# Patient Record
Sex: MALE | Race: BLACK OR AFRICAN AMERICAN | Employment: FULL TIME | URBAN - METROPOLITAN AREA
[De-identification: names, ages, dates, MRNs, and addresses within clinical notes are randomized per-mention and may not be internally consistent; named-entity substitution may affect disease eponyms.]

---

## 2017-10-12 ENCOUNTER — HOSPITAL ENCOUNTER (EMERGENCY)
Age: 27
Discharge: HOME | End: 2017-10-12
Attending: EMERGENCY MEDICINE
Payer: SELF-PAY

## 2017-10-12 VITALS
HEART RATE: 91 BPM | DIASTOLIC BLOOD PRESSURE: 85 MMHG | RESPIRATION RATE: 16 BRPM | OXYGEN SATURATION: 100 % | TEMPERATURE: 98.4 F | SYSTOLIC BLOOD PRESSURE: 137 MMHG | HEIGHT: 69 IN | BODY MASS INDEX: 30.81 KG/M2 | WEIGHT: 208 LBS

## 2017-10-12 DIAGNOSIS — B02.9 HERPES ZOSTER WITHOUT COMPLICATION: Primary | ICD-10-CM

## 2017-10-12 PROCEDURE — 99283 EMERGENCY DEPT VISIT LOW MDM: CPT

## 2017-10-12 PROCEDURE — 74011250637 HC RX REV CODE- 250/637: Performed by: PHYSICIAN ASSISTANT

## 2017-10-12 RX ORDER — ACYCLOVIR 200 MG/1
800 CAPSULE ORAL ONCE
Status: COMPLETED | OUTPATIENT
Start: 2017-10-12 | End: 2017-10-12

## 2017-10-12 RX ORDER — ACYCLOVIR 800 MG/1
800 TABLET ORAL
Qty: 50 TAB | Refills: 0 | Status: SHIPPED | OUTPATIENT
Start: 2017-10-12 | End: 2017-10-22

## 2017-10-12 RX ADMIN — ACYCLOVIR 800 MG: 200 CAPSULE ORAL at 20:32

## 2017-10-12 NOTE — ED TRIAGE NOTES
Amb into ed w/ reports onset blistery rash to R neck and R chest area 2 days ago - w/ irritation noted before rash broke out.  + itchiness/+ pain w/ r sided h/a.

## 2017-10-13 NOTE — ED PROVIDER NOTES
HPI Comments: 8:25 PM    Kassie Tuttle is a 28 y.o. male presenting to the ED C/O gradually worsening rash to the right side of his neck and right chest onset ~2 days ago. Pt describes the rash as painful, blistery, and itchy. Pain rated 5/10. Pt has had chicken pox in the past. Pt suspects he may have shingles. No facial involvement. Pt has no known allergies and no medical problems. Pt denies any other symptoms or complaints. Written by GERARD Vasquez, as dictated by Petros Moreira PA-C     Patient is a 28 y.o. male presenting with rash. The history is provided by the patient. No  was used. Rash    This is a new problem. The current episode started 2 days ago. The problem has been gradually worsening. There has been no fever. The rash is present on the chest and neck. The pain is at a severity of 5/10. Associated symptoms include blisters, itching and pain. History reviewed. No pertinent past medical history. History reviewed. No pertinent surgical history. History reviewed. No pertinent family history. Social History     Social History    Marital status: SINGLE     Spouse name: N/A    Number of children: N/A    Years of education: N/A     Occupational History    Not on file. Social History Main Topics    Smoking status: Current Every Day Smoker     Packs/day: 0.25    Smokeless tobacco: Never Used    Alcohol use Yes      Comment: socially    Drug use: No    Sexual activity: Not on file     Other Topics Concern    Not on file     Social History Narrative         ALLERGIES: Review of patient's allergies indicates no known allergies. Review of Systems   HENT: Negative for facial swelling. Respiratory: Negative for shortness of breath. Musculoskeletal: Positive for neck pain. Skin: Positive for color change, itching and rash (Right side of neck & right chest with pain, blistering, and itchiness). Negative for wound.    Hematological: Negative for adenopathy. Vitals:    10/12/17 1958   BP: 137/85   Pulse: 91   Resp: 16   Temp: 98.4 °F (36.9 °C)   SpO2: 100%   Weight: 94.3 kg (208 lb)   Height: 5' 9\" (1.753 m)            Physical Exam   Constitutional: He is oriented to person, place, and time. He appears well-developed and well-nourished. No distress. Male in NAD. Alert. Appears comfortable. In hallway bed   HENT:   Head: Normocephalic and atraumatic. Head is without right periorbital erythema and without left periorbital erythema. Right Ear: External ear normal.   Left Ear: External ear normal.   Nose: Nose normal.   Mouth/Throat: Uvula is midline, oropharynx is clear and moist and mucous membranes are normal. No oral lesions. No trismus in the jaw. Eyes: Conjunctivae are normal. Right eye exhibits no discharge. Left eye exhibits no discharge. No scleral icterus. Neck: Normal range of motion. Cardiovascular: Normal rate, regular rhythm, normal heart sounds and intact distal pulses. Exam reveals no gallop and no friction rub. No murmur heard. Pulmonary/Chest: Effort normal and breath sounds normal. No accessory muscle usage. No tachypnea. No respiratory distress. He has no decreased breath sounds. He has no wheezes. He has no rhonchi. He has no rales. Abdominal: Bowel sounds are normal.   Musculoskeletal: Normal range of motion. Neurological: He is alert and oriented to person, place, and time. Skin: Skin is warm and dry. Rash noted. Rash is vesicular. He is not diaphoretic. There is erythema. Psychiatric: He has a normal mood and affect. Judgment normal.   Nursing note and vitals reviewed. RESULTS:    No orders to display        Labs Reviewed - No data to display    No results found for this or any previous visit (from the past 12 hour(s)). MDM  Number of Diagnoses or Management Options  Herpes zoster without complication:   Diagnosis management comments: Zoster, tinea, eczema, dermatitis, scabies.  Doubt cellulitis. ED Course     Medications   acyclovir (ZOVIRAX) capsule 800 mg (800 mg Oral Given 10/12/17 2032)      Procedures    PROGRESS NOTE:  8:25 PM  Initial assessment performed. Most c/w zoster. Will tx sxs. No nasociliary involvement. Reasons to RTED discussed with pt. All questions answered. Pt feels comfortable going home at this time. Pt expressed understanding and he agrees with plan. DISCHARGE NOTE:   8:30 PM  Pt has been reexamined. Patient has no new complaints, changes, or physical findings. Care plan outlined and precautions discussed. Results were reviewed with the patient. All medications were reviewed with the patient; will d/c home with Zovirax. All of pt's questions and concerns were addressed. Patient was instructed and agrees to follow up with [facility], as well as to return to the ED upon further deterioration. Patient is ready to go home. CLINICAL IMPRESSION:    1. Herpes zoster without complication        PLAN: DISCHARGE HOME    Follow-up Information     Follow up With Details Comments Contact Info    [facility] CLINIC Schedule an appointment as soon as possible for a visit in 2 days For primary care follow [place] [id number] [facility], [address] [facility] [address] [facility]    [facility] EMERGENCY DEPT  As needed, If symptoms worsen [address] [facility] [address]  [phone]          Discharge Medication List as of 10/12/2017  8:33 PM          ATTESTATION:  This note is prepared by Verline Meckel, acting as Scribe for Letitia Rivera PA-C. Letitia Rivera PA-C: The scribe's documentation has been prepared under my direction and personally reviewed by me in its entirety. I confirm that the note above accurately reflects all work, treatment, procedures, and medical decision making performed by me.

## 2017-10-13 NOTE — DISCHARGE INSTRUCTIONS

## 2017-10-31 ENCOUNTER — HOSPITAL ENCOUNTER (EMERGENCY)
Age: 27
Discharge: HOME | End: 2017-10-31
Attending: EMERGENCY MEDICINE
Payer: SELF-PAY

## 2017-10-31 VITALS
HEART RATE: 73 BPM | BODY MASS INDEX: 30.51 KG/M2 | SYSTOLIC BLOOD PRESSURE: 132 MMHG | DIASTOLIC BLOOD PRESSURE: 93 MMHG | WEIGHT: 206 LBS | OXYGEN SATURATION: 100 % | TEMPERATURE: 98.2 F | HEIGHT: 69 IN | RESPIRATION RATE: 16 BRPM

## 2017-10-31 DIAGNOSIS — B02.8 HERPES ZOSTER WITH OTHER COMPLICATION: Primary | ICD-10-CM

## 2017-10-31 PROCEDURE — 99281 EMR DPT VST MAYX REQ PHY/QHP: CPT

## 2017-10-31 RX ORDER — HYDROCODONE BITARTRATE AND ACETAMINOPHEN 5; 325 MG/1; MG/1
1 TABLET ORAL
Qty: 10 TAB | Refills: 0 | Status: SHIPPED | OUTPATIENT
Start: 2017-10-31 | End: 2018-02-02

## 2017-10-31 NOTE — DISCHARGE INSTRUCTIONS
Take Vicodin for severe pain otherwise Tylenol or Motrin   Return to the ED for severe increase in pain, worsening rash, further rash near the face or the eye or worsening of symptoms  Follow up as directed

## 2017-10-31 NOTE — LETTER
[facility] [place] 
[facility] EMERGENCY DEPT 
[address] [address] 
[phone] Work/School Note Date: 10/31/2017 To Whom It May concern: 
 
Carolynn Javier was seen and treated today in the emergency room by the following provider(s): 
Attending Provider: Vero Mcallister MD 
Nurse Practitioner: Micky Desouza NP. Carolynn Javier may return to work on in 1-2 days;. Sincerely, Micky Desouza NP

## 2017-10-31 NOTE — ED PROVIDER NOTES
[address]  EMERGENCY DEPARTMENT HISTORY AND PHYSICAL EXAM       Date: 10/31/2017   Patient Name: Mary Handley   YOB: 1990  Medical Record Number: [id number]    History of Presenting Illness     Chief Complaint   Patient presents with    Rash        History Provided By:  Patient     Additional History: 12:36 PM  Mary Handley is a 28 y.o. male who presents to the emergency department C/O a gradually improving rash to his right chest and shoulder onset ~3 weeks ago. Pain rated 8/10. Associated Sx include burning and itching to the affected area. Pt reports he was seen here and diagnosed with singles ~2.5 weeks ago but that the pain has continued after antivirals. Pt has no known allergies. Pt denies any other Sx or complaints at this time. Primary Care Provider: None   Specialist:    Past History     Past Medical History:   No past medical history on file. Past Surgical History:   No past surgical history on file. Family History:   No family history on file. Social History:   Social History   Substance Use Topics    Smoking status: Current Every Day Smoker     Packs/day: 0.25    Smokeless tobacco: Never Used    Alcohol use Yes      Comment: socially        Allergies:   No Known Allergies     Review of Systems   Review of Systems   Skin: Positive for rash (Right chest and shoulder with pain, burning, and itching). All other systems reviewed and are negative. Physical Exam  Vitals:    10/31/17 1230   BP: (!) 132/93   Pulse: 73   Resp: 16   Temp: 98.2 °F (36.8 °C)   SpO2: 100%   Weight: 93.4 kg (206 lb)   Height: 5' 9\" (1.753 m)       Physical Exam   Constitutional: He is oriented to person, place, and time. He appears well-developed and well-nourished. HENT:   Head: Normocephalic. Eyes: Conjunctivae are normal.   Neck: Normal range of motion. Pulmonary/Chest: Effort normal.   Musculoskeletal: Normal range of motion.    Neurological: He is alert and oriented to person, place, and time. Skin: Skin is warm and dry. Rash noted. Rash is vesicular. There is erythema. Nursing note and vitals reviewed. Impression: Patient c/o continued neuropathic pain but admits the rash has gotten much better since antivirals. Patient given limited amount of norco for severe pain. He understands reasons to return to the ED and is offering no questions or concerns at this time. Diagnostic Study Results     Labs -    No results found for this or any previous visit (from the past 12 hour(s)). Radiologic Studies -  The following have been ordered and reviewed:  No orders to display       Medical Decision Making   I am the first provider for this patient. I reviewed the vital signs, available nursing notes, past medical history, past surgical history, family history and social history. Vital Signs-Reviewed the patient's vital signs. Patient Vitals for the past 12 hrs:   Temp Pulse Resp BP SpO2   10/31/17 1230 98.2 °F (36.8 °C) 73 16 (!) 132/93 100 %     Pulse Oximetry Analysis - Normal 100% on room air     Procedures:   Procedures    ED Course:  12:36 PM  Initial assessment performed. The patients presenting problems have been discussed, and they are in agreement with the care plan formulated and outlined with them. I have encouraged them to ask questions as they arise throughout their visit. Medications Given in the ED:  Medications - No data to display    Discharge Note:  12:47 PM  Pt has been reexamined. Patient has no new complaints, changes, or physical findings. Care plan outlined and precautions discussed. All medications were reviewed with the patient; will d/c home with [place]. All of pt's questions and concerns were addressed. Patient was instructed and agrees to follow up with [facility], as well as to return to the ED upon further deterioration. Patient is ready to go home. Diagnosis   Clinical Impression:   1.  Herpes zoster with other complication           Follow-up Information     Follow up With Details Comments Contact Info    [facility] CLINIC Schedule an appointment as soon as possible for a visit in 2 days For primary care follow up [address] [facility], [address] [facility] [address]    [facility] EMERGENCY DEPT  As needed, If symptoms worsen [address] [place]  [phone]          Current Discharge Medication List      START taking these medications    Details   HYDROcodone-acetaminophen (NORCO) 5-325 mg per tablet Take 1 Tab by mouth every four (4) hours as needed for Pain. Max Daily Amount: 6 Tabs. Qty: 10 Tab, Refills: 0             _______________________________   Attestations: This note is prepared by Arthur Cam, acting as a Scribe for CANDE Arciniega on 12:30 PM on 10/31/2017. CANDE Arciniega: The scribe's documentation has been prepared under my direction and personally reviewed by me in its entirety.   _______________________________

## 2017-10-31 NOTE — ED TRIAGE NOTES
C/o rash, seen here on 10/12/17 dx'd with shingles, states he con't to have pain, burning, itching, denies having f/u.

## 2018-02-02 ENCOUNTER — HOSPITAL ENCOUNTER (EMERGENCY)
Age: 28
Discharge: HOME | End: 2018-02-02
Attending: EMERGENCY MEDICINE
Payer: SELF-PAY

## 2018-02-02 VITALS
RESPIRATION RATE: 18 BRPM | HEART RATE: 100 BPM | BODY MASS INDEX: 32.58 KG/M2 | TEMPERATURE: 99.6 F | HEIGHT: 68 IN | OXYGEN SATURATION: 99 % | DIASTOLIC BLOOD PRESSURE: 81 MMHG | SYSTOLIC BLOOD PRESSURE: 140 MMHG | WEIGHT: 215 LBS

## 2018-02-02 DIAGNOSIS — B34.9 VIRAL ILLNESS: Primary | ICD-10-CM

## 2018-02-02 PROCEDURE — 87081 CULTURE SCREEN ONLY: CPT | Performed by: EMERGENCY MEDICINE

## 2018-02-02 PROCEDURE — 99282 EMERGENCY DEPT VISIT SF MDM: CPT

## 2018-02-02 RX ORDER — ONDANSETRON 4 MG/1
4 TABLET, ORALLY DISINTEGRATING ORAL
Qty: 12 TAB | Refills: 0 | Status: SHIPPED | OUTPATIENT
Start: 2018-02-02 | End: 2020-01-01

## 2018-02-02 NOTE — DISCHARGE INSTRUCTIONS
Viral Infections: Care Instructions  Your Care Instructions    You don't feel well, but it's not clear what's causing it. You may have a viral infection. Viruses cause many illnesses, such as the common cold, influenza, fever, rashes, and the diarrhea, nausea, and vomiting that are often called \"stomach flu. \" You may wonder if antibiotic medicines could make you feel better. But antibiotics only treat infections caused by bacteria. They don't work on viruses. The good news is that viral infections usually aren't serious. Most will go away in a few days without medical treatment. In the meantime, there are a few things you can do to make yourself more comfortable. Follow-up care is a key part of your treatment and safety. Be sure to make and go to all appointments, and call your doctor if you are having problems. It's also a good idea to know your test results and keep a list of the medicines you take. How can you care for yourself at home? · Get plenty of rest if you feel tired. · Take an over-the-counter pain medicine if needed, such as acetaminophen (Tylenol), ibuprofen (Advil, Motrin), or naproxen (Aleve). Read and follow all instructions on the label. · Be careful when taking over-the-counter cold or flu medicines and Tylenol at the same time. Many of these medicines have acetaminophen, which is Tylenol. Read the labels to make sure that you are not taking more than the recommended dose. Too much acetaminophen (Tylenol) can be harmful. · Drink plenty of fluids, enough so that your urine is light yellow or clear like water. If you have kidney, heart, or liver disease and have to limit fluids, talk with your doctor before you increase the amount of fluids you drink. · Stay home from work, school, and other public places while you have a fever. When should you call for help? Call 911 anytime you think you may need emergency care. For example, call if:  ? · You have severe trouble breathing.    ? · You passed out (lost consciousness). ?Call your doctor now or seek immediate medical care if:  ? · You seem to be getting much sicker. ? · You have a new or higher fever. ? · You have blood in your stools. ? · You have new belly pain, or your pain gets worse. ? · You have a new rash. ? Watch closely for changes in your health, and be sure to contact your doctor if:  ? · You start to get better and then get worse. ? · You do not get better as expected. Where can you learn more? Go to http://garry-tejinder.info/. Enter L190 in the search box to learn more about \"Viral Infections: Care Instructions. \"  Current as of: March 3, 2017  Content Version: 11.4  © 9983-6744 [employer] Care instructions adapted under license by [employer] (which disclaims liability or warranty for this information). If you have questions about a medical condition or this instruction, always ask your healthcare professional. [address] any warranty or liability for your use of this information.

## 2018-02-02 NOTE — ED PROVIDER NOTES
EMERGENCY DEPARTMENT HISTORY AND PHYSICAL EXAM    Date: 2/2/2018  Patient Name: Jovana Pelletier    History of Presenting Illness     Chief Complaint   Patient presents with    Nausea    Vomiting    Diarrhea         History Provided By: Patient    Chief Complaint: URI like sxs  Duration: 1.5 Weeks  Timing:  Progressive  Modifying Factors: Zofran with relief  Associated Symptoms: lightheadedness, dizziness, subjective fever, diaphoresis, headache, sore throat, mild abdominal pain, and generalized body aches    Additional History (Context):   3:49 PM   Jovana Pelletier is a 28 y.o. male who presents to the emergency department C/O URI like sxs starting 1.5 weeks ago. Sxs started with dizziness and lightheadedness and progressed to include subjective fever, diaphoresis, headache, sore throat, hoarse voice, mild abdominal pain, nausea, vomiting, diarrhea, and generalized body aches. Pt has taken Zofran with some relief. Reports recent ill contacts: mother. Pt denies congestion, cough, hematochezia, and any other sxs or complaints. PCP: None        Past History     Past Medical History:  History reviewed. No pertinent past medical history. Past Surgical History:  History reviewed. No pertinent surgical history. Family History:  History reviewed. No pertinent family history. Social History:  Social History   Substance Use Topics    Smoking status: Current Every Day Smoker     Packs/day: 0.25    Smokeless tobacco: Never Used    Alcohol use Yes      Comment: socially       Allergies:  No Known Allergies      Review of Systems   Review of Systems   Constitutional: Positive for diaphoresis and fever (subjective). HENT: Positive for sore throat and voice change (hoarse). Negative for congestion. Respiratory: Negative for cough. Gastrointestinal: Positive for abdominal pain (mild ), diarrhea, nausea and vomiting. Negative for blood in stool.    Musculoskeletal: Positive for myalgias (generalized body aches). All other systems reviewed and are negative. Physical Exam     Vitals:    02/02/18 1515   BP: 140/81   Pulse: 100   Resp: 18   Temp: 99.6 °F (37.6 °C)   SpO2: 99%   Weight: 97.5 kg (215 lb)   Height: 5' 8\" (1.727 m)     Physical Exam   Nursing note and vitals reviewed. Vital signs and nursing notes reviewed. CONSTITUTIONAL: Alert. Well-appearing; well-nourished; in no apparent distress. HEAD: Normocephalic; atraumatic. EYES: PERRL; Conjunctiva clear. ENT: TM's normal. External ear normal. Normal nose; no rhinorrhea. Normal pharynx. Tonsils not enlarged without exudate. Moist mucus membranes. NECK: Supple; FROM without difficulty, non-tender; no cervical lymphadenopathy. CV: Normal S1, S2; no murmurs, rubs, or gallops. No chest wall tenderness. RESPIRATORY: Normal chest excursion with respiration; breath sounds clear and equal bilaterally; no wheezes, rhonchi, or rales. GI: Normal bowel sounds; non-distended; non-tender; no guarding or rigidity; no palpable organomegaly. No CVA tenderness. EXT: Normal ROM in all four extremities; non-tender to palpation. SKIN: Normal for age and race; warm; dry; good turgor; no apparent lesions or exudate. NEURO: A & O x3. PSYCH:  Mood and affect appropriate. Diagnostic Study Results     Labs -   No results found for this or any previous visit (from the past 12 hour(s)). Radiologic Studies -   No orders to display     CT Results  (Last 48 hours)    None        CXR Results  (Last 48 hours)    None          Medications given in the ED-  Medications - No data to display      Medical Decision Making   I am the first provider for this patient. I reviewed the vital signs, available nursing notes, past medical history, past surgical history, family history and social history. Vital Signs-Reviewed the patient's vital signs.     Pulse Oximetry Analysis - 99% on room air     Records Reviewed: Nursing Notes      Procedures:  Procedures    ED Course:   3:49 PM Initial assessment performed. The patients presenting problems have been discussed, and they are in agreement with the care plan formulated and outlined with them. I have encouraged them to ask questions as they arise throughout their visit. Diagnosis and Disposition       DISCHARGE NOTE:  4:07 PM   Sergey Lozada's  results have been reviewed with him. He has been counseled regarding his diagnosis, treatment, and plan. He verbally conveys understanding and agreement of the signs, symptoms, diagnosis, treatment and prognosis and additionally agrees to follow up as discussed. He also agrees with the care-plan and conveys that all of his questions have been answered. I have also provided discharge instructions for him that include: educational information regarding their diagnosis and treatment, and list of reasons why they would want to return to the ED prior to their follow-up appointment, should his condition change. He has been provided with education for proper emergency department utilization. CLINICAL IMPRESSION:    1. Viral illness        PLAN:  1. D/C Home  2. Discharge Medication List as of 2/2/2018  4:08 PM      START taking these medications    Details   ondansetron (ZOFRAN ODT) 4 mg disintegrating tablet Take 1 Tab by mouth every eight (8) hours as needed for Nausea., Normal, Disp-12 Tab, R-0           3. Follow-up Information     Follow up With Details Comments Contact Info    [facility] CLINIC Schedule an appointment as soon as possible for a visit in 2 days For primary care follow up at [facility] [address] [facility], [address] [facility] [address] [facility] [address]    [facility] EMERGENCY DEPT  As needed, If symptoms worsen [address] [address] [phone]        _______________________________    Attestations:   This note is prepared by Marissa Salgado, acting as Scribe for [employer], [employer].    [employer]:  The scribe's documentation has been prepared under my direction and personally reviewed by me in its entirety.   I confirm that the note above accurately reflects all work, treatment, procedures, and medical decision making performed by me.  _______________________________

## 2018-02-02 NOTE — ED TRIAGE NOTES
Patient with complaints of nausea, vomiting and diarrhea x1 week. Patient states he has been able to eat and keep fluids down.

## 2018-02-02 NOTE — ED NOTES
See scanned documents for pt signing that he rec'd discharge instructions. Pt given discharge instructions by AUDI Domínguez. Pt discharged home ambulatory. No distress noted.

## 2018-02-04 LAB
B-HEM STREP THROAT QL CULT: NEGATIVE
B-HEM STREP THROAT QL CULT: NORMAL
BACTERIA SPEC CULT: NORMAL
SERVICE CMNT-IMP: NORMAL

## 2018-03-08 ENCOUNTER — HOSPITAL ENCOUNTER (EMERGENCY)
Age: 28
Discharge: HOME | End: 2018-03-08
Attending: INTERNAL MEDICINE
Payer: SELF-PAY

## 2018-03-08 VITALS
TEMPERATURE: 98 F | RESPIRATION RATE: 16 BRPM | BODY MASS INDEX: 28.79 KG/M2 | HEART RATE: 100 BPM | HEIGHT: 68 IN | WEIGHT: 190 LBS | SYSTOLIC BLOOD PRESSURE: 131 MMHG | DIASTOLIC BLOOD PRESSURE: 92 MMHG | OXYGEN SATURATION: 100 %

## 2018-03-08 DIAGNOSIS — D72.819 LEUKOPENIA, UNSPECIFIED TYPE: ICD-10-CM

## 2018-03-08 DIAGNOSIS — N39.0 URINARY TRACT INFECTION WITHOUT HEMATURIA, SITE UNSPECIFIED: ICD-10-CM

## 2018-03-08 DIAGNOSIS — R21 RASH: Primary | ICD-10-CM

## 2018-03-08 LAB
ALBUMIN SERPL-MCNC: 2.6 G/DL (ref 3.4–5)
ALBUMIN/GLOB SERPL: 0.4 {RATIO} (ref 0.8–1.7)
ALP SERPL-CCNC: 143 U/L (ref 45–117)
ALT SERPL-CCNC: 26 U/L (ref 16–61)
ANION GAP SERPL CALC-SCNC: 6 MMOL/L (ref 3–18)
APPEARANCE UR: CLEAR
AST SERPL-CCNC: 20 U/L (ref 15–37)
BACTERIA URNS QL MICRO: ABNORMAL /HPF
BASOPHILS # BLD: 0 K/UL (ref 0–0.06)
BASOPHILS NFR BLD: 0 % (ref 0–2)
BILIRUB SERPL-MCNC: 0.2 MG/DL (ref 0.2–1)
BILIRUB UR QL: NEGATIVE
BUN SERPL-MCNC: 9 MG/DL (ref 7–18)
BUN/CREAT SERPL: 7 (ref 12–20)
CALCIUM SERPL-MCNC: 8.4 MG/DL (ref 8.5–10.1)
CHLORIDE SERPL-SCNC: 102 MMOL/L (ref 100–108)
CO2 SERPL-SCNC: 31 MMOL/L (ref 21–32)
COLOR UR: YELLOW
CREAT SERPL-MCNC: 1.21 MG/DL (ref 0.6–1.3)
DIFFERENTIAL METHOD BLD: ABNORMAL
EOSINOPHIL # BLD: 0.1 K/UL (ref 0–0.4)
EOSINOPHIL NFR BLD: 1 % (ref 0–5)
EPITH CASTS URNS QL MICRO: ABNORMAL /LPF (ref 0–5)
ERYTHROCYTE [DISTWIDTH] IN BLOOD BY AUTOMATED COUNT: 13.9 % (ref 11.6–14.5)
GLOBULIN SER CALC-MCNC: 6.7 G/DL (ref 2–4)
GLUCOSE SERPL-MCNC: 82 MG/DL (ref 74–99)
GLUCOSE UR STRIP.AUTO-MCNC: NEGATIVE MG/DL
HCT VFR BLD AUTO: 31.8 % (ref 36–48)
HGB BLD-MCNC: 10.4 G/DL (ref 13–16)
HGB UR QL STRIP: NEGATIVE
KETONES UR QL STRIP.AUTO: ABNORMAL MG/DL
LEUKOCYTE ESTERASE UR QL STRIP.AUTO: ABNORMAL
LYMPHOCYTES # BLD: 1.3 K/UL (ref 0.9–3.6)
LYMPHOCYTES NFR BLD: 29 % (ref 21–52)
MCH RBC QN AUTO: 27.7 PG (ref 24–34)
MCHC RBC AUTO-ENTMCNC: 32.7 G/DL (ref 31–37)
MCV RBC AUTO: 84.8 FL (ref 74–97)
MONOCYTES # BLD: 0.4 K/UL (ref 0.05–1.2)
MONOCYTES NFR BLD: 9 % (ref 3–10)
NEUTS SEG # BLD: 2.6 K/UL (ref 1.8–8)
NEUTS SEG NFR BLD: 61 % (ref 40–73)
NITRITE UR QL STRIP.AUTO: NEGATIVE
PH UR STRIP: 6 [PH] (ref 5–8)
PLATELET # BLD AUTO: 337 K/UL (ref 135–420)
PMV BLD AUTO: 9.1 FL (ref 9.2–11.8)
POTASSIUM SERPL-SCNC: 4.2 MMOL/L (ref 3.5–5.5)
PROT SERPL-MCNC: 9.3 G/DL (ref 6.4–8.2)
PROT UR STRIP-MCNC: 30 MG/DL
RBC # BLD AUTO: 3.75 M/UL (ref 4.7–5.5)
RBC #/AREA URNS HPF: NEGATIVE /HPF (ref 0–5)
SODIUM SERPL-SCNC: 139 MMOL/L (ref 136–145)
SP GR UR REFRACTOMETRY: >1.03 (ref 1–1.03)
UROBILINOGEN UR QL STRIP.AUTO: 1 EU/DL (ref 0.2–1)
WBC # BLD AUTO: 4.3 K/UL (ref 4.6–13.2)
WBC URNS QL MICRO: ABNORMAL /HPF (ref 0–5)

## 2018-03-08 PROCEDURE — 86592 SYPHILIS TEST NON-TREP QUAL: CPT | Performed by: PHYSICIAN ASSISTANT

## 2018-03-08 PROCEDURE — 74011250636 HC RX REV CODE- 250/636: Performed by: PHYSICIAN ASSISTANT

## 2018-03-08 PROCEDURE — 86780 TREPONEMA PALLIDUM: CPT | Performed by: PHYSICIAN ASSISTANT

## 2018-03-08 PROCEDURE — 81001 URINALYSIS AUTO W/SCOPE: CPT | Performed by: PHYSICIAN ASSISTANT

## 2018-03-08 PROCEDURE — 74011250637 HC RX REV CODE- 250/637: Performed by: PHYSICIAN ASSISTANT

## 2018-03-08 PROCEDURE — 87491 CHLMYD TRACH DNA AMP PROBE: CPT | Performed by: PHYSICIAN ASSISTANT

## 2018-03-08 PROCEDURE — 80053 COMPREHEN METABOLIC PANEL: CPT | Performed by: PHYSICIAN ASSISTANT

## 2018-03-08 PROCEDURE — 96372 THER/PROPH/DIAG INJ SC/IM: CPT

## 2018-03-08 PROCEDURE — 87086 URINE CULTURE/COLONY COUNT: CPT | Performed by: PHYSICIAN ASSISTANT

## 2018-03-08 PROCEDURE — 87040 BLOOD CULTURE FOR BACTERIA: CPT | Performed by: PHYSICIAN ASSISTANT

## 2018-03-08 PROCEDURE — 99284 EMERGENCY DEPT VISIT MOD MDM: CPT

## 2018-03-08 PROCEDURE — 85025 COMPLETE CBC W/AUTO DIFF WBC: CPT | Performed by: PHYSICIAN ASSISTANT

## 2018-03-08 PROCEDURE — 86593 SYPHILIS TEST NON-TREP QUANT: CPT | Performed by: PHYSICIAN ASSISTANT

## 2018-03-08 RX ORDER — SULFAMETHOXAZOLE AND TRIMETHOPRIM 800; 160 MG/1; MG/1
2 TABLET ORAL 2 TIMES DAILY
Qty: 40 TAB | Refills: 0 | Status: SHIPPED | OUTPATIENT
Start: 2018-03-08 | End: 2018-03-18

## 2018-03-08 RX ORDER — SULFAMETHOXAZOLE AND TRIMETHOPRIM 800; 160 MG/1; MG/1
2 TABLET ORAL
Status: COMPLETED | OUTPATIENT
Start: 2018-03-08 | End: 2018-03-08

## 2018-03-08 RX ADMIN — SULFAMETHOXAZOLE AND TRIMETHOPRIM 2 TABLET: 800; 160 TABLET ORAL at 22:27

## 2018-03-08 RX ADMIN — PENICILLIN G BENZATHINE 2.4 MILLION UNITS: 1200000 INJECTION, SUSPENSION INTRAMUSCULAR at 20:15

## 2018-03-09 LAB
RPR SER QL: REACTIVE
RPR SER-TITR: NORMAL {TITER}

## 2018-03-09 NOTE — ED TRIAGE NOTES
Pt reports to ED c/c \"sores all over my body\" onset 1 month ago. Pt appears to have multiple lesions and raised areas on palms of hands, bilateral arms, posterior and anterior trunk, also face.

## 2018-03-09 NOTE — DISCHARGE INSTRUCTIONS
Urinary Tract Infections in Men: Care Instructions  Your Care Instructions    A urinary tract infection, or UTI, is a general term for an infection anywhere between the kidneys and the tip of the penis. UTIs can also be a result of a prostate problem. Most cause pain or burning when you urinate. Most UTIs are caused by bacteria and can be cured with antibiotics. It is important to complete your treatment so that the infection does not get worse. Follow-up care is a key part of your treatment and safety. Be sure to make and go to all appointments, and call your doctor if you are having problems. It's also a good idea to know your test results and keep a list of the medicines you take. How can you care for yourself at home? · Take your antibiotics as prescribed. Do not stop taking them just because you feel better. You need to take the full course of antibiotics. · Take your medicines exactly as prescribed. Your doctor may have prescribed a medicine, such as phenazopyridine (Pyridium), to help relieve pain when you urinate. This turns your urine orange. You may stop taking it when your symptoms get better. But be sure to take all of your antibiotics, which treat the infection. · Drink extra water for the next day or two. This will help make the urine less concentrated and help wash out the bacteria causing the infection. (If you have kidney, heart, or liver disease and have to limit your fluids, talk with your doctor before you increase your fluid intake.)  · Avoid drinks that are carbonated or have caffeine. They can irritate the bladder. · Urinate often. Try to empty your bladder each time. · To relieve pain, take a hot bath or lay a heating pad (set on low) over your lower belly or genital area. Never go to sleep with a heating pad in place. To help prevent UTIs  · Drink plenty of fluids, enough so that your urine is light yellow or clear like water.  If you have kidney, heart, or liver disease and have to limit fluids, talk with your doctor before you increase the amount of fluids you drink. · Urinate when you have the urge. Do not hold your urine for a long time. Urinate before you go to sleep. · Keep your penis clean. Catheter care  If you have a drainage tube (catheter) in place, the following steps will help you care for it. · Always wash your hands before and after touching your catheter. · Check the area around the urethra for inflammation or signs of infection. Signs of infection include irritated, swollen, red, or tender skin, or pus around the catheter. · Clean the area around the catheter with soap and water two times a day. Dry with a clean towel afterward. · Do not apply powder or lotion to the skin around the catheter. To empty the urine collection bag  · Wash your hands with soap and water. · Without touching the drain spout, remove the spout from its sleeve at the bottom of the collection bag. Open the valve on the spout. · Let the urine flow out of the bag and into the toilet or a container. Do not let the tubing or drain spout touch anything. · After you empty the bag, clean the end of the drain spout with tissue and water. Close the valve and put the drain spout back into its sleeve at the bottom of the collection bag. · Wash your hands with soap and water. When should you call for help? Call your doctor now or seek immediate medical care if:  ? · Symptoms such as a fever, chills, nausea, or vomiting get worse or happen for the first time. ? · You have new pain in your back just below your rib cage. This is called flank pain. ? · There is new blood or pus in your urine. ? · You are not able to take or keep down your antibiotics. ? Watch closely for changes in your health, and be sure to contact your doctor if:  ? · You are not getting better after taking an antibiotic for 2 days. ? · Your symptoms go away but then come back. Where can you learn more?   Go to http://garry-tejinder.info/. Enter E570 in the search box to learn more about \"Urinary Tract Infections in Men: Care Instructions. \"  Current as of: May 12, 2017  Content Version: 11.4  © 9178-8347 [employer]. Care instructions adapted under license by [employer] (which disclaims liability or warranty for this information). If you have questions about a medical condition or this instruction, always ask your healthcare professional. [address] any warranty or liability for your use of this information. Rash: Care Instructions  Your Care Instructions  A rash is any irritation or inflammation of the skin. Rashes have many possible causes, including allergy, infection, illness, heat, and emotional stress. Follow-up care is a key part of your treatment and safety. Be sure to make and go to all appointments, and call your doctor if you are having problems. It's also a good idea to know your test results and keep a list of the medicines you take. How can you care for yourself at home? · Wash the area with water only. Soap can make dryness and itching worse. Pat dry. · Put cold, wet cloths on the rash to reduce itching. · Keep cool, and stay out of the sun. · Leave the rash open to the air as much of the time as possible. · Sometimes petroleum jelly (Vaseline) can help relieve the discomfort caused by a rash. A moisturizing lotion, such as Cetaphil, also may help. Calamine lotion may help for rashes caused by contact with something (such as a plant or soap) that irritated the skin. Use it 3 or 4 times a day. · If your doctor prescribed a cream, use it as directed. If your doctor prescribed medicine, take it exactly as directed. · If your rash itches so badly that it interferes with your normal activities, take an over-the-counter antihistamine, such as diphenhydramine (Benadryl) or loratadine (Claritin).  Read and follow all instructions on the label.  When should you call for help? Call your doctor now or seek immediate medical care if:  ? · You have signs of infection, such as:  ¨ Increased pain, swelling, warmth, or redness. ¨ Red streaks leading from the area. ¨ Pus draining from the area. ¨ A fever. ? · You have joint pain along with the rash. ? Watch closely for changes in your health, and be sure to contact your doctor if:  ? · Your rash is changing or getting worse. For example, call if you have pain along with the rash, the rash is spreading, or you have new blisters. ? · You do not get better after 1 week. Where can you learn more? Go to http://garry-tejinder.info/. Enter T237 in the search box to learn more about \"Rash: Care Instructions. \"  Current as of: October 13, 2016  Content Version: 11.4  © 1068-8319 [employer]. Care instructions adapted under license by [employer] (which disclaims liability or warranty for this information). If you have questions about a medical condition or this instruction, always ask your healthcare professional. [address] any warranty or liability for your use of this information.

## 2018-03-09 NOTE — ED PROVIDER NOTES
EMERGENCY DEPARTMENT HISTORY AND PHYSICAL EXAM    Date: 3/8/2018  Patient Name: Julianna Wright    History of Presenting Illness     Chief Complaint   Patient presents with    Skin Problem         History Provided By: Patient    Chief Complaint: Rash  Duration: 2 Days  Timing:  Progressive  Location: All over body  Modifying Factors: Pt states no worsening or relieving sx  Associated Symptoms: denies any other associated signs or symptoms    Additional History (Context):   7:58 PM  Julianna Wright is a 33 y.o. male who presents to the emergency department C/O progressing rash on all over body including hands and feet that started on the left leg onset 2 weeks. Pt states this is not painful like when he had the shingles on his neck and back 6 months ago. Associated sxs include painful lesions on left leg. Pt states he has not had sex in 7 years. Pt states he was tested for STDs/HIV 10 months ago and came back negative. Pt was here 1.5 months ago with flu like sx. Pt denies any allergies, any medical hx, previous STDs, urinary sx, penile discharge, sores on genitals, oral lesions, nausea, vomiting and any other sxs or complaints. PCP: None    Current Outpatient Prescriptions   Medication Sig Dispense Refill    trimethoprim-sulfamethoxazole (BACTRIM DS) 160-800 mg per tablet Take 2 Tabs by mouth two (2) times a day for 10 days. 40 Tab 0    ondansetron (ZOFRAN ODT) 4 mg disintegrating tablet Take 1 Tab by mouth every eight (8) hours as needed for Nausea. 12 Tab 0       Past History     Past Medical History:  History reviewed. No pertinent past medical history. Past Surgical History:  History reviewed. No pertinent surgical history. Family History:  History reviewed. No pertinent family history.     Social History:  Social History   Substance Use Topics    Smoking status: Current Every Day Smoker     Packs/day: 0.25    Smokeless tobacco: Never Used    Alcohol use Yes      Comment: socially Allergies:  No Known Allergies      Review of Systems   Review of Systems   HENT: Negative for mouth sores. Gastrointestinal: Negative for nausea and vomiting. Genitourinary: Negative for discharge, dysuria and genital sores. Musculoskeletal: Positive for myalgias (painful lesion on left leg). Skin: Positive for rash (all over body). All other systems reviewed and are negative. Physical Exam     Vitals:    03/08/18 1941 03/08/18 2243   BP: 144/80 (!) 131/92   Pulse: 100 100   Resp: 14 16   Temp: 98 °F (36.7 °C) 98 °F (36.7 °C)   SpO2: 100% 100%   Weight: 86.2 kg (190 lb)    Height: 5' 8\" (1.727 m)      Physical Exam   Constitutional: He is oriented to person, place, and time. He appears well-developed and well-nourished. No distress. Pt appears well sitting up in bed in no distress, speaking in full sentences without evidence of dyspnea, increased work of breathing or any obvious pain at this time     HENT:   Head: Normocephalic and atraumatic. Right Ear: Hearing, tympanic membrane, external ear and ear canal normal.   Left Ear: Hearing, tympanic membrane, external ear and ear canal normal.   Nose: Nose normal.   Mouth/Throat: Uvula is midline, oropharynx is clear and moist and mucous membranes are normal. Mucous membranes are not dry. No trismus in the jaw. No uvula swelling. No oropharyngeal exudate, posterior oropharyngeal edema, posterior oropharyngeal erythema or tonsillar abscesses. No face, tongue and swelling  Airway patent, no throat swelling   No intraoral lesions    Neck: Normal range of motion. Neck supple. Cardiovascular: Normal rate, regular rhythm, normal heart sounds and intact distal pulses. No murmur heard. Pulmonary/Chest: Effort normal and breath sounds normal. No stridor. No respiratory distress. He has no wheezes. He has no rales. He exhibits no tenderness. Abdominal: Soft. Bowel sounds are normal. There is no tenderness.    Neurological: He is alert and oriented to person, place, and time. Skin: Rash noted. Diffuse macular rash to trunk BUE, BLE and face including palms and soles of feet, no intraoral lesions    Psychiatric: He has a normal mood and affect. Judgment normal.   Nursing note and vitals reviewed. Diagnostic Study Results     Labs -     Recent Results (from the past 12 hour(s))   CBC WITH AUTOMATED DIFF    Collection Time: 03/08/18  8:33 PM   Result Value Ref Range    WBC 4.3 (L) 4.6 - 13.2 K/uL    RBC 3.75 (L) 4.70 - 5.50 M/uL    HGB 10.4 (L) 13.0 - 16.0 g/dL    HCT 31.8 (L) 36.0 - 48.0 %    MCV 84.8 74.0 - 97.0 FL    MCH 27.7 24.0 - 34.0 PG    MCHC 32.7 31.0 - 37.0 g/dL    RDW 13.9 11.6 - 14.5 %    PLATELET 996 967 - 475 K/uL    MPV 9.1 (L) 9.2 - 11.8 FL    NEUTROPHILS 61 40 - 73 %    LYMPHOCYTES 29 21 - 52 %    MONOCYTES 9 3 - 10 %    EOSINOPHILS 1 0 - 5 %    BASOPHILS 0 0 - 2 %    ABS. NEUTROPHILS 2.6 1.8 - 8.0 K/UL    ABS. LYMPHOCYTES 1.3 0.9 - 3.6 K/UL    ABS. MONOCYTES 0.4 0.05 - 1.2 K/UL    ABS. EOSINOPHILS 0.1 0.0 - 0.4 K/UL    ABS. BASOPHILS 0.0 0.0 - 0.06 K/UL    DF AUTOMATED     METABOLIC PANEL, COMPREHENSIVE    Collection Time: 03/08/18  8:33 PM   Result Value Ref Range    Sodium 139 136 - 145 mmol/L    Potassium 4.2 3.5 - 5.5 mmol/L    Chloride 102 100 - 108 mmol/L    CO2 31 21 - 32 mmol/L    Anion gap 6 3.0 - 18 mmol/L    Glucose 82 74 - 99 mg/dL    BUN 9 7.0 - 18 MG/DL    Creatinine 1.21 0.6 - 1.3 MG/DL    BUN/Creatinine ratio 7 (L) 12 - 20      GFR est AA >60 >60 ml/min/1.73m2    GFR est non-AA >60 >60 ml/min/1.73m2    Calcium 8.4 (L) 8.5 - 10.1 MG/DL    Bilirubin, total 0.2 0.2 - 1.0 MG/DL    ALT (SGPT) 26 16 - 61 U/L    AST (SGOT) 20 15 - 37 U/L    Alk.  phosphatase 143 (H) 45 - 117 U/L    Protein, total 9.3 (H) 6.4 - 8.2 g/dL    Albumin 2.6 (L) 3.4 - 5.0 g/dL    Globulin 6.7 (H) 2.0 - 4.0 g/dL    A-G Ratio 0.4 (L) 0.8 - 1.7     URINALYSIS W/ RFLX MICROSCOPIC    Collection Time: 03/08/18  9:11 PM   Result Value Ref Range Color YELLOW      Appearance CLEAR      Specific gravity >1.030 (H) 1.005 - 1.030    pH (UA) 6.0 5.0 - 8.0      Protein 30 (A) NEG mg/dL    Glucose NEGATIVE  NEG mg/dL    Ketone TRACE (A) NEG mg/dL    Bilirubin NEGATIVE  NEG      Blood NEGATIVE  NEG      Urobilinogen 1.0 0.2 - 1.0 EU/dL    Nitrites NEGATIVE  NEG      Leukocyte Esterase SMALL (A) NEG     URINE MICROSCOPIC ONLY    Collection Time: 03/08/18  9:11 PM   Result Value Ref Range    WBC 4 to 6 0 - 5 /hpf    RBC NEGATIVE  0 - 5 /hpf    Epithelial cells 1+ 0 - 5 /lpf    Bacteria FEW (A) NEG /hpf       Radiologic Studies -   No orders to display     CT Results  (Last 48 hours)    None        CXR Results  (Last 48 hours)    None          Medications given in the ED-  Medications   penicillin g benzathine (BICILLIN LA) 1,200,000 unit/2 mL IM injection 2.4 Million Units (2.4 Million Units IntraMUSCular Given 3/8/18 2015)   trimethoprim-sulfamethoxazole (BACTRIM DS, SEPTRA DS) 160-800 mg per tablet 2 Tab (2 Tabs Oral Given 3/8/18 2227)         Medical Decision Making   I am the first provider for this patient. I reviewed the vital signs, available nursing notes, past medical history, past surgical history, family history and social history. Vital Signs-Reviewed the patient's vital signs. Pulse Oximetry Analysis - 100% on Room Air     Records Reviewed: Nursing Notes    Provider Notes (Medical Decision Making):     Procedures:  Procedures    ED Course:   7:58 PM   Initial assessment performed. The patients presenting problems have been discussed, and they are in agreement with the care plan formulated and outlined with them. I have encouraged them to ask questions as they arise throughout their visit. CONSULT NOTE:   9:00 PM  Barry Johnson PA-C spoke with Liset Palm MD   Specialty: Emergency Medicine  Discussed pt's hx, disposition, and available diagnostic and imaging results  in person. Reviewed care plans.  Consulting physician agrees with plans as outlined. Recommends adding Bactrim. Strict follow up instructions. RPR and GC/Chlamydia pending. Covered with IM PCN and Bactrim. Bactrim will cover for secondary skin infections and UTI. Advised f/u with health clinic for repeat HIV testing. Will call if + syphilis and will have pt follow up for repeat IM PCN dose per week for next 2 weeks. Pt understands and agrees with plan. Diagnosis and Disposition       DISCHARGE NOTE:  10:25 PM  Sergey Lozada's  results have been reviewed with him. He has been counseled regarding his diagnosis, treatment, and plan. He verbally conveys understanding and agreement of the signs, symptoms, diagnosis, treatment and prognosis and additionally agrees to follow up as discussed. He also agrees with the care-plan and conveys that all of his questions have been answered. I have also provided discharge instructions for him that include: educational information regarding their diagnosis and treatment, and list of reasons why they would want to return to the ED prior to their follow-up appointment, should his condition change. He has been provided with education for proper emergency department utilization. CLINICAL IMPRESSION:    1. Rash    2. Leukopenia, unspecified type    3. Urinary tract infection without hematuria, site unspecified        PLAN:  1. D/C Home  2. Discharge Medication List as of 3/8/2018 10:25 PM      START taking these medications    Details   trimethoprim-sulfamethoxazole (BACTRIM DS) 160-800 mg per tablet Take 2 Tabs by mouth two (2) times a day for 10 days. , Print, Disp-40 Tab, R-0         CONTINUE these medications which have NOT CHANGED    Details   ondansetron (ZOFRAN ODT) 4 mg disintegrating tablet Take 1 Tab by mouth every eight (8) hours as needed for Nausea., Normal, Disp-12 Tab, R-0           3.    Follow-up Information     Follow up With Details Comments Juwan Liriano Schedule an appointment as soon as possible for a visit in 1 week Follow up for repeat HIV testing [place] [id number]  [phone]    [facility] Schedule an appointment as soon as possible for a visit in 1 week For [place] [address] [facility], [address] [facility] [address]    [facility] EMERGENCY DEPT Go to As needed, if symptoms worsen [address] [place] [id number]  [phone]        _______________________________    Attestations: This note is prepared by China Montoya, acting as Scribe for Alexandria Bynum PA-C. Alexandria Bynum PA-C:  The scribe's documentation has been prepared under my direction and personally reviewed by me in its entirety.   I confirm that the note above accurately reflects all work, treatment, procedures, and medical decision making performed by me.  _______________________________

## 2018-03-10 LAB
BACTERIA SPEC CULT: NORMAL
SERVICE CMNT-IMP: NORMAL

## 2018-03-11 NOTE — CALL BACK NOTE
1:52 PM  03/11/18    Informed pt of + RPR. He was treated with IM 2.4 million units Bicillin during visit. Informed that he should follow up for repeat dosing of Bicillin once a week for the next 2 weeks. Dates 3/15 and 3/22. Ray/Hernando and FTA pending.     Belinda Ramírez PA-C

## 2018-03-12 LAB
C TRACH RRNA SPEC QL NAA+PROBE: POSITIVE
N GONORRHOEA RRNA SPEC QL NAA+PROBE: NEGATIVE
SPECIMEN SOURCE: ABNORMAL

## 2018-03-13 LAB — T PALLIDUM AB SER QL IF: REACTIVE

## 2018-03-13 RX ORDER — AZITHROMYCIN 500 MG/1
1000 TABLET, FILM COATED ORAL ONCE
Qty: 2 TAB | Refills: 0 | Status: SHIPPED | OUTPATIENT
Start: 2018-03-13 | End: 2018-03-13

## 2018-03-13 NOTE — CALL BACK NOTE
3:52 PM  03/13/18      Attempted to call patient to inform of + FTA and chlamydia. No answer, left message to return call.      Alice Dorsey PA-C

## 2018-03-13 NOTE — CALL BACK NOTE
4:37 PM  03/13/18    Pt called back. Informed of + chlamydia. rx for 1000 mg Azithromycin sent to pharmacy. Informed of + FTA. Pt is aware that he needs repeat doses of PCN. sts he does not want to go to the health dept. Recommended check ing with [facility] clinic but if they are unable to see him or give PCN he needs to return here on 3/15. Advised to f/u for HIV testing and inform all sexual partners so that they can be tested and treated.      Alexandria Bynum PA-C

## 2018-03-14 LAB
BACTERIA SPEC CULT: NORMAL
SERVICE CMNT-IMP: NORMAL

## 2018-03-16 ENCOUNTER — HOSPITAL ENCOUNTER (EMERGENCY)
Age: 28
Discharge: HOME | End: 2018-03-16
Attending: INTERNAL MEDICINE
Payer: SELF-PAY

## 2018-03-16 VITALS
TEMPERATURE: 98.3 F | BODY MASS INDEX: 29.55 KG/M2 | SYSTOLIC BLOOD PRESSURE: 136 MMHG | HEART RATE: 94 BPM | RESPIRATION RATE: 18 BRPM | HEIGHT: 68 IN | DIASTOLIC BLOOD PRESSURE: 80 MMHG | OXYGEN SATURATION: 97 % | WEIGHT: 195 LBS

## 2018-03-16 DIAGNOSIS — A74.9 CHLAMYDIA INFECTION: ICD-10-CM

## 2018-03-16 DIAGNOSIS — A53.9 SYPHILIS IN MALE: Primary | ICD-10-CM

## 2018-03-16 PROCEDURE — 99283 EMERGENCY DEPT VISIT LOW MDM: CPT

## 2018-03-16 PROCEDURE — 99282 EMERGENCY DEPT VISIT SF MDM: CPT

## 2018-03-16 PROCEDURE — 74011250636 HC RX REV CODE- 250/636: Performed by: PHYSICIAN ASSISTANT

## 2018-03-16 PROCEDURE — 96372 THER/PROPH/DIAG INJ SC/IM: CPT

## 2018-03-16 RX ADMIN — PENICILLIN G BENZATHINE 2.4 MILLION UNITS: 1200000 INJECTION, SUSPENSION INTRAMUSCULAR at 12:14

## 2018-03-16 NOTE — ED NOTES
Patient tolerated rx Penicillin IM inj x2 in ALEKSANDRA gluteus max. Discharge instructions reviewed with the patient with opportunity for questions given. The patient verbalized understanding. Patient armband removed and shredded. Patient in stable condition at time of discharge.

## 2018-03-16 NOTE — ED TRIAGE NOTES
States he was told to come back to the ED today for a penicillin shot after being dx'd with syphilis.

## 2018-03-16 NOTE — ED PROVIDER NOTES
EMERGENCY DEPARTMENT HISTORY AND PHYSICAL EXAM    Date: 3/16/2018  Patient Name: Kina Friedman    History of Presenting Illness     Chief Complaint   Patient presents with    Other       History Provided By: Patient    Chief Complaint: syphilis treatment  Duration: 8 days ago   Timing:  Improving  Location: generalized rash  Modifying Factors: penicillin shot  Associated Symptoms: No associated symptoms    Additional History (Context):   11:58 AM  Kina Friedman is a 33 y.o. male who presents to the emergency department for treatment after being diagnosed with syphilis. Pt was seen by this facility for rash 1 week ago and was rx Bactrim; pt states his rash is improving. Pt has been rx Azithromycin for positive chlamydia which he has not filled yet. No associated symptoms. Pt denies penile discharge and any other Sx or complaints. PCP: None    Current Outpatient Prescriptions   Medication Sig Dispense Refill    trimethoprim-sulfamethoxazole (BACTRIM DS) 160-800 mg per tablet Take 2 Tabs by mouth two (2) times a day for 10 days. 40 Tab 0    ondansetron (ZOFRAN ODT) 4 mg disintegrating tablet Take 1 Tab by mouth every eight (8) hours as needed for Nausea. 12 Tab 0       Past History     Past Medical History:  Past Medical History:   Diagnosis Date    Chlamydia     Herpes zoster     Syphilis        Past Surgical History:  No past surgical history on file. Family History:  No family history on file. Social History:  Social History   Substance Use Topics    Smoking status: Current Every Day Smoker     Packs/day: 0.25    Smokeless tobacco: Never Used    Alcohol use Yes      Comment: socially       Allergies:  No Known Allergies      Review of Systems   Review of Systems   Constitutional: Negative for chills and fever. Genitourinary: Negative for discharge, dysuria, penile pain, scrotal swelling and testicular pain. Skin: Positive for rash (improving). Negative for color change and wound. Allergic/Immunologic: Negative for environmental allergies and immunocompromised state. Neurological: Negative for headaches. Hematological: Negative for adenopathy. Psychiatric/Behavioral: Negative for confusion. All other systems reviewed and are negative. Physical Exam     Vitals:    03/16/18 1139 03/16/18 1249   BP: 136/82 136/80   Pulse: 100 94   Resp: 16 18   Temp: 98.3 °F (36.8 °C)    SpO2: 99% 97%   Weight: 88.5 kg (195 lb)    Height: 5' 8\" (1.727 m)      Physical Exam   Constitutional: He is oriented to person, place, and time. He appears well-developed and well-nourished. No distress. AA male in NAD. Alert. Appears comfortable. HENT:   Head: Normocephalic and atraumatic. Right Ear: External ear normal.   Left Ear: External ear normal.   Nose: Nose normal. No mucosal edema or rhinorrhea. Right sinus exhibits no maxillary sinus tenderness and no frontal sinus tenderness. Left sinus exhibits no maxillary sinus tenderness and no frontal sinus tenderness. Eyes: Conjunctivae are normal. Right eye exhibits no discharge. Left eye exhibits no discharge. No scleral icterus. Neck: Normal range of motion. Cardiovascular: Normal rate, regular rhythm, normal heart sounds and intact distal pulses. Exam reveals no gallop and no friction rub. No murmur heard. Pulmonary/Chest: [place] normal and breath sounds normal. No accessory muscle usage. No tachypnea. No respiratory distress. He has no decreased breath sounds. He has no wheezes. He has no rhonchi. He has no rales. Musculoskeletal: Normal range of motion. Neurological: He is alert and oriented to person, place, and time. Skin: Skin is warm and dry. Rash (diffuse non-erythematous non-tender macular rash) noted. No petechiae and no purpura noted. He is not diaphoretic. Psychiatric: He has a normal mood and affect. Judgment normal.   Nursing note and vitals reviewed.        Diagnostic Study Results     Labs -   No results found for this or any previous visit (from the past 12 hour(s)). Radiologic Studies -   No orders to display     CT Results  (Last 48 hours)    None        CXR Results  (Last 48 hours)    None          Medications given in the ED-  Medications   penicillin g benzathine (BICILLIN LA) 1,200,000 unit/2 mL IM injection 2.4 Million Units (2.4 Million Units IntraMUSCular Given 3/16/18 1214)         Medical Decision Making   I am the first provider for this patient. I reviewed the vital signs, available nursing notes, past medical history, past surgical history, family history and social history. Vital Signs-Reviewed the patient's vital signs. Pulse Oximetry Analysis - 99% on RA     Records Reviewed: Nursing Notes and Old Medical Records  Seen at this facility on 3/8/18 for rash. RPR as well as FTA was reactive. Pt was given IM penicillin on day of initial visit. Called by provider and told to return for repeat dosing on 3/15/18 and 3/22/18. Incidentally, patient was also rx Azithromycin for positive chlamydia     Procedures:  Procedures    ED Course:   11:58 AM Initial assessment performed. The patients presenting problems have been discussed, and they are in agreement with the care plan formulated and outlined with them. I have encouraged them to ask questions as they arise throughout their visit. Diagnosis and Disposition     Pt with + RPR/FTA. Told to RTED for second IM PCN as needs 3 injections over 3 weeks for known latent/late stage syphilis. Reports rash improving. Has not yet picked up azithromycin for + chlamydia cultures. Pt has appt with HD for HIV testing next week per patient. Reasons to RTED discussed with pt. All questions answered. Pt feels comfortable going home at this time. Pt expressed understanding and he agrees with plan. DISCHARGE NOTE:  12:08 PM  Karin Lozada's  results have been reviewed with him. He has been counseled regarding his diagnosis, treatment, and plan.   He verbally conveys understanding and agreement of the signs, symptoms, diagnosis, treatment and prognosis and additionally agrees to follow up as discussed. He also agrees with the care-plan and conveys that all of his questions have been answered. I have also provided discharge instructions for him that include: educational information regarding their diagnosis and treatment, and list of reasons why they would want to return to the ED prior to their follow-up appointment, should his condition change. He has been provided with education for proper emergency department utilization. CLINICAL IMPRESSION:    1. Syphilis in male    2. Chlamydia infection        PLAN:  1. D/C Home  2. Discharge Medication List as of 3/16/2018 12:22 PM        3. Follow-up Information     Follow up With Details Comments Juwan Boyle 72 Schedule an appointment as soon as possible for a visit For follow up with health department [address] [place] [address]. [address]. [address]    [facility] EMERGENCY DEPT Go to As needed, as symptoms worsen [address] [address] [phone]    Salbador Styles Schedule an appointment as soon as possible for a visit For follow up with infectious disease [address]  [address] [place] [address]          _______________________________    Attestations: This note is prepared by Deyvi Sandoval, acting as Scribe for Tyrese Shoemaker PA-C. Tyrese Shoemaker PA-C:  The scribe's documentation has been prepared under my direction and personally reviewed by me in its entirety. I confirm that the note above accurately reflects all work, treatment, procedures, and medical decision making performed by me.  _______________________________      I was personally available for consultation in the emergency department. I have reviewed the chart and agree with the documentation recorded by the [facility], including the assessment, treatment plan, and disposition.

## 2018-03-16 NOTE — DISCHARGE INSTRUCTIONS
Chlamydia: Care Instructions  Your Care Instructions  Chlamydia is a bacterial infection spread through sexual contact. It is one of the most common sexually transmitted infections (STIs). Most people who get chlamydia do not have symptoms, but they can still infect their sex partners. If chlamydia in women is not treated, it can cause pelvic inflammatory disease (PID), a severe pelvic infection. PID can make it hard for a woman to get pregnant. Antibiotics can cure chlamydia. Both sex partners need treatment to keep from passing the infection back and forth. Certain antibiotics should not be used in pregnancy. If you were not tested for pregnancy during this visit, tell your doctor if you might be pregnant. Follow-up care is a key part of your treatment and safety. Be sure to make and go to all appointments, and call your doctor if you are having problems. It's also a good idea to know your test results and keep a list of the medicines you take. How can you care for yourself at home? · Chlamydia often is treated with a single dose of antibiotics in the doctor's office. If your doctor prescribed antibiotics to take at home, take them as directed. Do not stop taking them just because you feel better. You need to take the full course of antibiotics. · Do not have sex with anyone while you are being treated. If your treatment is a single dose of antibiotics, wait at least 7 days after you take the dose before you have sex. Even if you use a condom, you and your partner may pass the infection back and forth. · Make sure to tell your sex partner or partners that you have chlamydia. They should get treated, even if they do not have symptoms. · Your doctor may have done tests for other STIs. If so, call back in 3 or 4 days for those results. · Your doctor may advise you to be tested again for chlamydia in 3 or 4 months. How can you prevent chlamydia and other STIs in the future?   · Use latex condoms every time you have sex. Use them from the beginning to the end of sexual contact. · Talk to your partner before you have sex. Find out if he or she has or is at risk for chlamydia or any other STI. Keep in mind that a person may be able to spread an STI even if he or she does not have symptoms. · Do not have sex while you are being treated for chlamydia or any other STI. · Do not have sex with anyone who has symptoms of an STI, such as sores on the genitals or mouth. · Having one sex partner (who does not have STIs and does not have sex with anyone else) is a good way to avoid STIs. When should you call for help? Call 911 anytime you think you may need emergency care. For example, call if:  ? · You have sudden, severe pain in your belly or pelvis. ?Call your doctor now or seek immediate medical care if:  ? · You have new belly or pelvic pain. ? · You have a fever. ? · You have new or increased burning or pain with urination, or you cannot urinate. ? · You have pain, swelling, or tenderness in the scrotum. ? Watch closely for changes in your health, and be sure to contact your doctor if:  ? · You have unusual vaginal bleeding. ? · You have a discharge from the vagina or penis. ? · You think you may have been exposed to another STI. ? · Your symptoms get worse or have not improved within 1 week after starting treatment. ? · You have any new symptoms, such as sores, bumps, rashes, blisters, or warts in the genital or anal area. Where can you learn more? Go to http://garry-tejinder.info/. Enter J177 in the search box to learn more about \"Chlamydia: Care Instructions. \"  Current as of: March 20, 2017  Content Version: 11.4  © 6493-6924 [employer]. Care instructions adapted under license by [employer] (which disclaims liability or warranty for this information).  If you have questions about a medical condition or this instruction, always ask your healthcare professional. [address] any warranty or liability for your use of this information. Syphilis: Care Instructions  Your Care Instructions  Syphilis is an infection caused by bacteria. It's usually spread through sex. It is one of several types of sexually transmitted infections (STIs). The first symptom is usually a painless, red sore on the genitals, rectal area, or mouth. This type of sore is called a chancre (say \"SHANK-er\"). Later, you may get other symptoms. These include a rash, a fever, and swollen lymph nodes. Your hair may start to fall out. Or you may feel like you have the flu. Sometimes these symptoms go away on their own. But this doesn't mean that the infection is gone. If you don't treat syphilis with antibiotics, the infection can spread in your body. You can also spread it to others. Antibiotics can cure syphilis and prevent more serious complications. Both you and your sex partner or partners need antibiotic treatment. This is to prevent you from passing the infection back and forth or to other sex partners. During the first 24 hours of treatment, you may have a fever, a headache, and muscle aches. After treatment, you will get blood tests to make sure you don't have any more bacteria in your body. You may also want to be tested for other STIs. Follow-up care is a key part of your treatment and safety. Be sure to make and go to all appointments, and call your doctor if you are having problems. It's also a good idea to know your test results and keep a list of the medicines you take. How can you care for yourself at home? · Your doctor probably gave you a shot of antibiotics. If you've had syphilis for a while, you may need 2 more shots. It's very important to get all the recommended shots. · If your doctor prescribed antibiotic pills, take them as directed. Do not stop taking them just because you feel better.  You need to take the full course of antibiotics. · Do not have sexual contact with anyone while you are being treated. After treatment, wait at least 7 days and until all of your sores are healed before you have any sexual contact. Even if you use a condom, you and your partner may pass the infection back and forth. · Wash your hands if you touch an infected area. This will help prevent spreading the infection to other parts of your body or to other people. · Tell your sex partner or partners that you have syphilis. They should get treatment even if they don't have symptoms. To prevent syphilis in the future  · Use latex condoms every time you have sex. Use them from the start to the end of sexual contact. · Talk to your partner before you have sex. Find out if he or she has or is at risk for syphilis or any other STI. Remember that a person without symptoms may still be able to spread an STI. · Do not have sex or any type of sexual contact if you are being treated for syphilis or any other STI. · Do not have sex with anyone who has symptoms of an STI. These include sores on the genitals or mouth. · Having one sex partner (who does not have STIs and does not have sex with anyone else) is a good way to avoid STIs. When should you call for help? Watch closely for changes in your health, and be sure to contact your doctor if:  ? · You do not get better as expected. ? · Your symptoms continue or come back after treatment. ? · You develop new symptoms, such as a fever. Where can you learn more? Go to http://garry-tejinder.info/. Enter Z000 in the search box to learn more about \"Syphilis: Care Instructions. \"  Current as of: March 20, 2017  Content Version: 11.4  © 6962-9474 [employer]. Care instructions adapted under license by [employer] (which disclaims liability or warranty for this information).  If you have questions about a medical condition or this instruction, always ask your healthcare professional. [address] any warranty or liability for your use of this information.

## 2020-01-01 ENCOUNTER — APPOINTMENT (OUTPATIENT)
Dept: GENERAL RADIOLOGY | Age: 30
DRG: 890 | End: 2020-01-01
Attending: HOSPITALIST
Payer: MEDICAID

## 2020-01-01 ENCOUNTER — APPOINTMENT (OUTPATIENT)
Dept: NON INVASIVE DIAGNOSTICS | Age: 30
DRG: 890 | End: 2020-01-01
Attending: HOSPITALIST
Payer: MEDICAID

## 2020-01-01 ENCOUNTER — APPOINTMENT (OUTPATIENT)
Dept: GENERAL RADIOLOGY | Age: 30
DRG: 890 | End: 2020-01-01
Attending: INTERNAL MEDICINE
Payer: MEDICAID

## 2020-01-01 ENCOUNTER — HOSPITAL ENCOUNTER (EMERGENCY)
Age: 30
Discharge: HOME | End: 2020-12-07
Attending: EMERGENCY MEDICINE
Payer: MEDICAID

## 2020-01-01 ENCOUNTER — APPOINTMENT (OUTPATIENT)
Dept: ULTRASOUND IMAGING | Age: 30
DRG: 890 | End: 2020-01-01
Attending: INTERNAL MEDICINE
Payer: MEDICAID

## 2020-01-01 ENCOUNTER — APPOINTMENT (OUTPATIENT)
Dept: GENERAL RADIOLOGY | Age: 30
DRG: 890 | End: 2020-01-01
Attending: EMERGENCY MEDICINE
Payer: MEDICAID

## 2020-01-01 ENCOUNTER — APPOINTMENT (OUTPATIENT)
Dept: CT IMAGING | Age: 30
DRG: 890 | End: 2020-01-01
Attending: HOSPITALIST
Payer: MEDICAID

## 2020-01-01 ENCOUNTER — PATIENT OUTREACH (OUTPATIENT)
Dept: CASE MANAGEMENT | Age: 30
End: 2020-01-01

## 2020-01-01 ENCOUNTER — HOSPITAL ENCOUNTER (INPATIENT)
Age: 30
LOS: 20 days | DRG: 890 | End: 2021-01-06
Attending: EMERGENCY MEDICINE | Admitting: FAMILY MEDICINE
Payer: MEDICAID

## 2020-01-01 VITALS
TEMPERATURE: 99.5 F | OXYGEN SATURATION: 96 % | DIASTOLIC BLOOD PRESSURE: 69 MMHG | RESPIRATION RATE: 16 BRPM | WEIGHT: 190 LBS | BODY MASS INDEX: 28.79 KG/M2 | SYSTOLIC BLOOD PRESSURE: 113 MMHG | HEART RATE: 120 BPM | HEIGHT: 68 IN

## 2020-01-01 DIAGNOSIS — A53.9 SYPHILIS: ICD-10-CM

## 2020-01-01 DIAGNOSIS — J96.01 ACUTE RESPIRATORY FAILURE WITH HYPOXIA (HCC): ICD-10-CM

## 2020-01-01 DIAGNOSIS — Z71.89 ADVANCED CARE PLANNING/COUNSELING DISCUSSION: ICD-10-CM

## 2020-01-01 DIAGNOSIS — B20 SYMPTOMATIC HIV INFECTION (HCC): ICD-10-CM

## 2020-01-01 DIAGNOSIS — Z20.822 PERSON UNDER INVESTIGATION FOR COVID-19: Primary | ICD-10-CM

## 2020-01-01 DIAGNOSIS — Z20.822 SUSPECTED COVID-19 VIRUS INFECTION: ICD-10-CM

## 2020-01-01 DIAGNOSIS — B20 FEVER OF UNKNOWN ORIGIN WITH HIV INFECTION (HCC): ICD-10-CM

## 2020-01-01 DIAGNOSIS — R50.9 FEVER OF UNKNOWN ORIGIN WITH HIV INFECTION (HCC): ICD-10-CM

## 2020-01-01 DIAGNOSIS — J18.9 PNEUMONIA OF BOTH LUNGS DUE TO INFECTIOUS ORGANISM, UNSPECIFIED PART OF LUNG: Primary | ICD-10-CM

## 2020-01-01 DIAGNOSIS — B20 AIDS (ACQUIRED IMMUNE DEFICIENCY SYNDROME) (HCC): ICD-10-CM

## 2020-01-01 LAB
1,3 BETA GLUCAN SER-MCNC: 455 PG/ML
25(OH)D3 SERPL-MCNC: 12.8 NG/ML (ref 30–100)
ABO + RH BLD: NORMAL
ALBUMIN SERPL-MCNC: 1.7 G/DL (ref 3.4–5)
ALBUMIN SERPL-MCNC: 1.7 G/DL (ref 3.4–5)
ALBUMIN SERPL-MCNC: 1.8 G/DL (ref 3.4–5)
ALBUMIN SERPL-MCNC: 1.8 G/DL (ref 3.4–5)
ALBUMIN SERPL-MCNC: 2 G/DL (ref 3.4–5)
ALBUMIN SERPL-MCNC: 2.1 G/DL (ref 3.4–5)
ALBUMIN SERPL-MCNC: 2.2 G/DL (ref 3.4–5)
ALBUMIN SERPL-MCNC: 2.5 G/DL (ref 3.4–5)
ALBUMIN SERPL-MCNC: 2.8 G/DL (ref 3.4–5)
ALBUMIN SERPL-MCNC: 3.2 G/DL (ref 3.4–5)
ALBUMIN SERPL-MCNC: 3.5 G/DL (ref 3.4–5)
ALBUMIN/GLOB SERPL: 0.3 {RATIO} (ref 0.8–1.7)
ALBUMIN/GLOB SERPL: 0.4 {RATIO} (ref 0.8–1.7)
ALBUMIN/GLOB SERPL: 0.5 {RATIO} (ref 0.8–1.7)
ALBUMIN/GLOB SERPL: 0.6 {RATIO} (ref 0.8–1.7)
ALBUMIN/GLOB SERPL: 0.7 {RATIO} (ref 0.8–1.7)
ALBUMIN/GLOB SERPL: 0.8 {RATIO} (ref 0.8–1.7)
ALP SERPL-CCNC: 139 U/L (ref 45–117)
ALP SERPL-CCNC: 144 U/L (ref 45–117)
ALP SERPL-CCNC: 153 U/L (ref 45–117)
ALP SERPL-CCNC: 176 U/L (ref 45–117)
ALP SERPL-CCNC: 228 U/L (ref 45–117)
ALP SERPL-CCNC: 239 U/L (ref 45–117)
ALP SERPL-CCNC: 262 U/L (ref 45–117)
ALP SERPL-CCNC: 267 U/L (ref 45–117)
ALP SERPL-CCNC: 313 U/L (ref 45–117)
ALP SERPL-CCNC: 325 U/L (ref 45–117)
ALP SERPL-CCNC: 334 U/L (ref 45–117)
ALT SERPL-CCNC: 119 U/L (ref 16–61)
ALT SERPL-CCNC: 135 U/L (ref 16–61)
ALT SERPL-CCNC: 144 U/L (ref 16–61)
ALT SERPL-CCNC: 152 U/L (ref 16–61)
ALT SERPL-CCNC: 19 U/L (ref 16–61)
ALT SERPL-CCNC: 23 U/L (ref 16–61)
ALT SERPL-CCNC: 25 U/L (ref 16–61)
ALT SERPL-CCNC: 30 U/L (ref 16–61)
ALT SERPL-CCNC: 31 U/L (ref 16–61)
ALT SERPL-CCNC: 61 U/L (ref 16–61)
ALT SERPL-CCNC: 83 U/L (ref 16–61)
ANION GAP SERPL CALC-SCNC: 10 MMOL/L (ref 3–18)
ANION GAP SERPL CALC-SCNC: 11 MMOL/L (ref 3–18)
ANION GAP SERPL CALC-SCNC: 6 MMOL/L (ref 3–18)
ANION GAP SERPL CALC-SCNC: 8 MMOL/L (ref 3–18)
ANION GAP SERPL CALC-SCNC: 9 MMOL/L (ref 3–18)
APPEARANCE UR: CLEAR
ARTERIAL PATENCY WRIST A: YES
ARTERIAL PATENCY WRIST A: YES
AST SERPL-CCNC: 26 U/L (ref 10–38)
AST SERPL-CCNC: 34 U/L (ref 10–38)
AST SERPL-CCNC: 35 U/L (ref 10–38)
AST SERPL-CCNC: 36 U/L (ref 10–38)
AST SERPL-CCNC: 42 U/L (ref 10–38)
AST SERPL-CCNC: 43 U/L (ref 10–38)
AST SERPL-CCNC: 44 U/L (ref 10–38)
AST SERPL-CCNC: 46 U/L (ref 10–38)
AST SERPL-CCNC: 53 U/L (ref 10–38)
AST SERPL-CCNC: 57 U/L (ref 10–38)
AST SERPL-CCNC: 65 U/L (ref 10–38)
ATRIAL RATE: 147 BPM
AV VELOCITY RATIO: 0.78
AV VTI RATIO: 0.8
BACTERIA SPEC CULT: ABNORMAL
BACTERIA SPEC CULT: ABNORMAL
BACTERIA SPEC CULT: NORMAL
BACTERIA SPEC CULT: NORMAL
BASE DEFICIT BLD-SCNC: 2 MMOL/L
BASE DEFICIT BLD-SCNC: 5 MMOL/L
BASOPHILS # BLD AUTO: 0 X10E3/UL (ref 0–0.2)
BASOPHILS # BLD: 0 K/UL (ref 0–0.1)
BASOPHILS NFR BLD AUTO: 0 %
BASOPHILS NFR BLD: 0 % (ref 0–2)
BASOPHILS NFR BLD: 0 % (ref 0–3)
BASOPHILS NFR BLD: 0 % (ref 0–3)
BASOPHILS NFR BLD: 1 % (ref 0–2)
BDY SITE: ABNORMAL
BDY SITE: NORMAL
BILIRUB SERPL-MCNC: 0.1 MG/DL (ref 0.2–1)
BILIRUB SERPL-MCNC: 0.2 MG/DL (ref 0.2–1)
BILIRUB SERPL-MCNC: 0.5 MG/DL (ref 0.2–1)
BILIRUB UR QL: NEGATIVE
BLOOD GROUP ANTIBODIES SERPL: NORMAL
BNP SERPL-MCNC: 109 PG/ML (ref 0–450)
BODY TEMPERATURE: 102.8
BUN SERPL-MCNC: 10 MG/DL (ref 7–18)
BUN SERPL-MCNC: 13 MG/DL (ref 7–18)
BUN SERPL-MCNC: 14 MG/DL (ref 7–18)
BUN SERPL-MCNC: 15 MG/DL (ref 7–18)
BUN SERPL-MCNC: 6 MG/DL (ref 7–18)
BUN SERPL-MCNC: 6 MG/DL (ref 7–18)
BUN SERPL-MCNC: 7 MG/DL (ref 7–18)
BUN SERPL-MCNC: 7 MG/DL (ref 7–18)
BUN SERPL-MCNC: 8 MG/DL (ref 7–18)
BUN SERPL-MCNC: 9 MG/DL (ref 7–18)
BUN/CREAT SERPL: 10 (ref 12–20)
BUN/CREAT SERPL: 11 (ref 12–20)
BUN/CREAT SERPL: 12 (ref 12–20)
BUN/CREAT SERPL: 13 (ref 12–20)
BUN/CREAT SERPL: 14 (ref 12–20)
BUN/CREAT SERPL: 15 (ref 12–20)
BUN/CREAT SERPL: 17 (ref 12–20)
BUN/CREAT SERPL: 17 (ref 12–20)
BUN/CREAT SERPL: 19 (ref 12–20)
BUN/CREAT SERPL: 20 (ref 12–20)
BUN/CREAT SERPL: 21 (ref 12–20)
BUN/CREAT SERPL: 21 (ref 12–20)
BUN/CREAT SERPL: 22 (ref 12–20)
BUN/CREAT SERPL: 9 (ref 12–20)
BUN/CREAT SERPL: 9 (ref 12–20)
CALCIUM SERPL-MCNC: 7.4 MG/DL (ref 8.5–10.1)
CALCIUM SERPL-MCNC: 7.7 MG/DL (ref 8.5–10.1)
CALCIUM SERPL-MCNC: 7.8 MG/DL (ref 8.5–10.1)
CALCIUM SERPL-MCNC: 7.9 MG/DL (ref 8.5–10.1)
CALCIUM SERPL-MCNC: 7.9 MG/DL (ref 8.5–10.1)
CALCIUM SERPL-MCNC: 8 MG/DL (ref 8.5–10.1)
CALCIUM SERPL-MCNC: 8.3 MG/DL (ref 8.5–10.1)
CALCIUM SERPL-MCNC: 8.3 MG/DL (ref 8.5–10.1)
CALCIUM SERPL-MCNC: 8.6 MG/DL (ref 8.5–10.1)
CALCIUM SERPL-MCNC: 8.7 MG/DL (ref 8.5–10.1)
CALCIUM SERPL-MCNC: 8.7 MG/DL (ref 8.5–10.1)
CALCIUM SERPL-MCNC: 9 MG/DL (ref 8.5–10.1)
CALCIUM SERPL-MCNC: 9.1 MG/DL (ref 8.5–10.1)
CALCIUM SERPL-MCNC: 9.2 MG/DL (ref 8.5–10.1)
CALCIUM SERPL-MCNC: 9.6 MG/DL (ref 8.5–10.1)
CALCULATED P AXIS, ECG09: 49 DEGREES
CALCULATED R AXIS, ECG10: -77 DEGREES
CALCULATED T AXIS, ECG11: 54 DEGREES
CD3+CD4+ CELLS # BLD: 6 /UL (ref 359–1519)
CD3+CD4+ CELLS NFR BLD: 2 % (ref 30.8–58.5)
CHLORIDE SERPL-SCNC: 101 MMOL/L (ref 100–111)
CHLORIDE SERPL-SCNC: 102 MMOL/L (ref 100–111)
CHLORIDE SERPL-SCNC: 103 MMOL/L (ref 100–111)
CHLORIDE SERPL-SCNC: 90 MMOL/L (ref 100–111)
CHLORIDE SERPL-SCNC: 91 MMOL/L (ref 100–111)
CHLORIDE SERPL-SCNC: 92 MMOL/L (ref 100–111)
CHLORIDE SERPL-SCNC: 92 MMOL/L (ref 100–111)
CHLORIDE SERPL-SCNC: 93 MMOL/L (ref 100–111)
CHLORIDE SERPL-SCNC: 94 MMOL/L (ref 100–111)
CHLORIDE SERPL-SCNC: 97 MMOL/L (ref 100–111)
CHLORIDE SERPL-SCNC: 98 MMOL/L (ref 100–111)
CHLORIDE SERPL-SCNC: 99 MMOL/L (ref 100–111)
CK MB CFR SERPL CALC: 1.5 % (ref 0–4)
CK MB SERPL-MCNC: 1 NG/ML (ref 5–25)
CK SERPL-CCNC: 65 U/L (ref 39–308)
CO2 SERPL-SCNC: 21 MMOL/L (ref 21–32)
CO2 SERPL-SCNC: 22 MMOL/L (ref 21–32)
CO2 SERPL-SCNC: 23 MMOL/L (ref 21–32)
CO2 SERPL-SCNC: 23 MMOL/L (ref 21–32)
CO2 SERPL-SCNC: 24 MMOL/L (ref 21–32)
CO2 SERPL-SCNC: 24 MMOL/L (ref 21–32)
CO2 SERPL-SCNC: 25 MMOL/L (ref 21–32)
CO2 SERPL-SCNC: 25 MMOL/L (ref 21–32)
CO2 SERPL-SCNC: 26 MMOL/L (ref 21–32)
CO2 SERPL-SCNC: 27 MMOL/L (ref 21–32)
CO2 SERPL-SCNC: 27 MMOL/L (ref 21–32)
CO2 SERPL-SCNC: 28 MMOL/L (ref 21–32)
CO2 SERPL-SCNC: 30 MMOL/L (ref 21–32)
COLOR UR: YELLOW
CORTIS SERPL-MCNC: 29.4 UG/DL
CREAT SERPL-MCNC: 0.6 MG/DL (ref 0.6–1.3)
CREAT SERPL-MCNC: 0.62 MG/DL (ref 0.6–1.3)
CREAT SERPL-MCNC: 0.65 MG/DL (ref 0.6–1.3)
CREAT SERPL-MCNC: 0.65 MG/DL (ref 0.6–1.3)
CREAT SERPL-MCNC: 0.67 MG/DL (ref 0.6–1.3)
CREAT SERPL-MCNC: 0.67 MG/DL (ref 0.6–1.3)
CREAT SERPL-MCNC: 0.69 MG/DL (ref 0.6–1.3)
CREAT SERPL-MCNC: 0.69 MG/DL (ref 0.6–1.3)
CREAT SERPL-MCNC: 0.7 MG/DL (ref 0.6–1.3)
CREAT SERPL-MCNC: 0.76 MG/DL (ref 0.6–1.3)
CREAT SERPL-MCNC: 0.78 MG/DL (ref 0.6–1.3)
CREAT SERPL-MCNC: 0.84 MG/DL (ref 0.6–1.3)
CREAT SERPL-MCNC: 0.89 MG/DL (ref 0.6–1.3)
CRP SERPL-MCNC: 2.9 MG/DL (ref 0–0.3)
D DIMER PPP FEU-MCNC: 0.55 UG/ML(FEU)
D DIMER PPP FEU-MCNC: 0.6 UG/ML(FEU)
D DIMER PPP FEU-MCNC: 0.78 UG/ML(FEU)
DIAGNOSIS, 93000: NORMAL
DIFFERENTIAL METHOD BLD: ABNORMAL
ECHO AV ANNULUS DIAM: 2.91 CM
ECHO AV AREA PEAK VELOCITY: 3.3 CM2
ECHO AV AREA VTI: 3.3 CM2
ECHO AV AREA/BSA PEAK VELOCITY: 1.8 CM2/M2
ECHO AV AREA/BSA VTI: 1.8 CM2/M2
ECHO AV MEAN GRADIENT: 4.3 MMHG
ECHO AV MEAN VELOCITY: 0.98 M/S
ECHO AV PEAK GRADIENT: 8.4 MMHG
ECHO AV PEAK VELOCITY: 145 CM/S
ECHO AV VTI: 21.56 CM
ECHO IVC PROX: 1 CM
ECHO LA AREA 4C: 13 CM2
ECHO LA MAJOR AXIS: 3.92 CM
ECHO LA MINOR AXIS: 2.18 CM
ECHO LA VOL 4C: 33.74 ML (ref 18–58)
ECHO LA VOLUME INDEX A4C: 18.75 ML/M2 (ref 16–28)
ECHO LV E' LATERAL VELOCITY: 10 CM/S
ECHO LV E' SEPTAL VELOCITY: 9 CM/S
ECHO LV EDV A2C: 0 ML
ECHO LV EDV A4C: 121.6 ML
ECHO LV EDV INDEX A4C: 67.6 ML/M2
ECHO LV EDV NDEX A2C: 0 ML/M2
ECHO LV EDV TEICHHOLZ: 0.74 ML
ECHO LV EJECTION FRACTION A4C: 45 %
ECHO LV ESV A4C: 67.4 ML
ECHO LV ESV INDEX A4C: 37.5 ML/M2
ECHO LV ESV TEICHHOLZ: 0.39 ML
ECHO LV INTERNAL DIMENSION DIASTOLIC: 5.13 CM (ref 4.2–5.9)
ECHO LV INTERNAL DIMENSION SYSTOLIC: 3.9 CM
ECHO LV IVSD: 1.1 CM (ref 0.6–1)
ECHO LV MASS 2D: 225.4 G (ref 88–224)
ECHO LV MASS INDEX 2D: 125.3 G/M2 (ref 49–115)
ECHO LV POSTERIOR WALL DIASTOLIC: 1.17 CM (ref 0.6–1)
ECHO LV POSTERIOR WALL SYSTOLIC: 0 CM
ECHO LVOT CARDIAC OUTPUT: 19.6 L/MIN
ECHO LVOT DIAM: 2.32 CM
ECHO LVOT PEAK GRADIENT: 5.1 MMHG
ECHO LVOT PEAK VELOCITY: 113.28 CM/S
ECHO LVOT SV: 71.9 ML
ECHO LVOT VTI: 16.97 CM
ECHO MV AREA PHT: 9.1 CM2
ECHO MV E DECELERATION TIME (DT): 83.1 MS
ECHO MV E VELOCITY: 90.38 CM/S
ECHO MV E/E' LATERAL: 9.04
ECHO MV E/E' RATIO (AVERAGED): 9.54
ECHO MV E/E' SEPTAL: 10.04
ECHO MV PRESSURE HALF TIME (PHT): 24.1 MS
ECHO RA AREA 4C: 17.24 CM2
ECHO RA VOLUME: 52.6 ML
ECHO RV INTERNAL DIMENSION: 3.69 CM
ECHO RV TAPSE: 2 CM (ref 1.5–2)
EOSINOPHIL # BLD AUTO: 0 X10E3/UL (ref 0–0.4)
EOSINOPHIL # BLD: 0 K/UL (ref 0–0.4)
EOSINOPHIL # BLD: 0.1 K/UL (ref 0–0.4)
EOSINOPHIL # BLD: 0.2 K/UL (ref 0–0.4)
EOSINOPHIL NFR BLD AUTO: 0 %
EOSINOPHIL NFR BLD: 0 % (ref 0–5)
EOSINOPHIL NFR BLD: 1 % (ref 0–5)
EOSINOPHIL NFR BLD: 2 % (ref 0–5)
ERYTHROCYTE [DISTWIDTH] IN BLOOD BY AUTOMATED COUNT: 12.6 % (ref 11.6–15.4)
ERYTHROCYTE [DISTWIDTH] IN BLOOD BY AUTOMATED COUNT: 13.4 % (ref 11.6–14.5)
ERYTHROCYTE [DISTWIDTH] IN BLOOD BY AUTOMATED COUNT: 13.6 % (ref 11.6–14.5)
ERYTHROCYTE [DISTWIDTH] IN BLOOD BY AUTOMATED COUNT: 13.7 % (ref 11.6–14.5)
ERYTHROCYTE [DISTWIDTH] IN BLOOD BY AUTOMATED COUNT: 13.8 % (ref 11.6–14.5)
ERYTHROCYTE [DISTWIDTH] IN BLOOD BY AUTOMATED COUNT: 13.8 % (ref 11.6–14.5)
ERYTHROCYTE [DISTWIDTH] IN BLOOD BY AUTOMATED COUNT: 13.9 % (ref 11.6–14.5)
ERYTHROCYTE [DISTWIDTH] IN BLOOD BY AUTOMATED COUNT: 14 % (ref 11.6–14.5)
ERYTHROCYTE [DISTWIDTH] IN BLOOD BY AUTOMATED COUNT: 14.1 % (ref 11.6–14.5)
ERYTHROCYTE [DISTWIDTH] IN BLOOD BY AUTOMATED COUNT: 14.1 % (ref 11.6–14.5)
ERYTHROCYTE [DISTWIDTH] IN BLOOD BY AUTOMATED COUNT: 14.2 % (ref 11.6–14.5)
ERYTHROCYTE [DISTWIDTH] IN BLOOD BY AUTOMATED COUNT: 14.2 % (ref 11.6–14.5)
ERYTHROCYTE [DISTWIDTH] IN BLOOD BY AUTOMATED COUNT: 14.3 % (ref 11.6–14.5)
EST. AVERAGE GLUCOSE BLD GHB EST-MCNC: 103 MG/DL
FERRITIN SERPL-MCNC: 1525 NG/ML (ref 8–388)
FIBRINOGEN PPP-MCNC: 452 MG/DL (ref 210–451)
FIBRINOGEN PPP-MCNC: 463 MG/DL (ref 210–451)
FIBRINOGEN PPP-MCNC: 504 MG/DL (ref 210–451)
FIBRINOGEN PPP-MCNC: 507 MG/DL (ref 210–451)
FLUAV AG NPH QL IA: NEGATIVE
FLUBV AG NOSE QL IA: NEGATIVE
FOLATE SERPL-MCNC: 2.7 NG/ML (ref 3.1–17.5)
G6PD BLD QN: 372 U/10E12 RBC (ref 127–427)
GAS FLOW.O2 O2 DELIVERY SYS: ABNORMAL L/MIN
GAS FLOW.O2 O2 DELIVERY SYS: NORMAL L/MIN
GAS FLOW.O2 SETTING OXYMISER: 2 L/M
GAS FLOW.O2 SETTING OXYMISER: 6 L/M
GLOBULIN SER CALC-MCNC: 3.8 G/DL (ref 2–4)
GLOBULIN SER CALC-MCNC: 3.9 G/DL (ref 2–4)
GLOBULIN SER CALC-MCNC: 4.1 G/DL (ref 2–4)
GLOBULIN SER CALC-MCNC: 4.4 G/DL (ref 2–4)
GLOBULIN SER CALC-MCNC: 4.5 G/DL (ref 2–4)
GLOBULIN SER CALC-MCNC: 4.5 G/DL (ref 2–4)
GLOBULIN SER CALC-MCNC: 4.6 G/DL (ref 2–4)
GLOBULIN SER CALC-MCNC: 4.7 G/DL (ref 2–4)
GLOBULIN SER CALC-MCNC: 4.8 G/DL (ref 2–4)
GLOBULIN SER CALC-MCNC: 4.9 G/DL (ref 2–4)
GLOBULIN SER CALC-MCNC: 5.7 G/DL (ref 2–4)
GLUCOSE BLD STRIP.AUTO-MCNC: 107 MG/DL (ref 70–110)
GLUCOSE BLD STRIP.AUTO-MCNC: 116 MG/DL (ref 70–110)
GLUCOSE BLD STRIP.AUTO-MCNC: 120 MG/DL (ref 70–110)
GLUCOSE BLD STRIP.AUTO-MCNC: 143 MG/DL (ref 70–110)
GLUCOSE BLD STRIP.AUTO-MCNC: 163 MG/DL (ref 70–110)
GLUCOSE BLD STRIP.AUTO-MCNC: 171 MG/DL (ref 70–110)
GLUCOSE BLD STRIP.AUTO-MCNC: 174 MG/DL (ref 70–110)
GLUCOSE BLD STRIP.AUTO-MCNC: 179 MG/DL (ref 70–110)
GLUCOSE BLD STRIP.AUTO-MCNC: 189 MG/DL (ref 70–110)
GLUCOSE BLD STRIP.AUTO-MCNC: 200 MG/DL (ref 70–110)
GLUCOSE BLD STRIP.AUTO-MCNC: 202 MG/DL (ref 70–110)
GLUCOSE BLD STRIP.AUTO-MCNC: 215 MG/DL (ref 70–110)
GLUCOSE BLD STRIP.AUTO-MCNC: 215 MG/DL (ref 70–110)
GLUCOSE BLD STRIP.AUTO-MCNC: 216 MG/DL (ref 70–110)
GLUCOSE BLD STRIP.AUTO-MCNC: 231 MG/DL (ref 70–110)
GLUCOSE BLD STRIP.AUTO-MCNC: 241 MG/DL (ref 70–110)
GLUCOSE BLD STRIP.AUTO-MCNC: 246 MG/DL (ref 70–110)
GLUCOSE BLD STRIP.AUTO-MCNC: 248 MG/DL (ref 70–110)
GLUCOSE BLD STRIP.AUTO-MCNC: 253 MG/DL (ref 70–110)
GLUCOSE BLD STRIP.AUTO-MCNC: 275 MG/DL (ref 70–110)
GLUCOSE BLD STRIP.AUTO-MCNC: 294 MG/DL (ref 70–110)
GLUCOSE BLD STRIP.AUTO-MCNC: 319 MG/DL (ref 70–110)
GLUCOSE BLD STRIP.AUTO-MCNC: 384 MG/DL (ref 70–110)
GLUCOSE BLD STRIP.AUTO-MCNC: 94 MG/DL (ref 70–110)
GLUCOSE BLD STRIP.AUTO-MCNC: 99 MG/DL (ref 70–110)
GLUCOSE SERPL-MCNC: 100 MG/DL (ref 74–99)
GLUCOSE SERPL-MCNC: 112 MG/DL (ref 74–99)
GLUCOSE SERPL-MCNC: 138 MG/DL (ref 74–99)
GLUCOSE SERPL-MCNC: 153 MG/DL (ref 74–99)
GLUCOSE SERPL-MCNC: 168 MG/DL (ref 74–99)
GLUCOSE SERPL-MCNC: 183 MG/DL (ref 74–99)
GLUCOSE SERPL-MCNC: 190 MG/DL (ref 74–99)
GLUCOSE SERPL-MCNC: 212 MG/DL (ref 74–99)
GLUCOSE SERPL-MCNC: 214 MG/DL (ref 74–99)
GLUCOSE SERPL-MCNC: 241 MG/DL (ref 74–99)
GLUCOSE SERPL-MCNC: 243 MG/DL (ref 74–99)
GLUCOSE SERPL-MCNC: 319 MG/DL (ref 74–99)
GLUCOSE SERPL-MCNC: 82 MG/DL (ref 74–99)
GLUCOSE SERPL-MCNC: 97 MG/DL (ref 74–99)
GLUCOSE SERPL-MCNC: 98 MG/DL (ref 74–99)
GLUCOSE UR STRIP.AUTO-MCNC: NEGATIVE MG/DL
GRAM STN SPEC: ABNORMAL
HAV IGM SER QL: NEGATIVE
HBA1C MFR BLD: 5.2 % (ref 4.2–5.6)
HBV CORE IGM SER QL: NEGATIVE
HBV SURFACE AG SER QL: <0.1 INDEX
HBV SURFACE AG SER QL: NEGATIVE
HCO3 BLD-SCNC: 19.4 MMOL/L (ref 22–26)
HCO3 BLD-SCNC: 22.3 MMOL/L (ref 22–26)
HCT VFR BLD AUTO: 24.7 % (ref 36–48)
HCT VFR BLD AUTO: 25 % (ref 36–48)
HCT VFR BLD AUTO: 25 % (ref 37.5–51)
HCT VFR BLD AUTO: 26.8 % (ref 36–48)
HCT VFR BLD AUTO: 26.9 % (ref 36–48)
HCT VFR BLD AUTO: 27.2 % (ref 36–48)
HCT VFR BLD AUTO: 27.3 % (ref 36–48)
HCT VFR BLD AUTO: 27.5 % (ref 36–48)
HCT VFR BLD AUTO: 27.6 % (ref 36–48)
HCT VFR BLD AUTO: 28.4 % (ref 36–48)
HCT VFR BLD AUTO: 28.4 % (ref 36–48)
HCT VFR BLD AUTO: 28.9 % (ref 36–48)
HCT VFR BLD AUTO: 30.4 % (ref 36–48)
HCT VFR BLD AUTO: 30.7 % (ref 36–48)
HCT VFR BLD AUTO: 31 % (ref 36–48)
HCT VFR BLD AUTO: 32.3 % (ref 36–48)
HCV AB SER IA-ACNC: 0.12 INDEX
HCV AB SERPL QL IA: NEGATIVE
HCV COMMENT,HCGAC: NORMAL
HEALTH STATUS, XMCV2T: NORMAL
HGB BLD-MCNC: 10.6 G/DL (ref 13–16)
HGB BLD-MCNC: 10.8 G/DL (ref 13–16)
HGB BLD-MCNC: 10.9 G/DL (ref 13–16)
HGB BLD-MCNC: 11.3 G/DL (ref 13–16)
HGB BLD-MCNC: 8.6 G/DL (ref 13–17.7)
HGB BLD-MCNC: 8.7 G/DL (ref 13–16)
HGB BLD-MCNC: 8.8 G/DL (ref 13–16)
HGB BLD-MCNC: 9 G/DL (ref 13–16)
HGB BLD-MCNC: 9 G/DL (ref 13–16)
HGB BLD-MCNC: 9.2 G/DL (ref 13–16)
HGB BLD-MCNC: 9.3 G/DL (ref 13–16)
HGB BLD-MCNC: 9.5 G/DL (ref 13–16)
HGB BLD-MCNC: 9.6 G/DL (ref 13–16)
HGB BLD-MCNC: 9.7 G/DL (ref 13–16)
HGB BLD-MCNC: 9.7 G/DL (ref 13–16)
HGB BLD-MCNC: 9.8 G/DL (ref 13–16)
HGB UR QL STRIP: NEGATIVE
HIV1 RNA # SERPL NAA+PROBE: NORMAL COPIES/ML
HIV1 RNA SERPL NAA+PROBE-LOG#: 6.15 LOG10COPY/ML
IMM GRANULOCYTES # BLD: 0 X10E3/UL (ref 0–0.1)
IMM GRANULOCYTES NFR BLD: 1 %
INR PPP: 0.9 (ref 0.8–1.2)
INR PPP: 1 (ref 0.8–1.2)
INR PPP: 1 (ref 0.8–1.2)
IRON SATN MFR SERPL: 12 % (ref 20–50)
IRON SERPL-MCNC: 29 UG/DL (ref 50–175)
KETONES UR QL STRIP.AUTO: NEGATIVE MG/DL
L PNEUMO AG UR QL IA: NEGATIVE
LACTATE BLD-SCNC: 1.19 MMOL/L (ref 0.4–2)
LACTATE SERPL-SCNC: 2.1 MMOL/L (ref 0.4–2)
LACTATE SERPL-SCNC: 2.2 MMOL/L (ref 0.4–2)
LACTATE SERPL-SCNC: 2.6 MMOL/L (ref 0.4–2)
LDH SERPL L TO P-CCNC: 560 U/L (ref 81–234)
LDH SERPL L TO P-CCNC: 683 U/L (ref 81–234)
LEUKOCYTE ESTERASE UR QL STRIP.AUTO: NEGATIVE
LIPASE SERPL-CCNC: 72 U/L (ref 73–393)
LVFS 2D: 24.02 %
LVOT MG: 2.88 MMHG
LVOT MV: 0.81 CM/S
LVSV (MOD SINGLE 4C): 30 ML
LVSV (TEICH): 33.02 ML
LYMPHOCYTES # BLD AUTO: 0.3 X10E3/UL (ref 0.7–3.1)
LYMPHOCYTES # BLD: 0.1 K/UL (ref 0.8–3.5)
LYMPHOCYTES # BLD: 0.1 K/UL (ref 0.9–3.6)
LYMPHOCYTES # BLD: 0.2 K/UL (ref 0.9–3.6)
LYMPHOCYTES # BLD: 0.3 K/UL (ref 0.9–3.6)
LYMPHOCYTES # BLD: 0.4 K/UL (ref 0.9–3.6)
LYMPHOCYTES # BLD: 0.4 K/UL (ref 0.9–3.6)
LYMPHOCYTES # BLD: 0.6 K/UL (ref 0.8–3.5)
LYMPHOCYTES NFR BLD AUTO: 12 %
LYMPHOCYTES NFR BLD: 1 % (ref 20–51)
LYMPHOCYTES NFR BLD: 10 % (ref 21–52)
LYMPHOCYTES NFR BLD: 2 % (ref 21–52)
LYMPHOCYTES NFR BLD: 3 % (ref 21–52)
LYMPHOCYTES NFR BLD: 4 % (ref 21–52)
LYMPHOCYTES NFR BLD: 5 % (ref 21–52)
LYMPHOCYTES NFR BLD: 6 % (ref 21–52)
LYMPHOCYTES NFR BLD: 6 % (ref 21–52)
LYMPHOCYTES NFR BLD: 7 % (ref 20–51)
LYMPHOCYTES NFR BLD: 7 % (ref 21–52)
LYMPHOCYTES NFR BLD: 9 % (ref 21–52)
MAGNESIUM SERPL-MCNC: 1.9 MG/DL (ref 1.6–2.6)
MAGNESIUM SERPL-MCNC: 2 MG/DL (ref 1.6–2.6)
MAGNESIUM SERPL-MCNC: 2.1 MG/DL (ref 1.6–2.6)
MAGNESIUM SERPL-MCNC: 2.1 MG/DL (ref 1.6–2.6)
MAGNESIUM SERPL-MCNC: 2.4 MG/DL (ref 1.6–2.6)
MCH RBC QN AUTO: 29.3 PG (ref 24–34)
MCH RBC QN AUTO: 29.4 PG (ref 24–34)
MCH RBC QN AUTO: 29.6 PG (ref 24–34)
MCH RBC QN AUTO: 29.6 PG (ref 24–34)
MCH RBC QN AUTO: 29.7 PG (ref 24–34)
MCH RBC QN AUTO: 29.8 PG (ref 24–34)
MCH RBC QN AUTO: 29.8 PG (ref 24–34)
MCH RBC QN AUTO: 29.9 PG (ref 24–34)
MCH RBC QN AUTO: 29.9 PG (ref 24–34)
MCH RBC QN AUTO: 30 PG (ref 24–34)
MCH RBC QN AUTO: 30.1 PG (ref 24–34)
MCH RBC QN AUTO: 30.2 PG (ref 24–34)
MCH RBC QN AUTO: 30.3 PG (ref 24–34)
MCH RBC QN AUTO: 30.3 PG (ref 24–34)
MCH RBC QN AUTO: 30.4 PG (ref 26.6–33)
MCH RBC QN AUTO: 30.5 PG (ref 24–34)
MCHC RBC AUTO-ENTMCNC: 33.5 G/DL (ref 31–37)
MCHC RBC AUTO-ENTMCNC: 33.6 G/DL (ref 31–37)
MCHC RBC AUTO-ENTMCNC: 33.6 G/DL (ref 31–37)
MCHC RBC AUTO-ENTMCNC: 33.8 G/DL (ref 31–37)
MCHC RBC AUTO-ENTMCNC: 34.1 G/DL (ref 31–37)
MCHC RBC AUTO-ENTMCNC: 34.2 G/DL (ref 31–37)
MCHC RBC AUTO-ENTMCNC: 34.4 G/DL (ref 31.5–35.7)
MCHC RBC AUTO-ENTMCNC: 34.5 G/DL (ref 31–37)
MCHC RBC AUTO-ENTMCNC: 34.5 G/DL (ref 31–37)
MCHC RBC AUTO-ENTMCNC: 34.8 G/DL (ref 31–37)
MCHC RBC AUTO-ENTMCNC: 34.8 G/DL (ref 31–37)
MCHC RBC AUTO-ENTMCNC: 34.9 G/DL (ref 31–37)
MCHC RBC AUTO-ENTMCNC: 35 G/DL (ref 31–37)
MCHC RBC AUTO-ENTMCNC: 35.2 G/DL (ref 31–37)
MCHC RBC AUTO-ENTMCNC: 35.2 G/DL (ref 31–37)
MCHC RBC AUTO-ENTMCNC: 35.5 G/DL (ref 31–37)
MCV RBC AUTO: 85 FL (ref 74–97)
MCV RBC AUTO: 85.4 FL (ref 74–97)
MCV RBC AUTO: 85.4 FL (ref 74–97)
MCV RBC AUTO: 85.5 FL (ref 74–97)
MCV RBC AUTO: 85.7 FL (ref 74–97)
MCV RBC AUTO: 85.8 FL (ref 74–97)
MCV RBC AUTO: 85.9 FL (ref 74–97)
MCV RBC AUTO: 85.9 FL (ref 74–97)
MCV RBC AUTO: 86.1 FL (ref 74–97)
MCV RBC AUTO: 86.2 FL (ref 74–97)
MCV RBC AUTO: 86.9 FL (ref 74–97)
MCV RBC AUTO: 87.3 FL (ref 74–97)
MCV RBC AUTO: 88 FL (ref 79–97)
MCV RBC AUTO: 89.3 FL (ref 74–97)
MCV RBC AUTO: 90.1 FL (ref 74–97)
MCV RBC AUTO: 90.6 FL (ref 74–97)
MONOCYTES # BLD AUTO: 0.3 X10E3/UL (ref 0.1–0.9)
MONOCYTES # BLD: 0.1 K/UL (ref 0.05–1.2)
MONOCYTES # BLD: 0.1 K/UL (ref 0–1)
MONOCYTES # BLD: 0.2 K/UL (ref 0.05–1.2)
MONOCYTES # BLD: 0.3 K/UL (ref 0.05–1.2)
MONOCYTES # BLD: 0.3 K/UL (ref 0–1)
MONOCYTES # BLD: 0.5 K/UL (ref 0.05–1.2)
MONOCYTES # BLD: 0.5 K/UL (ref 0.05–1.2)
MONOCYTES # BLD: 0.6 K/UL (ref 0.05–1.2)
MONOCYTES NFR BLD AUTO: 13 %
MONOCYTES NFR BLD: 1 % (ref 2–9)
MONOCYTES NFR BLD: 1 % (ref 3–10)
MONOCYTES NFR BLD: 11 % (ref 3–10)
MONOCYTES NFR BLD: 12 % (ref 3–10)
MONOCYTES NFR BLD: 2 % (ref 3–10)
MONOCYTES NFR BLD: 3 % (ref 3–10)
MONOCYTES NFR BLD: 4 % (ref 2–9)
MONOCYTES NFR BLD: 4 % (ref 3–10)
MONOCYTES NFR BLD: 6 % (ref 3–10)
MONOCYTES NFR BLD: 7 % (ref 3–10)
MONOCYTES NFR BLD: 9 % (ref 3–10)
MV DEC SLOPE: 10.88
NEUTROPHILS # BLD AUTO: 1.8 X10E3/UL (ref 1.4–7)
NEUTROPHILS NFR BLD AUTO: 74 %
NEUTS BAND NFR BLD MANUAL: 1 % (ref 0–5)
NEUTS BAND NFR BLD MANUAL: 6 % (ref 0–5)
NEUTS SEG # BLD: 3.1 K/UL (ref 1.8–8)
NEUTS SEG # BLD: 3.2 K/UL (ref 1.8–8)
NEUTS SEG # BLD: 3.3 K/UL (ref 1.8–8)
NEUTS SEG # BLD: 3.6 K/UL (ref 1.8–8)
NEUTS SEG # BLD: 4.4 K/UL (ref 1.8–8)
NEUTS SEG # BLD: 4.4 K/UL (ref 1.8–8)
NEUTS SEG # BLD: 4.5 K/UL (ref 1.8–8)
NEUTS SEG # BLD: 4.6 K/UL (ref 1.8–8)
NEUTS SEG # BLD: 4.8 K/UL (ref 1.8–8)
NEUTS SEG # BLD: 5.3 K/UL (ref 1.8–8)
NEUTS SEG # BLD: 5.4 K/UL (ref 1.8–8)
NEUTS SEG # BLD: 5.7 K/UL (ref 1.8–8)
NEUTS SEG # BLD: 5.8 K/UL (ref 1.8–8)
NEUTS SEG # BLD: 6.7 K/UL (ref 1.8–8)
NEUTS SEG # BLD: 9.5 K/UL (ref 1.8–8)
NEUTS SEG NFR BLD: 77 % (ref 40–73)
NEUTS SEG NFR BLD: 82 % (ref 40–73)
NEUTS SEG NFR BLD: 83 % (ref 42–75)
NEUTS SEG NFR BLD: 85 % (ref 40–73)
NEUTS SEG NFR BLD: 87 % (ref 40–73)
NEUTS SEG NFR BLD: 88 % (ref 40–73)
NEUTS SEG NFR BLD: 89 % (ref 40–73)
NEUTS SEG NFR BLD: 91 % (ref 40–73)
NEUTS SEG NFR BLD: 93 % (ref 40–73)
NEUTS SEG NFR BLD: 93 % (ref 40–73)
NEUTS SEG NFR BLD: 94 % (ref 40–73)
NEUTS SEG NFR BLD: 95 % (ref 40–73)
NEUTS SEG NFR BLD: 95 % (ref 40–73)
NEUTS SEG NFR BLD: 95 % (ref 42–75)
NEUTS SEG NFR BLD: 96 % (ref 40–73)
NITRITE UR QL STRIP.AUTO: NEGATIVE
O2/TOTAL GAS SETTING VFR VENT: 40 %
P-R INTERVAL, ECG05: 126 MS
PCO2 BLD: 28.2 MMHG (ref 35–45)
PCO2 BLD: 36.8 MMHG (ref 35–45)
PH BLD: 7.4 [PH] (ref 7.35–7.45)
PH BLD: 7.45 [PH] (ref 7.35–7.45)
PH UR STRIP: 7 [PH] (ref 5–8)
PHOSPHATE SERPL-MCNC: 3.6 MG/DL (ref 2.5–4.9)
PHOSPHATE SERPL-MCNC: 4 MG/DL (ref 2.5–4.9)
PHOSPHATE SERPL-MCNC: 4.2 MG/DL (ref 2.5–4.9)
PHOSPHATE SERPL-MCNC: 4.5 MG/DL (ref 2.5–4.9)
PHOSPHATE SERPL-MCNC: 5.8 MG/DL (ref 2.5–4.9)
PLATELET # BLD AUTO: 233 K/UL (ref 135–420)
PLATELET # BLD AUTO: 236 K/UL (ref 135–420)
PLATELET # BLD AUTO: 246 K/UL (ref 135–420)
PLATELET # BLD AUTO: 251 X10E3/UL (ref 150–450)
PLATELET # BLD AUTO: 273 K/UL (ref 135–420)
PLATELET # BLD AUTO: 274 K/UL (ref 135–420)
PLATELET # BLD AUTO: 285 K/UL (ref 135–420)
PLATELET # BLD AUTO: 288 K/UL (ref 135–420)
PLATELET # BLD AUTO: 290 K/UL (ref 135–420)
PLATELET # BLD AUTO: 296 K/UL (ref 135–420)
PLATELET # BLD AUTO: 298 K/UL (ref 135–420)
PLATELET # BLD AUTO: 299 K/UL (ref 135–420)
PLATELET # BLD AUTO: 301 K/UL (ref 135–420)
PLATELET # BLD AUTO: 303 K/UL (ref 135–420)
PLATELET # BLD AUTO: 306 K/UL (ref 135–420)
PLATELET # BLD AUTO: 324 K/UL (ref 135–420)
PLATELET COMMENTS,PCOM: ABNORMAL
PMV BLD AUTO: 10 FL (ref 9.2–11.8)
PMV BLD AUTO: 10.2 FL (ref 9.2–11.8)
PMV BLD AUTO: 10.4 FL (ref 9.2–11.8)
PMV BLD AUTO: 10.5 FL (ref 9.2–11.8)
PMV BLD AUTO: 9 FL (ref 9.2–11.8)
PMV BLD AUTO: 9.4 FL (ref 9.2–11.8)
PMV BLD AUTO: 9.5 FL (ref 9.2–11.8)
PMV BLD AUTO: 9.6 FL (ref 9.2–11.8)
PMV BLD AUTO: 9.7 FL (ref 9.2–11.8)
PMV BLD AUTO: 9.9 FL (ref 9.2–11.8)
PO2 BLD: 74 MMHG (ref 80–100)
PO2 BLD: 96 MMHG (ref 80–100)
POTASSIUM SERPL-SCNC: 3.5 MMOL/L (ref 3.5–5.5)
POTASSIUM SERPL-SCNC: 4 MMOL/L (ref 3.5–5.5)
POTASSIUM SERPL-SCNC: 4.1 MMOL/L (ref 3.5–5.5)
POTASSIUM SERPL-SCNC: 4.2 MMOL/L (ref 3.5–5.5)
POTASSIUM SERPL-SCNC: 4.3 MMOL/L (ref 3.5–5.5)
POTASSIUM SERPL-SCNC: 4.4 MMOL/L (ref 3.5–5.5)
POTASSIUM SERPL-SCNC: 4.5 MMOL/L (ref 3.5–5.5)
POTASSIUM SERPL-SCNC: 4.6 MMOL/L (ref 3.5–5.5)
POTASSIUM SERPL-SCNC: 4.7 MMOL/L (ref 3.5–5.5)
POTASSIUM SERPL-SCNC: 4.9 MMOL/L (ref 3.5–5.5)
POTASSIUM SERPL-SCNC: 5.1 MMOL/L (ref 3.5–5.5)
POTASSIUM SERPL-SCNC: 5.2 MMOL/L (ref 3.5–5.5)
POTASSIUM SERPL-SCNC: 5.2 MMOL/L (ref 3.5–5.5)
PROCALCITONIN SERPL-MCNC: 0.57 NG/ML
PROT SERPL-MCNC: 5.9 G/DL (ref 6.4–8.2)
PROT SERPL-MCNC: 6 G/DL (ref 6.4–8.2)
PROT SERPL-MCNC: 6.3 G/DL (ref 6.4–8.2)
PROT SERPL-MCNC: 6.4 G/DL (ref 6.4–8.2)
PROT SERPL-MCNC: 6.6 G/DL (ref 6.4–8.2)
PROT SERPL-MCNC: 7.2 G/DL (ref 6.4–8.2)
PROT SERPL-MCNC: 7.7 G/DL (ref 6.4–8.2)
PROT SERPL-MCNC: 7.8 G/DL (ref 6.4–8.2)
PROT SERPL-MCNC: 8.1 G/DL (ref 6.4–8.2)
PROT UR STRIP-MCNC: NEGATIVE MG/DL
PROTHROMBIN TIME: 12.3 SEC (ref 11.5–15.2)
PROTHROMBIN TIME: 12.7 SEC (ref 11.5–15.2)
PROTHROMBIN TIME: 12.8 SEC (ref 11.5–15.2)
Q-T INTERVAL, ECG07: 270 MS
QRS DURATION, ECG06: 74 MS
QTC CALCULATION (BEZET), ECG08: 422 MS
RBC # BLD AUTO: 2.83 X10E6/UL (ref 4.14–5.8)
RBC # BLD AUTO: 2.89 M/UL (ref 4.7–5.5)
RBC # BLD AUTO: 2.9 M/UL (ref 4.7–5.5)
RBC # BLD AUTO: 2.97 M/UL (ref 4.7–5.5)
RBC # BLD AUTO: 3 M/UL (ref 4.7–5.5)
RBC # BLD AUTO: 3.02 M/UL (ref 4.7–5.5)
RBC # BLD AUTO: 3.14 M/UL (ref 4.7–5.5)
RBC # BLD AUTO: 3.2 M/UL (ref 4.7–5.5)
RBC # BLD AUTO: 3.22 M/UL (ref 4.7–5.5)
RBC # BLD AUTO: 3.3 M/UL (ref 4.7–5.5)
RBC # BLD AUTO: 3.31 M/UL (ref 4.7–5.5)
RBC # BLD AUTO: 3.31 M/UL (ref 4.7–5.5)
RBC # BLD AUTO: 3.48 X10E6/UL (ref 4.14–5.8)
RBC # BLD AUTO: 3.54 M/UL (ref 4.7–5.5)
RBC # BLD AUTO: 3.61 M/UL (ref 4.7–5.5)
RBC # BLD AUTO: 3.63 M/UL (ref 4.7–5.5)
RBC # BLD AUTO: 3.78 M/UL (ref 4.7–5.5)
RBC MORPH BLD: ABNORMAL
RPR SER QL: REACTIVE
RPR SER QL: REACTIVE
RPR SER-TITR: ABNORMAL {TITER}
RPR SER-TITR: ABNORMAL {TITER}
S PNEUM AG UR QL: NEGATIVE
SAO2 % BLD: 96 % (ref 92–97)
SAO2 % BLD: 97 % (ref 92–97)
SARS-COV-2, COV2NT: NOT DETECTED
SARS-COV-2, COV2NT: NOT DETECTED
SERVICE CMNT-IMP: ABNORMAL
SERVICE CMNT-IMP: ABNORMAL
SERVICE CMNT-IMP: NORMAL
SODIUM SERPL-SCNC: 124 MMOL/L (ref 136–145)
SODIUM SERPL-SCNC: 125 MMOL/L (ref 136–145)
SODIUM SERPL-SCNC: 125 MMOL/L (ref 136–145)
SODIUM SERPL-SCNC: 126 MMOL/L (ref 136–145)
SODIUM SERPL-SCNC: 126 MMOL/L (ref 136–145)
SODIUM SERPL-SCNC: 127 MMOL/L (ref 136–145)
SODIUM SERPL-SCNC: 128 MMOL/L (ref 136–145)
SODIUM SERPL-SCNC: 128 MMOL/L (ref 136–145)
SODIUM SERPL-SCNC: 129 MMOL/L (ref 136–145)
SODIUM SERPL-SCNC: 131 MMOL/L (ref 136–145)
SODIUM SERPL-SCNC: 133 MMOL/L (ref 136–145)
SODIUM SERPL-SCNC: 134 MMOL/L (ref 136–145)
SODIUM SERPL-SCNC: 135 MMOL/L (ref 136–145)
SOURCE, COVRS: NORMAL
SOURCE, COVRS: NORMAL
SP GR UR REFRACTOMETRY: <1.005 (ref 1–1.03)
SP1: NORMAL
SP2: NORMAL
SP3: NORMAL
SPECIMEN EXP DATE BLD: NORMAL
SPECIMEN TYPE, XMCV1T: NORMAL
SPECIMEN TYPE, XMCV1T: NORMAL
SPECIMEN TYPE: ABNORMAL
SPECIMEN TYPE: NORMAL
T PALLIDUM AB SER QL IA: REACTIVE
T PALLIDUM AB SER QL IA: REACTIVE
TIBC SERPL-MCNC: 241 UG/DL (ref 250–450)
TOTAL RESP. RATE, ITRR: 38
TOTAL RESP. RATE, ITRR: 54
TROPONIN I SERPL-MCNC: <0.02 NG/ML (ref 0–0.04)
TSH SERPL DL<=0.05 MIU/L-ACNC: 2.1 UIU/ML (ref 0.36–3.74)
UROBILINOGEN UR QL STRIP.AUTO: 1 EU/DL (ref 0.2–1)
VENTRICULAR RATE, ECG03: 147 BPM
VIT B12 SERPL-MCNC: 228 PG/ML (ref 211–911)
WBC # BLD AUTO: 10 K/UL (ref 4.6–13.2)
WBC # BLD AUTO: 2.4 X10E3/UL (ref 3.4–10.8)
WBC # BLD AUTO: 3.5 K/UL (ref 4.6–13.2)
WBC # BLD AUTO: 3.6 K/UL (ref 4.6–13.2)
WBC # BLD AUTO: 4.2 K/UL (ref 4.6–13.2)
WBC # BLD AUTO: 4.3 K/UL (ref 4.6–13.2)
WBC # BLD AUTO: 4.7 K/UL (ref 4.6–13.2)
WBC # BLD AUTO: 4.8 K/UL (ref 4.6–13.2)
WBC # BLD AUTO: 4.9 K/UL (ref 4.6–13.2)
WBC # BLD AUTO: 5.5 K/UL (ref 4.6–13.2)
WBC # BLD AUTO: 5.6 K/UL (ref 4.6–13.2)
WBC # BLD AUTO: 6 K/UL (ref 4.6–13.2)
WBC # BLD AUTO: 6.1 K/UL (ref 4.6–13.2)
WBC # BLD AUTO: 8.1 K/UL (ref 4.6–13.2)

## 2020-01-01 PROCEDURE — 74011636637 HC RX REV CODE- 636/637: Performed by: INTERNAL MEDICINE

## 2020-01-01 PROCEDURE — 77010033678 HC OXYGEN DAILY

## 2020-01-01 PROCEDURE — 74011250636 HC RX REV CODE- 250/636: Performed by: INTERNAL MEDICINE

## 2020-01-01 PROCEDURE — 74011250637 HC RX REV CODE- 250/637: Performed by: FAMILY MEDICINE

## 2020-01-01 PROCEDURE — 74011250637 HC RX REV CODE- 250/637: Performed by: INTERNAL MEDICINE

## 2020-01-01 PROCEDURE — 87804 INFLUENZA ASSAY W/OPTIC: CPT

## 2020-01-01 PROCEDURE — 85379 FIBRIN DEGRADATION QUANT: CPT

## 2020-01-01 PROCEDURE — 94760 N-INVAS EAR/PLS OXIMETRY 1: CPT

## 2020-01-01 PROCEDURE — 80048 BASIC METABOLIC PNL TOTAL CA: CPT

## 2020-01-01 PROCEDURE — 85025 COMPLETE CBC W/AUTO DIFF WBC: CPT

## 2020-01-01 PROCEDURE — 65660000000 HC RM CCU STEPDOWN

## 2020-01-01 PROCEDURE — 85384 FIBRINOGEN ACTIVITY: CPT

## 2020-01-01 PROCEDURE — 96375 TX/PRO/DX INJ NEW DRUG ADDON: CPT

## 2020-01-01 PROCEDURE — 36415 COLL VENOUS BLD VENIPUNCTURE: CPT

## 2020-01-01 PROCEDURE — 83615 LACTATE (LD) (LDH) ENZYME: CPT

## 2020-01-01 PROCEDURE — 83690 ASSAY OF LIPASE: CPT

## 2020-01-01 PROCEDURE — 74011250637 HC RX REV CODE- 250/637: Performed by: HOSPITALIST

## 2020-01-01 PROCEDURE — 74011000250 HC RX REV CODE- 250: Performed by: HOSPITALIST

## 2020-01-01 PROCEDURE — 74011250636 HC RX REV CODE- 250/636: Performed by: HOSPITALIST

## 2020-01-01 PROCEDURE — 87635 SARS-COV-2 COVID-19 AMP PRB: CPT

## 2020-01-01 PROCEDURE — 82533 TOTAL CORTISOL: CPT

## 2020-01-01 PROCEDURE — 99223 1ST HOSP IP/OBS HIGH 75: CPT | Performed by: NURSE PRACTITIONER

## 2020-01-01 PROCEDURE — 99285 EMERGENCY DEPT VISIT HI MDM: CPT

## 2020-01-01 PROCEDURE — 80053 COMPREHEN METABOLIC PANEL: CPT

## 2020-01-01 PROCEDURE — 82728 ASSAY OF FERRITIN: CPT

## 2020-01-01 PROCEDURE — 93306 TTE W/DOPPLER COMPLETE: CPT

## 2020-01-01 PROCEDURE — 74011000250 HC RX REV CODE- 250: Performed by: INTERNAL MEDICINE

## 2020-01-01 PROCEDURE — 94640 AIRWAY INHALATION TREATMENT: CPT

## 2020-01-01 PROCEDURE — 82962 GLUCOSE BLOOD TEST: CPT

## 2020-01-01 PROCEDURE — 71046 X-RAY EXAM CHEST 2 VIEWS: CPT

## 2020-01-01 PROCEDURE — 74011250636 HC RX REV CODE- 250/636: Performed by: FAMILY MEDICINE

## 2020-01-01 PROCEDURE — 86592 SYPHILIS TEST NON-TREP QUAL: CPT

## 2020-01-01 PROCEDURE — 71045 X-RAY EXAM CHEST 1 VIEW: CPT

## 2020-01-01 PROCEDURE — 99282 EMERGENCY DEPT VISIT SF MDM: CPT

## 2020-01-01 PROCEDURE — P9047 ALBUMIN (HUMAN), 25%, 50ML: HCPCS | Performed by: HOSPITALIST

## 2020-01-01 PROCEDURE — 83735 ASSAY OF MAGNESIUM: CPT

## 2020-01-01 PROCEDURE — 77010033711 HC HIGH FLOW OXYGEN

## 2020-01-01 PROCEDURE — 85610 PROTHROMBIN TIME: CPT

## 2020-01-01 PROCEDURE — 87449 NOS EACH ORGANISM AG IA: CPT

## 2020-01-01 PROCEDURE — 86901 BLOOD TYPING SEROLOGIC RH(D): CPT

## 2020-01-01 PROCEDURE — 96374 THER/PROPH/DIAG INJ IV PUSH: CPT

## 2020-01-01 PROCEDURE — 36600 WITHDRAWAL OF ARTERIAL BLOOD: CPT

## 2020-01-01 PROCEDURE — 87205 SMEAR GRAM STAIN: CPT

## 2020-01-01 PROCEDURE — 86361 T CELL ABSOLUTE COUNT: CPT

## 2020-01-01 PROCEDURE — 86780 TREPONEMA PALLIDUM: CPT

## 2020-01-01 PROCEDURE — 81003 URINALYSIS AUTO W/O SCOPE: CPT

## 2020-01-01 PROCEDURE — 83605 ASSAY OF LACTIC ACID: CPT

## 2020-01-01 PROCEDURE — 85041 AUTOMATED RBC COUNT: CPT

## 2020-01-01 PROCEDURE — 97110 THERAPEUTIC EXERCISES: CPT

## 2020-01-01 PROCEDURE — 77030020362 HC SOL INJ STRL H2O 1000ML BG LF

## 2020-01-01 PROCEDURE — 74011000636 HC RX REV CODE- 636: Performed by: FAMILY MEDICINE

## 2020-01-01 PROCEDURE — 97162 PT EVAL MOD COMPLEX 30 MIN: CPT

## 2020-01-01 PROCEDURE — 84100 ASSAY OF PHOSPHORUS: CPT

## 2020-01-01 PROCEDURE — 87040 BLOOD CULTURE FOR BACTERIA: CPT

## 2020-01-01 PROCEDURE — 97530 THERAPEUTIC ACTIVITIES: CPT

## 2020-01-01 PROCEDURE — 71275 CT ANGIOGRAPHY CHEST: CPT

## 2020-01-01 PROCEDURE — 74011250637 HC RX REV CODE- 250/637: Performed by: EMERGENCY MEDICINE

## 2020-01-01 PROCEDURE — 87798 DETECT AGENT NOS DNA AMP: CPT

## 2020-01-01 PROCEDURE — 82803 BLOOD GASES ANY COMBINATION: CPT

## 2020-01-01 PROCEDURE — 83036 HEMOGLOBIN GLYCOSYLATED A1C: CPT

## 2020-01-01 PROCEDURE — 84443 ASSAY THYROID STIM HORMONE: CPT

## 2020-01-01 PROCEDURE — 87206 SMEAR FLUORESCENT/ACID STAI: CPT

## 2020-01-01 PROCEDURE — 74011000250 HC RX REV CODE- 250: Performed by: EMERGENCY MEDICINE

## 2020-01-01 PROCEDURE — 76450000000

## 2020-01-01 PROCEDURE — 84145 PROCALCITONIN (PCT): CPT

## 2020-01-01 PROCEDURE — 82746 ASSAY OF FOLIC ACID SERUM: CPT

## 2020-01-01 PROCEDURE — 93005 ELECTROCARDIOGRAM TRACING: CPT

## 2020-01-01 PROCEDURE — 83540 ASSAY OF IRON: CPT

## 2020-01-01 PROCEDURE — 86140 C-REACTIVE PROTEIN: CPT

## 2020-01-01 PROCEDURE — 82306 VITAMIN D 25 HYDROXY: CPT

## 2020-01-01 PROCEDURE — 82550 ASSAY OF CK (CPK): CPT

## 2020-01-01 PROCEDURE — 74011000250 HC RX REV CODE- 250: Performed by: FAMILY MEDICINE

## 2020-01-01 PROCEDURE — 74011250636 HC RX REV CODE- 250/636: Performed by: EMERGENCY MEDICINE

## 2020-01-01 PROCEDURE — 76700 US EXAM ABDOM COMPLETE: CPT

## 2020-01-01 PROCEDURE — 80074 ACUTE HEPATITIS PANEL: CPT

## 2020-01-01 PROCEDURE — 87536 HIV-1 QUANT&REVRSE TRNSCRPJ: CPT

## 2020-01-01 PROCEDURE — 83880 ASSAY OF NATRIURETIC PEPTIDE: CPT

## 2020-01-01 RX ORDER — LEVALBUTEROL 1.25 MG/.5ML
1.25 SOLUTION, CONCENTRATE RESPIRATORY (INHALATION)
Status: DISCONTINUED | OUTPATIENT
Start: 2020-01-01 | End: 2021-01-01

## 2020-01-01 RX ORDER — ONDANSETRON 2 MG/ML
4 INJECTION INTRAMUSCULAR; INTRAVENOUS
Status: DISCONTINUED | OUTPATIENT
Start: 2020-01-01 | End: 2021-01-01

## 2020-01-01 RX ORDER — ENOXAPARIN SODIUM 100 MG/ML
40 INJECTION SUBCUTANEOUS EVERY 24 HOURS
Status: DISCONTINUED | OUTPATIENT
Start: 2020-01-01 | End: 2020-01-01

## 2020-01-01 RX ORDER — SODIUM CHLORIDE 0.9 % (FLUSH) 0.9 %
5-40 SYRINGE (ML) INJECTION AS NEEDED
Status: DISCONTINUED | OUTPATIENT
Start: 2020-01-01 | End: 2021-01-01

## 2020-01-01 RX ORDER — SODIUM CHLORIDE 0.9 % (FLUSH) 0.9 %
5-10 SYRINGE (ML) INJECTION AS NEEDED
Status: DISCONTINUED | OUTPATIENT
Start: 2020-01-01 | End: 2021-01-01

## 2020-01-01 RX ORDER — SULFAMETHOXAZOLE AND TRIMETHOPRIM 800; 160 MG/1; MG/1
1 TABLET ORAL EVERY 12 HOURS
Status: DISCONTINUED | OUTPATIENT
Start: 2020-01-01 | End: 2020-01-01

## 2020-01-01 RX ORDER — BISACODYL 5 MG
5 TABLET, DELAYED RELEASE (ENTERIC COATED) ORAL DAILY PRN
Status: DISCONTINUED | OUTPATIENT
Start: 2020-01-01 | End: 2021-01-01

## 2020-01-01 RX ORDER — FAMOTIDINE 20 MG/1
20 TABLET, FILM COATED ORAL 2 TIMES DAILY
Status: DISCONTINUED | OUTPATIENT
Start: 2020-01-01 | End: 2021-01-01

## 2020-01-01 RX ORDER — ENOXAPARIN SODIUM 100 MG/ML
30 INJECTION SUBCUTANEOUS EVERY 12 HOURS
Status: DISCONTINUED | OUTPATIENT
Start: 2020-01-01 | End: 2020-01-01

## 2020-01-01 RX ORDER — SODIUM BICARBONATE 650 MG/1
650 TABLET ORAL 2 TIMES DAILY
Status: DISCONTINUED | OUTPATIENT
Start: 2020-01-01 | End: 2021-01-01

## 2020-01-01 RX ORDER — SODIUM CHLORIDE 9 MG/ML
500 INJECTION, SOLUTION INTRAVENOUS ONCE
Status: COMPLETED | OUTPATIENT
Start: 2020-01-01 | End: 2020-01-01

## 2020-01-01 RX ORDER — MELATONIN
6000 DAILY
Status: DISCONTINUED | OUTPATIENT
Start: 2020-01-01 | End: 2020-01-01

## 2020-01-01 RX ORDER — ACETAMINOPHEN 650 MG/1
650 SUPPOSITORY RECTAL
Status: DISCONTINUED | OUTPATIENT
Start: 2020-01-01 | End: 2020-01-01

## 2020-01-01 RX ORDER — ACETAMINOPHEN 325 MG/1
650 TABLET ORAL
Status: DISCONTINUED | OUTPATIENT
Start: 2020-01-01 | End: 2020-01-01

## 2020-01-01 RX ORDER — PREDNISONE 20 MG/1
20 TABLET ORAL
Status: DISCONTINUED | OUTPATIENT
Start: 2021-01-01 | End: 2021-01-01

## 2020-01-01 RX ORDER — PREDNISONE 20 MG/1
20 TABLET ORAL 2 TIMES DAILY WITH MEALS
Status: DISCONTINUED | OUTPATIENT
Start: 2020-01-01 | End: 2020-01-01

## 2020-01-01 RX ORDER — ALBUMIN HUMAN 250 G/1000ML
25 SOLUTION INTRAVENOUS EVERY 6 HOURS
Status: COMPLETED | OUTPATIENT
Start: 2020-01-01 | End: 2020-01-01

## 2020-01-01 RX ORDER — CHOLECALCIFEROL (VITAMIN D3) 125 MCG
10 CAPSULE ORAL
Status: DISCONTINUED | OUTPATIENT
Start: 2020-01-01 | End: 2020-01-01

## 2020-01-01 RX ORDER — SODIUM BICARBONATE 650 MG/1
325 TABLET ORAL 2 TIMES DAILY
Status: DISCONTINUED | OUTPATIENT
Start: 2020-01-01 | End: 2020-01-01

## 2020-01-01 RX ORDER — INSULIN LISPRO 100 [IU]/ML
INJECTION, SOLUTION INTRAVENOUS; SUBCUTANEOUS
Status: DISCONTINUED | OUTPATIENT
Start: 2020-01-01 | End: 2020-01-01

## 2020-01-01 RX ORDER — SERTRALINE HYDROCHLORIDE 50 MG/1
50 TABLET, FILM COATED ORAL DAILY
Status: DISCONTINUED | OUTPATIENT
Start: 2020-01-01 | End: 2021-01-01

## 2020-01-01 RX ORDER — DEXAMETHASONE SODIUM PHOSPHATE 10 MG/ML
10 INJECTION INTRAMUSCULAR; INTRAVENOUS
Status: COMPLETED | OUTPATIENT
Start: 2020-01-01 | End: 2020-01-01

## 2020-01-01 RX ORDER — SODIUM CHLORIDE 9 MG/ML
50 INJECTION, SOLUTION INTRAVENOUS CONTINUOUS
Status: DISCONTINUED | OUTPATIENT
Start: 2020-01-01 | End: 2020-01-01

## 2020-01-01 RX ORDER — DEXAMETHASONE SODIUM PHOSPHATE 10 MG/ML
10 INJECTION INTRAMUSCULAR; INTRAVENOUS EVERY 12 HOURS
Status: DISCONTINUED | OUTPATIENT
Start: 2020-01-01 | End: 2020-01-01

## 2020-01-01 RX ORDER — ACETAMINOPHEN 325 MG/1
650 TABLET ORAL
Status: DISCONTINUED | OUTPATIENT
Start: 2020-01-01 | End: 2021-01-01 | Stop reason: SDUPTHER

## 2020-01-01 RX ORDER — PREDNISONE 20 MG/1
40 TABLET ORAL 2 TIMES DAILY WITH MEALS
Status: COMPLETED | OUTPATIENT
Start: 2020-01-01 | End: 2020-01-01

## 2020-01-01 RX ORDER — SULFAMETHOXAZOLE AND TRIMETHOPRIM 800; 160 MG/1; MG/1
2 TABLET ORAL
Status: DISCONTINUED | OUTPATIENT
Start: 2020-01-01 | End: 2020-01-01

## 2020-01-01 RX ORDER — LORAZEPAM 0.5 MG/1
0.5 TABLET ORAL
Status: DISCONTINUED | OUTPATIENT
Start: 2020-01-01 | End: 2021-01-01

## 2020-01-01 RX ORDER — FUROSEMIDE 10 MG/ML
40 INJECTION INTRAMUSCULAR; INTRAVENOUS ONCE
Status: COMPLETED | OUTPATIENT
Start: 2020-01-01 | End: 2020-01-01

## 2020-01-01 RX ORDER — ASCORBIC ACID 250 MG
500 TABLET ORAL 3 TIMES DAILY
Status: DISCONTINUED | OUTPATIENT
Start: 2020-01-01 | End: 2020-01-01

## 2020-01-01 RX ORDER — ENOXAPARIN SODIUM 100 MG/ML
40 INJECTION SUBCUTANEOUS EVERY 24 HOURS
Status: DISCONTINUED | OUTPATIENT
Start: 2020-01-01 | End: 2021-01-01

## 2020-01-01 RX ORDER — SODIUM CHLORIDE 0.9 % (FLUSH) 0.9 %
5-40 SYRINGE (ML) INJECTION EVERY 8 HOURS
Status: DISCONTINUED | OUTPATIENT
Start: 2020-01-01 | End: 2020-01-01

## 2020-01-01 RX ORDER — BUSPIRONE HYDROCHLORIDE 5 MG/1
5 TABLET ORAL 3 TIMES DAILY
Status: DISCONTINUED | OUTPATIENT
Start: 2020-01-01 | End: 2021-01-01

## 2020-01-01 RX ORDER — VANCOMYCIN HYDROCHLORIDE
1250 EVERY 12 HOURS
Status: DISCONTINUED | OUTPATIENT
Start: 2020-01-01 | End: 2020-01-01

## 2020-01-01 RX ORDER — FLUCONAZOLE 100 MG/1
100 TABLET ORAL DAILY
Status: COMPLETED | OUTPATIENT
Start: 2020-01-01 | End: 2020-01-01

## 2020-01-01 RX ORDER — GUAIFENESIN 100 MG/5ML
100 SOLUTION ORAL
Status: DISCONTINUED | OUTPATIENT
Start: 2020-01-01 | End: 2021-01-01

## 2020-01-01 RX ORDER — SULFAMETHOXAZOLE AND TRIMETHOPRIM 800; 160 MG/1; MG/1
2 TABLET ORAL 3 TIMES DAILY
Status: DISCONTINUED | OUTPATIENT
Start: 2020-01-01 | End: 2020-01-01

## 2020-01-01 RX ORDER — INSULIN LISPRO 100 [IU]/ML
INJECTION, SOLUTION INTRAVENOUS; SUBCUTANEOUS
Status: DISCONTINUED | OUTPATIENT
Start: 2020-01-01 | End: 2021-01-01

## 2020-01-01 RX ORDER — GUAIFENESIN/DEXTROMETHORPHAN 100-10MG/5
5 SYRUP ORAL
Status: DISCONTINUED | OUTPATIENT
Start: 2020-01-01 | End: 2020-01-01

## 2020-01-01 RX ORDER — BUDESONIDE 0.5 MG/2ML
500 INHALANT ORAL
Status: DISCONTINUED | OUTPATIENT
Start: 2020-01-01 | End: 2021-01-01

## 2020-01-01 RX ORDER — KETOROLAC TROMETHAMINE 15 MG/ML
15 INJECTION, SOLUTION INTRAMUSCULAR; INTRAVENOUS ONCE
Status: COMPLETED | OUTPATIENT
Start: 2020-01-01 | End: 2020-01-01

## 2020-01-01 RX ORDER — CALCIUM CARBONATE 200(500)MG
200 TABLET,CHEWABLE ORAL
Status: DISCONTINUED | OUTPATIENT
Start: 2020-01-01 | End: 2021-01-01

## 2020-01-01 RX ORDER — ASCORBIC ACID 250 MG
500 TABLET ORAL 2 TIMES DAILY
Status: DISCONTINUED | OUTPATIENT
Start: 2020-01-01 | End: 2020-01-01

## 2020-01-01 RX ORDER — ZINC SULFATE 50(220)MG
1 CAPSULE ORAL DAILY
Status: DISCONTINUED | OUTPATIENT
Start: 2020-01-01 | End: 2020-01-01

## 2020-01-01 RX ORDER — SODIUM CHLORIDE 9 MG/ML
100 INJECTION, SOLUTION INTRAVENOUS CONTINUOUS
Status: DISCONTINUED | OUTPATIENT
Start: 2020-01-01 | End: 2020-01-01

## 2020-01-01 RX ORDER — ACETAMINOPHEN 500 MG
1000 TABLET ORAL ONCE
Status: COMPLETED | OUTPATIENT
Start: 2020-01-01 | End: 2020-01-01

## 2020-01-01 RX ORDER — MORPHINE SULFATE 2 MG/ML
0.5 INJECTION, SOLUTION INTRAMUSCULAR; INTRAVENOUS
Status: DISCONTINUED | OUTPATIENT
Start: 2020-01-01 | End: 2020-01-01

## 2020-01-01 RX ORDER — PREDNISONE 20 MG/1
20 TABLET ORAL 2 TIMES DAILY WITH MEALS
Status: DISPENSED | OUTPATIENT
Start: 2020-01-01 | End: 2020-01-01

## 2020-01-01 RX ORDER — IPRATROPIUM BROMIDE AND ALBUTEROL SULFATE 2.5; .5 MG/3ML; MG/3ML
3 SOLUTION RESPIRATORY (INHALATION)
Status: DISCONTINUED | OUTPATIENT
Start: 2020-01-01 | End: 2020-01-01

## 2020-01-01 RX ORDER — CHOLECALCIFEROL (VITAMIN D3) 125 MCG
10 CAPSULE ORAL
Status: DISCONTINUED | OUTPATIENT
Start: 2020-01-01 | End: 2021-01-01 | Stop reason: CLARIF

## 2020-01-01 RX ORDER — PROMETHAZINE HYDROCHLORIDE 25 MG/1
12.5 TABLET ORAL
Status: DISCONTINUED | OUTPATIENT
Start: 2020-01-01 | End: 2021-01-01

## 2020-01-01 RX ORDER — VANCOMYCIN/0.9 % SOD CHLORIDE 1.5G/250ML
1500 PLASTIC BAG, INJECTION (ML) INTRAVENOUS ONCE
Status: DISCONTINUED | OUTPATIENT
Start: 2020-01-01 | End: 2020-01-01

## 2020-01-01 RX ORDER — MORPHINE SULFATE 2 MG/ML
2 INJECTION, SOLUTION INTRAMUSCULAR; INTRAVENOUS
Status: DISCONTINUED | OUTPATIENT
Start: 2020-01-01 | End: 2021-01-01

## 2020-01-01 RX ORDER — IBUPROFEN 400 MG/1
400 TABLET ORAL
Status: DISCONTINUED | OUTPATIENT
Start: 2020-01-01 | End: 2021-01-01

## 2020-01-01 RX ORDER — IPRATROPIUM BROMIDE 0.5 MG/2.5ML
0.5 SOLUTION RESPIRATORY (INHALATION)
Status: DISCONTINUED | OUTPATIENT
Start: 2020-01-01 | End: 2021-01-01

## 2020-01-01 RX ADMIN — Medication 10 MG: at 21:32

## 2020-01-01 RX ADMIN — Medication 500 MG: at 10:39

## 2020-01-01 RX ADMIN — SULFAMETHOXAZOLE AND TRIMETHOPRIM 2 TABLET: 800; 160 TABLET ORAL at 21:49

## 2020-01-01 RX ADMIN — METHYLPREDNISOLONE SODIUM SUCCINATE 40 MG: 40 INJECTION, POWDER, FOR SOLUTION INTRAMUSCULAR; INTRAVENOUS at 12:05

## 2020-01-01 RX ADMIN — METHYLPREDNISOLONE SODIUM SUCCINATE 40 MG: 40 INJECTION, POWDER, FOR SOLUTION INTRAMUSCULAR; INTRAVENOUS at 13:32

## 2020-01-01 RX ADMIN — BUDESONIDE 500 MCG: 0.5 INHALANT RESPIRATORY (INHALATION) at 07:35

## 2020-01-01 RX ADMIN — INSULIN LISPRO 2 UNITS: 100 INJECTION, SOLUTION INTRAVENOUS; SUBCUTANEOUS at 22:37

## 2020-01-01 RX ADMIN — Medication 1 CAPSULE: at 09:35

## 2020-01-01 RX ADMIN — GUAIFENESIN 100 MG: 200 SOLUTION ORAL at 21:05

## 2020-01-01 RX ADMIN — BUSPIRONE HYDROCHLORIDE 5 MG: 5 TABLET ORAL at 09:26

## 2020-01-01 RX ADMIN — Medication 500 MG: at 21:46

## 2020-01-01 RX ADMIN — LEVALBUTEROL 1.25 MG: 1.25 SOLUTION, CONCENTRATE RESPIRATORY (INHALATION) at 07:15

## 2020-01-01 RX ADMIN — Medication 500 MG: at 21:11

## 2020-01-01 RX ADMIN — FAMOTIDINE 20 MG: 20 TABLET, FILM COATED ORAL at 09:13

## 2020-01-01 RX ADMIN — ENOXAPARIN SODIUM 40 MG: 40 INJECTION SUBCUTANEOUS at 15:10

## 2020-01-01 RX ADMIN — SULFAMETHOXAZOLE AND TRIMETHOPRIM 500 MG: 80; 16 INJECTION, SOLUTION, CONCENTRATE INTRAVENOUS at 18:00

## 2020-01-01 RX ADMIN — GUAIFENESIN 100 MG: 200 SOLUTION ORAL at 12:05

## 2020-01-01 RX ADMIN — GUAIFENESIN 100 MG: 200 SOLUTION ORAL at 06:28

## 2020-01-01 RX ADMIN — IPRATROPIUM BROMIDE 0.5 MG: 0.5 SOLUTION RESPIRATORY (INHALATION) at 15:43

## 2020-01-01 RX ADMIN — SULFAMETHOXAZOLE AND TRIMETHOPRIM 2 TABLET: 800; 160 TABLET ORAL at 23:00

## 2020-01-01 RX ADMIN — FAMOTIDINE 20 MG: 20 TABLET, FILM COATED ORAL at 08:45

## 2020-01-01 RX ADMIN — LEVALBUTEROL 1.25 MG: 1.25 SOLUTION, CONCENTRATE RESPIRATORY (INHALATION) at 12:09

## 2020-01-01 RX ADMIN — SODIUM CHLORIDE 1000 ML: 900 INJECTION, SOLUTION INTRAVENOUS at 17:24

## 2020-01-01 RX ADMIN — SODIUM BICARBONATE 325 MG: 650 TABLET ORAL at 20:56

## 2020-01-01 RX ADMIN — Medication 10 MG: at 22:37

## 2020-01-01 RX ADMIN — GUAIFENESIN 100 MG: 200 SOLUTION ORAL at 10:09

## 2020-01-01 RX ADMIN — MORPHINE SULFATE 0.5 MG: 2 INJECTION, SOLUTION INTRAMUSCULAR; INTRAVENOUS at 08:56

## 2020-01-01 RX ADMIN — PREDNISONE 20 MG: 20 TABLET ORAL at 08:35

## 2020-01-01 RX ADMIN — FLUCONAZOLE 100 MG: 100 TABLET ORAL at 08:22

## 2020-01-01 RX ADMIN — LEVALBUTEROL 1.25 MG: 1.25 SOLUTION, CONCENTRATE RESPIRATORY (INHALATION) at 23:15

## 2020-01-01 RX ADMIN — SODIUM BICARBONATE 325 MG: 650 TABLET ORAL at 09:12

## 2020-01-01 RX ADMIN — LEVALBUTEROL 1.25 MG: 1.25 SOLUTION, CONCENTRATE RESPIRATORY (INHALATION) at 04:32

## 2020-01-01 RX ADMIN — ENOXAPARIN SODIUM 40 MG: 40 INJECTION SUBCUTANEOUS at 17:17

## 2020-01-01 RX ADMIN — ACETAMINOPHEN 650 MG: 325 TABLET ORAL at 04:46

## 2020-01-01 RX ADMIN — MORPHINE SULFATE 2 MG: 2 INJECTION, SOLUTION INTRAMUSCULAR; INTRAVENOUS at 01:11

## 2020-01-01 RX ADMIN — SULFAMETHOXAZOLE AND TRIMETHOPRIM 500 MG: 80; 16 INJECTION, SOLUTION, CONCENTRATE INTRAVENOUS at 10:25

## 2020-01-01 RX ADMIN — Medication 6 TABLET: at 09:06

## 2020-01-01 RX ADMIN — SULFAMETHOXAZOLE AND TRIMETHOPRIM 2 TABLET: 800; 160 TABLET ORAL at 12:05

## 2020-01-01 RX ADMIN — FAMOTIDINE 20 MG: 20 TABLET, FILM COATED ORAL at 08:35

## 2020-01-01 RX ADMIN — GUAIFENESIN 100 MG: 200 SOLUTION ORAL at 18:18

## 2020-01-01 RX ADMIN — ENOXAPARIN SODIUM 40 MG: 40 INJECTION SUBCUTANEOUS at 13:56

## 2020-01-01 RX ADMIN — GUAIFENESIN 100 MG: 200 SOLUTION ORAL at 02:30

## 2020-01-01 RX ADMIN — LEVALBUTEROL 1.25 MG: 1.25 SOLUTION, CONCENTRATE RESPIRATORY (INHALATION) at 20:03

## 2020-01-01 RX ADMIN — SULFAMETHOXAZOLE AND TRIMETHOPRIM 2 TABLET: 800; 160 TABLET ORAL at 21:07

## 2020-01-01 RX ADMIN — LEVALBUTEROL 1.25 MG: 1.25 SOLUTION, CONCENTRATE RESPIRATORY (INHALATION) at 04:01

## 2020-01-01 RX ADMIN — AZITHROMYCIN MONOHYDRATE 500 MG: 500 INJECTION, POWDER, LYOPHILIZED, FOR SOLUTION INTRAVENOUS at 18:36

## 2020-01-01 RX ADMIN — SULFAMETHOXAZOLE AND TRIMETHOPRIM 500 MG: 80; 16 INJECTION, SOLUTION, CONCENTRATE INTRAVENOUS at 22:26

## 2020-01-01 RX ADMIN — ENOXAPARIN SODIUM 40 MG: 40 INJECTION SUBCUTANEOUS at 13:31

## 2020-01-01 RX ADMIN — BUDESONIDE 500 MCG: 0.5 INHALANT RESPIRATORY (INHALATION) at 19:53

## 2020-01-01 RX ADMIN — SULFAMETHOXAZOLE AND TRIMETHOPRIM 2 TABLET: 800; 160 TABLET ORAL at 16:33

## 2020-01-01 RX ADMIN — SULFAMETHOXAZOLE AND TRIMETHOPRIM 1 TABLET: 800; 160 TABLET ORAL at 18:54

## 2020-01-01 RX ADMIN — Medication 500 MG: at 22:26

## 2020-01-01 RX ADMIN — LEVALBUTEROL 1.25 MG: 1.25 SOLUTION, CONCENTRATE RESPIRATORY (INHALATION) at 20:04

## 2020-01-01 RX ADMIN — SODIUM BICARBONATE 650 MG: 650 TABLET ORAL at 11:14

## 2020-01-01 RX ADMIN — LEVALBUTEROL 1.25 MG: 1.25 SOLUTION, CONCENTRATE RESPIRATORY (INHALATION) at 20:08

## 2020-01-01 RX ADMIN — LEVALBUTEROL 1.25 MG: 1.25 SOLUTION, CONCENTRATE RESPIRATORY (INHALATION) at 03:43

## 2020-01-01 RX ADMIN — BUDESONIDE 500 MCG: 0.5 INHALANT RESPIRATORY (INHALATION) at 07:14

## 2020-01-01 RX ADMIN — LEVALBUTEROL 1.25 MG: 1.25 SOLUTION, CONCENTRATE RESPIRATORY (INHALATION) at 19:53

## 2020-01-01 RX ADMIN — GUAIFENESIN 100 MG: 200 SOLUTION ORAL at 05:44

## 2020-01-01 RX ADMIN — SULFAMETHOXAZOLE AND TRIMETHOPRIM 2 TABLET: 800; 160 TABLET ORAL at 17:07

## 2020-01-01 RX ADMIN — IPRATROPIUM BROMIDE 0.5 MG: 0.5 SOLUTION RESPIRATORY (INHALATION) at 20:32

## 2020-01-01 RX ADMIN — GUAIFENESIN 100 MG: 200 SOLUTION ORAL at 12:11

## 2020-01-01 RX ADMIN — SERTRALINE HYDROCHLORIDE 50 MG: 50 TABLET ORAL at 11:14

## 2020-01-01 RX ADMIN — LEVALBUTEROL 1.25 MG: 1.25 SOLUTION, CONCENTRATE RESPIRATORY (INHALATION) at 15:31

## 2020-01-01 RX ADMIN — LEVALBUTEROL 1.25 MG: 1.25 SOLUTION, CONCENTRATE RESPIRATORY (INHALATION) at 23:10

## 2020-01-01 RX ADMIN — FUROSEMIDE 40 MG: 10 INJECTION, SOLUTION INTRAMUSCULAR; INTRAVENOUS at 09:06

## 2020-01-01 RX ADMIN — SODIUM CHLORIDE 100 ML/HR: 900 INJECTION, SOLUTION INTRAVENOUS at 08:30

## 2020-01-01 RX ADMIN — BUDESONIDE 500 MCG: 0.5 INHALANT RESPIRATORY (INHALATION) at 20:59

## 2020-01-01 RX ADMIN — SODIUM CHLORIDE 1000 ML: 900 INJECTION, SOLUTION INTRAVENOUS at 18:24

## 2020-01-01 RX ADMIN — ENOXAPARIN SODIUM 30 MG: 30 INJECTION SUBCUTANEOUS at 22:11

## 2020-01-01 RX ADMIN — IPRATROPIUM BROMIDE 0.5 MG: 0.5 SOLUTION RESPIRATORY (INHALATION) at 11:26

## 2020-01-01 RX ADMIN — BUDESONIDE 500 MCG: 0.5 INHALANT RESPIRATORY (INHALATION) at 20:17

## 2020-01-01 RX ADMIN — MULTIPLE VITAMINS W/ MINERALS TAB 1 TABLET: TAB at 09:52

## 2020-01-01 RX ADMIN — BUDESONIDE 500 MCG: 0.5 INHALANT RESPIRATORY (INHALATION) at 07:36

## 2020-01-01 RX ADMIN — LEVALBUTEROL 1.25 MG: 1.25 SOLUTION, CONCENTRATE RESPIRATORY (INHALATION) at 20:17

## 2020-01-01 RX ADMIN — FAMOTIDINE 20 MG: 20 TABLET, FILM COATED ORAL at 21:22

## 2020-01-01 RX ADMIN — Medication 10 MG: at 21:00

## 2020-01-01 RX ADMIN — SODIUM BICARBONATE 325 MG: 650 TABLET ORAL at 21:47

## 2020-01-01 RX ADMIN — GUAIFENESIN 100 MG: 200 SOLUTION ORAL at 06:50

## 2020-01-01 RX ADMIN — SULFAMETHOXAZOLE AND TRIMETHOPRIM 2 TABLET: 800; 160 TABLET ORAL at 16:47

## 2020-01-01 RX ADMIN — LEVALBUTEROL 1.25 MG: 1.25 SOLUTION, CONCENTRATE RESPIRATORY (INHALATION) at 23:55

## 2020-01-01 RX ADMIN — LEVALBUTEROL 1.25 MG: 1.25 SOLUTION, CONCENTRATE RESPIRATORY (INHALATION) at 03:52

## 2020-01-01 RX ADMIN — SULFAMETHOXAZOLE AND TRIMETHOPRIM 2 TABLET: 800; 160 TABLET ORAL at 15:47

## 2020-01-01 RX ADMIN — FAMOTIDINE 20 MG: 20 TABLET, FILM COATED ORAL at 23:00

## 2020-01-01 RX ADMIN — SULFAMETHOXAZOLE AND TRIMETHOPRIM 2 TABLET: 800; 160 TABLET ORAL at 08:21

## 2020-01-01 RX ADMIN — FAMOTIDINE 20 MG: 20 TABLET, FILM COATED ORAL at 08:33

## 2020-01-01 RX ADMIN — GUAIFENESIN 100 MG: 200 SOLUTION ORAL at 10:52

## 2020-01-01 RX ADMIN — Medication 1 CAPSULE: at 08:22

## 2020-01-01 RX ADMIN — FAMOTIDINE 20 MG: 20 TABLET, FILM COATED ORAL at 21:03

## 2020-01-01 RX ADMIN — IPRATROPIUM BROMIDE AND ALBUTEROL SULFATE 3 ML: .5; 3 SOLUTION RESPIRATORY (INHALATION) at 13:44

## 2020-01-01 RX ADMIN — SODIUM CHLORIDE 100 ML/HR: 900 INJECTION, SOLUTION INTRAVENOUS at 02:06

## 2020-01-01 RX ADMIN — LEVALBUTEROL 1.25 MG: 1.25 SOLUTION, CONCENTRATE RESPIRATORY (INHALATION) at 11:17

## 2020-01-01 RX ADMIN — GUAIFENESIN 100 MG: 200 SOLUTION ORAL at 22:36

## 2020-01-01 RX ADMIN — Medication 500 MG: at 22:53

## 2020-01-01 RX ADMIN — SODIUM CHLORIDE 100 ML/HR: 900 INJECTION, SOLUTION INTRAVENOUS at 12:52

## 2020-01-01 RX ADMIN — SULFAMETHOXAZOLE AND TRIMETHOPRIM 500 MG: 80; 16 INJECTION, SOLUTION, CONCENTRATE INTRAVENOUS at 05:39

## 2020-01-01 RX ADMIN — KETOROLAC TROMETHAMINE 15 MG: 30 INJECTION, SOLUTION INTRAMUSCULAR; INTRAVENOUS at 18:04

## 2020-01-01 RX ADMIN — PREDNISONE 40 MG: 20 TABLET ORAL at 17:07

## 2020-01-01 RX ADMIN — Medication 10 MG: at 21:48

## 2020-01-01 RX ADMIN — GUAIFENESIN 100 MG: 200 SOLUTION ORAL at 21:11

## 2020-01-01 RX ADMIN — METHYLPREDNISOLONE SODIUM SUCCINATE 40 MG: 40 INJECTION, POWDER, FOR SOLUTION INTRAMUSCULAR; INTRAVENOUS at 21:20

## 2020-01-01 RX ADMIN — FLUCONAZOLE 100 MG: 100 TABLET ORAL at 09:54

## 2020-01-01 RX ADMIN — INSULIN LISPRO 2 UNITS: 100 INJECTION, SOLUTION INTRAVENOUS; SUBCUTANEOUS at 06:42

## 2020-01-01 RX ADMIN — Medication 10 MG: at 00:39

## 2020-01-01 RX ADMIN — LEVALBUTEROL 1.25 MG: 1.25 SOLUTION, CONCENTRATE RESPIRATORY (INHALATION) at 13:16

## 2020-01-01 RX ADMIN — Medication 10 MG: at 21:46

## 2020-01-01 RX ADMIN — Medication 500 MG: at 23:00

## 2020-01-01 RX ADMIN — IPRATROPIUM BROMIDE 0.5 MG: 0.5 SOLUTION RESPIRATORY (INHALATION) at 01:49

## 2020-01-01 RX ADMIN — FAMOTIDINE 20 MG: 20 TABLET, FILM COATED ORAL at 08:43

## 2020-01-01 RX ADMIN — METHYLPREDNISOLONE SODIUM SUCCINATE 40 MG: 40 INJECTION, POWDER, FOR SOLUTION INTRAMUSCULAR; INTRAVENOUS at 21:29

## 2020-01-01 RX ADMIN — LEVALBUTEROL 1.25 MG: 1.25 SOLUTION, CONCENTRATE RESPIRATORY (INHALATION) at 15:44

## 2020-01-01 RX ADMIN — GUAIFENESIN 100 MG: 200 SOLUTION ORAL at 01:25

## 2020-01-01 RX ADMIN — SULFAMETHOXAZOLE AND TRIMETHOPRIM 2 TABLET: 800; 160 TABLET ORAL at 22:19

## 2020-01-01 RX ADMIN — ACETAMINOPHEN 650 MG: 325 TABLET ORAL at 18:18

## 2020-01-01 RX ADMIN — ALBUMIN (HUMAN) 25 G: 0.25 INJECTION, SOLUTION INTRAVENOUS at 20:15

## 2020-01-01 RX ADMIN — INSULIN LISPRO 2 UNITS: 100 INJECTION, SOLUTION INTRAVENOUS; SUBCUTANEOUS at 21:58

## 2020-01-01 RX ADMIN — Medication 500 MG: at 08:22

## 2020-01-01 RX ADMIN — SULFAMETHOXAZOLE AND TRIMETHOPRIM 500 MG: 80; 16 INJECTION, SOLUTION, CONCENTRATE INTRAVENOUS at 02:51

## 2020-01-01 RX ADMIN — MORPHINE SULFATE 2 MG: 2 INJECTION, SOLUTION INTRAMUSCULAR; INTRAVENOUS at 10:09

## 2020-01-01 RX ADMIN — GUAIFENESIN 100 MG: 200 SOLUTION ORAL at 22:39

## 2020-01-01 RX ADMIN — FAMOTIDINE 20 MG: 20 TABLET, FILM COATED ORAL at 20:50

## 2020-01-01 RX ADMIN — FAMOTIDINE 20 MG: 20 TABLET, FILM COATED ORAL at 21:29

## 2020-01-01 RX ADMIN — ALBUMIN (HUMAN) 25 G: 0.25 INJECTION, SOLUTION INTRAVENOUS at 23:02

## 2020-01-01 RX ADMIN — FAMOTIDINE 20 MG: 20 TABLET, FILM COATED ORAL at 21:46

## 2020-01-01 RX ADMIN — MORPHINE SULFATE 2 MG: 2 INJECTION, SOLUTION INTRAMUSCULAR; INTRAVENOUS at 21:29

## 2020-01-01 RX ADMIN — GUAIFENESIN 100 MG: 200 SOLUTION ORAL at 14:21

## 2020-01-01 RX ADMIN — BUDESONIDE 500 MCG: 0.5 INHALANT RESPIRATORY (INHALATION) at 07:32

## 2020-01-01 RX ADMIN — LEVALBUTEROL 1.25 MG: 1.25 SOLUTION, CONCENTRATE RESPIRATORY (INHALATION) at 15:38

## 2020-01-01 RX ADMIN — BUDESONIDE 500 MCG: 0.5 INHALANT RESPIRATORY (INHALATION) at 07:21

## 2020-01-01 RX ADMIN — ENOXAPARIN SODIUM 40 MG: 40 INJECTION SUBCUTANEOUS at 14:20

## 2020-01-01 RX ADMIN — LEVALBUTEROL 1.25 MG: 1.25 SOLUTION, CONCENTRATE RESPIRATORY (INHALATION) at 16:28

## 2020-01-01 RX ADMIN — IBUPROFEN 400 MG: 400 TABLET, FILM COATED ORAL at 21:57

## 2020-01-01 RX ADMIN — ENOXAPARIN SODIUM 40 MG: 40 INJECTION SUBCUTANEOUS at 13:33

## 2020-01-01 RX ADMIN — MORPHINE SULFATE 0.5 MG: 2 INJECTION, SOLUTION INTRAMUSCULAR; INTRAVENOUS at 01:26

## 2020-01-01 RX ADMIN — SODIUM BICARBONATE 325 MG: 650 TABLET ORAL at 08:13

## 2020-01-01 RX ADMIN — GUAIFENESIN 100 MG: 200 SOLUTION ORAL at 17:24

## 2020-01-01 RX ADMIN — FAMOTIDINE 20 MG: 20 TABLET, FILM COATED ORAL at 22:19

## 2020-01-01 RX ADMIN — Medication 500 MG: at 08:34

## 2020-01-01 RX ADMIN — SODIUM BICARBONATE 650 MG: 650 TABLET ORAL at 21:03

## 2020-01-01 RX ADMIN — FAMOTIDINE 20 MG: 20 TABLET, FILM COATED ORAL at 09:52

## 2020-01-01 RX ADMIN — SODIUM BICARBONATE 325 MG: 650 TABLET ORAL at 08:35

## 2020-01-01 RX ADMIN — PREDNISONE 40 MG: 20 TABLET ORAL at 09:35

## 2020-01-01 RX ADMIN — BUDESONIDE 500 MCG: 0.5 INHALANT RESPIRATORY (INHALATION) at 21:13

## 2020-01-01 RX ADMIN — FAMOTIDINE 20 MG: 20 TABLET, FILM COATED ORAL at 22:26

## 2020-01-01 RX ADMIN — Medication 10 ML: at 00:40

## 2020-01-01 RX ADMIN — Medication 10 ML: at 06:30

## 2020-01-01 RX ADMIN — Medication 10 MG: at 21:07

## 2020-01-01 RX ADMIN — IPRATROPIUM BROMIDE 0.5 MG: 0.5 SOLUTION RESPIRATORY (INHALATION) at 09:28

## 2020-01-01 RX ADMIN — SERTRALINE HYDROCHLORIDE 50 MG: 50 TABLET ORAL at 09:26

## 2020-01-01 RX ADMIN — SULFAMETHOXAZOLE AND TRIMETHOPRIM 2 TABLET: 800; 160 TABLET ORAL at 08:42

## 2020-01-01 RX ADMIN — BUSPIRONE HYDROCHLORIDE 5 MG: 5 TABLET ORAL at 17:17

## 2020-01-01 RX ADMIN — ENOXAPARIN SODIUM 40 MG: 40 INJECTION SUBCUTANEOUS at 15:47

## 2020-01-01 RX ADMIN — Medication 500 MG: at 22:37

## 2020-01-01 RX ADMIN — ENOXAPARIN SODIUM 40 MG: 40 INJECTION SUBCUTANEOUS at 14:00

## 2020-01-01 RX ADMIN — GUAIFENESIN 100 MG: 200 SOLUTION ORAL at 17:10

## 2020-01-01 RX ADMIN — GUAIFENESIN 100 MG: 200 SOLUTION ORAL at 21:03

## 2020-01-01 RX ADMIN — SODIUM BICARBONATE 650 MG: 650 TABLET ORAL at 09:26

## 2020-01-01 RX ADMIN — SULFAMETHOXAZOLE AND TRIMETHOPRIM 2 TABLET: 800; 160 TABLET ORAL at 17:10

## 2020-01-01 RX ADMIN — BUDESONIDE 500 MCG: 0.5 INHALANT RESPIRATORY (INHALATION) at 07:26

## 2020-01-01 RX ADMIN — PREDNISONE 40 MG: 20 TABLET ORAL at 09:55

## 2020-01-01 RX ADMIN — PREDNISONE 40 MG: 20 TABLET ORAL at 16:05

## 2020-01-01 RX ADMIN — Medication 500 MG: at 21:22

## 2020-01-01 RX ADMIN — SULFAMETHOXAZOLE AND TRIMETHOPRIM 2 TABLET: 800; 160 TABLET ORAL at 06:29

## 2020-01-01 RX ADMIN — Medication 500 MG: at 09:35

## 2020-01-01 RX ADMIN — PREDNISONE 40 MG: 20 TABLET ORAL at 17:10

## 2020-01-01 RX ADMIN — GUAIFENESIN 100 MG: 200 SOLUTION ORAL at 18:40

## 2020-01-01 RX ADMIN — FLUCONAZOLE 100 MG: 100 TABLET ORAL at 09:07

## 2020-01-01 RX ADMIN — GUAIFENESIN 100 MG: 200 SOLUTION ORAL at 05:14

## 2020-01-01 RX ADMIN — Medication 500 MG: at 08:43

## 2020-01-01 RX ADMIN — PREDNISONE 40 MG: 20 TABLET ORAL at 16:48

## 2020-01-01 RX ADMIN — SULFAMETHOXAZOLE AND TRIMETHOPRIM 1 TABLET: 800; 160 TABLET ORAL at 13:32

## 2020-01-01 RX ADMIN — MORPHINE SULFATE 0.5 MG: 2 INJECTION, SOLUTION INTRAMUSCULAR; INTRAVENOUS at 16:33

## 2020-01-01 RX ADMIN — LEVALBUTEROL 1.25 MG: 1.25 SOLUTION, CONCENTRATE RESPIRATORY (INHALATION) at 00:23

## 2020-01-01 RX ADMIN — BUSPIRONE HYDROCHLORIDE 5 MG: 5 TABLET ORAL at 15:38

## 2020-01-01 RX ADMIN — FAMOTIDINE 20 MG: 20 TABLET, FILM COATED ORAL at 08:13

## 2020-01-01 RX ADMIN — LEVALBUTEROL 1.25 MG: 1.25 SOLUTION, CONCENTRATE RESPIRATORY (INHALATION) at 00:17

## 2020-01-01 RX ADMIN — SODIUM CHLORIDE 75 ML/HR: 900 INJECTION, SOLUTION INTRAVENOUS at 21:06

## 2020-01-01 RX ADMIN — FAMOTIDINE 20 MG: 20 TABLET, FILM COATED ORAL at 09:26

## 2020-01-01 RX ADMIN — MULTIPLE VITAMINS W/ MINERALS TAB 1 TABLET: TAB at 12:05

## 2020-01-01 RX ADMIN — BUSPIRONE HYDROCHLORIDE 5 MG: 5 TABLET ORAL at 21:03

## 2020-01-01 RX ADMIN — GUAIFENESIN 100 MG: 200 SOLUTION ORAL at 21:29

## 2020-01-01 RX ADMIN — SULFAMETHOXAZOLE AND TRIMETHOPRIM 2 TABLET: 800; 160 TABLET ORAL at 09:35

## 2020-01-01 RX ADMIN — MORPHINE SULFATE 0.5 MG: 2 INJECTION, SOLUTION INTRAMUSCULAR; INTRAVENOUS at 02:35

## 2020-01-01 RX ADMIN — ENOXAPARIN SODIUM 30 MG: 30 INJECTION SUBCUTANEOUS at 09:35

## 2020-01-01 RX ADMIN — ALBUMIN (HUMAN) 25 G: 0.25 INJECTION, SOLUTION INTRAVENOUS at 08:21

## 2020-01-01 RX ADMIN — MORPHINE SULFATE 2 MG: 2 INJECTION, SOLUTION INTRAMUSCULAR; INTRAVENOUS at 06:28

## 2020-01-01 RX ADMIN — METHYLPREDNISOLONE SODIUM SUCCINATE 40 MG: 40 INJECTION, POWDER, FOR SOLUTION INTRAMUSCULAR; INTRAVENOUS at 21:04

## 2020-01-01 RX ADMIN — Medication 10 MG: at 21:09

## 2020-01-01 RX ADMIN — SULFAMETHOXAZOLE AND TRIMETHOPRIM 2 TABLET: 800; 160 TABLET ORAL at 11:02

## 2020-01-01 RX ADMIN — Medication 10 MG: at 21:03

## 2020-01-01 RX ADMIN — PREDNISONE 20 MG: 20 TABLET ORAL at 10:38

## 2020-01-01 RX ADMIN — SULFAMETHOXAZOLE AND TRIMETHOPRIM 2 TABLET: 800; 160 TABLET ORAL at 16:48

## 2020-01-01 RX ADMIN — INSULIN LISPRO 6 UNITS: 100 INJECTION, SOLUTION INTRAVENOUS; SUBCUTANEOUS at 12:11

## 2020-01-01 RX ADMIN — Medication 10 MG: at 22:52

## 2020-01-01 RX ADMIN — SODIUM BICARBONATE 325 MG: 650 TABLET ORAL at 21:22

## 2020-01-01 RX ADMIN — INSULIN LISPRO 4 UNITS: 100 INJECTION, SOLUTION INTRAVENOUS; SUBCUTANEOUS at 22:53

## 2020-01-01 RX ADMIN — INSULIN LISPRO 4 UNITS: 100 INJECTION, SOLUTION INTRAVENOUS; SUBCUTANEOUS at 17:24

## 2020-01-01 RX ADMIN — LEVALBUTEROL 1.25 MG: 1.25 SOLUTION, CONCENTRATE RESPIRATORY (INHALATION) at 09:28

## 2020-01-01 RX ADMIN — SODIUM CHLORIDE 75 ML/HR: 900 INJECTION, SOLUTION INTRAVENOUS at 09:08

## 2020-01-01 RX ADMIN — SODIUM BICARBONATE 325 MG: 650 TABLET ORAL at 22:26

## 2020-01-01 RX ADMIN — SODIUM CHLORIDE 1000 ML: 900 INJECTION, SOLUTION INTRAVENOUS at 18:54

## 2020-01-01 RX ADMIN — GUAIFENESIN 100 MG: 200 SOLUTION ORAL at 17:23

## 2020-01-01 RX ADMIN — BUSPIRONE HYDROCHLORIDE 5 MG: 5 TABLET ORAL at 21:29

## 2020-01-01 RX ADMIN — SODIUM BICARBONATE 325 MG: 650 TABLET ORAL at 22:37

## 2020-01-01 RX ADMIN — FAMOTIDINE 20 MG: 20 TABLET, FILM COATED ORAL at 21:11

## 2020-01-01 RX ADMIN — GUAIFENESIN AND DEXTROMETHORPHAN 5 ML: 100; 10 SYRUP ORAL at 04:46

## 2020-01-01 RX ADMIN — Medication 10 MG: at 22:26

## 2020-01-01 RX ADMIN — INSULIN LISPRO 4 UNITS: 100 INJECTION, SOLUTION INTRAVENOUS; SUBCUTANEOUS at 06:29

## 2020-01-01 RX ADMIN — BUDESONIDE 500 MCG: 0.5 INHALANT RESPIRATORY (INHALATION) at 20:00

## 2020-01-01 RX ADMIN — FAMOTIDINE 20 MG: 20 TABLET, FILM COATED ORAL at 21:48

## 2020-01-01 RX ADMIN — LIDOCAINE HYDROCHLORIDE 40 ML: 20 SOLUTION ORAL; TOPICAL at 08:45

## 2020-01-01 RX ADMIN — LEVALBUTEROL 1.25 MG: 1.25 SOLUTION, CONCENTRATE RESPIRATORY (INHALATION) at 11:14

## 2020-01-01 RX ADMIN — LEVALBUTEROL 1.25 MG: 1.25 SOLUTION, CONCENTRATE RESPIRATORY (INHALATION) at 02:10

## 2020-01-01 RX ADMIN — ACETAMINOPHEN 650 MG: 325 TABLET ORAL at 17:24

## 2020-01-01 RX ADMIN — LEVALBUTEROL 1.25 MG: 1.25 SOLUTION, CONCENTRATE RESPIRATORY (INHALATION) at 07:27

## 2020-01-01 RX ADMIN — PREDNISONE 40 MG: 20 TABLET ORAL at 09:07

## 2020-01-01 RX ADMIN — LEVALBUTEROL 1.25 MG: 1.25 SOLUTION, CONCENTRATE RESPIRATORY (INHALATION) at 07:35

## 2020-01-01 RX ADMIN — IPRATROPIUM BROMIDE AND ALBUTEROL SULFATE 3 ML: .5; 3 SOLUTION RESPIRATORY (INHALATION) at 07:47

## 2020-01-01 RX ADMIN — SODIUM BICARBONATE 325 MG: 650 TABLET ORAL at 10:39

## 2020-01-01 RX ADMIN — ENOXAPARIN SODIUM 30 MG: 30 INJECTION SUBCUTANEOUS at 21:49

## 2020-01-01 RX ADMIN — ENOXAPARIN SODIUM 30 MG: 30 INJECTION SUBCUTANEOUS at 08:45

## 2020-01-01 RX ADMIN — GUAIFENESIN 100 MG: 200 SOLUTION ORAL at 06:57

## 2020-01-01 RX ADMIN — LORAZEPAM 0.5 MG: 0.5 TABLET ORAL at 15:37

## 2020-01-01 RX ADMIN — PREDNISONE 40 MG: 20 TABLET ORAL at 08:42

## 2020-01-01 RX ADMIN — LEVALBUTEROL 1.25 MG: 1.25 SOLUTION, CONCENTRATE RESPIRATORY (INHALATION) at 19:52

## 2020-01-01 RX ADMIN — LEVALBUTEROL 1.25 MG: 1.25 SOLUTION, CONCENTRATE RESPIRATORY (INHALATION) at 07:21

## 2020-01-01 RX ADMIN — ALBUMIN (HUMAN) 25 G: 0.25 INJECTION, SOLUTION INTRAVENOUS at 06:43

## 2020-01-01 RX ADMIN — BUDESONIDE 500 MCG: 0.5 INHALANT RESPIRATORY (INHALATION) at 07:13

## 2020-01-01 RX ADMIN — MORPHINE SULFATE 0.5 MG: 2 INJECTION, SOLUTION INTRAMUSCULAR; INTRAVENOUS at 06:57

## 2020-01-01 RX ADMIN — SULFAMETHOXAZOLE AND TRIMETHOPRIM 2 TABLET: 800; 160 TABLET ORAL at 21:11

## 2020-01-01 RX ADMIN — MORPHINE SULFATE 0.5 MG: 2 INJECTION, SOLUTION INTRAMUSCULAR; INTRAVENOUS at 18:40

## 2020-01-01 RX ADMIN — MORPHINE SULFATE 0.5 MG: 2 INJECTION, SOLUTION INTRAMUSCULAR; INTRAVENOUS at 05:44

## 2020-01-01 RX ADMIN — MORPHINE SULFATE 2 MG: 2 INJECTION, SOLUTION INTRAMUSCULAR; INTRAVENOUS at 17:17

## 2020-01-01 RX ADMIN — INSULIN LISPRO 6 UNITS: 100 INJECTION, SOLUTION INTRAVENOUS; SUBCUTANEOUS at 18:15

## 2020-01-01 RX ADMIN — ALBUMIN (HUMAN) 25 G: 0.25 INJECTION, SOLUTION INTRAVENOUS at 15:09

## 2020-01-01 RX ADMIN — SULFAMETHOXAZOLE AND TRIMETHOPRIM 2 TABLET: 800; 160 TABLET ORAL at 08:34

## 2020-01-01 RX ADMIN — FAMOTIDINE 20 MG: 20 TABLET, FILM COATED ORAL at 20:55

## 2020-01-01 RX ADMIN — Medication 1 CAPSULE: at 08:45

## 2020-01-01 RX ADMIN — LEVALBUTEROL 1.25 MG: 1.25 SOLUTION, CONCENTRATE RESPIRATORY (INHALATION) at 15:19

## 2020-01-01 RX ADMIN — MULTIPLE VITAMINS W/ MINERALS TAB 1 TABLET: TAB at 11:14

## 2020-01-01 RX ADMIN — FAMOTIDINE 20 MG: 20 TABLET, FILM COATED ORAL at 08:22

## 2020-01-01 RX ADMIN — MORPHINE SULFATE 0.5 MG: 2 INJECTION, SOLUTION INTRAMUSCULAR; INTRAVENOUS at 11:02

## 2020-01-01 RX ADMIN — BUDESONIDE 500 MCG: 0.5 INHALANT RESPIRATORY (INHALATION) at 20:04

## 2020-01-01 RX ADMIN — WATER 2 G: 1 INJECTION INTRAMUSCULAR; INTRAVENOUS; SUBCUTANEOUS at 18:27

## 2020-01-01 RX ADMIN — LEVALBUTEROL 1.25 MG: 1.25 SOLUTION, CONCENTRATE RESPIRATORY (INHALATION) at 07:37

## 2020-01-01 RX ADMIN — PREDNISONE 40 MG: 20 TABLET ORAL at 17:17

## 2020-01-01 RX ADMIN — Medication 500 MG: at 20:55

## 2020-01-01 RX ADMIN — MULTIPLE VITAMINS W/ MINERALS TAB 1 TABLET: TAB at 08:36

## 2020-01-01 RX ADMIN — FAMOTIDINE 20 MG: 20 TABLET, FILM COATED ORAL at 22:11

## 2020-01-01 RX ADMIN — FLUCONAZOLE 100 MG: 100 TABLET ORAL at 08:34

## 2020-01-01 RX ADMIN — Medication 6 TABLET: at 08:45

## 2020-01-01 RX ADMIN — ANTACID TABLETS 200 MG: 500 TABLET, CHEWABLE ORAL at 23:37

## 2020-01-01 RX ADMIN — FAMOTIDINE 20 MG: 20 TABLET, FILM COATED ORAL at 09:35

## 2020-01-01 RX ADMIN — PREDNISONE 40 MG: 20 TABLET ORAL at 16:47

## 2020-01-01 RX ADMIN — SODIUM BICARBONATE 325 MG: 650 TABLET ORAL at 21:11

## 2020-01-01 RX ADMIN — SODIUM CHLORIDE 500 ML/HR: 900 INJECTION, SOLUTION INTRAVENOUS at 21:57

## 2020-01-01 RX ADMIN — SULFAMETHOXAZOLE AND TRIMETHOPRIM 2 TABLET: 800; 160 TABLET ORAL at 06:37

## 2020-01-01 RX ADMIN — Medication 500 MG: at 09:52

## 2020-01-01 RX ADMIN — Medication 500 MG: at 09:12

## 2020-01-01 RX ADMIN — SULFAMETHOXAZOLE AND TRIMETHOPRIM 500 MG: 80; 16 INJECTION, SOLUTION, CONCENTRATE INTRAVENOUS at 18:18

## 2020-01-01 RX ADMIN — LEVALBUTEROL 1.25 MG: 1.25 SOLUTION, CONCENTRATE RESPIRATORY (INHALATION) at 11:26

## 2020-01-01 RX ADMIN — BUDESONIDE 500 MCG: 0.5 INHALANT RESPIRATORY (INHALATION) at 20:07

## 2020-01-01 RX ADMIN — ACETAMINOPHEN 650 MG: 325 TABLET ORAL at 13:02

## 2020-01-01 RX ADMIN — Medication 500 MG: at 20:49

## 2020-01-01 RX ADMIN — LEVALBUTEROL 1.25 MG: 1.25 SOLUTION, CONCENTRATE RESPIRATORY (INHALATION) at 07:36

## 2020-01-01 RX ADMIN — IPRATROPIUM BROMIDE AND ALBUTEROL SULFATE 3 ML: .5; 3 SOLUTION RESPIRATORY (INHALATION) at 15:34

## 2020-01-01 RX ADMIN — SODIUM BICARBONATE 650 MG: 650 TABLET ORAL at 21:29

## 2020-01-01 RX ADMIN — METHYLPREDNISOLONE SODIUM SUCCINATE 40 MG: 40 INJECTION, POWDER, FOR SOLUTION INTRAMUSCULAR; INTRAVENOUS at 22:53

## 2020-01-01 RX ADMIN — FAMOTIDINE 20 MG: 20 TABLET, FILM COATED ORAL at 11:14

## 2020-01-01 RX ADMIN — SULFAMETHOXAZOLE AND TRIMETHOPRIM 2 TABLET: 800; 160 TABLET ORAL at 21:09

## 2020-01-01 RX ADMIN — MORPHINE SULFATE 0.5 MG: 2 INJECTION, SOLUTION INTRAMUSCULAR; INTRAVENOUS at 21:19

## 2020-01-01 RX ADMIN — Medication 10 MG: at 21:29

## 2020-01-01 RX ADMIN — GUAIFENESIN 100 MG: 200 SOLUTION ORAL at 21:19

## 2020-01-01 RX ADMIN — LEVALBUTEROL 1.25 MG: 1.25 SOLUTION, CONCENTRATE RESPIRATORY (INHALATION) at 00:48

## 2020-01-01 RX ADMIN — IPRATROPIUM BROMIDE AND ALBUTEROL SULFATE 3 ML: .5; 3 SOLUTION RESPIRATORY (INHALATION) at 11:38

## 2020-01-01 RX ADMIN — ENOXAPARIN SODIUM 40 MG: 40 INJECTION SUBCUTANEOUS at 09:07

## 2020-01-01 RX ADMIN — IPRATROPIUM BROMIDE AND ALBUTEROL SULFATE 3 ML: .5; 3 SOLUTION RESPIRATORY (INHALATION) at 20:32

## 2020-01-01 RX ADMIN — PREDNISONE 20 MG: 20 TABLET ORAL at 08:13

## 2020-01-01 RX ADMIN — SODIUM CHLORIDE 50 ML/HR: 900 INJECTION, SOLUTION INTRAVENOUS at 23:02

## 2020-01-01 RX ADMIN — GUAIFENESIN 100 MG: 200 SOLUTION ORAL at 12:45

## 2020-01-01 RX ADMIN — BUDESONIDE 500 MCG: 0.5 INHALANT RESPIRATORY (INHALATION) at 19:52

## 2020-01-01 RX ADMIN — METHYLPREDNISOLONE SODIUM SUCCINATE 40 MG: 40 INJECTION, POWDER, FOR SOLUTION INTRAMUSCULAR; INTRAVENOUS at 11:15

## 2020-01-01 RX ADMIN — SODIUM CHLORIDE 125 ML/HR: 900 INJECTION, SOLUTION INTRAVENOUS at 22:16

## 2020-01-01 RX ADMIN — SODIUM BICARBONATE 325 MG: 650 TABLET ORAL at 09:52

## 2020-01-01 RX ADMIN — MORPHINE SULFATE 2 MG: 2 INJECTION, SOLUTION INTRAMUSCULAR; INTRAVENOUS at 22:00

## 2020-01-01 RX ADMIN — Medication 500 MG: at 22:19

## 2020-01-01 RX ADMIN — ALBUMIN (HUMAN) 25 G: 0.25 INJECTION, SOLUTION INTRAVENOUS at 02:11

## 2020-01-01 RX ADMIN — METHYLPREDNISOLONE SODIUM SUCCINATE 40 MG: 40 INJECTION, POWDER, FOR SOLUTION INTRAMUSCULAR; INTRAVENOUS at 09:12

## 2020-01-01 RX ADMIN — Medication 10 MG: at 21:11

## 2020-01-01 RX ADMIN — FAMOTIDINE 20 MG: 20 TABLET, FILM COATED ORAL at 09:54

## 2020-01-01 RX ADMIN — ALBUMIN (HUMAN) 25 G: 0.25 INJECTION, SOLUTION INTRAVENOUS at 13:28

## 2020-01-01 RX ADMIN — ACETAMINOPHEN 650 MG: 325 TABLET ORAL at 11:33

## 2020-01-01 RX ADMIN — Medication 6 TABLET: at 08:42

## 2020-01-01 RX ADMIN — MORPHINE SULFATE 2 MG: 2 INJECTION, SOLUTION INTRAMUSCULAR; INTRAVENOUS at 12:10

## 2020-01-01 RX ADMIN — LEVALBUTEROL 1.25 MG: 1.25 SOLUTION, CONCENTRATE RESPIRATORY (INHALATION) at 14:17

## 2020-01-01 RX ADMIN — LEVALBUTEROL 1.25 MG: 1.25 SOLUTION, CONCENTRATE RESPIRATORY (INHALATION) at 15:49

## 2020-01-01 RX ADMIN — SULFAMETHOXAZOLE AND TRIMETHOPRIM 2 TABLET: 800; 160 TABLET ORAL at 09:07

## 2020-01-01 RX ADMIN — INSULIN LISPRO 10 UNITS: 100 INJECTION, SOLUTION INTRAVENOUS; SUBCUTANEOUS at 16:58

## 2020-01-01 RX ADMIN — Medication 500 MG: at 21:07

## 2020-01-01 RX ADMIN — INSULIN LISPRO 4 UNITS: 100 INJECTION, SOLUTION INTRAVENOUS; SUBCUTANEOUS at 06:37

## 2020-01-01 RX ADMIN — LEVALBUTEROL 1.25 MG: 1.25 SOLUTION, CONCENTRATE RESPIRATORY (INHALATION) at 16:42

## 2020-01-01 RX ADMIN — SODIUM BICARBONATE 325 MG: 650 TABLET ORAL at 09:58

## 2020-01-01 RX ADMIN — LEVALBUTEROL 1.25 MG: 1.25 SOLUTION, CONCENTRATE RESPIRATORY (INHALATION) at 15:33

## 2020-01-01 RX ADMIN — ENOXAPARIN SODIUM 40 MG: 40 INJECTION SUBCUTANEOUS at 14:08

## 2020-01-01 RX ADMIN — LEVALBUTEROL 1.25 MG: 1.25 SOLUTION, CONCENTRATE RESPIRATORY (INHALATION) at 20:59

## 2020-01-01 RX ADMIN — Medication 500 MG: at 08:13

## 2020-01-01 RX ADMIN — Medication 1 CAPSULE: at 08:43

## 2020-01-01 RX ADMIN — Medication 500 MG: at 08:45

## 2020-01-01 RX ADMIN — LEVALBUTEROL 1.25 MG: 1.25 SOLUTION, CONCENTRATE RESPIRATORY (INHALATION) at 23:36

## 2020-01-01 RX ADMIN — FAMOTIDINE 20 MG: 20 TABLET, FILM COATED ORAL at 21:07

## 2020-01-01 RX ADMIN — LEVALBUTEROL 1.25 MG: 1.25 SOLUTION, CONCENTRATE RESPIRATORY (INHALATION) at 07:55

## 2020-01-01 RX ADMIN — SULFAMETHOXAZOLE AND TRIMETHOPRIM 2 TABLET: 800; 160 TABLET ORAL at 15:37

## 2020-01-01 RX ADMIN — PREDNISONE 40 MG: 20 TABLET ORAL at 08:22

## 2020-01-01 RX ADMIN — GUAIFENESIN 100 MG: 200 SOLUTION ORAL at 08:36

## 2020-01-01 RX ADMIN — FLUCONAZOLE 100 MG: 100 TABLET ORAL at 14:17

## 2020-01-01 RX ADMIN — PREDNISONE 20 MG: 20 TABLET ORAL at 18:19

## 2020-01-01 RX ADMIN — LEVALBUTEROL 1.25 MG: 1.25 SOLUTION, CONCENTRATE RESPIRATORY (INHALATION) at 07:13

## 2020-01-01 RX ADMIN — SULFAMETHOXAZOLE AND TRIMETHOPRIM 438.72 MG: 80; 16 INJECTION, SOLUTION, CONCENTRATE INTRAVENOUS at 19:49

## 2020-01-01 RX ADMIN — SULFAMETHOXAZOLE AND TRIMETHOPRIM 500 MG: 80; 16 INJECTION, SOLUTION, CONCENTRATE INTRAVENOUS at 13:28

## 2020-01-01 RX ADMIN — LEVALBUTEROL 1.25 MG: 1.25 SOLUTION, CONCENTRATE RESPIRATORY (INHALATION) at 23:49

## 2020-01-01 RX ADMIN — MORPHINE SULFATE 0.5 MG: 2 INJECTION, SOLUTION INTRAMUSCULAR; INTRAVENOUS at 10:19

## 2020-01-01 RX ADMIN — FAMOTIDINE 20 MG: 20 TABLET, FILM COATED ORAL at 22:53

## 2020-01-01 RX ADMIN — ALBUMIN (HUMAN) 25 G: 0.25 INJECTION, SOLUTION INTRAVENOUS at 18:18

## 2020-01-01 RX ADMIN — SULFAMETHOXAZOLE AND TRIMETHOPRIM 2 TABLET: 800; 160 TABLET ORAL at 06:40

## 2020-01-01 RX ADMIN — DEXAMETHASONE SODIUM PHOSPHATE 10 MG: 10 INJECTION, SOLUTION INTRAMUSCULAR; INTRAVENOUS at 08:45

## 2020-01-01 RX ADMIN — PREDNISONE 40 MG: 20 TABLET ORAL at 08:34

## 2020-01-01 RX ADMIN — LORAZEPAM 0.5 MG: 0.5 TABLET ORAL at 02:12

## 2020-01-01 RX ADMIN — GUAIFENESIN 100 MG: 200 SOLUTION ORAL at 01:11

## 2020-01-01 RX ADMIN — SULFAMETHOXAZOLE AND TRIMETHOPRIM 2 TABLET: 800; 160 TABLET ORAL at 16:57

## 2020-01-01 RX ADMIN — LEVALBUTEROL 1.25 MG: 1.25 SOLUTION, CONCENTRATE RESPIRATORY (INHALATION) at 10:31

## 2020-01-01 RX ADMIN — Medication 500 MG: at 09:54

## 2020-01-01 RX ADMIN — BUDESONIDE 500 MCG: 0.5 INHALANT RESPIRATORY (INHALATION) at 07:55

## 2020-01-01 RX ADMIN — MULTIPLE VITAMINS W/ MINERALS TAB 1 TABLET: TAB at 09:12

## 2020-01-01 RX ADMIN — GUAIFENESIN 100 MG: 200 SOLUTION ORAL at 03:21

## 2020-01-01 RX ADMIN — Medication 500 MG: at 09:07

## 2020-01-01 RX ADMIN — LEVALBUTEROL 1.25 MG: 1.25 SOLUTION, CONCENTRATE RESPIRATORY (INHALATION) at 20:00

## 2020-01-01 RX ADMIN — LEVALBUTEROL 1.25 MG: 1.25 SOLUTION, CONCENTRATE RESPIRATORY (INHALATION) at 21:13

## 2020-01-01 RX ADMIN — LEVALBUTEROL 1.25 MG: 1.25 SOLUTION, CONCENTRATE RESPIRATORY (INHALATION) at 04:42

## 2020-01-01 RX ADMIN — Medication 10 ML: at 17:10

## 2020-01-01 RX ADMIN — MORPHINE SULFATE 0.5 MG: 2 INJECTION, SOLUTION INTRAMUSCULAR; INTRAVENOUS at 21:11

## 2020-01-01 RX ADMIN — FAMOTIDINE 20 MG: 20 TABLET, FILM COATED ORAL at 09:07

## 2020-01-01 RX ADMIN — Medication 1 CAPSULE: at 09:07

## 2020-01-01 RX ADMIN — MORPHINE SULFATE 0.5 MG: 2 INJECTION, SOLUTION INTRAMUSCULAR; INTRAVENOUS at 03:21

## 2020-01-01 RX ADMIN — MORPHINE SULFATE 0.5 MG: 2 INJECTION, SOLUTION INTRAMUSCULAR; INTRAVENOUS at 22:53

## 2020-01-01 RX ADMIN — SULFAMETHOXAZOLE AND TRIMETHOPRIM 500 MG: 80; 16 INJECTION, SOLUTION, CONCENTRATE INTRAVENOUS at 01:51

## 2020-01-01 RX ADMIN — FAMOTIDINE 20 MG: 20 TABLET, FILM COATED ORAL at 10:38

## 2020-01-01 RX ADMIN — LEVALBUTEROL 1.25 MG: 1.25 SOLUTION, CONCENTRATE RESPIRATORY (INHALATION) at 07:31

## 2020-01-01 RX ADMIN — LEVALBUTEROL 1.25 MG: 1.25 SOLUTION, CONCENTRATE RESPIRATORY (INHALATION) at 14:26

## 2020-01-01 RX ADMIN — METHYLPREDNISOLONE SODIUM SUCCINATE 40 MG: 40 INJECTION, POWDER, FOR SOLUTION INTRAMUSCULAR; INTRAVENOUS at 09:52

## 2020-01-01 RX ADMIN — BUDESONIDE 500 MCG: 0.5 INHALANT RESPIRATORY (INHALATION) at 07:38

## 2020-01-01 RX ADMIN — DEXAMETHASONE SODIUM PHOSPHATE 10 MG: 10 INJECTION, SOLUTION INTRAMUSCULAR; INTRAVENOUS at 22:32

## 2020-01-01 RX ADMIN — ENOXAPARIN SODIUM 30 MG: 30 INJECTION SUBCUTANEOUS at 08:42

## 2020-01-01 RX ADMIN — LEVALBUTEROL 1.25 MG: 1.25 SOLUTION, CONCENTRATE RESPIRATORY (INHALATION) at 05:35

## 2020-01-01 RX ADMIN — GUAIFENESIN 100 MG: 200 SOLUTION ORAL at 04:40

## 2020-01-01 RX ADMIN — GUAIFENESIN 100 MG: 200 SOLUTION ORAL at 03:10

## 2020-01-01 RX ADMIN — BUDESONIDE 500 MCG: 0.5 INHALANT RESPIRATORY (INHALATION) at 20:32

## 2020-01-01 RX ADMIN — IOPAMIDOL 100 ML: 755 INJECTION, SOLUTION INTRAVENOUS at 14:00

## 2020-01-01 RX ADMIN — BUSPIRONE HYDROCHLORIDE 5 MG: 5 TABLET ORAL at 11:14

## 2020-01-01 RX ADMIN — Medication 6 TABLET: at 09:35

## 2020-01-01 RX ADMIN — DEXAMETHASONE SODIUM PHOSPHATE 10 MG: 10 INJECTION, SOLUTION INTRAMUSCULAR; INTRAVENOUS at 18:26

## 2020-01-01 RX ADMIN — Medication 500 MG: at 21:49

## 2020-01-01 RX ADMIN — Medication 500 MG: at 08:35

## 2020-01-01 RX ADMIN — SODIUM BICARBONATE 325 MG: 650 TABLET ORAL at 09:54

## 2020-01-01 RX ADMIN — LEVALBUTEROL 1.25 MG: 1.25 SOLUTION, CONCENTRATE RESPIRATORY (INHALATION) at 11:03

## 2020-01-01 RX ADMIN — MORPHINE SULFATE 2 MG: 2 INJECTION, SOLUTION INTRAMUSCULAR; INTRAVENOUS at 17:28

## 2020-01-01 RX ADMIN — MULTIPLE VITAMINS W/ MINERALS TAB 1 TABLET: TAB at 09:26

## 2020-01-01 RX ADMIN — SULFAMETHOXAZOLE AND TRIMETHOPRIM 2 TABLET: 800; 160 TABLET ORAL at 15:10

## 2020-01-01 RX ADMIN — ENOXAPARIN SODIUM 30 MG: 30 INJECTION SUBCUTANEOUS at 20:50

## 2020-01-01 RX ADMIN — MORPHINE SULFATE 0.5 MG: 2 INJECTION, SOLUTION INTRAMUSCULAR; INTRAVENOUS at 00:19

## 2020-01-01 RX ADMIN — Medication 1 CAPSULE: at 08:33

## 2020-01-01 RX ADMIN — INSULIN LISPRO 4 UNITS: 100 INJECTION, SOLUTION INTRAVENOUS; SUBCUTANEOUS at 21:45

## 2020-01-01 RX ADMIN — SULFAMETHOXAZOLE AND TRIMETHOPRIM 2 TABLET: 800; 160 TABLET ORAL at 12:42

## 2020-01-01 RX ADMIN — INSULIN LISPRO 4 UNITS: 100 INJECTION, SOLUTION INTRAVENOUS; SUBCUTANEOUS at 21:32

## 2020-01-01 RX ADMIN — LEVALBUTEROL 1.25 MG: 1.25 SOLUTION, CONCENTRATE RESPIRATORY (INHALATION) at 23:43

## 2020-01-01 RX ADMIN — MULTIPLE VITAMINS W/ MINERALS TAB 1 TABLET: TAB at 08:13

## 2020-01-01 RX ADMIN — SODIUM BICARBONATE 325 MG: 650 TABLET ORAL at 22:53

## 2020-01-01 RX ADMIN — METHYLPREDNISOLONE SODIUM SUCCINATE 40 MG: 40 INJECTION, POWDER, FOR SOLUTION INTRAMUSCULAR; INTRAVENOUS at 22:37

## 2020-01-01 RX ADMIN — ACETAMINOPHEN 1000 MG: 500 TABLET ORAL at 18:29

## 2020-01-01 RX ADMIN — MORPHINE SULFATE 0.5 MG: 2 INJECTION, SOLUTION INTRAMUSCULAR; INTRAVENOUS at 05:14

## 2020-01-01 RX ADMIN — MULTIPLE VITAMINS W/ MINERALS TAB 1 TABLET: TAB at 10:38

## 2020-01-01 RX ADMIN — FAMOTIDINE 20 MG: 20 TABLET, FILM COATED ORAL at 22:36

## 2020-01-01 RX ADMIN — Medication 10 MG: at 02:11

## 2020-01-01 RX ADMIN — INSULIN LISPRO 2 UNITS: 100 INJECTION, SOLUTION INTRAVENOUS; SUBCUTANEOUS at 06:58

## 2020-01-01 RX ADMIN — GUAIFENESIN 100 MG: 200 SOLUTION ORAL at 12:12

## 2020-01-01 RX ADMIN — SULFAMETHOXAZOLE AND TRIMETHOPRIM 1 TABLET: 800; 160 TABLET ORAL at 08:45

## 2020-01-01 RX ADMIN — SULFAMETHOXAZOLE AND TRIMETHOPRIM 2 TABLET: 800; 160 TABLET ORAL at 12:11

## 2020-12-07 NOTE — ED PROVIDER NOTES
EMERGENCY DEPARTMENT HISTORY AND PHYSICAL EXAM    Date: 12/7/2020  Patient Name: Yas Bonilla    History of Presenting Illness     Chief Complaint   Patient presents with    Concern For JANIF-02 (Coronavirus)         History Provided By: Patient    Chief Complaint: COVID test    HPI(Context):   9:14 AM  Yas Bonilla is a 31 y.o. male who presents to the emergency department for COVID test. Pt reports he works at [employer] and is concerned for possible exposure. Pt state he lives with 2 elderly individuals and requesting test. Pt is asymptomatic. Pt denies fever, chills, congestion, cough, myalgias, loss of taste/smell, and any other sxs or complaints. PCP: None        Past History     Past Medical History:  Past Medical History:   Diagnosis Date    Asthma     Chlamydia     Herpes zoster     Syphilis        Past Surgical History:  History reviewed. No pertinent surgical history. Family History:  History reviewed. No pertinent family history. Social History:  Social History     Tobacco Use    Smoking status: Former Smoker    Smokeless tobacco: Never Used   Substance Use Topics    Alcohol use: Yes     Comment: socially    Drug use: No       Allergies:  No Known Allergies      Review of Systems   Review of Systems   Constitutional: Negative for chills and fever. HENT: Negative for congestion. Respiratory: Negative for cough. Musculoskeletal: Negative for myalgias. All other systems reviewed and are negative. Physical Exam     Vitals:    12/07/20 0915 12/07/20 0957   BP: 113/69    Pulse: (!) 114 (!) 120   Resp: 16    Temp: 99.5 °F (37.5 °C)    SpO2: 98% 96%   Weight: 86.2 kg (190 lb)    Height: 5' 8\" (1.727 m)      Physical Exam  Vitals signs and nursing note reviewed. Constitutional:       General: He is not in acute distress. Appearance: He is well-developed. He is not diaphoretic. Comments: AA male in NAD. Alert. Appears comfortable.     HENT:      Head: Normocephalic and atraumatic. Right Ear: External ear normal.      Left Ear: External ear normal.      Nose: Nose normal.   Eyes:      General: No scleral icterus. Right eye: No discharge. Left eye: No discharge. Conjunctiva/sclera: Conjunctivae normal.   Neck:      Musculoskeletal: Normal range of motion. Cardiovascular:      Rate and Rhythm: Normal rate and regular rhythm. Heart sounds: Normal heart sounds. No murmur. No friction rub. No gallop. Pulmonary:      [place]: Pulmonary effort is normal. No tachypnea, accessory muscle usage or respiratory distress. Breath sounds: Normal breath sounds. No decreased breath sounds, wheezing, rhonchi or rales. Abdominal:      General: Bowel sounds are normal.   Musculoskeletal: Normal range of motion. Skin:     General: Skin is warm and dry. Neurological:      Mental Status: He is alert and oriented to person, place, and time. Psychiatric:         Judgment: Judgment normal.             Diagnostic Study Results     Labs -   No results found for this or any previous visit (from the past 12 hour(s)). No orders to display     CT Results  (Last 48 hours)    None        CXR Results  (Last 48 hours)    None          Medications given in the ED-  Medications - No data to display      Medical Decision Making   I am the first provider for this patient. I reviewed the vital signs, available nursing notes, past medical history, past surgical history, family history and social history. Vital Signs-Reviewed the patient's vital signs. Pulse Oximetry Analysis - 98% on RA. NORMAL     Records Reviewed: Nursing Notes and Old Medical Records    Provider Notes (Medical Decision Making): COVID testing. Asymptomatic. Will test and instruct isolation    Procedures:  Procedures    ED Course:   9:14 AM Initial assessment performed.  The patients presenting problems have been discussed, and they are in agreement with the care plan formulated and outlined with them. I have encouraged them to ask questions as they arise throughout their visit. Diagnosis and Disposition       See MDM above. Reasons to RTED discussed with pt. All questions answered. Pt feels comfortable going home at this time. Pt expressed understanding and he agrees with plan. 9:58 AM  HR elevated but pt state he is nervous about being in ED. Pt states he has no sxs. 1. Person under investigation for COVID-19        PLAN:  1. D/C Home  2. There are no discharge medications for this patient. 3.   Follow-up Information     Follow up With Specialties Details Why Juwan Boyle [address]    [address]    [facility] EMERGENCY DEPT Emergency Medicine  As needed, If symptoms worsen [address] [place] [address]  [phone]        _______________________________    Attestations: This note is prepared by Nidhi Martin PA-C.  _______________________________          Please note that this dictation was completed with [employer], the computer voice recognition software. Quite often unanticipated grammatical, syntax, homophones, and other interpretive errors are inadvertently transcribed by the computer software. Please disregard these errors. Please excuse any errors that have escaped final proofreading.

## 2020-12-08 NOTE — PROGRESS NOTES
Patient contacted via [address] regarding COVID-19 exposure. COVID-19 related testing was available at this time. Test results were negative. Care Transition Nurse attempted to contact patient to discuss COVID test results and provide COVID-related resources. Unable to reach patient. MM requesting a return call for any questions or concerns. Sent patient a [employer] message with COVID resources. Patient has no identifiable risk factors and had no symptoms on presentation to the ED. Resolving COVID Care Transitions episode.

## 2020-12-11 NOTE — PROGRESS NOTES
Two attempts to contact patient completed, patient has number to contact RN if desired, will sign off 12/11/20.

## 2020-12-17 PROBLEM — J18.9 BILATERAL PNEUMONIA: Status: ACTIVE | Noted: 2020-01-01

## 2020-12-17 PROBLEM — Z20.822 SUSPECTED COVID-19 VIRUS INFECTION: Status: ACTIVE | Noted: 2020-01-01

## 2020-12-17 PROBLEM — B20 HIV INFECTION (HCC): Status: ACTIVE | Noted: 2020-01-01

## 2020-12-17 PROBLEM — E87.1 HYPONATREMIA: Status: ACTIVE | Noted: 2020-01-01

## 2020-12-17 PROBLEM — A41.9 SEPSIS (HCC): Status: ACTIVE | Noted: 2020-01-01

## 2020-12-17 NOTE — ED PROVIDER NOTES
EMERGENCY DEPARTMENT HISTORY AND PHYSICAL EXAM 
 
Date: 12/17/2020 Patient Name: Yvonne Yoon History of Presenting Illness Chief Complaint Patient presents with  Shortness of Breath History Provided By: Patient and EMS Additional History (Context):  
Yvonne Yoon is a 31 y.o. male with PMHX HIV not on antivirals, not currently being followed up with infectious disease, no recent viral load or CD4 count presents to the emergency department via ambulance C/O cough, fever, shortness of breath. Patient reports that the symptoms have been ongoing for 2 weeks but worsening today. Patient tested 10 days ago for Covid. Was Covid negative. However states that the day after being tested he developed symptoms of fever, cough and shortness of breath. Patient states that the fever resolved but then woke up again this morning with a fever of 101. EMS reports that patient was hypoxic on room air in the mid 80s. Was placed on 2 L and with better oxygenation. Pt denies nausea, vomiting, abdominal pain, chest pain, and any other sxs or complaints. PCP: None Past History Past Medical History: 
Past Medical History:  
Diagnosis Date  Asthma  Chlamydia  Herpes zoster  HIV (human immunodeficiency virus infection) ([facility] [address]  Syphilis Past Surgical History: 
History reviewed. No pertinent surgical history. Family History: 
History reviewed. No pertinent family history. Social History: 
Social History Tobacco Use  Smoking status: Former Smoker  Smokeless tobacco: Never Used Substance Use Topics  Alcohol use: Yes Comment: socially  Drug use: No  
 
 
Allergies: 
No Known Allergies Review of Systems Review of Systems Constitutional: Positive for appetite change, chills, fatigue and fever. HENT: Negative for congestion, ear pain, sinus pain and sore throat. Eyes: Negative for pain and visual disturbance. Respiratory: Positive for cough and shortness of breath. Cardiovascular: Negative for chest pain and leg swelling. Gastrointestinal: Negative for abdominal pain, constipation, diarrhea, nausea and vomiting. Genitourinary: Negative for dysuria and hematuria. Musculoskeletal: Negative for back pain and neck pain. Skin: Negative for pallor and rash. Neurological: Negative for dizziness, tremors, weakness, light-headedness and headaches. All other systems reviewed and are negative. Physical Exam  
 
Vitals:  
 12/17/20 1807 12/17/20 1830 12/17/20 1845 BP: 111/74 130/78 122/75 Pulse: (!) 148 (!) 142 (!) 137 Resp: (!) 43 (!) 32 (!) 41 Temp: (!) 102.4 °F (39.1 °C) SpO2: 100% Weight: 63.5 kg (140 lb) Height: 5' 7\" (1.702 m) Physical Exam 
 
Nursing note and vitals reviewed Constitutional: Young thin -American male, in moderate respiratory distress, actively coughing Head: Normocephalic, Atraumatic Eyes: Pupils are equal, round, and reactive to light, EOMI Neck: Supple, non-tender Cardiovascular: Tachycardic, no murmurs, rubs, or gallops, + 2 radial pulses bilaterally Chest: Mildly dyspneic but with symmetrical chest excursion bilaterally Lungs: Bibasilar crackles, Abdomen: Soft, non tender, non distended, normoactive bowel sounds Back: No evidence of trauma or deformity Extremities: No evidence of trauma or deformity, no LE edema. No streaking erythema, vesicular lesions, ulcerations or bulla Skin: Warm and dry, normal cap refill Neuro: Alert and appropriate, CN intact, normal speech, moving all 4 extremities freely and symmetrically Psychiatric: Normal mood and affect Diagnostic Study Results Labs - Recent Results (from the past 12 hour(s)) METABOLIC PANEL, COMPREHENSIVE Collection Time: 12/17/20  6:10 PM  
Result Value Ref Range Sodium 127 (L) 136 - 145 mmol/L Potassium 3.5 3.5 - 5.5 mmol/L  Chloride 93 (L) 100 - 111 mmol/L  
 CO2 26 21 - 32 mmol/L Anion gap 8 3.0 - 18 mmol/L Glucose 98 74 - 99 mg/dL BUN 9 7.0 - 18 MG/DL Creatinine 0.70 0.6 - 1.3 MG/DL  
 BUN/Creatinine ratio 13 12 - 20 GFR est AA >60 >60 ml/min/1.73m2 GFR est non-AA >60 >60 ml/min/1.73m2 Calcium 7.9 (L) 8.5 - 10.1 MG/DL Bilirubin, total 0.5 0.2 - 1.0 MG/DL  
 ALT (SGPT) 31 16 - 61 U/L  
 AST (SGOT) 65 (H) 10 - 38 U/L Alk. phosphatase 228 (H) 45 - 117 U/L Protein, total 7.8 6.4 - 8.2 g/dL Albumin 2.1 (L) 3.4 - 5.0 g/dL Globulin 5.7 (H) 2.0 - 4.0 g/dL A-G Ratio 0.4 (L) 0.8 - 1.7    
CBC WITH AUTOMATED DIFF Collection Time: 12/17/20  6:10 PM  
Result Value Ref Range WBC 8.1 4.6 - 13.2 K/uL  
 RBC 3.54 (L) 4.70 - 5.50 M/uL  
 HGB 10.6 (L) 13.0 - 16.0 g/dL HCT 30.4 (L) 36.0 - 48.0 % MCV 85.9 74.0 - 97.0 FL  
 MCH 29.9 24.0 - 34.0 PG  
 MCHC 34.9 31.0 - 37.0 g/dL  
 RDW 13.9 11.6 - 14.5 % PLATELET 918 580 - 584 K/uL MPV 9.7 9.2 - 11.8 FL  
 NEUTROPHILS PENDING % LYMPHOCYTES PENDING % MONOCYTES PENDING % EOSINOPHILS PENDING % BASOPHILS PENDING %  
 ABS. NEUTROPHILS PENDING K/UL  
 ABS. LYMPHOCYTES PENDING K/UL  
 ABS. MONOCYTES PENDING K/UL  
 ABS. EOSINOPHILS PENDING K/UL  
 ABS. BASOPHILS PENDING K/UL  
 DF PENDING   
NT-PRO BNP Collection Time: 12/17/20  6:10 PM  
Result Value Ref Range NT pro- 0 - 450 PG/ML  
CARDIAC PANEL,(CK, CKMB & TROPONIN) Collection Time: 12/17/20  6:10 PM  
Result Value Ref Range CK - MB 1.0 <3.6 ng/ml CK-MB Index 1.5 0.0 - 4.0 % CK 65 39 - 308 U/L Troponin-I, QT <0.02 0.0 - 0.045 NG/ML  
POC LACTIC ACID Collection Time: 12/17/20  6:12 PM  
Result Value Ref Range Lactic Acid (POC) 1.19 0.40 - 2.00 mmol/L  
POC G3 Collection Time: 12/17/20  6:17 PM  
Result Value Ref Range Device: NASAL CANNULA Flow rate (POC) 2 L/M  
 pH (POC) 7.40 7.35 - 7.45    
 pCO2 (POC) 36.8 35.0 - 45.0 MMHG  
 pO2 (POC) 96 80 - 100 MMHG HCO3 (POC) 22.3 22 - 26 MMOL/L  
 sO2 (POC) 97 92 - 97 % Base deficit (POC) 2 mmol/L Allens test (POC) YES Total resp. rate 54 Site LEFT RADIAL Patient temp. 102.8 Specimen type (POC) ARTERIAL Performed by Kelsey Quigley SARS-COV-2 Collection Time: 12/17/20  6:30 PM  
Result Value Ref Range SARS-CoV-2 PENDING Specimen source Nasopharyngeal    
 Specimen type NP Swab Radiologic Studies -  
XR CHEST PORT Final Result IMPRESSION:  
  
1. Diffuse bilateral lung opacity most concerning for bilateral pneumonia. CT Results  (Last 48 hours) None CXR Results  (Last 48 hours) 12/17/20 1832  XR CHEST PORT Final result Impression:  IMPRESSION:  
   
1. Diffuse bilateral lung opacity most concerning for bilateral pneumonia. Narrative:  EXAM: Portable frontal view of the chest.  
   
CLINICAL INDICATION/HISTORY: Shortness of breath COMPARISON: None.  
   
_______________ FINDINGS:   
   
There is diffuse hazy and groundglass appearing opacity throughout both lungs,  
with denser areas of opacity in the medial right upper and lower lung and within  
the region of the central left upper lobe. No pleural effusion. Normal heart  
size. No acute osseous findings. _______________ Medical Decision Making I am the first provider for this patient. I reviewed the vital signs, available nursing notes, past medical history, past surgical history, family history and social history. Vital Signs-Reviewed the patient's vital signs. Pulse Oximetry Analysis -96% on 2 L Cardiac Monitor: 
Rate: 140 bpm 
Rhythm: Regular EKG interpretation: (Preliminary) 6:52 PM 
Sinus tachycardia 147 bpm.  NE interval 126 ms. QRS 74 ms. QTc 422 ms. No acute ST elevation Records Reviewed: Nursing Notes and Old Medical Records Provider Notes: 31 y.o. male presenting with fever, cough, shortness of breath. History of HIV. On presentation, patient is febrile and very tachycardic. He appears dyspneic. He does have past bibasal crackles. Patient meeting sirs criteria, will initiate septic work-up. Will start antibiotics and steroids. In light of the Covid pandemic, will reswab for Covid. Will place in droplet precautions. Procedures: 
Procedures ED Course:  
5:55 PM 
 Initial assessment performed. The patients presenting problems have been discussed, and they are in agreement with the care plan formulated and outlined with them. I have encouraged them to ask questions as they arise throughout their visit. 6:50 PM 
Patient's lactic acid within normal limits. Chest x-ray showing bilateral pneumonia. This could be consistent with Covid pneumonia however in the setting of HIV infection, not on antiviral, could be due to underlying pneumocystis pneumonia. We will also cover with Bactrim. ABG shows stable oxygen on 2 L. Diagnosis and Disposition 6:50 PM 
I have spent 45 minutes of critical care time involved in lab review, consultations with specialist, family decision-making, and documentation. During this entire length of time I was immediately available to the patient. Critical Care: The reason for providing this level of medical care for this critically ill patient was due a critical illness that impaired one or more vital organ systems such that there was a high probability of imminent or life threatening deterioration in the patients condition. This care involved high complexity decision making to assess, manipulate, and support vital system functions, to treat this degreee vital organ system failure and to prevent further life threatening deterioration of the patients condition. Core Measures: 
For Hospitalized Patients: 
 
1.  Hospitalization Decision Time: 
 The decision to hospitalize the patient was made by Winda Bamberger, DO at 6:51 PM on 12/17/2020 2. Aspirin: Aspirin was not given because the patient did not present with a stroke at the time of their Emergency Department evaluation 6:50 PM  Patient is being admitted to the hospital by Sandy Miranda MD. The results of their tests and reasons for their admission have been discussed with them and/or available family. They convey agreement and understanding for the need to be admitted and for their admission diagnosis. CONDITIONS ON ADMISSION: 
Sepsis is not present at the time of admission. Pneumonia is present at the time of admission. MRSA is not present at the time of admission. Wound infection is not present at the time of admission. Pressure Ulcer is not present at the time of admission. CLINICAL IMPRESSION: 
 
1. Pneumonia of both lungs due to infectious organism, unspecified part of lung 2. Fever of unknown origin with HIV infection ([facility] [address] 3. Suspected COVID-19 virus infection   
 
 
____________________________________ Please note that this dictation was completed with [employer], the computer voice recognition software. Quite often unanticipated grammatical, syntax, homophones, and other interpretive errors are inadvertently transcribed by the computer software. Please disregard these errors. Please excuse any errors that have escaped final proofreading.

## 2020-12-17 NOTE — ED TRIAGE NOTES
Pt arrives from home per EMS w/ c/o sob and fever starting today. EMS reports pt was 87% on room air and placed on 2L nasal cannula and back to 94%. Pt reports that he tested neg for covid x2 days ago and positive for flu.

## 2020-12-18 PROBLEM — B59 PNEUMONIA OF BOTH LUNGS DUE TO PNEUMOCYSTIS JIROVECII (HCC): Status: ACTIVE | Noted: 2020-01-01

## 2020-12-18 NOTE — CONSULTS
[facility] 
(A Division of [address] [place] [address]) Consultation Note Date of Admission: 12/17/2020    Date of Consultation: 12/18/2020 Referred by: Ernesto Mohamud MD 
 
Reason for Referral: Pneumonia Current Antimicrobials:    Prior Antimicrobials: 
none 1. azithro IV (12/17) 2. CAX IV (12/17) 3. TMP-SMX PO  (one DS pill last night/this morning) Assessment: Rec / Plan: PJP pneumonia 12/17:  PCT pending;  Classic appearing CXR given his underlying AIDS (CD4+ <200); a poor man's CD4+ is his absolute lymphocyte count <1000. Will await his formal CD4+/Viral load, but it will be terrible as it returns. Go ahead and presumptively treat PJP. I like dexamethasone for COVID-19, but am more familiar with prednisone for PJP (80mg/day for first week, then 40mg/day for second week, finishing 20mg/day for third/last week) -> 
 
Effective PJP therapy is tmp-smx DS - 2 tablets PO TID for 21 days with pre-dosed steroids on long taper. He's already started his steroids yesterday - safe to start today. Will await COVID-19 testing. Once stable/improved, can return home AIDS Will plug him into [facility] at [facility] upon DC to get his care set up there. COVID-PUI Can also cause lymphopenia; await test.  MOD risk given zaira janna Syphilis Titer 1:512 in 2018; got 3 weekly shots. Recheck this admission Microbiology:  12/18-  Resp sent Legionella/Spneumo Ag (-) 
   12/17 - Flu (-) BCx x2 (-) COVID-19 pending Lines / Catheters: peripheral 
 
 
HPI: 
CC:  \"I used to be 300lbs a few years ago\" Mr Rhae Gosselin is a 33y MSM AAM with underlying/untreated HIV who was in usual state of health until approx 2-3wks ago with onset of gradual dry cough and associated/progressive dyspnea (rest and exertion). No f/s/c. Has had 50-60lbs wt loss over the last year. Has known about his HIV disease for a couple of years, but has not sought care yet. Works at [employer]; wears a mask. 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 Feels the same today. 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  
Diagnosis Date  Asthma  Chlamydia  Herpes zoster  HIV (human immunodeficiency virus infection) ([facility] [address]  Syphilis History reviewed. No pertinent surgical history. History reviewed. No pertinent family history. Social History Socioeconomic History  Marital status: SINGLE Spouse name: Not on file  Number of children: Not on file  Years of education: Not on file  Highest education level: Not on file Occupational History  Not on file Social Needs  Financial resource strain: Not on file  Food insecurity Worry: Not on file Inability: Not on file  Transportation needs Medical: Not on file Non-medical: Not on file Tobacco Use  Smoking status: Former Smoker  Smokeless tobacco: Never Used Substance and Sexual Activity  Alcohol use: Yes Comment: socially  Drug use: No  
 Sexual activity: Not on file Lifestyle  Physical activity Days per week: Not on file Minutes per session: Not on file  Stress: Not on file Relationships  Social connections Talks on phone: Not on file Gets together: Not on file Attends Zoroastrianism service: Not on file Active member of club or organization: Not on file Attends meetings of clubs or organizations: Not on file Relationship status: Not on file  Intimate partner violence Fear of current or ex partner: Not on file Emotionally abused: Not on file Physically abused: Not on file Forced sexual activity: Not on file Other Topics Concern  Not on file Social History Narrative  Not on file Allergies: 
Patient has no known allergies. Medications: 
Current Facility-Administered Medications Medication Dose Route Frequency  calcium carbonate (TUMS) chewable tablet 200 mg [elemental]  200 mg Oral TID PRN  
 ascorbic acid (vitamin C) (VITAMIN C) tablet 500 mg  500 mg Oral BID  predniSONE (DELTASONE) tablet 40 mg  40 mg Oral BID WITH MEALS  trimethoprim-sulfamethoxazole (BACTRIM DS, SEPTRA DS) 160-800 mg per tablet 2 Tab  2 Tab Oral TID  sodium chloride (NS) flush 5-10 mL  5-10 mL IntraVENous PRN  
 enoxaparin (LOVENOX) injection 30 mg  30 mg SubCUTAneous Q12H  
 acetaminophen (TYLENOL) tablet 650 mg  650 mg Oral Q6H PRN Or  
 acetaminophen (TYLENOL) suppository 650 mg  650 mg Rectal Q6H PRN  
 guaiFENesin-dextromethorphan (ROBITUSSIN DM) 100-10 mg/5 mL syrup 5 mL  5 mL Oral Q4H PRN  cholecalciferol (VITAMIN D3) (1000 Units /25 mcg) tablet 6 Tab  6,000 Units Oral DAILY  sodium chloride (NS) flush 5-40 mL  5-40 mL IntraVENous Q8H  
 sodium chloride (NS) flush 5-40 mL  5-40 mL IntraVENous PRN  promethazine (PHENERGAN) tablet 12.5 mg  12.5 mg Oral Q6H PRN  Or  
 ondansetron (ZOFRAN) injection 4 mg  4 mg IntraVENous Q6H PRN  
 bisacodyL (DULCOLAX) tablet 5 mg  5 mg Oral DAILY PRN  
 famotidine (PEPCID) tablet 20 mg  20 mg Oral BID  
 0.9% sodium chloride infusion  100 mL/hr IntraVENous CONTINUOUS  
  zinc sulfate (ZINCATE) 220 (50) mg capsule 1 Cap  1 Cap Oral DAILY  melatonin tablet 10 mg  10 mg Oral QHS  
  
 
ROS: 
Constitutional: positive for fevers, fatigue, malaise, anorexia and weight loss, negative for sweats Ears, Nose, Mouth, Throat, and Face: negative for facial trauma Respiratory: positive for cough or dyspnea on exertion, negative for sputum or hemoptysis Cardiovascular: positive for dyspnea, fatigue, tachypnea, dyspnea on exertion, negative for chest pain Gastrointestinal: negative for nausea, vomiting, diarrhea and abdominal pain Genitourinary:negative for dysuria Physical Exam: HPI: 
Temp (24hrs), Av.2 °F (37.3 °C), Min:97.6 °F (36.4 °C), Max:102.4 °F (39.1 °C) Visit Vitals /74 Pulse (!) 108 Temp 97.6 °F (36.4 °C) Resp 16 Ht 5' 7\" (1.702 m) Wt 68.9 kg (152 lb) SpO2 94% BMI 23.81 kg/m² General: Well developed, well nourished 31 y.o. BLACK OR  male in no acute distress. ENT: ENT exam normal, no neck nodes or sinus tenderness Head: normocephalic, without obvious abnormality Mouth:  mucous membranes moist, pharynx normal without lesions; no thrush Neck: supple, symmetrical, trachea midline Cardio:  regular rate and rhythm, S1, S2 normal, no murmur, click, rub or gallop Chest: inspection normal - no chest wall deformities or tenderness, respiratory effort normal 
Lungs: Bilat rales; unlabored breathing Abdomen: soft, non-tender. Bowel sounds normal. No masses, no organomegaly. Extremities:  no redness or tenderness in the calves or thighs, no edema Neuro: Grossly normal  
 
 
Lab results: 
 
Chemistry Recent Labs 20 
0205 20 
1810 * 98 * 127* K 4.1 3.5  93* CO2 25 26 BUN 7 9 CREA 0.60 0.70 CA 7.4* 7.9* AGAP 9 8 BUCR 12 13 * 228* TP 6.6 7.8 ALB 1.7* 2.1*  
GLOB 4.9* 5.7* AGRAT 0.3* 0.4* CBC w/ Diff Recent Labs 20 
0205 20 
1243 WBC 3.6* 8.1  
RBC 3.14* 3.54* HGB 9.3* 10.6* HCT 27.3* 30.4*  324 GRANS 88* 83* LYMPH 9* 7* EOS 0 0 Microbiology All Micro Results Procedure Component Value Units Date/Time CULTURE, RESPIRATORY/SPUTUM/BRONCH Ruffus Daisha [[id number]] Collected: 12/18/20 0014 Order Status: Completed Specimen: Sputum Updated: 12/18/20 1453 LEGIONELLA PNEUMOPHILA AG, URINE [[id number]] Collected: 12/18/20 0014 Order Status: Completed Specimen: Urine, random Updated: 12/18/20 1131 Legionella Ag, urine Negative Amanda Arya, URINE [[id number]] Collected: 12/18/20 0014 Order Status: Completed Specimen: Urine, random Updated: 12/18/20 1131 Strep pneumo Ag, urine Negative INFLUENZA A & B AG (RAPID TEST) [[id number]] Collected: 12/17/20 1900 Order Status: Completed Specimen: Nasopharyngeal from Nasal washing Updated: 12/17/20 1925 Influenza A Antigen Negative Comment: A negative result does not exclude influenza virus infection, seasonal or H1N1 due to suboptimal sensitivity. If influenza is circulating in your community, a diagnosis of influenza should be considered based on a patients clinical presentation and empiric antiviral treatment should be considered, if indicated. Influenza B Antigen Negative CULTURE, BLOOD [[id number]] Collected: 12/17/20 1820 Order Status: Completed Specimen: Blood Updated: 12/17/20 1829 CULTURE, BLOOD [[id number]] Collected: 12/17/20 1810 Order Status: Completed Specimen: Blood Updated: 12/17/20 1824 Asif Scott MD 
Cell [phone] [facility] 12/18/2020  
4:03 PM

## 2020-12-18 NOTE — PROGRESS NOTES
conducted an initial consultation and Spiritual Assessment for Sergey Lozada, who is a 30 y.o.,male. Patient’s Primary Language is: English.  
According to the patient’s EMR Adventism Affiliation is: Non Islam.  
 
The reason the Patient came to the hospital is:  
Patient Active Problem List  
 Diagnosis Date Noted  
• HIV infection (HCC) 12/17/2020  
• Bilateral pneumonia 12/17/2020  
• Sepsis (HCC) 12/17/2020  
• Suspected COVID-19 virus infection 12/17/2020  
• Hyponatremia 12/17/2020  
  
 
The  provided the following Interventions: 
Initiated a relationship of care and support through phone conversation with patient. 
Explored issues of nate, belief, spirituality and Amish/ritual needs while hospitalized. 
Listened empathically.. 
Provided information about Spiritual Care Services. 
Offered assurance of prayer on patient's behalf.  
Chart reviewed. 
 
Plan: 
Chaplains will continue to follow and will provide pastoral care on an as needed/requested basis.  recommends bedside caregivers page  on duty if patient shows signs of acute spiritual or emotional distress. 
 
Rev. Emmanuel Tam,Spiritual Care 
[facility] 
[phone]

## 2020-12-18 NOTE — PROGRESS NOTES
Hospitalist Progress Note Patient: Antolin Dailey MRN: [id number]  CSN: [id number] YOB: 1990  Age: 31 y.o. Sex: male DOA: 12/17/2020 LOS:  LOS: 1 day Chief Complaint: 
 
sepsis Assessment/Plan  
 
30 yo male dx with [facility] and with no treatent or follow up came with progressive ob X 2 weeks, recent 12/7 covid test negative Sepsis - Febrile and tachycardic on admission Lactic acid wnl WBC not elevated but left shift Continue mild tachycardia, continue IVF Check LDH Repeat stat CXR, get CTA chest, and echo also today 
  
Consult ID for HIV infection, possible pneumocystis pneumonia, and possible COVID - Continue vitamins, steroids, broaden abx coverage to zosyn , vanco with immune deficiency and bilateral pneumonia findings HIV infection - 
need viral load and CD4 count 
  
Bilateral pneumonia -he is saturating normally on 2 liters but remains quite dyspneic with talking. moving CXR showed bilateral pneumonia This could be c/w COVID-19 PNA COVID PCR test pending Hypoalbuminemia High risk also for CAP as well as more complicated pneumocystis pneumonia  
  
Hyponatremia - Improved with saline IV 
  
DVT prophylaxis -Lovenox  
  
GI prophylaxis -Pepcid Tele monitoring' Further testing as above 
recommended bedrest to him, and if worsens then low threshold to move to ICU 
  
Disposition : 
Patient Active Problem List  
Diagnosis Code  HIV infection ([facility] [address] B20  
 Bilateral pneumonia J18.9  Sepsis ([facility] [address] A41.9  Suspected COVID-19 virus infection Z20.828  
 Hyponatremia E87.1 Subjective: 
I am still feeling bad Sob when I move' Using urinal and bedside commode to avoid getting up much Review of systems: 
 
Constitutional: chills, myalgias Respiratory:  SOB, NP cough Cardiovascular: denies chest pain, palpitations Gastrointestinal: denies nausea, vomiting, diarrhea Vital signs/Intake and Output: 
Visit Vitals /80 Pulse (!) 115 Temp 99 °F (37.2 °C) Resp 20 Ht 5' 7\" (1.702 m) Wt 69 kg (152 lb 1.6 oz) SpO2 100% BMI 23.82 kg/m² Current Shift:  12/18 0701 - 12/18 1900 In: -  
Out: 423 [FHKCP:674] Last three shifts:  12/16 1901 - 12/18 0700 In: 1000 [I.V.:1000] Out: 1200 [Urine:1200] Exam: 
 
General: thin chronically ill appearing AAM, alert, NAD, OX3 Head/Neck: NCAT, supple, No masses, No lymphadenopathy CVS:Regular rate and rhythm, no M/R/G, S1/S2 heard, no thrill Lungs:Coarse BS no wheezing, dec BS bases Abdomen: Soft, Nontender, No distention, Normal Bowel sounds, No hepatomegaly Extremities: No C/C/E, pulses palpable 2+ Neuro:grossly normal , follows commands Psych:appropriate Labs: Results:  
   
Chemistry Recent Labs 12/18/20 
0205 12/17/20 1810 * 98 * 127* K 4.1 3.5  93* CO2 25 26 BUN 7 9 CREA 0.60 0.70 CA 7.4* 7.9* AGAP 9 8 BUCR 12 13 * 228* TP 6.6 7.8 ALB 1.7* 2.1*  
GLOB 4.9* 5.7* AGRAT 0.3* 0.4* CBC w/Diff Recent Labs 12/18/20 
0205 12/17/20 1810 WBC 3.6* 8.1  
RBC 3.14* 3.54* HGB 9.3* 10.6* HCT 27.3* 30.4*  324 GRANS 88* 83* LYMPH 9* 7* EOS 0 0 Cardiac Enzymes Recent Labs 12/17/20 1810 CPK 65 CKND1 1.5 Coagulation No results for input(s): PTP, INR, APTT, INREXT in the last 72 hours. Lipid Panel No results found for: CHOL, CHOLPOCT, CHOLX, CHLST, CHOLV, 410501, HDL, HDLP, LDL, LDLC, DLDLP, 469225, VLDLC, VLDL, TGLX, TRIGL, TRIGP, TGLPOCT, CHHD, CHHDX  
BNP No results for input(s): BNPP in the last 72 hours. Liver Enzymes Recent Labs 12/18/20 
0205 TP 6.6 ALB 1.7* * Thyroid Studies No results found for: T4, T3U, TSH, TSHEXT    
 Procedures/imaging: see electronic medical records for all procedures/Xrays and details which were not copied into this note but were reviewed prior to creation of Plan Billy Mane MD

## 2020-12-18 NOTE — PROGRESS NOTES
Problem: Risk for Spread of Infection Goal: Prevent transmission of infectious organism to others Description: Prevent the transmission of infectious organisms to other patients, staff members, and visitors. Outcome: Progressing Towards Goal 
  
Problem: Patient Education:  Go to Education Activity Goal: Patient/Family Education Outcome: Progressing Towards Goal 
  
Problem: Pressure Injury - Risk of 
Goal: *Prevention of pressure injury Description: Document Josep Scale and appropriate interventions in the flowsheet. Outcome: Progressing Towards Goal 
Note: Pressure Injury Interventions: 
Sensory Interventions: Assess changes in LOC Moisture Interventions: Absorbent underpads Activity Interventions: Increase time out of bed Mobility Interventions: Pressure redistribution bed/mattress (bed type) Nutrition Interventions: Document food/fluid/supplement intake, Offer support with meals,snacks and hydration

## 2020-12-18 NOTE — ED NOTES
Assumed care. Patient reports \"feeling a lot better. \" And states that he can now breath. Patient alert and oriented. Updated on plan of care. Cardiac, bp, and pulse ox monitoring in place. Call bell in reach. IV fluids and IV abx infusing. Awaiting inpatient bed assignment. Will continue to monitor. 1933 - Report attempted to inpatient unit. RN to callback. 1944 - Report called to inpatient unit.

## 2020-12-18 NOTE — H&P
Hospitalist Progress Note Patient: Ashleigh  MRN: [id number]  CSN: [id number] YOB: 1990  Age: 31 y.o. Sex: male DOA: 12/17/2020 LOS:  LOS: 1 day Duplicate, see prog note

## 2020-12-18 NOTE — PROGRESS NOTES
2017 Head to toe assessment completed at this time. Pt states that he does have SOB but it is the same as upons admission. Pt denies pain at this time. Pt skin intact, crackles in the lungs. Patient bowel sounds ar active. Pt does present with bilateral weakness to lower extremities. Pt left in bed with no signs of distress. 2200- Pt in bed resting. Will continue to monitor. 0014 - Samples taken for sputum and urine labs. Shift summary - Pt had an uneventful night. Pt left in bed with no signs of distress.

## 2020-12-18 NOTE — ROUTINE PROCESS
TRANSFER - IN REPORT: 
 
Verbal report received from Philly Zhang RN(name) on [facility]  being received from ED(unit) for routine progression of care Report consisted of patients Situation, Background, Assessment and  
Recommendations(SBAR). Information from the following report(s) SBAR, Kardex, ED Summary, Intake/Output, MAR and Recent Results was reviewed with the receiving nurse. Opportunity for questions and clarification was provided.

## 2020-12-18 NOTE — PROGRESS NOTES
[address] [place] [address] care of the patient from [facility] (offgoing Nurse). 1900 Shift Uneventful 1925 Bedside and Verbal shift change report given to Carol Ashley RN (oncoming nurse) by Rafael aGrsia RN (offgoing nurse). Report included the following information SBAR, Kardex, MAR, Recent Results, and Cardiac Rhythm.

## 2020-12-18 NOTE — PROGRESS NOTES
Vancomycin - Pharmacy to Dose Consult provided for this 31y.o. year old ,male for indication of Pneumonia ( CAP ) 7 day Tx. Therapy day 1 Wt Readings from Last 1 Encounters:  
12/18/20 69 kg (152 lb 1.6 oz) Ht Readings from Last 1 Encounters:  
12/17/20 170.2 cm (67\") Dosing Weight:  69 kg Additional Antibiotics:  Zosyn Date:  12/18/20 Lab Results Component Value Date/Time Creatinine 0.60 12/18/2020 02:05 AM  
 Creatinine, POC 1.1 11/22/2016 09:29 PM  
 
creatinine clearance:  >100 ml/min 
 
white blood cell count:  3.6 Will dose Vancomycin 1500 mg IV now, then 1250 mg IV q12hrs. Trough level after 4-5 doses infused. Dose calculated to approximate a therapeutic trough of 15-20 mcg/mL. Pharmacy to follow daily and will make changes to dose and/or frequency based on clinical status. [id number] . [facility], [address] [place] [address]

## 2020-12-18 NOTE — PROGRESS NOTES
Reason for Admission:  C/o  Cough,fever and SOB 
                
RUR Score:           
        
Plan for utilizing home health:   low PCP: First and Last name:  Non listed Name of Practice:  
 Are you a current patient: Yes/No:  
 Approximate date of last visit:  
 Can you participate in a virtual visit with your PCP:  
                 
Current Advanced Directive/Advance Care Plan: not on chart at this time, pastoral services available if needed. Transition of Care Plan:   Chart reviewed and spoke with pt via phone conversation, cm introduced self and explained cm role as d/c planner, cm verified no insurance status, pt agreeable to speak with Jayy Sheldon for medicaid screening,hx includes HIV,syphillis herpes and asthma, covid results pending, ID following, cm will cont to review and remain available. Care Management Interventions PCP Verified by CM: Yes(none) Palliative Care Criteria Met (RRAT>21 & CHF Dx)?: No 
Mode of Transport at Discharge: Other (see comment)(family) Current Support Network: Other, Relative's Home(mother) Confirm Follow Up Transport: Family Discharge Location Discharge Placement: Home

## 2020-12-18 NOTE — H&P
History & Physical 
 
Patient: Scot Martínez MRN: [id number]  CSN: [id number] YOB: 1990  Age: 31 y.o. Sex: male DOA: 12/17/2020 Primary Care Provider:  None Assessment/Plan SIRS/Mild Sepsis - Febrile and tachycardic Tachypneic Lactic acid wnl WBC not elevated but left shift Consult ID for HIV infection, possible pneumocystis pneumonia, and possible COVID - 
Dr. Jacob Soliman HIV infection - 
Consult ID- Dr. Jacob Soliman Order viral load and CD4 count Bilateral pneumonia - 
CXR showed bilateral pneumonia This could be c/w COVID-19 PNA However, in the setting of HIV infection and not on antiviral, could be due to underlying pneumocystis pneumonia, therefore, Bactrim was started ABG shows stable oxygen on 2 L. Suspicion of COVID-19 infection - 
COVID PCR test pending High likelihood of Covid-19 High risk also for pneumocystis pneumonia Hyponatremia - Na 127 Started on normal saline Will recheck Na overnight DVT prophylaxis -Lovenox GI prophylaxis -Pepcid Patient Active Problem List  
Diagnosis Code  HIV infection ([facility] [address] B20  
 Bilateral pneumonia J18.9  Sepsis ([facility] [address] A41.9  Suspected COVID-19 virus infection Z20.828  
 Hyponatremia E87.1 Estimated length of stay: 3-4 days CC: Fever HPI:  
 
 Antolin Dailey is a 31 y.o. male with h/o HIV not on antivirals, not currently being followed up with infectious disease, no recent viral load or CD4 count presents to the emergency department via ambulance complaining of cough, fever, shortness of breath. Patient reports that the symptoms have been ongoing for 2 weeks but worsening today. Patient tested 10 days ago for Covid. Was Covid negative. However states that the day after being tested he developed symptoms of fever, cough and shortness of breath. Patient states that the fever resolved but then woke up again this morning with a fever of 101. EMS reports that patient was hypoxic on room air in the mid 80s. Was placed on 2 L and with better oxygenation. Pt denies nausea, vomiting, abdominal pain, chest pain, and any other sxs or complaints. Past Medical History:  
Diagnosis Date  Asthma  Chlamydia  Herpes zoster  HIV (human immunodeficiency virus infection) ([facility] [address]  Syphilis History reviewed. No pertinent surgical history. History reviewed. No pertinent family history. Social History Socioeconomic History  Marital status: SINGLE Spouse name: Not on file  Number of children: Not on file  Years of education: Not on file  Highest education level: Not on file Tobacco Use  Smoking status: Former Smoker  Smokeless tobacco: Never Used Substance and Sexual Activity  Alcohol use: Yes Comment: socially  Drug use: No  
 
 
Prior to Admission medications Not on File No Known Allergies Review of Systems Gen: No fever, chills, malaise, weight loss/gain. Heent: No headache, rhinorrhea, epistaxis, ear pain, hearing loss, sinus pain, neck pain/stiffness, sore throat. Heart: No chest pain, palpitations, ESCOTO, pnd, or orthopnea. Resp: No cough, hemoptysis, wheezing and shortness of breath. GI: No nausea, vomiting, diarrhea, constipation, melena or hematochezia. : No urinary obstruction, dysuria or hematuria. Derm: No rash, new skin lesion or pruritis. Musc/skeletal: no bone or joint complains. Vasc: No edema, cyanosis or claudication. Endo: No heat/cold intolerance, no polyuria,polydipsia or polyphagia. Neuro: No unilateral weakness, numbness, tingling. No seizures. Heme: No easy bruising or bleeding. Physical Exam:  
 
Physical Exam: 
Visit Vitals /71 (BP 1 Location: Left arm, BP Patient Position: At rest) Pulse 100 Temp 97.9 °F (36.6 °C) Resp 24 Ht 5' 7\" (1.702 m) Wt 69 kg (152 lb 1.6 oz) SpO2 100% BMI 23.82 kg/m² O2 Flow Rate (L/min): 2 l/min O2 Device: Nasal cannula Temp (24hrs), Av.6 °F (37.6 °C), Min:97.9 °F (36.6 °C), Max:102.4 °F (39.1 °C) 
  1901 -  0700 In: 1000 [I.V.:1000] Out: 1200 [Urine:1200]   No intake/output data recorded. General:  Awake, cooperative, no distress. Head:  Normocephalic, without obvious abnormality, atraumatic. Eyes:  Conjunctivae/corneas clear, sclera anicteric, PERRL, EOMs intact. Nose: Nares normal. No drainage or sinus tenderness. Throat: Lips, mucosa, and tongue normal.   
Neck: Supple, symmetrical, trachea midline, no adenopathy. Lungs:   Clear to auscultation bilaterally. Heart:  Regular rate and rhythm, S1, S2 normal, no murmur, click, rub or gallop. Abdomen: Soft, non-tender. Bowel sounds normal. No masses,  No organomegaly. Extremities: Extremities normal, atraumatic, no cyanosis or edema. Capillary refill normal.  
Pulses: 2+ and symmetric all extremities. Skin: Skin color as per ethnicity, turgor normal. No rashes or lesions Neurologic: CNII-XII intact. No focal motor or sensory deficit. Labs Reviewed: 
 
Recent Results (from the past 24 hour(s)) EKG, 12 LEAD, INITIAL Collection Time: 20  6:00 PM  
Result Value Ref Range Ventricular Rate 147 BPM  
 Atrial Rate 147 BPM  
 P-R Interval 126 ms  
 QRS Duration 74 ms Q-T Interval 270 ms QTC Calculation (Bezet) 422 ms Calculated P Axis 49 degrees Calculated R Axis -77 degrees Calculated T Axis 54 degrees Diagnosis Sinus tachycardia with occasional premature ventricular complexes Left anterior fascicular block Cannot rule out Anterior infarct , age undetermined Abnormal ECG No previous ECGs available METABOLIC PANEL, COMPREHENSIVE Collection Time: 12/17/20  6:10 PM  
Result Value Ref Range Sodium 127 (L) 136 - 145 mmol/L Potassium 3.5 3.5 - 5.5 mmol/L Chloride 93 (L) 100 - 111 mmol/L  
 CO2 26 21 - 32 mmol/L Anion gap 8 3.0 - 18 mmol/L Glucose 98 74 - 99 mg/dL BUN 9 7.0 - 18 MG/DL Creatinine 0.70 0.6 - 1.3 MG/DL  
 BUN/Creatinine ratio 13 12 - 20 GFR est AA >60 >60 ml/min/1.73m2 GFR est non-AA >60 >60 ml/min/1.73m2 Calcium 7.9 (L) 8.5 - 10.1 MG/DL Bilirubin, total 0.5 0.2 - 1.0 MG/DL  
 ALT (SGPT) 31 16 - 61 U/L  
 AST (SGOT) 65 (H) 10 - 38 U/L Alk. phosphatase 228 (H) 45 - 117 U/L Protein, total 7.8 6.4 - 8.2 g/dL Albumin 2.1 (L) 3.4 - 5.0 g/dL Globulin 5.7 (H) 2.0 - 4.0 g/dL A-G Ratio 0.4 (L) 0.8 - 1.7    
CBC WITH AUTOMATED DIFF Collection Time: 12/17/20  6:10 PM  
Result Value Ref Range WBC 8.1 4.6 - 13.2 K/uL  
 RBC 3.54 (L) 4.70 - 5.50 M/uL  
 HGB 10.6 (L) 13.0 - 16.0 g/dL HCT 30.4 (L) 36.0 - 48.0 % MCV 85.9 74.0 - 97.0 FL  
 MCH 29.9 24.0 - 34.0 PG  
 MCHC 34.9 31.0 - 37.0 g/dL  
 RDW 13.9 11.6 - 14.5 % PLATELET 725 916 - 708 K/uL MPV 9.7 9.2 - 11.8 FL  
 NEUTROPHILS 83 (H) 42 - 75 % BAND NEUTROPHILS 6 (H) 0 - 5 % LYMPHOCYTES 7 (L) 20 - 51 % MONOCYTES 4 2 - 9 % EOSINOPHILS 0 0 - 5 % BASOPHILS 0 0 - 3 %  
 ABS. NEUTROPHILS 6.7 1.8 - 8.0 K/UL  
 ABS. LYMPHOCYTES 0.6 (L) 0.8 - 3.5 K/UL  
 ABS. MONOCYTES 0.3 0 - 1.0 K/UL  
 ABS. EOSINOPHILS 0.0 0.0 - 0.4 K/UL  
 ABS. BASOPHILS 0.0 0.0 - 0.1 K/UL  
 RBC COMMENTS POLYCHROMASIA SLIGHT 
SPHEROCYTES 
FEW 
    
 DF MANUAL    
NT-PRO BNP Collection Time: 12/17/20  6:10 PM  
Result Value Ref Range NT pro- 0 - 450 PG/ML  
CARDIAC PANEL,(CK, CKMB & TROPONIN) Collection Time: 12/17/20  6:10 PM  
Result Value Ref Range CK - MB 1.0 <3.6 ng/ml CK-MB Index 1.5 0.0 - 4.0 % CK 65 39 - 308 U/L Troponin-I, QT <0.02 0.0 - 0.045 NG/ML  
LD Collection Time: 12/17/20  6:10 PM  
Result Value Ref Range  (H) 81 - 234 U/L  
POC LACTIC ACID Collection Time: 12/17/20  6:12 PM  
Result Value Ref Range Lactic Acid (POC) 1.19 0.40 - 2.00 mmol/L  
POC G3 Collection Time: 12/17/20  6:17 PM  
Result Value Ref Range Device: NASAL CANNULA Flow rate (POC) 2 L/M  
 pH (POC) 7.40 7.35 - 7.45    
 pCO2 (POC) 36.8 35.0 - 45.0 MMHG  
 pO2 (POC) 96 80 - 100 MMHG  
 HCO3 (POC) 22.3 22 - 26 MMOL/L  
 sO2 (POC) 97 92 - 97 % Base deficit (POC) 2 mmol/L Allens test (POC) YES Total resp. rate 54 Site LEFT RADIAL Patient temp. 102.8 Specimen type (POC) ARTERIAL Performed by Elvis Deleon SARS-COV-2 Collection Time: 12/17/20  6:30 PM  
Result Value Ref Range SARS-CoV-2 PENDING Specimen source Nasopharyngeal    
 Specimen type NP Swab INFLUENZA A & B AG (RAPID TEST) Collection Time: 12/17/20  7:00 PM  
Result Value Ref Range Influenza A Antigen Negative NEG Influenza B Antigen Negative NEG    
TYPE & SCREEN Collection Time: 12/17/20 11:00 PM  
Result Value Ref Range Crossmatch Expiration 12/20/2020,2359 ABO/Rh(D) B POSITIVE Antibody screen NEG   
URINALYSIS W/ RFLX MICROSCOPIC Collection Time: 12/18/20 12:14 AM  
Result Value Ref Range Color YELLOW Appearance CLEAR Specific gravity <1.005 (L) 1.005 - 1.030  
 pH (UA) 7.0 5.0 - 8.0 Protein Negative NEG mg/dL Glucose Negative NEG mg/dL Ketone Negative NEG mg/dL Bilirubin Negative NEG Blood Negative NEG Urobilinogen 1.0 0.2 - 1.0 EU/dL Nitrites Negative NEG Leukocyte Esterase Negative NEG    
CBC WITH AUTOMATED DIFF Collection Time: 12/18/20  2:05 AM  
Result Value Ref Range WBC 3.6 (L) 4.6 - 13.2 K/uL  
 RBC 3.14 (L) 4.70 - 5.50 M/uL HGB 9.3 (L) 13.0 - 16.0 g/dL HCT 27.3 (L) 36.0 - 48.0 % MCV 86.9 74.0 - 97.0 FL  
 MCH 29.6 24.0 - 34.0 PG  
 MCHC 34.1 31.0 - 37.0 g/dL  
 RDW 13.9 11.6 - 14.5 % PLATELET 627 316 - 931 K/uL MPV 9.5 9.2 - 11.8 FL  
 NEUTROPHILS 88 (H) 40 - 73 % LYMPHOCYTES 9 (L) 21 - 52 % MONOCYTES 3 3 - 10 % EOSINOPHILS 0 0 - 5 % BASOPHILS 0 0 - 2 %  
 ABS. NEUTROPHILS 3.2 1.8 - 8.0 K/UL  
 ABS. LYMPHOCYTES 0.3 (L) 0.9 - 3.6 K/UL  
 ABS. MONOCYTES 0.1 0.05 - 1.2 K/UL  
 ABS. EOSINOPHILS 0.0 0.0 - 0.4 K/UL  
 ABS. BASOPHILS 0.0 0.0 - 0.1 K/UL  
 DF AUTOMATED METABOLIC PANEL, COMPREHENSIVE Collection Time: 12/18/20  2:05 AM  
Result Value Ref Range Sodium 135 (L) 136 - 145 mmol/L Potassium 4.1 3.5 - 5.5 mmol/L Chloride 101 100 - 111 mmol/L  
 CO2 25 21 - 32 mmol/L Anion gap 9 3.0 - 18 mmol/L Glucose 112 (H) 74 - 99 mg/dL BUN 7 7.0 - 18 MG/DL Creatinine 0.60 0.6 - 1.3 MG/DL  
 BUN/Creatinine ratio 12 12 - 20 GFR est AA >60 >60 ml/min/1.73m2 GFR est non-AA >60 >60 ml/min/1.73m2 Calcium 7.4 (L) 8.5 - 10.1 MG/DL Bilirubin, total 0.2 0.2 - 1.0 MG/DL  
 ALT (SGPT) 23 16 - 61 U/L  
 AST (SGOT) 46 (H) 10 - 38 U/L Alk. phosphatase 176 (H) 45 - 117 U/L Protein, total 6.6 6.4 - 8.2 g/dL Albumin 1.7 (L) 3.4 - 5.0 g/dL Globulin 4.9 (H) 2.0 - 4.0 g/dL A-G Ratio 0.3 (L) 0.8 - 1.7 Procedures/imaging: see electronic medical records for all procedures/Xrays and details which were not copied into this note but were reviewed prior to creation of Plan CC: None

## 2020-12-18 NOTE — ROUTINE PROCESS
Bedside and Verbal shift change report given to LAITH Marin RN (oncoming nurse) by HARDIK Ventura RN (offgoing nurse). Report included the following information SBAR, Kardex, MAR and Recent Results.

## 2020-12-19 NOTE — ROUTINE PROCESS
Assume care of patient from [facility], [address] [place] [address]. Patient received in bed awake. Patient A&Ox4, denies pain and discomfort. No distress noted. Bed locked in low position. Call bell within reach and patient verbalized understanding of use for assistance and needs. 0720- Bedside and Verbal shift change report given to KIRAN Astudillo (oncoming nurse) by Rigoberto Hart RN (offgoing nurse). Report included the following information SBAR, Kardex, Intake/Output, MAR and Recent Results. [facility] Night Shift Chart Audit Chart Audit completed?  YES

## 2020-12-19 NOTE — PROGRESS NOTES
Problem: Risk for Spread of Infection Goal: Prevent transmission of infectious organism to others Description: Prevent the transmission of infectious organisms to other patients, staff members, and visitors. Outcome: Progressing Towards Goal 
  
Problem: Patient Education:  Go to Education Activity Goal: Patient/Family Education Outcome: Progressing Towards Goal 
  
Problem: Pressure Injury - Risk of 
Goal: *Prevention of pressure injury Description: Document Josep Scale and appropriate interventions in the flowsheet. Outcome: Progressing Towards Goal 
Note: Pressure Injury Interventions: 
Sensory Interventions: Assess changes in LOC, Float heels, Keep linens dry and wrinkle-free, Pad between skin to skin, Pressure redistribution bed/mattress (bed type) Moisture Interventions: Absorbent underpads Activity Interventions: Increase time out of bed, Pressure redistribution bed/mattress(bed type), PT/OT evaluation Mobility Interventions: HOB 30 degrees or less, Pressure redistribution bed/mattress (bed type), PT/OT evaluation Nutrition Interventions: Document food/fluid/supplement intake, Offer support with meals,snacks and hydration Friction and Shear Interventions: Apply protective barrier, creams and emollients, Foam dressings/transparent film/skin sealants, HOB 30 degrees or less Problem: Patient Education: Go to Patient Education Activity Goal: Patient/Family Education Outcome: Progressing Towards Goal 
  
Problem: Falls - Risk of 
Goal: *Absence of Falls Description: Document Thora Bare Fall Risk and appropriate interventions in the flowsheet. Outcome: Progressing Towards Goal 
Note: Fall Risk Interventions: 
Mobility Interventions: Bed/chair exit alarm, Communicate number of staff needed for ambulation/transfer, Patient to call before getting OOB, Utilize walker, cane, or other assistive device Elimination Interventions: Call light in reach Problem: Patient Education: Go to Patient Education Activity Goal: Patient/Family Education Outcome: Progressing Towards Goal

## 2020-12-19 NOTE — PROGRESS NOTES
0730 Received bedside shift report from off going nurse Venancio Evans RN. Report included the following information SBAR, Kardex, Intake/Output, MAR and Recent Results. Pt alert and oriented. Talkative about his care and priorities. Assisted with gown change and self care by providing warm water/basin and oral care supplies. Pt eating breakfast. 
 
1325 Pt eating pizza and watching a movie. 1600 Pt watching TV and talking to his mom on phone. He said Tylenol is helping a lot. 1800 Pt quietly watching TV. 
 
1930 Gave shift report to oncoming nurse Valerie Sotelo RN. Report included the following information SBAR, Kardex, Intake/Output, MAR and Recent Results.

## 2020-12-19 NOTE — PROGRESS NOTES
Hospitalist Progress Note-critical care note Patient: Nicole Martínez MRN: [id number]  CSN: [id number] YOB: 1990  Age: 31 y.o. 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 
ICD-9-CM: V08  12/17/2020 Unknown Bilateral pneumonia ICD-10-CM: J18.9 ICD-9-CM: 838  12/17/2020 Unknown Sepsis ([facility] [address] ICD-10-CM: A41.9 ICD-9-CM: 038.9, 995.91  12/17/2020 Unknown Suspected COVID-19 virus infection ICD-10-CM: Z20.828 ICD-9-CM: V01.79  12/17/2020 Unknown Hyponatremia ICD-10-CM: E87.1 ICD-9-CM: 276.1  12/17/2020 Unknown 30 yo male dx with [facility] ago [facility] and with no treatent or follow up came with progressive ob X 2 weeks, recent 12/7 covid test negative repeated  
  
Sepsis - 
afebrile overnight Lactic acid wnl Mild improving  
  
  
Consult ID for HIV infection, possible pneumocystis pneumonia, and possible COVID -still pending covid 19 Continue vitamins, steroid Diffuse bilateral groundglass opacities, suspicious for atypical infection -no PE ,emphysema  
  
HIV infection - 
need viral load and CD4 count 
  
Bilateral pneumonia On prednisone and bactrim  
  
Hypoalbuminemia 
  
High risk also for CAP as well as more complicated pneumocystis pneumonia  
  
Hyponatremia - Improved with saline IV 
  
Subjective: sob  
rn : still sob Pt was seen and examed in full PE  
 
35 total min's spent on patient care including >50% on counseling/coordinating care. Discussed the above assessments. also discussed labs, medications and hospital course Disposition :tbd, Review of systems: 
 
General: No fevers or chills. Cardiovascular: No chest pain or pressure. No palpitations. Pulmonary: + shortness of breath. Gastrointestinal: No nausea, vomiting. Vital signs/Intake and Output: 
Visit Vitals /88 (BP 1 Location: Right arm, BP Patient Position: Supine) Pulse 95 Temp 98.7 °F (37.1 °C) Resp 22 Ht 5' 7\" (1.702 m) Wt 68.9 kg (152 lb) SpO2 98% BMI 23.81 kg/m² Current Shift:  No intake/output data recorded. Last three shifts:  12/17 1901 - 12/19 0700 In: 8367 [P.O.:720; I.V.:4125] Out: 3750 [NELYX:7202] Physical Exam: 
General: WD, WN. Alert, cooperative, no acute distress HEENT: NC, Atraumatic. PERRLA, anicteric sclerae. Lungs: Coarse Heart:  Regular  rhythm,  No murmur, No Rubs, No Gallops Abdomen: Soft, Non distended, Non tender. +Bowel sounds, Extremities: No c/c/e Psych:   Not anxious or agitated. Neurologic:  No acute neurological deficit. Labs: Results:  
   
Chemistry Recent Labs 12/19/20 
0430 12/18/20 
0205 12/17/20 
1810 * 112* 98 * 135* 127* K 4.5 4.1 3.5  101 93* CO2 26 25 26 BUN 6* 7 9 CREA 0.62 0.60 0.70 CA 7.8* 7.4* 7.9* AGAP 6 9 8 BUCR 10* 12 13 * 176* 228* TP 6.4 6.6 7.8 ALB 1.7* 1.7* 2.1*  
GLOB 4.7* 4.9* 5.7* AGRAT 0.4* 0.3* 0.4* CBC w/Diff Recent Labs 12/19/20 
0430 12/18/20 
1155 12/18/20 
0205 12/17/20 
1810 WBC 4.3* 2.4* 3.6* 8.1  
RBC 3.31*  --  3.14* 3.54* HGB 9.7* 8.6* 9.3* 10.6* HCT 28.9* 25.0* 27.3* 30.4*  251 274 324 GRANS 77* 74 88* 83* LYMPH 10*  --  9* 7* EOS 0 0 0 0 Cardiac Enzymes Recent Labs 12/17/20 
1810 CPK 65 CKND1 1.5 Coagulation Recent Labs 12/19/20 
0430 12/18/20 
0745 PTP 12.3 12.7 INR 0.9 1.0 Lipid Panel No results found for: CHOL, CHOLPOCT, CHOLX, CHLST, CHOLV, 653203, HDL, HDLP, LDL, LDLC, DLDLP, 396431, VLDLC, VLDL, TGLX, TRIGL, TRIGP, TGLPOCT, CHHD, CHHDX  
BNP No results for input(s): BNPP in the last 72 hours. Liver Enzymes Recent Labs 12/19/20 
0430 TP 6.4 ALB 1.7* * Thyroid Studies No results found for: T4, T3U, TSH, TSHEXT Procedures/imaging: see electronic medical records for all procedures/Xrays and details which were not copied into this note but were reviewed prior to creation of Plan Cta Chest W Or W Wo Cont Result Date: 12/18/2020 EXAM: CTA Chest INDICATION: Shortness of breath. Possible PE. COMPARISON: No prior study. TECHNIQUE: Axial CT imaging from the thoracic inlet through the diaphragm with intravenous contrast utilizing CTA study for pulmonary artery evaluation. Coronal and sagittal MIP reformations were generated at a separate workstation. One or more dose reduction techniques were used on this CT: automated exposure control, adjustment of the mAs and/or kVp according to patient size, and iterative reconstruction techniques. The specific techniques used on this CT exam have been documented in the patient's electronic medical record. Digital Imaging and Communications in Medicine (DICOM) format image data are available to nonaffiliated external healthcare facilities or entities on a secure, media free, reciprocally searchable basis with patient authorization for at least a 12-month period after this study. _______________ FINDINGS: EXAM QUALITY: Overall exam quality is suboptimal. Pulmonary arterial enhancement is adequate. Respiratory motion artifact is present, limiting evaluation. PULMONARY ARTERIES: No convincing evidence of pulmonary embolism. MEDIASTINUM: Normal heart size. No evidence of right heart strain. Aorta is unremarkable. No pericardial effusion. LYMPH NODES: No pathologically enlarged lymph nodes lymph nodes. AIRWAY: Unremarkable. LUNGS: Centrilobular emphysema. Superimposed scattered bilateral groundglass opacities throughout the lungs. PLEURA: No pleural effusion or pneumothorax. UPPER ABDOMEN: Visualized upper abdomen is unremarkable. OTHER: No acute or aggressive osseous abnormalities identified. _______________ IMPRESSION: Suboptimal study secondary to motion artifact and bolus timing. No evidence of obvious central pulmonary embolism. Diffuse bilateral groundglass opacities, suspicious for atypical infection. Emphysema. Xr Chest [facility] Result Date: 12/18/2020 EXAM: XR CHEST PORT CLINICAL INDICATION/HISTORY: SOB -Additional: None COMPARISON: One day prior TECHNIQUE: Portable frontal view of the chest _______________ FINDINGS: SUPPORT DEVICES: None. HEART AND MEDIASTINUM: Cardiomediastinal silhouette within normal limits. LUNGS AND PLEURAL SPACES: Perihilar/central bilateral interstitial/parenchymal opacities, slightly worsened from prior study. No large effusion or pneumothorax. _______________ IMPRESSION: Perihilar/central bilateral interstitial/parenchymal opacities, slightly worsened from prior study. Xr Chest [facility] Result Date: 12/17/2020 EXAM: Portable frontal view of the chest. CLINICAL INDICATION/HISTORY: Shortness of breath COMPARISON: None. _______________ FINDINGS: There is diffuse hazy and groundglass appearing opacity throughout both lungs, with denser areas of opacity in the medial right upper and lower lung and within the region of the central left upper lobe. No pleural effusion. Normal heart size. No acute osseous findings. _______________ IMPRESSION: 1. Diffuse bilateral lung opacity most concerning for bilateral pneumonia. Echo Adult Complete Result Date: 12/18/2020 · Left Ventricle: Normal cavity size, wall thickness and systolic function (ejection fraction normal). The estimated EF is 55 - 60%. There is inconclusive left ventricular diastolic function E'E= 7.88. Wall Scoring: The left ventricular wall motion is normal. · Tricuspid Valve: Tricuspid valve not well visualized. No stenosis. Tricuspid regurgitation is inadequate for estimation of right ventricular systolic pressure. · Pericardium: Trivial pericardial effusion noted. No indications of tamponade present.    
 
 
Jazlyn Bush MD

## 2020-12-20 NOTE — ROUTINE PROCESS
Bedside and Verbal shift change report given to BUSTER Chambers RN (oncoming nurse) by Kehinde Kirk RN (offgoing nurse). Report included the following information SBAR, Kardex, Intake/Output and MAR.

## 2020-12-20 NOTE — PROGRESS NOTES
0730 Report received from Arelis Nava, KIRAN. Telephone call to patients room, patient in satisfactory condition. Last set of VS stable. Pain voiced at 3/10, will medicate at appropriate time. IS encouraged. Shift POC reviewed with patient. Menu/meal times explained. Call/bell phone within reach. Will monitor for changes. 0745 Negative COVID result 12/19/2020 @ 1736. Dr Rashi Gonzales notified. 0830 VS stable. Scheduled medications given. Patient has no needs at this time. Will monitor for changes. 1200 Vs stable. No needs at this time. 1400 Patient transferred out of [address] [place] [address]. Remains on 3L NC. Scheduled medications given. IV rate change. Patient has no needs at this time. 1430 Bedside and Verbal shift change report given to Sonia, ICU (oncoming nurse) by Debbie Chaves RN (offgoing nurse). Report included the following information SBAR, Kardex, MAR, Recent Results and Med Rec Status.

## 2020-12-20 NOTE — PROGRESS NOTES
Problem: Falls - Risk of 
Goal: *Absence of Falls Description: Document Luigi Led Fall Risk and appropriate interventions in the flowsheet. Outcome: Progressing Towards Goal 
Note: Fall Risk Interventions: 
Mobility Interventions: Bed/chair exit alarm, Communicate number of staff needed for ambulation/transfer, Patient to call before getting OOB, Utilize walker, cane, or other assistive device Mentation Interventions: Adequate sleep, hydration, pain control Medication Interventions: Teach patient to arise slowly Elimination Interventions: Call light in reach

## 2020-12-20 NOTE — PROGRESS NOTES
2048-alert and oriented x 4. Lungs clear, but diminished on 2LNC. Nasal canula changed and humidifier added d/t patient c/o not feeling the oxygen and feeling dry in nares. BS active x 4 quads. Patient urinating in urinal, these emptied. Bedside commode in room with one medium greenish BM, emptied. Patient with IV access to left AC and right AC. On tele monitor 39 showing SR-ST. Denies need for pain medication, pain rated 2/10. 
 
0434-blood drawn for labs. 0446-Tylenol given for pain to chest from coughing, pain rated 5/10. Cough syrup given for cough. Shift summary-up to bedside commode independently. Cough controlled with PRN cough syrup. Pain controlled with tylenol. Using urinal with good output. BM x one, greenish in color.

## 2020-12-20 NOTE — ROUTINE PROCESS
Bedside and Verbal shift change report given to EBEN Nina RN (oncoming nurse) by Jeyson John RN (offgoing nurse). Report included the following information SBAR, Kardex, Intake/Output and MAR.

## 2020-12-20 NOTE — PROGRESS NOTES
Hospitalist Progress Note-critical care note Patient: Lalo Rios MRN: [id number]  CSN: [id number] YOB: 1990  Age: 31 y.o. 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 
ICD-9-CM: V08  12/17/2020 Unknown Bilateral pneumonia ICD-10-CM: J18.9 ICD-9-CM: 120  12/17/2020 Unknown Sepsis ([facility] [address] ICD-10-CM: A41.9 ICD-9-CM: 038.9, 995.91  12/17/2020 Unknown Suspected COVID-19 virus infection ICD-10-CM: Z20.828 ICD-9-CM: V01.79  12/17/2020 Unknown Hyponatremia ICD-10-CM: E87.1 ICD-9-CM: 276.1  12/17/2020 Unknown 32 yo male dx with HIV 2 years ago at [facility] and with no treatent or follow up came with progressive ob X 2 weeks, recent 12/7 covid test negative repeated  
  
Sepsis - 
afebrile overnight Lactic acid wnl Low ns infusion  
  
  
Consult ID for HIV infection, possible pneumocystis pneumonia,  
Covid 19 prc negative twice -will transfer pt out of cohort unit Diffuse bilateral groundglass opacities, suspicious for atypical infection -no PE ,emphysema  
 respiratory cx HIV infection -aids CD4 6 , viral load 1420 Will repeat cxr for interval change  
  
Bilateral pneumonia On prednisone and bactrim Continue symptom treatment ARIST[facility]ES ALBICANS -will put diflucan  
  
Hypoalbuminemia 
  
High risk also for CAP as well as more complicated pneumocystis pneumonia  
  
Hyponatremia - Decrease ns to 75  
  
Subjective: sob , no change  
rn : still sob Disposition :tbd, Review of systems: 
 
General: No fevers or chills. Cardiovascular: No chest pain or pressure. No palpitations. Pulmonary: + shortness of breath. Gastrointestinal: No nausea, vomiting. Vital signs/Intake and Output: 
Visit Vitals /84 Pulse (!) 101 Temp 97.9 °F (36.6 °C) Resp 20 Ht 5' 7\" (1.702 m) Wt 64.4 kg (142 lb) SpO2 97% BMI 22.24 kg/m² Current Shift:  12/20 0701 - 12/20 1900 In: -  
Out: 200 [Urine:200] Last three shifts:  12/18 1901 - 12/20 0700 In: 3365 [P.O.:240; I.V.:3125] Out: 4425 [THNQY:1817] Physical Exam: 
General: WD, WN. Alert, cooperative, no acute distress HEENT: NC, Atraumatic. PERRLA, anicteric sclerae. Lungs: Coarse Heart:  Regular  rhythm,  No murmur, No Rubs, No Gallops Abdomen: Soft, Non distended, Non tender. +Bowel sounds, Extremities: No c/c/e Psych:   Not anxious or agitated. Neurologic:  No acute neurological deficit. Labs: Results:  
   
Chemistry Recent Labs  
  12/20/20 0434 12/19/20 
0430 12/18/20 
0205 GLU 97 100* 112* * 134* 135* K 4.3 4.5 4.1  102 101 CO2 24 26 25 BUN 6* 6* 7  
CREA 0.69 0.62 0.60  
CA 7.7* 7.8* 7.4* AGAP 6 6 9 BUCR 9* 10* 12 * 153* 176* TP 6.6 6.4 6.6 ALB 1.8* 1.7* 1.7*  
GLOB 4.8* 4.7* 4.9* AGRAT 0.4* 0.4* 0.3* CBC w/Diff Recent Labs  
  12/20/20 
0434 12/19/20 
0430 12/18/20 
1155 12/18/20 
0205 WBC 5.5 4.3* 2.4* 3.6*  
RBC 3.20* 3.31*  --  3.14* HGB 9.5* 9.7* 8.6* 9.3* HCT 27.5* 28.9* 25.0* 27.3*  
 299 251 274 GRANS 82* 77* 74 88* LYMPH 7* 10*  --  9*  
EOS 0 0 0 0 Cardiac Enzymes Recent Labs 12/17/20 
1810 CPK 65 CKND1 1.5 Coagulation Recent Labs  
  12/20/20 
0434 12/19/20 
0430 PTP 12.8 12.3 INR 1.0 0.9 Lipid Panel No results found for: CHOL, CHOLPOCT, CHOLX, CHLST, CHOLV, 085616, HDL, HDLP, LDL, LDLC, DLDLP, 528162, VLDLC, VLDL, TGLX, TRIGL, TRIGP, TGLPOCT, CHHD, CHHDX  
BNP No results for input(s): BNPP in the last 72 hours. Liver Enzymes Recent Labs  
  12/20/20 
0434 TP 6.6 ALB 1.8* * Thyroid Studies No results found for: T4, T3U, TSH, TSHEXT, TSHEXT Procedures/imaging: see electronic medical records for all procedures/Xrays and details which were not copied into this note but were reviewed prior to creation of Plan Cta Chest W Or W Wo Cont Result Date: 12/18/2020 EXAM: CTA Chest INDICATION: Shortness of breath. Possible PE. COMPARISON: No prior study. TECHNIQUE: Axial CT imaging from the thoracic inlet through the diaphragm with intravenous contrast utilizing CTA study for pulmonary artery evaluation. Coronal and sagittal MIP reformations were generated at a separate workstation. One or more dose reduction techniques were used on this CT: automated exposure control, adjustment of the mAs and/or kVp according to patient size, and iterative reconstruction techniques. The specific techniques used on this CT exam have been documented in the patient's electronic medical record. Digital Imaging and Communications in Medicine (DICOM) format image data are available to nonaffiliated external healthcare facilities or entities on a secure, media free, reciprocally searchable basis with patient authorization for at least a 12-month period after this study. _______________ FINDINGS: EXAM QUALITY: Overall exam quality is suboptimal. Pulmonary arterial enhancement is adequate. Respiratory motion artifact is present, limiting evaluation. PULMONARY ARTERIES: No convincing evidence of pulmonary embolism. MEDIASTINUM: Normal heart size. No evidence of right heart strain. Aorta is unremarkable. No pericardial effusion. LYMPH NODES: No pathologically enlarged lymph nodes lymph nodes. AIRWAY: Unremarkable. LUNGS: Centrilobular emphysema. Superimposed scattered bilateral groundglass opacities throughout the lungs. PLEURA: No pleural effusion or pneumothorax. UPPER ABDOMEN: Visualized upper abdomen is unremarkable. OTHER: No acute or aggressive osseous abnormalities identified. _______________ IMPRESSION: Suboptimal study secondary to motion artifact and bolus timing. No evidence of obvious central pulmonary embolism. Diffuse bilateral groundglass opacities, suspicious for atypical infection. Emphysema. Xr Chest [place] Result Date: 12/18/2020 EXAM: XR CHEST PORT CLINICAL INDICATION/HISTORY: SOB -Additional: None COMPARISON: One day prior TECHNIQUE: Portable frontal view of the chest _______________ FINDINGS: SUPPORT DEVICES: None. HEART AND MEDIASTINUM: Cardiomediastinal silhouette within normal limits. LUNGS AND PLEURAL SPACES: Perihilar/central bilateral interstitial/parenchymal opacities, slightly worsened from prior study. No large effusion or pneumothorax. _______________ IMPRESSION: Perihilar/central bilateral interstitial/parenchymal opacities, slightly worsened from prior study. Xr Chest [facility] Result Date: 12/17/2020 EXAM: Portable frontal view of the chest. CLINICAL INDICATION/HISTORY: Shortness of breath COMPARISON: None. _______________ FINDINGS: There is diffuse hazy and groundglass appearing opacity throughout both lungs, with denser areas of opacity in the medial right upper and lower lung and within the region of the central left upper lobe. No pleural effusion. Normal heart size. No acute osseous findings. _______________ IMPRESSION: 1. Diffuse bilateral lung opacity most concerning for bilateral pneumonia. Echo Adult Complete Result Date: 12/18/2020 · Left Ventricle: Normal cavity size, wall thickness and systolic function (ejection fraction normal). The estimated EF is 55 - 60%. There is inconclusive left ventricular diastolic function E'E= 0.94. Wall Scoring: The left ventricular wall motion is normal. · Tricuspid Valve: Tricuspid valve not well visualized. No stenosis. Tricuspid regurgitation is inadequate for estimation of right ventricular systolic pressure. · Pericardium: Trivial pericardial effusion noted. No indications of tamponade present.    
 
 
Rei Hernandez MD

## 2020-12-21 NOTE — PROGRESS NOTES
Patient presents compliant to current plan of care as directed. Mariya Potter 12/21/2020 
9:20 AM

## 2020-12-21 NOTE — PROGRESS NOTES
12/21/20 4888 12/21/20 0825 12/21/20 1497 Vital Signs Temp 99.3 °F (37.4 °C) 98 °F (36.7 °C)  -- Temp Source Oral Axillary  --   
Pulse (Heart Rate) (!) 114 (!) 124 (!) 118 Heart Rate Source  --  Monitor Radial  
Cardiac Rhythm  --  NSR  --   
Resp Rate 22 18  --   
O2 Sat (%) 100 % 93 %  -- Level of Consciousness Alert Alert  --   
BP (!) 137/97 (!) 141/89  --   
MAP (Calculated) 110 106  --   
BP 1 Location  --  Left arm  --   
BP Patient Position  --  At rest  --   
MEWS Score 4 3  -- Box Number  --  TELE#39  --   
Pain 1 Pain Scale 1  --  Numeric (0 - 10)  --   
Pain Intensity 1  --  0  -- Patient Stated Pain Goal  --  0  -- Oxygen Therapy O2 Device  --  Nasal cannula  --   
O2 Flow Rate (L/min)  --  3 l/min  --   
Height/Weight Height  --  5' 7.01\" (1.702 m)  -- Weight  --  64.3 kg (141 lb 11.2 oz)  -- Weight Source  --  Bed  --   
BSA (calculated - sq m)  --  1.74 sq meters  --   
BMI (calculated)  --  22.2  --   
 
 
S: Elevated MEWS score/ MD notified/ No intervention provided. B: ESTEBAN Cameron Garcia reported the following vital signs and MEWS score of 4 for the vital signs listed above for 812 am. Patient's vital signs were reassessed at bedside and patient presented awake and responsive with no signs/symptoms of complications and the following vital signs listed above for 825 am and 828 am and a MEWs score of 3. Patient verbalized that when he coughed that his heart rate increased and that he was producing a lot of thick white secretions with coughing. Patient was inquired as to whether he had sputum sample taken for analysis and patient verbalized \"NO\". Patient was informed that on-call hospitalist Dr. Jimmy Eaton would be paged and notified for possible intervention.  Dr. Ozzy Bubsy was paged via  at [phone] am.  
 A: At 830 am, Dr. Yoselin Loyola contacted unit by phone and was notified about the following vital signs listed above and patient's notification that he experiences tachycardia during periods of coughing and that he was producing thick white secretions. At 834 am, Dr. Yoselin Loyola ordered a one time dose of lasix 40 mg IV for management. Understanding was verbalized and intervention will be given as provided and patient will be reassessed 1-2 hours after intervention. Patient was notified of lasix intervention and verbalized understanding. R: Will continue with prescribed plan of care as directed. Elizabeth Child 12/21/2020 
8:35 AM

## 2020-12-21 NOTE — WOUND CARE
Chart audited for low joan score, patient with high risk for skin breakdown. Skin Care & Pressure Relief Recommendations Minimize layers of linen Pads under patient to optimize support surface Turn/reposition approximately every 2 hours Pillow wedges Manage incontinence Promote continence; Skin Protective lotion/cream to buttocks and sacrum daily and as needed with incontinence care Offload heels pillows

## 2020-12-21 NOTE — PROGRESS NOTES
Chart reviewed noted Med-Asst screened pt for medicaid on 12-18-20, when discharged cm will offer [facility] follow up, cm can be reached at x [phone].

## 2020-12-21 NOTE — PROGRESS NOTES
12/21/20 9325 12/21/20 0825 12/21/20 0293 Vital Signs Temp 99.3 °F (37.4 °C) 98 °F (36.7 °C)  -- Temp Source Oral Axillary  --   
Pulse (Heart Rate) (!) 114 (!) 124 (!) 118 Heart Rate Source  --  Monitor Radial  
Cardiac Rhythm  --  Sinus Tach Sinus Tach Resp Rate 22 18  --   
O2 Sat (%) 100 % 93 %  -- Level of Consciousness Alert Alert  --   
BP (!) 137/97 (!) 141/89  --   
MAP (Calculated) 110 106  --   
BP 1 Location  --  Left arm  --   
BP Patient Position  --  At rest  --   
MEWS Score 4 3  -- Box Number  --  TELE#39  --   
Pain 1 Pain Scale 1  --  Numeric (0 - 10)  --   
Pain Intensity 1  --  0  -- Patient Stated Pain Goal  --  0  --   
 
 12/21/20 0850 12/21/20 1150 Vital Signs Temp  --  98.4 °F (36.9 °C) Temp Source  --  Oral  
Pulse (Heart Rate)  --  (!) 113 Heart Rate Source  --   --   
Cardiac Rhythm Sinus Tach 
(During periods of coughing. 110's-120's. Dr. Derick Rangel aware. ) Sinus Tach Resp Rate  --  18  
O2 Sat (%)  --  93 % Level of Consciousness  --  Alert BP  --  129/85 MAP (Calculated)  --  100 BP 1 Location  --   --   
BP Patient Position  --   --   
MEWS Score  --  3 Box Number TELE#39 TELE #39 Pain 1 Pain Scale 1  --  Numeric (0 - 10) Pain Intensity 1  --  0 Patient Stated Pain Goal  --  0  
 
 
S: MD notification of reassessed vital signs after IV Lasix 40  Mg intervention given for management of tachycardia/No Further Interventions given. B: Patient presented awake and responsive with productive cough and thick white secretions with the following vital signs listed above for 812 am, 825 am, and 828 am. Patient presented with a MEWS score of 3. Dr. Rupal Deluca was contacted and notified and a one time dose of IV lasix 40 mg was given at 906 Am. Patient was informed that vital signs would be reassessed 1-2 hours after intervention and that MD would be notified for further intervention. Patient verbalized understanding. At 1150 am, patient presented awake, and responsive with no signs/symptoms of complications, the following vitals listed above and a MEWS score of 3. At 1210 pm, on call hospitalist Dr. Rupal Deluca was paged via . A: At 1211 pm, Dr. Rupal Deluca contacted unit and was notified about the patient's reassessed vital signs listed above at 1150 am and a current MEWs score of 3 after receiving IV Lasix 40 mg at 906 am. Dr. Mary Orlando was inquired as to whether further intervention needed to be provided. No new orders were given. R: Will continue with prescribed plan of care as directed. Landen Chapman 12/21/2020  
12:08 PM

## 2020-12-21 NOTE — PROGRESS NOTES
No acute change overnight. Bedside and Verbal shift change report given to Stephanie Macario RN (oncoming nurse) by Areli Lovell RN (offgoing nurse). Report included the following information SBAR, Kardex, Intake/Output, MAR, Accordion, Recent Results and Med Rec Status.

## 2020-12-21 NOTE — PROGRESS NOTES
12/21/2020 PT note: consult received and chart reviewed. OT note noted; pt continues with tachycardia 120 at this time per monitor. Will hold PT at this time, notify nurse Miriam Whitfield and f/u at later time as appropriate for PT evaluation. Thank you for this referral.  
Jovita Soto, PT 
 
 
16:57 Chart reviewed. Pt  Remains tachycardic due to respiratory distress; advises Limit activity for now until resp status improves per Dr. Gricelda Pena note. Will hold PT evaluation today and f/up tomorrow as appropriate.  Thank you for this referral. 
Jovita Soto, PT

## 2020-12-21 NOTE — CONSULTS
[facility] 
(A Division of [address] [place] [address]) Follow-up Note Date of Admission: 12/17/2020       Date of Note:  12/21/2020 Summary:   
Mr Queenie Myles is a 33y MSM AAM with underlying/untreated HIV who was in usual state of health until approx 2-3wks ago with onset of gradual dry cough and associated/progressive dyspnea (rest and exertion). No f/s/c. Has had 50-60lbs wt loss over the last year. Has known about his HIV disease for a couple of years, but has not sought care yet. Works at [employer]; wears a mask. 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 Feels the same today. 
  
[facility]ista at [facility] CC:  \"winded\" Interval History: 
Events over weekend reviewed/tracked from [place]. COVID-19 ruled out. Moved to Current Antimicrobials:    Prior Antimicrobials: 1. Tmp-smx DS PO (12/18-) #3 1. azithro IV (12/17) 2. CAX IV (12/17) Assessment: Rec / Plan: PJP pneumonia 12/17:  PCT 0.57ng/mL;  Slow. As it normally does/takes. Give it some more time. His illness/PJP is severe. ->Tmp-smx #3/21 Keep pushing. Home when off oxygen supplementation. AIDS 
 
CD4+ 6 (2%) with viral load >1 million. IT's bad. Has time to plug into [facility] at [facility]; I gave him the phone # [phone]) to call/set up appointment upon DC here. Syphilis Awaiting RPR titer. COVID-PUI Not. Test (-). Done. Microbiology:                12/18-  Resp nl dequan/yeast 
                                                    Legionella/Spneumo Ag (-) 
                                       12/17 - Flu (-) BCx x2 (-) COVID-19 (-) 
  Lines / Catheters:         peripheral 
 
 
 
Patient Active Problem List  
 Diagnosis Code  HIV infection ([facility] B20  
 Bilateral pneumonia J18.9  Sepsis ([facility] A41.9  Suspected COVID-19 virus infection Z20.828  
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  
 albumin human 25% (BUMINATE) solution 25 g  25 g IntraVENous Q6H  
 influenza vaccine 2020-21 (6 mos+)(PF) (FLUARIX/FLULAVAL/FLUZONE QUAD) injection 0.5 mL  0.5 mL IntraMUSCular PRIOR TO DISCHARGE  fluconazole (DIFLUCAN) tablet 100 mg  100 mg Oral DAILY  0.9% sodium chloride infusion  50 mL/hr IntraVENous CONTINUOUS  
 calcium carbonate (TUMS) chewable tablet 200 mg [elemental]  200 mg Oral TID PRN  
 ascorbic acid (vitamin C) (VITAMIN C) tablet 500 mg  500 mg Oral BID  predniSONE (DELTASONE) tablet 40 mg  40 mg Oral BID WITH MEALS  trimethoprim-sulfamethoxazole (BACTRIM DS, SEPTRA DS) 160-800 mg per tablet 2 Tab  2 Tab Oral TID  [START ON 1/1/2021] predniSONE (DELTASONE) tablet 20 mg  20 mg Oral DAILY WITH BREAKFAST  [START ON 12/25/2020] predniSONE (DELTASONE) tablet 20 mg  20 mg Oral BID WITH MEALS  sodium chloride (NS) flush 5-10 mL  5-10 mL IntraVENous PRN  
 sodium chloride (NS) flush 5-40 mL  5-40 mL IntraVENous PRN  promethazine (PHENERGAN) tablet 12.5 mg  12.5 mg Oral Q6H PRN Or  
 ondansetron (ZOFRAN) injection 4 mg  4 mg IntraVENous Q6H PRN  
 bisacodyL (DULCOLAX) tablet 5 mg  5 mg Oral DAILY PRN  
 famotidine (PEPCID) tablet 20 mg  20 mg Oral BID  zinc sulfate (ZINCATE) 220 (50) mg capsule 1 Cap  1 Cap Oral DAILY Review of Systems - General ROS: positive for  - fatigue and malaise 
negative for - chills, fever or night sweats Respiratory ROS: positive for - cough and shortness of breath 
negative for - hemoptysis or sputum changes Cardiovascular ROS: positive for - dyspnea on exertion and shortness of breath 
negative for - chest pain Gastrointestinal ROS: no abdominal pain, change in bowel habits, or black or bloody stools Objective: 
 
Visit Vitals /85 Pulse (!) 113 Temp 98.4 °F (36.9 °C) Resp 18 Ht 5' 7.01\" (1.702 m) Wt 64.3 kg (141 lb 11.2 oz) SpO2 93% BMI 22.19 kg/m² Temp (24hrs), Av.3 °F (36.8 °C), Min:98 °F (36.7 °C), Max:99.3 °F (37.4 °C) GEN: Cachectic AAM in NAD - 3-4word dyspnea HEENT: anicteric CHEST: CTA 
CVS:tachy ABD: NT 
EXT: no cyanosis Lab results: 
 
Chemistry Recent Labs  
  20 
0520 
0434 20 
0430 GLU 82 97 100* * 133* 134* K 4.4 4.3 4.5  
CL 99* 103 102 CO2 24 24 26 BUN 7 6* 6*  
CREA 0.65 0.69 0.62  
CA 7.9* 7.7* 7.8* AGAP 8 6 6 BUCR 11* 9* 10* * 144* 153* TP 6.3* 6.6 6.4 ALB 1.8* 1.8* 1.7*  
GLOB 4.5* 4.8* 4.7* AGRAT 0.4* 0.4* 0.4* CBC w/ Diff Recent Labs  
  20 
0535 20 
0434 20 
0430 WBC 5.6 5.5 4.3*  
RBC 3.30* 3.20* 3.31* HGB 9.7* 9.5* 9.7* HCT 28.4* 27.5* 28.9*  
 298 299 GRANS 85* 82* 77* LYMPH 6* 7* 10* EOS 0 0 0 Microbiology All Micro Results Procedure Component Value Units Date/Time CULTURE, BLOOD [[id number]] Collected: 20 1820 Order Status: Completed Specimen: Blood Updated: 20 1014 Special Requests: NO SPECIAL REQUESTS Culture result: NO GROWTH 4 DAYS     
 CULTURE, BLOOD [[id number]] Collected: 20 1810 Order Status: Completed Specimen: Blood Updated: 20 1014 Special Requests: NO SPECIAL REQUESTS Culture result: NO GROWTH 4 DAYS     
 CULTURE, RESPIRATORY/SPUTUM/BRONCH Roosvelt Alena STAIN [[id number]]  (Abnormal) Collected: 20 0014 Order Status: Completed Specimen: Sputum Updated: 20 1030   Special Requests: NO SPECIAL REQUESTS     
  GRAM STAIN MODERATE WBCS SEEN     
      
 RARE EPITHELIAL CELLS SEEN  
     
   FEW GRAM NEGATIVE RODS     
      
  RARE GRAM POSITIVE COCCI IN CHAINS  
     
   RARE GRAM POSITIVE RODS Culture result:    
  LIGHT NORMAL RESPIRATORY PRANAY  
     
      
  FEW YEAST, (APPARENT CANDIDA ALBICANS) LEGIONELLA PNEUMOPHILA AG, URINE [[id number]] Collected: 12/18/20 0014 Order Status: Completed Specimen: Urine, random Updated: 12/18/20 1131 Legionella Ag, urine Negative Bernell Curtis, URINE [[id number]] Collected: 12/18/20 0014 Order Status: Completed Specimen: Urine, random Updated: 12/18/20 1131 Strep pneumo Ag, urine Negative INFLUENZA A & B AG (RAPID TEST) [[id number]] Collected: 12/17/20 1900 Order Status: Completed Specimen: Nasopharyngeal from Nasal washing Updated: 12/17/20 1925 Influenza A Antigen Negative Comment: A negative result does not exclude influenza virus infection, seasonal or H1N1 due to suboptimal sensitivity. If influenza is circulating in your community, a diagnosis of influenza should be considered based on a patients clinical presentation and empiric antiviral treatment should be considered, if indicated. Influenza B Antigen Negative Holli Smith MD 
Cell [phone] [facility] 12/21/2020  
2:36 PM

## 2020-12-21 NOTE — PROGRESS NOTES
Problem: Risk for Spread of Infection Goal: Prevent transmission of infectious organism to others Description: Prevent the transmission of infectious organisms to other patients, staff members, and visitors. Outcome: Progressing Towards Goal 
  
Problem: Falls - Risk of 
Goal: *Absence of Falls Description: Document   Fall Risk and appropriate interventions in the flowsheet. Outcome: Progressing Towards Goal 
Note: Fall Risk Interventions: 
Mobility Interventions: Communicate number of staff needed for ambulation/transfer, Patient to call before getting OOB, Utilize walker, cane, or other assistive device Mentation Interventions: Bed/chair exit alarm, Door open when patient unattended, Reorient patient Medication Interventions: Teach patient to arise slowly, Patient to call before getting OOB Elimination Interventions: Call light in reach, Patient to call for help with toileting needs, Toileting schedule/hourly rounds

## 2020-12-21 NOTE — PROGRESS NOTES
Hospitalist Progress Note Patient: Tonna Najjar MRN: [id number]  CSN: [id number] YOB: 1990  Age: 31 y.o. Sex: male DOA: 12/17/2020 LOS:  LOS: 4 days Chief Complaint: 
 
sepsis Assessment/Plan Sepsis HIV/AIDS Pneumocystis pneumonia Debility and weakness Tachycardia due to resp distress Malnutrition moderate Mild hyponatremia CD4 count 6=AIDS, hold on preventative tx otherwise until he clinically improves Discussed with ID this am 
 
Will keep pushing prednisone and bactrim (and covid is ruled out) DVT proph-lovenox One dose lasix for mild volume overload Reduce IVF rate Tele monitoring HR is elevated but expected with his severe pneumonia Can give supportuive albumin X 4 doses Limit activity for now until resp status improves Trial nebs for supportive care Very ill young man, with guarded prognosis Disposition : 
Patient Active Problem List  
Diagnosis Code  HIV infection ([facility] B20  
 Bilateral pneumonia J18.9  Sepsis ([facility] A41.9  Suspected COVID-19 virus infection Z20.828  
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 
VERY SOB with minimal activity still Can speak but not for long Is maintaining sats on NC 02 Can slow breatjing and feel better Denies CP Has been eating and drinking some Review of systems: 
 
Constitutional: denies fevers, chills, myalgias Cardiovascular: denies chest pain, palpitations Gastrointestinal: denies nausea, vomiting, diarrhea Vital signs/Intake and Output: 
Visit Vitals /85 Pulse (!) 113 Temp 98.4 °F (36.9 °C) Resp 18 Ht 5' 7.01\" (1.702 m) Wt 64.3 kg (141 lb 11.2 oz) SpO2 93% BMI 22.19 kg/m² Current Shift:  12/21 0701 - 12/21 1900 In: 240 [P.O.:240] Out: 1970 [VJKDT:[id number]] Last three shifts:  12/19 1901 - 12/21 0700 In: 1536.3 [P.O.:480; I.V.:1056.3] Out: 3275 [[id number]:[id number]] Exam: 
 
General: thin ill appearing AAM alert, NAD, OX3 Head/Neck: NCAT, supple, No masses, No lymphadenopathy CVS:tachy regular, no M/R/G, S1/S2 heard, no thrill Lungs:Coarse BS BL, no wheezes Abdomen: Soft, Nontender, No distention, Normal Bowel sounds, No hepatomegaly Extremities: No C/C/E, pulses palpable 2+ Neuro:grossly normal , follows commands Psych:appropriate Labs: Results:  
   
Chemistry Recent Labs  
  12/21/20 
0535 12/20/20 
0434 12/19/20 0430 GLU 82 97 100* * 133* 134* K 4.4 4.3 4.5  
CL 99* 103 102 CO2 24 24 26 BUN 7 6* 6*  
CREA 0.65 0.69 0.62  
CA 7.9* 7.7* 7.8* AGAP 8 6 6 BUCR 11* 9* 10* * 144* 153* TP 6.3* 6.6 6.4 ALB 1.8* 1.8* 1.7*  
GLOB 4.5* 4.8* 4.7* AGRAT 0.4* 0.4* 0.4* CBC w/Diff Recent Labs  
  12/21/20 
0535 12/20/20 0434 12/19/20 0430 WBC 5.6 5.5 4.3*  
RBC 3.30* 3.20* 3.31* HGB 9.7* 9.5* 9.7* HCT 28.4* 27.5* 28.9*  
 298 299 GRANS 85* 82* 77* LYMPH 6* 7* 10* EOS 0 0 0 Cardiac Enzymes No results for input(s): CPK, CKND1, ANIKA in the last 72 hours. No lab exists for component: Samir Stein Coagulation Recent Labs  
  12/20/20 
0434 12/19/20 0430 PTP 12.8 12.3 INR 1.0 0.9 Lipid Panel No results found for: CHOL, CHOLPOCT, CHOLX, CHLST, CHOLV, 001440, HDL, HDLP, LDL, LDLC, DLDLP, 415305, VLDLC, VLDL, TGLX, TRIGL, TRIGP, TGLPOCT, CHHD, CHHDX  
BNP No results for input(s): BNPP in the last 72 hours. Liver Enzymes Recent Labs  
  12/21/20 
0535 TP 6.3* ALB 1.8* * Thyroid Studies No results found for: T4, T3U, TSH, TSHEXT Procedures/imaging: see electronic medical records for all procedures/Xrays and details which were not copied into this note but were reviewed prior to creation of Plan Rowan Deleon MD

## 2020-12-21 NOTE — PROGRESS NOTES
Occupational Therapy Evaluation/Treatment Attempt Chart reviewed. Attempted Occupational Therapy Evaluation/Treatment, however, patient unable to be seen due to: 
[]  Nausea/vomiting 
[]  Eating 
[]  Pain 
[]  Patient too lethargic 
[]  Off Unit for testing/procedure 
[]  Dialysis treatment in progress  
[x]  Telemetry Results/0810 pt w/ ; 2nd attempt . RN/Dony reports MD/Carloz aware. Pt coughing white secretions w/ elevated B/P and HR. 
[]  Other:  
 
Will follow up later as patient's schedule allows.   
Thank you for this referral. 
Carolee Larger Saintclair Bones, OTR/L, CSRS, CDCS, CFPS

## 2020-12-22 PROBLEM — Z20.822 SUSPECTED COVID-19 VIRUS INFECTION: Status: RESOLVED | Noted: 2020-01-01 | Resolved: 2020-01-01

## 2020-12-22 PROBLEM — R06.03 ACUTE RESPIRATORY DISTRESS: Status: ACTIVE | Noted: 2020-01-01

## 2020-12-22 NOTE — PROGRESS NOTES
12/22/2020 PT note: Chart reviewed. Events of the morning noted. Spoke with Nurse Montilla and will hold PT evaluation at this time per her advice (pt tachycardia 140 post neb treatment and SPO2 90% on 3.5L. Increased to 5 lpm per nurse. Will f/u at later time as appropriate for PT evaluation. Thank you for this referral.  
Star Basilio, PT 
 
15:58 Pt continues with tachycardia in presence of pneumonia. Will f/u tomorrow as appropriate. Thank you.  
Star Basilio, PT

## 2020-12-22 NOTE — PROGRESS NOTES
Bedside and Verbal shift change report given to Jessica Aguero  RN  (oncoming nurse) by Tao CASIANO, RN (offgoing nurse). Report included the following information SBAR, Kardex and MAR. SHIFT UPDATES: Patient presented with a MEWs score of 3 due to an incidence of tachycardia during the early part of shift associated with coughing and white, thick oral secretion production. Dr. Kamila Guerra was notified and a one time IV dose of Lasix 40 mg was given as directed for management of chest congestion and tachycardia at 906 am. Around mid-day, patient's tachycardia decreased in the 110's and patient still presented with a MEWS score of 3. Dr. Kamila Guerra added  Albuterol nebulizer treatments to be given 4 times daily scheduled by respiratory staff. Patient received 2 treatments during shift around 1344 pm and 1534 pm and patient presented with great improvement of breathing and an evening pulse in the 110's. Additionally, patient's IV continuous fluid therapy of Normal Saline 0.9% was decreased from 75 cc/hr to 50 cc/hr. Patient is on telemetry box # 39. ABNORMAL LAB:  
Results for Abbey Preston (MRN [id number]) as of 12/21/2020 18:57 Ref. Range 12/19/2020 04:30 12/20/2020 04:34 12/20/2020 13:56 12/21/2020 05:35 WBC Latest Ref Range: 4.6 - 13.2 K/uL 4.3 (L) 5.5  5.6 RBC Latest Ref Range: 4.70 - 5.50 M/uL 3.31 (L) 3.20 (L)  3.30 (L) HGB Latest Ref Range: 13.0 - 16.0 g/dL 9.7 (L) 9.5 (L)  9.7 (L) HCT Latest Ref Range: 36.0 - 48.0 % 28.9 (L) 27.5 (L)  28.4 (L) MCV Latest Ref Range: 74.0 - 97.0 FL 87.3 85.9  86.1 MCH Latest Ref Range: 24.0 - 34.0 PG 29.3 29.7  29.4 MCHC Latest Ref Range: 31.0 - 37.0 g/dL 33.6 34.5  34.2 RDW Latest Ref Range: 11.6 - 14.5 % 13.9 13.8  13.7 PLATELET Latest Ref Range: 135 - 420 K/uL 299 298  301 MPV Latest Ref Range: 9.2 - 11.8 FL 9.9 9.7  10.0 NEUTROPHILS Latest Ref Range: 40 - 73 % 77 (H) 82 (H)  85 (H) LYMPHOCYTES Latest Ref Range: 21 - 52 % 10 (L) 7 (L)  6 (L) MONOCYTES Latest Ref Range: 3 - 10 % 12 (H) 11 (H)  9  
EOSINOPHILS Latest Ref Range: 0 - 5 % 0 0  0  
BASOPHILS Latest Ref Range: 0 - 2 % 1 0  0 DF Latest Units:   AUTOMATED AUTOMATED  AUTOMATED  
ABS. NEUTROPHILS Latest Ref Range: 1.8 - 8.0 K/UL 3.3 4.6  4.8  
ABS. LYMPHOCYTES Latest Ref Range: 0.9 - 3.6 K/UL 0.4 (L) 0.4 (L)  0.3 (L)  
ABS. MONOCYTES Latest Ref Range: 0.05 - 1.2 K/UL 0.5 0.6  0.5  
ABS. EOSINOPHILS Latest Ref Range: 0.0 - 0.4 K/UL 0.0 0.0  0.0  
ABS. BASOPHILS Latest Ref Range: 0.0 - 0.1 K/UL 0.0 0.0  0.0 INR Latest Ref Range: 0.8 - 1.2   0.9 1.0 Prothrombin time Latest Ref Range: 11.5 - 15.2 sec 12.3 12.8 Fibrinogen Latest Ref Range: 210 - 451 mg/dL 463 (H) 452 (H)  504 (H) D DIMER Latest Ref Range: <0.46 ug/ml(FEU) 0.60 (H) 0.55 (H) Sodium Latest Ref Range: 136 - 145 mmol/L 134 (L) 133 (L)  131 (L) Potassium Latest Ref Range: 3.5 - 5.5 mmol/L 4.5 4.3  4.4 Chloride Latest Ref Range: 100 - 111 mmol/L 102 103  99 (L)  
CO2 Latest Ref Range: 21 - 32 mmol/L 26 24  24 Anion gap Latest Ref Range: 3.0 - 18 mmol/L 6 6  8 Glucose Latest Ref Range: 74 - 99 mg/dL 100 (H) 97  82 BUN Latest Ref Range: 7.0 - 18 MG/DL 6 (L) 6 (L)  7 Creatinine Latest Ref Range: 0.6 - 1.3 MG/DL 0.62 0.69  0.65 BUN/Creatinine ratio Latest Ref Range: 12 - 20   10 (L) 9 (L)  11 (L) Calcium Latest Ref Range: 8.5 - 10.1 MG/DL 7.8 (L) 7.7 (L)  7.9 (L) Magnesium Latest Ref Range: 1.6 - 2.6 mg/dL    1.9 GFR est non-AA Latest Ref Range: >60 ml/min/1.73m2 >60 >60  >60  
GFR est AA Latest Ref Range: >60 ml/min/1.73m2 >60 >60  >60 Bilirubin, total Latest Ref Range: 0.2 - 1.0 MG/DL 0.2 0.1 (L)  0.2 Protein, total Latest Ref Range: 6.4 - 8.2 g/dL 6.4 6.6  6.3 (L) Albumin Latest Ref Range: 3.4 - 5.0 g/dL 1.7 (L) 1.8 (L)  1.8 (L) Globulin Latest Ref Range: 2.0 - 4.0 g/dL 4.7 (H) 4.8 (H)  4.5 (H) A-G Ratio Latest Ref Range: 0.8 - 1.7   0.4 (L) 0.4 (L)  0.4 (L) ALT Latest Ref Range: 16 - 61 U/L 19 25  30 AST Latest Ref Range: 10 - 38 U/L 34 36  35 Alk. phosphatase Latest Ref Range: 45 - 117 U/L 153 (H) 144 (H)  139 (H) RPR W/REFLEX TITER AND TREPONEMA ABS Unknown Rpt     
XR CHEST PORT Unknown   Rpt Wounds? None. Central Lines? None. Last BM:  12/21/2020. Pending Labs for AM Draw: None. Discharge plan:  As of case management note on 12/21/2020 at 910 am, patient will dischage home to follow with [facility] clinic upon discharge when medically stable. Alpha Deep 12/21/2020 
7:28 PM

## 2020-12-22 NOTE — PROGRESS NOTES
Bedside shift change report given to Patricia Adame RN (oncoming nurse) by Ivanna Lemus RN (offgoing nurse). Report included the following information SBAR, Kardex, ED Summary, Intake/Output, MAR and Accordion.

## 2020-12-22 NOTE — PROGRESS NOTES
Bedside shift change report given to [address] [place] [address] (oncoming nurse) by Aureliano Bliss RN (offgoing nurse). Report included the following information SBAR, Kardex, ED Summary, Intake/Output, MAR and Accordion. 0830 Shift assessment complete. 1150 Mews of 4 due to the fact the Patient has an increased HR of 140s. Patient's O2 sat 90% on 3.5L. Increased O2 to 5L. Patient's O2 sat remains at 80. 
 
1205 Dr. Dunia Arenas aware. She ordered a blood gas and adjusted her respiratory treatments. Will continue to monitor. Shift Summary: Shift uneventful. No complaints of chest pain or shortness of breath. Call light is within reach.

## 2020-12-22 NOTE — PROGRESS NOTES
Care manager rounded on patient who was eating lunch but appeared very short of breath while speaking; asked if ID MD had recommended further follow up for HIV treatment and patient stated that he was given the name of a Juliet MCCLURE, Dr. Tiffany Goins and CMS will work to set follow up appointment when patient is ready for discharge. Care Management Interventions PCP Verified by CM: Yes(none) Palliative Care Criteria Met (RRAT>21 & CHF Dx)?: No 
Mode of Transport at Discharge: Other (see comment)(family) Current Support Network: Other, Relative's Home(mother) Confirm Follow Up Transport: Family Discharge Location Discharge Placement: Home

## 2020-12-22 NOTE — PROGRESS NOTES
Patient HR went to 140 post neb treatment and SPO2 90% on 3.5 lpm Increased to 5 lpm per nurse. Nurse Kaylen Gaspar will contact Doctor about these changes

## 2020-12-22 NOTE — PROGRESS NOTES
Problem: Risk for Spread of Infection Goal: Prevent transmission of infectious organism to others Description: Prevent the transmission of infectious organisms to other patients, staff members, and visitors. Outcome: Progressing Towards Goal 
  
Problem: Pressure Injury - Risk of 
Goal: *Prevention of pressure injury Description: Document Josep Scale and appropriate interventions in the flowsheet. Outcome: Progressing Towards Goal 
Note: Pressure Injury Interventions: 
Sensory Interventions: Assess changes in LOC, Keep linens dry and wrinkle-free, Maintain/enhance activity level, Minimize linen layers, Monitor skin under medical devices, Pad between skin to skin, Pressure redistribution bed/mattress (bed type) Moisture Interventions: Absorbent underpads Activity Interventions: Pressure redistribution bed/mattress(bed type) Mobility Interventions: Pressure redistribution bed/mattress (bed type), HOB 30 degrees or less Nutrition Interventions: Document food/fluid/supplement intake Friction and Shear Interventions: Apply protective barrier, creams and emollients, HOB 30 degrees or less, Lift sheet, Minimize layers Problem: Falls - Risk of 
Goal: *Absence of Falls Description: Document [place] Led Fall Risk and appropriate interventions in the flowsheet. Outcome: Progressing Towards Goal 
Note: Fall Risk Interventions: 
Mobility Interventions: Assess mobility with egress test, Bed/chair exit alarm, Communicate number of staff needed for ambulation/transfer, Patient to call before getting OOB, PT Consult for mobility concerns Mentation Interventions: Adequate sleep, hydration, pain control, Bed/chair exit alarm, Door open when patient unattended, Evaluate medications/consider consulting pharmacy, Toileting rounds, Update white board Medication Interventions: Bed/chair exit alarm, Evaluate medications/consider consulting pharmacy, Patient to call before getting OOB, Teach patient to arise slowly Elimination Interventions: Bed/chair exit alarm, Call light in reach, Elevated toilet seat, Toilet paper/wipes in reach, Stay With Me (per policy), Toileting schedule/hourly rounds, Urinal in reach

## 2020-12-22 NOTE — PROGRESS NOTES
Bedside and Verbal shift change report given to 2220 Velvet Vargas RN (oncoming nurse) by Tasha Nuñez RN 
 (offgoing nurse). Report included the following information SBAR, Kardex, ED Summary, Procedure Summary, Intake/Output, MAR, Recent Results and Med Rec Status.

## 2020-12-22 NOTE — PROGRESS NOTES
OT order received, chart reviewed. 1342: holding OT evaluation at this time. HR 130s at rest. Will follow up as schedule permits.  
 
Shannan Noriega MS OTR/L

## 2020-12-22 NOTE — PROGRESS NOTES
Problem: Risk for Spread of Infection Goal: Prevent transmission of infectious organism to others Description: Prevent the transmission of infectious organisms to other patients, staff members, and visitors. Outcome: Progressing Towards Goal 
  
Problem: Patient Education:  Go to Education Activity Goal: Patient/Family Education Outcome: Progressing Towards Goal 
  
Problem: Pressure Injury - Risk of 
Goal: *Prevention of pressure injury Description: Document Josep Scale and appropriate interventions in the flowsheet. Outcome: Progressing Towards Goal 
Note: Pressure Injury Interventions: 
Sensory Interventions: Assess changes in LOC Moisture Interventions: Absorbent underpads, Apply protective barrier, creams and emollients, Assess need for specialty bed Activity Interventions: Assess need for specialty bed, Increase time out of bed, Pressure redistribution bed/mattress(bed type), PT/OT evaluation Mobility Interventions: Assess need for specialty bed, PT/OT evaluation, HOB 30 degrees or less, Pressure redistribution bed/mattress (bed type) Nutrition Interventions: Document food/fluid/supplement intake, Discuss nutritional consult with provider Friction and Shear Interventions: Apply protective barrier, creams and emollients, Foam dressings/transparent film/skin sealants, HOB 30 degrees or less, Lift sheet Problem: Patient Education: Go to Patient Education Activity Goal: Patient/Family Education Outcome: Progressing Towards Goal 
  
Problem: Falls - Risk of 
Goal: *Absence of Falls Description: Document Bard  Fall Risk and appropriate interventions in the flowsheet. Outcome: Progressing Towards Goal 
Note: Fall Risk Interventions: 
Mobility Interventions: Assess mobility with egress test, Bed/chair exit alarm, Communicate number of staff needed for ambulation/transfer, OT consult for ADLs, Patient to call before getting OOB Mentation Interventions: Adequate sleep, hydration, pain control, Bed/chair exit alarm, Door open when patient unattended Medication Interventions: Assess postural VS orthostatic hypotension, Bed/chair exit alarm, Evaluate medications/consider consulting pharmacy, Patient to call before getting OOB Elimination Interventions: Bed/chair exit alarm, Call light in reach, Elevated toilet seat, Patient to call for help with toileting needs Problem: Patient Education: Go to Patient Education Activity Goal: Patient/Family Education Outcome: Progressing Towards Goal

## 2020-12-22 NOTE — PROGRESS NOTES
Hospitalist Progress Note Patient: Scot Martínez MRN: [id number]  CSN: [id number] YOB: 1990  Age: 31 y.o. Sex: male DOA: 12/17/2020 LOS:  LOS: 5 days Chief Complaint: Ac resp distress Assessment/Plan Sepsis with pneumonia HIV/AIDS-CD4 count 6, untreated PJ pneumonia Ac resp distress, hypoxia Debility and weakness Tachycardia due to resp distress Malnutrition severe Mild hyponatremia Nebs treatments adjusted-add pulmicort, change to xopenex due to tachycardia Albumin another 4 doses for hypoalbuminemia, try not to overload with fluids Repeat CXR 
 
02 support, stop IVF 
 
PO steroids as per ID with bactrim Supportive care DVT prophylaxis-lovenox ABG reviewed Await labs ordered If he worsens he will have to go to ICU, and if requires ventilation, prognosis will be even worse unfortunately Disposition : 
Patient Active Problem List  
Diagnosis Code  AIDS (acquired immune deficiency syndrome) ([facility]) B20  
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  
 Acute respiratory distress R06.03 Subjective: 
 
Working hard this am when he talks or moves Can still speak in sentences HR has fluctuated up 
 
resp has been to see me and we have addressd his nebs and requested an ABG He has eaten this am, he also drank fluids Review of systems: 
 
Constitutional: denies fevers, chills Respiratory: denies SOB, productive cough Cardiovascular: denies chest pain, palpitations Gastrointestinal: denies nausea, vomiting, diarrhea Vital signs/Intake and Output: 
Visit Vitals /82 (BP 1 Location: Right arm, BP Patient Position: At rest;Supine) Pulse (!) 138 Temp 98.1 °F (36.7 °C) Resp 18 Ht 5' 7.01\" (1.702 m) Wt 64.3 kg (141 lb 11.2 oz) SpO2 90% BMI 22.19 kg/m² Current Shift:  No intake/output data recorded. Last three shifts:  12/20 1901 - 12/22 0700 In: 2256.3 [P.O.:1200; I.V.:1056.3] Out: Zaina Ford [[id number]] Exam: 
 
General: thin cachectic AAM, alert, NAD, OX3 Head/Neck: NCAT, supple, No masses, No lymphadenopathy CVS:Regular rate and rhythm, no M/R/G, S1/S2 heard, no thrill Lungs:Coarse, no wheezes or rales Abdomen: Soft, Nontender, No distention, Normal Bowel sounds, No hepatomegaly Extremities: No C/C/E, pulses palpable 2+ Neuro:grossly normal , follows commands Psych:appropriate Labs: Results:  
   
Chemistry Recent Labs  
  12/21/20 0535 12/20/20 0434 GLU 82 97 * 133* K 4.4 4.3 CL 99* 103 CO2 24 24 BUN 7 6*  
CREA 0.65 0.69 CA 7.9* 7.7* AGAP 8 6 BUCR 11* 9* * 144* TP 6.3* 6.6 ALB 1.8* 1.8*  
GLOB 4.5* 4.8* AGRAT 0.4* 0.4* CBC w/Diff Recent Labs  
  12/21/20 0535 12/20/20 
0434 WBC 5.6 5.5  
RBC 3.30* 3.20* HGB 9.7* 9.5* HCT 28.4* 27.5*  
 298 GRANS 85* 82* LYMPH 6* 7* EOS 0 0 Cardiac Enzymes No results for input(s): CPK, CKND1, ANIKA in the last 72 hours. No lab exists for component: Smith Sprinkle Coagulation Recent Labs  
  12/20/20 
0434 PTP 12.8 INR 1.0 Lipid Panel No results found for: CHOL, CHOLPOCT, CHOLX, CHLST, CHOLV, 088403, HDL, HDLP, LDL, LDLC, DLDLP, 976641, VLDLC, VLDL, TGLX, TRIGL, TRIGP, TGLPOCT, CHHD, CHHDX  
BNP No results for input(s): BNPP in the last 72 hours. Liver Enzymes Recent Labs  
  12/21/20 0535 TP 6.3* ALB 1.8* * Thyroid Studies No results found for: T4, T3U, TSH, TSHEXT, TSHEXT Procedures/imaging: see electronic medical records for all procedures/Xrays and details which were not copied into this note but were reviewed prior to creation of Plan Ignacia Verma MD

## 2020-12-23 PROBLEM — R09.02 HYPOXIA: Status: ACTIVE | Noted: 2020-01-01

## 2020-12-23 NOTE — PROGRESS NOTES
Occupational Therapy Evaluation/Treatment Attempt Chart reviewed. Attempted Occupational Therapy Evaluation/Treatment, however, patient unable to be seen due to: 
[]  Nausea/vomiting 
[]  Eating 
[]  Pain 
[]  Patient too lethargic 
[]  Off Unit for testing/procedure 
[]  Dialysis treatment in progress  
[x]  Telemetry Results/Pt continues with elevated HR; 3rd day attempt. Will sign off as pt not medically stable for ADLs and OOB. []  Other:  
 
Please reorder OT when pt medically stable Thank you for this referral. 
Arina Hickman, OTR/L, CSRS, CDCS, CFPS

## 2020-12-23 NOTE — ROUTINE PROCESS
Bedside and Verbal shift change report given to [address] [place] [address] (oncoming nurse) by Fletcher Mathis RN (offgoing nurse). Report included the following information SBAR, Kardex, Intake/Output, MAR, Recent Results, and Cardiac Rhythm ST.

## 2020-12-23 NOTE — CONSULTS
[facility] Pulmonary, Critical Care, and Sleep Medicine Name: Magan Mendoza MRN: [id number] : 1990 Hospital: [facility] [place] Date: 2020 PCCM Note Consult requesting physician: Dr. Nimesh Read Reason for Consult: Likely PJP pneumonia, hypoxia IMPRESSION:  
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  
Diagnosis Code  AIDS (acquired immune deficiency syndrome) ([facility]) B20  
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  
 Acute respiratory distress R06.03  
 Hypoxia R09.02  
 
  
  
RECOMMENDATIONS:  
Bilateral extensive pneumonia with hypoxemia in a patient with HIV, COVID-19 negative; likely PJP pneumonia. Continue Bactrim. Continue prednisone p.o.  as scheduled. Continue O2, currently on  6 LPM NC. Titrate to keep SPO2 more than 92%. Fungitell pending. LDH elevated: 560. Expectorated sputum for PJP ordered. I do not think he needs bronchoscopy. Sputum culture: Light normal dequan. Urine antigen panel: Negative. COVID-19: Negative. CT chest with bilateral diffuse GGO and underlying emphysema. He may have mild pulmonary fibrosis in the background but due to motion artifact it is difficult with exclude. Bronchodilators: Budesonide twice daily, Atrovent as needed. Airway clearance: Continue incentive spirometer; advised to use often. Out of bed. FiO2 to keep SpO2 >=92%, HOB >=30 degree, aspiration precautions, aggressive pulmonary toileting, incentive spirometry, PT/OT eval and treat, mobilization. DVT Prophylaxis: Lovenox GI Prophylaxis: Pepcid Other issues management by primary team and respective consultants. Further recommendations will be based on the patient's response to recommended treatment and results of the investigation ordered. Quality Care: PPI, DVT prophylaxis, HOB elevated, infection control all reviewed and addressed. Discussed with patient, radiologic work up showed, answered all questions to their satisfaction. Discussed with Dr. Chau Nolasco. Care plan discussed with nursing, Dr. Yana Frias. Subjective/History:  
 
Tonna Najjar is a 31 y.o. male with PMHx significant for HIV diagnosed couple years ago, not on treatment; admitted with weight loss, ESCOTO; COVID-19 negative x2; diagnosed with atypical looking pneumonia/GGO on CT chest which is negative for PE, suspected for PJP pneumonia. Initially treated with Rocephin and Zithromax. Seen by Dr. Chau Nolasco: Elevated LDH, PCT 0.57. Antibiotics changed to Bactrim and started on steroids. HIV viral load elevated. 12/23/2020 Seen in room 342. On NC 6LPM. Complains of cough with clear white expectoration. Not able to ambulate in the room due to minimal exertion causes dyspnea. No hemoptysis, fever, night sweats. About 50 to 60 pound weight loss in last 1 year. Review of Systems:  
HEENT: No epistaxis, no nasal drainage, no difficulty in swallowing, no redness in eyes Respiratory: as above Cardiovascular: no palpitations, no chronic leg edema, no syncope Gastrointestinal: no abd pain, no vomiting, no diarrhea, no bleeding symptoms Genitourinary: No urinary symptoms or hematuria Integument/breast: No ulcers or rashes Musculoskeletal:Neg 
Neurological: No focal weakness, no seizures, no headaches Behvioral/Psych: No anxiety, no depression Constitutional: No fever, no chills,  no night sweats No Known Allergies Past Medical History:  
Diagnosis Date  Asthma  Chlamydia  Herpes zoster  HIV (human immunodeficiency virus infection) ([facility] [address]  Syphilis History reviewed. No pertinent surgical history. History reviewed. No pertinent family history. Social History Tobacco Use  Smoking status: Former Smoker  Smokeless tobacco: Never Used Substance Use Topics  Alcohol use: Yes Comment: socially Prior to Admission medications Not on File Current Facility-Administered Medications Medication Dose Route Frequency  melatonin tablet 10 mg  10 mg Oral QHS  sodium bicarbonate tablet 325 mg  325 mg Oral BID  levalbuterol (XOPENEX) nebulizer soln 1.25 mg/0.5 ml  1.25 mg Nebulization Q4H RT  
 ipratropium (ATROVENT) 0.02 % nebulizer solution 0.5 mg  0.5 mg Nebulization Q4H PRN  
 budesonide (PULMICORT) 500 mcg/2 ml nebulizer suspension  500 mcg Nebulization BID RT  
 levalbuterol (XOPENEX) nebulizer soln 1.25 mg/0.5 ml  1.25 mg Nebulization Q4H PRN  
 enoxaparin (LOVENOX) injection 40 mg  40 mg SubCUTAneous Q24H  
 influenza vaccine 2020-21 (6 mos+)(PF) (FLUARIX/FLULAVAL/FLUZONE QUAD) injection 0.5 mL  0.5 mL IntraMUSCular PRIOR TO DISCHARGE  fluconazole (DIFLUCAN) tablet 100 mg  100 mg Oral DAILY  calcium carbonate (TUMS) chewable tablet 200 mg [elemental]  200 mg Oral TID PRN  
 ascorbic acid (vitamin C) (VITAMIN C) tablet 500 mg  500 mg Oral BID  predniSONE (DELTASONE) tablet 40 mg  40 mg Oral BID WITH MEALS  trimethoprim-sulfamethoxazole (BACTRIM DS, SEPTRA DS) 160-800 mg per tablet 2 Tab  2 Tab Oral TID  [START ON 1/1/2021] predniSONE (DELTASONE) tablet 20 mg  20 mg Oral DAILY WITH BREAKFAST  [START ON 12/25/2020] predniSONE (DELTASONE) tablet 20 mg  20 mg Oral BID WITH MEALS  sodium chloride (NS) flush 5-10 mL  5-10 mL IntraVENous PRN  
 sodium chloride (NS) flush 5-40 mL  5-40 mL IntraVENous PRN  promethazine (PHENERGAN) tablet 12.5 mg  12.5 mg Oral Q6H PRN  Or  
 ondansetron (ZOFRAN) injection 4 mg  4 mg IntraVENous Q6H PRN  
 bisacodyL (DULCOLAX) tablet 5 mg  5 mg Oral DAILY PRN  
 famotidine (PEPCID) tablet 20 mg  20 mg Oral BID  
 
 Objective:  
Vital Signs:   
Visit Vitals /80 Pulse (!) 128 Temp 97.3 °F (36.3 °C) Resp 20 Ht 5' 7.01\" (1.702 m) Wt 101.8 kg (224 lb 6.9 oz) SpO2 96% BMI 35.14 kg/m² O2 Device: Nasal cannula O2 Flow Rate (L/min): 6 l/min Temp (24hrs), Av.1 °F (36.7 °C), Min:97.3 °F (36.3 °C), Max:98.6 °F (37 °C) Intake/Output:  
Last shift:       0701 -  190 In: 720 [P.O.:720] Out: 800 [Urine:800] Last 3 shifts: 1901 -  0700 In: 840 [P.O.:840] Out: 2810 [Urine:2810] Intake/Output Summary (Last 24 hours) at 2020 1649 Last data filed at 2020 1615 Gross per 24 hour Intake 1200 ml Output 2250 ml Net -1050 ml Physical Exam:  
 
General/Neurology: Alert, Awake, NAD. NC O2. Head:   NCAT Eye:   EOM intact, no scleral icterus, no pallor, no cyanosis. Nose:   No discharge Neck:   Trachea midline Lung: Moderate air entry bilateral equal.  Mild scattered basilar crackles. No wheezing. No stridors. No prolonged expiration. No accessory muscle use. Heart:   S1 S2 present. No murmur. No JVD. Abdomen:  Soft. NT. Extremities:  No pedal edema. No cyanosis. No clubbing. Pulses: 2+ and symmetric in DP. Capillary refill: normal.  
Lymphatic:  No cervical or supraclavicular palpable lymphadenopathy. Skin:   Color, texture, turgor normal. No rashes or lesions. Data:  
   
Recent Results (from the past 24 hour(s)) CBC WITH AUTOMATED DIFF Collection Time: 20 12:45 AM  
Result Value Ref Range WBC 4.9 4.6 - 13.2 K/uL  
 RBC 2.89 (L) 4.70 - 5.50 M/uL HGB 8.7 (L) 13.0 - 16.0 g/dL HCT 24.7 (L) 36.0 - 48.0 % MCV 85.5 74.0 - 97.0 FL  
 MCH 30.1 24.0 - 34.0 PG  
 MCHC 35.2 31.0 - 37.0 g/dL  
 RDW 13.9 11.6 - 14.5 % PLATELET 934 426 - 856 K/uL MPV 9.0 (L) 9.2 - 11.8 FL  
 NEUTROPHILS 91 (H) 40 - 73 % LYMPHOCYTES 4 (L) 21 - 52 % MONOCYTES 4 3 - 10 % EOSINOPHILS 1 0 - 5 % BASOPHILS 0 0 - 2 % ABS. NEUTROPHILS 4.5 1.8 - 8.0 K/UL  
 ABS. LYMPHOCYTES 0.2 (L) 0.9 - 3.6 K/UL  
 ABS. MONOCYTES 0.2 0.05 - 1.2 K/UL  
 ABS. EOSINOPHILS 0.1 0.0 - 0.4 K/UL  
 ABS. BASOPHILS 0.0 0.0 - 0.1 K/UL  
 DF AUTOMATED METABOLIC PANEL, BASIC Collection Time: 12/23/20 12:45 AM  
Result Value Ref Range Sodium 128 (L) 136 - 145 mmol/L Potassium 4.6 3.5 - 5.5 mmol/L Chloride 97 (L) 100 - 111 mmol/L  
 CO2 23 21 - 32 mmol/L Anion gap 8 3.0 - 18 mmol/L Glucose 138 (H) 74 - 99 mg/dL BUN 10 7.0 - 18 MG/DL Creatinine 0.69 0.6 - 1.3 MG/DL  
 BUN/Creatinine ratio 14 12 - 20 GFR est AA >60 >60 ml/min/1.73m2 GFR est non-AA >60 >60 ml/min/1.73m2 Calcium 8.7 8.5 - 10.1 MG/DL  
VITAMIN B12 & FOLATE Collection Time: 12/23/20 12:45 AM  
Result Value Ref Range Vitamin B12 228 211 - 911 pg/mL Folate 2.7 (L) 3.10 - 17.50 ng/mL IRON PROFILE Collection Time: 12/23/20 12:45 AM  
Result Value Ref Range Iron 29 (L) 50 - 175 ug/dL TIBC 241 (L) 250 - 450 ug/dL Iron % saturation 12 (L) 20 - 50 % TSH 3RD GENERATION Collection Time: 12/23/20 10:45 AM  
Result Value Ref Range TSH 2.10 0.36 - 3.74 uIU/mL HEPATITIS PANEL, ACUTE Collection Time: 12/23/20 10:45 AM  
Result Value Ref Range Hepatitis A, IgM Negative NEG    
 __ Hepatitis B surface Ag <0.10 <1.00 Index Hep B surface Ag Interp. Negative NEG    
 __ Hepatitis B core, IgM Negative NEG    
 __ Hepatitis C virus Ab 0.12 <0.80 Index Hep C virus Ab Interp. Negative NEG Hep C  virus Ab comment PHOSPHORUS Collection Time: 12/23/20 10:45 AM  
Result Value Ref Range Phosphorus 3.6 2.5 - 4.9 MG/DL Chemistry Recent Labs  
  12/23/20 
1045 12/23/20 
0045 12/22/20 
1455 12/21/20 
0535 GLU  --  138* 183* 82 NA  --  128* 129* 131* K  --  4.6 4.0 4.4 CL  --  97* 98* 99* CO2  --  23 22 24 BUN  --  10 8 7 CREA  --  0.69 0.89 0.65 CA  --  8.7 8.6 7.9*  
 MG  --   --   --  1.9 PHOS 3.6  --   --   --   
AGAP  --  8 9 8 BUCR  --  14 9* 11* AP  --   --   --  139* TP  --   --   --  6.3* ALB  --   --   --  1.8*  
GLOB  --   --   --  4.5* AGRAT  --   --   --  0.4* Lactic Acid No results found for: LAC No results for input(s): LAC in the last 72 hours. Liver Enzymes Protein, total  
Date Value Ref Range Status 12/21/2020 6.3 (L) 6.4 - 8.2 g/dL Final  
 
Albumin Date Value Ref Range Status 12/21/2020 1.8 (L) 3.4 - 5.0 g/dL Final  
 
Globulin Date Value Ref Range Status 12/21/2020 4.5 (H) 2.0 - 4.0 g/dL Final  
 
A-G Ratio Date Value Ref Range Status 12/21/2020 0.4 (L) 0.8 - 1.7   Final  
 
Alk. phosphatase Date Value Ref Range Status 12/21/2020 139 (H) 45 - 117 U/L Final  
 
Recent Labs  
  12/21/20 
0535 TP 6.3* ALB 1.8*  
GLOB 4.5* AGRAT 0.4* * CBC w/Diff Recent Labs  
  12/23/20 
0045 12/22/20 
1455 12/21/20 
0535 WBC 4.9 10.0 5.6  
RBC 2.89* 2.90* 3.30* HGB 8.7* 8.8* 9.7* HCT 24.7* 25.0* 28.4*  
 306 301 GRANS 91* 95* 85* LYMPH 4* 1* 6*  
EOS 1 2 0 Cardiac Enzymes No results found for: CPK, CK, CKMMB, CKMB, RCK3, CKMBT, CKNDX, CKND1, ANIKA, TROPT, TROIQ, ZOË, TROPT, TNIPOC, BNP, BNPP  
 
BNP No results found for: BNP, BNPP, XBNPT Coagulation No results for input(s): PTP, INR, APTT, INREXT, INREXT in the last 72 hours. Thyroid  Lab Results Component Value Date/Time TSH 2.10 12/23/2020 10:45 AM  
   
No results found for: T4  
 
Urinalysis Lab Results Component Value Date/Time  Color YELLOW 12/18/2020 12:14 AM  
 Appearance CLEAR 12/18/2020 12:14 AM  
 Specific gravity <1.005 (L) 12/18/2020 12:14 AM  
 pH (UA) 7.0 12/18/2020 12:14 AM  
 Protein Negative 12/18/2020 12:14 AM  
 Glucose Negative 12/18/2020 12:14 AM  
 Ketone Negative 12/18/2020 12:14 AM  
 Bilirubin Negative 12/18/2020 12:14 AM  
 Urobilinogen 1.0 12/18/2020 12:14 AM  
 Nitrites Negative 12/18/2020 12:14 AM  
 Leukocyte Esterase Negative 12/18/2020 12:14 AM  
 Epithelial cells 1+ 03/08/2018 09:11 PM  
 Bacteria FEW (A) 03/08/2018 09:11 PM  
 WBC 4 to 6 03/08/2018 09:11 PM  
 RBC NEGATIVE  03/08/2018 09:11 PM  
  
 
Micro  No results for input(s): SDES, CULT in the last 72 hours. No results for input(s): CULT in the last 72 hours. ABG Recent Labs  
  12/22/20 
1250 PHI 7.45  
PCO2I 28.2*  
PO2I 74* HCO3I 19.4*  
FIO2I 40  
  
 
 
 
CT (Most Recent) (CT chest reviewed by me) [place] [facility] [place] [facility]17[place]20 CTA CHEST W OR W WO CONT Narrative EXAM: CTA Chest 
 
INDICATION: Shortness of breath. Possible PE. COMPARISON: No prior study. TECHNIQUE: Axial CT imaging from the thoracic inlet through the diaphragm with 
intravenous contrast utilizing CTA study for pulmonary artery evaluation. Coronal and sagittal MIP reformations were generated at a separate workstation. One or more dose reduction techniques were used on this CT: automated exposure 
control, adjustment of the mAs and/or kVp according to patient size, and 
iterative reconstruction techniques. The specific techniques used on this CT 
exam have been documented in the patient's electronic medical record. Digital 
Imaging and Communications in Medicine (DICOM) format image data are available 
to nonaffiliated external healthcare facilities or entities on a secure, media 
free, reciprocally searchable basis with patient authorization for at least a 
12-month period after this study. _______________ FINDINGS: 
 
EXAM QUALITY: Overall exam quality is suboptimal. Pulmonary arterial enhancement 
is adequate. Respiratory motion artifact is present, limiting evaluation. PULMONARY ARTERIES: No convincing evidence of pulmonary embolism. MEDIASTINUM: Normal heart size. No evidence of right heart strain. Aorta is 
unremarkable. No pericardial effusion. LYMPH NODES: No pathologically enlarged lymph nodes lymph nodes. AIRWAY: Unremarkable. LUNGS: Centrilobular emphysema. Superimposed scattered bilateral groundglass 
opacities throughout the lungs. PLEURA: No pleural effusion or pneumothorax. UPPER ABDOMEN: Visualized upper abdomen is unremarkable. OTHER: No acute or aggressive osseous abnormalities identified. _______________ Impression IMPRESSION: 
 
Suboptimal study secondary to motion artifact and bolus timing. No evidence of obvious central pulmonary embolism. Diffuse bilateral groundglass opacities, suspicious for atypical infection. Emphysema. XR (Most Recent). CXR  reviewed by me and compared with previous CXR Results from [facility] [place] on 12/17/20 XR CHEST PORT Narrative EXAM: XR CHEST PORT 
 
CLINICAL INDICATION/HISTORY: SOB, PCJ pneumonia 
-Additional: None COMPARISON: 12/20/2020, 12/18/2020 TECHNIQUE: Frontal view of the chest 
 
_______________ FINDINGS: 
 
HEART AND MEDIASTINUM: Midline cardiac silhouette, normal in size. Stable 
mediastinal and hilar structures LUNGS AND PLEURAL SPACES: Multifocal bilateral confluent perihilar, mid and 
lower lung opacities with mild increased right diaphragmatic confluence may be 
secondary to slight the lung volumes. Unchanged bilateral interstitial 
prominence. No pneumothorax or significant pleural effusion. BONY THORAX AND SOFT TISSUES: No acute or destructive osseous abnormality. _______________ Impression IMPRESSION: 
 
1. Interval developing right diaphragmatic patchy confluent/consolidation, 
superimposed upon extensive bilateral alveolar pneumonia. Diagnostic 
considerations include atelectasis in the setting of slight decreased lung 
volumes versus progressive right basilar disease. ·Please note: Voice-recognition software may have been used to generate this report, which may have resulted in some phonetic-based errors in grammar and contents. Even though attempts were made to correct all the mistakes, some may have been missed, and remained in the body of the document. Anamaria Auguste MD 
12/23/2020

## 2020-12-23 NOTE — PROGRESS NOTES
Hospitalist Progress Note Patient: Ginger Bright MRN: [id number]  CSN: [id number] YOB: 1990  Age: 31 y.o. Sex: male DOA: 12/17/2020 LOS:  LOS: 6 days Chief Complaint: 
 
resp distress Assessment/Plan Sepsis with pneumonia, sepsis is resolved HIV/AIDS-CD4 count 6, untreated, high risk for further OI 
PJ pneumonia-on high dose bactrim and steroids as per ID recss, continues to be SOB with minimal activity but feels he is getting better Ac resp distress, hypoxia-stable with NC 02, continue nebs Debility and weakness, trial PT when resp status improves Tachycardia due to resp distress Malnutrition severe-add ensures with meals, checked phos, it is wnl Mild hyponatremia-add sodium bicarb tabs Check TSH, cortisol, B12, iron, folate levels Pulmonology consult Supportive care Tele monitoring He may be a little better today, I agree with his opinion, but he has a long way to go as he is still bedridden due to his SOB and weakness DVT prophylaxis-lovenox Disposition : 
Patient Active Problem List  
Diagnosis Code  AIDS (acquired immune deficiency syndrome) ([facility]) B20  
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  
 Acute respiratory distress R06.03 Subjective: I think I am getting a little better Eating is BF this am 
Able to speak Gets short winded after conversation No chest pain No fevers Review of systems: 
 
Constitutional: denies fevers, chills, myalgias Cardiovascular: denies chest pain, palpitations Gastrointestinal: denies nausea, vomiting, diarrhea Vital signs/Intake and Output: 
Visit Vitals /84 Pulse (!) 121 Temp 98.3 °F (36.8 °C) Resp 20 Ht 5' 7.01\" (1.702 m) Wt 101.8 kg (224 lb 6.9 oz) SpO2 96% BMI 35.14 kg/m² Current Shift:  No intake/output data recorded. Last three shifts:  12/21 1901 - 12/23 0700 In: 840 [P.O.:840] Out: 2810 [Urine:2810] Exam: 
 
General: thin weakened AAM, alert, NAD, OX3 Head/Neck: NCAT, supple, No masses, No lymphadenopathy CVS:Regular rate and rhythm, no M/R/G, S1/S2 heard, no thrill Lungs:Coarse BS BL, no wheezles Abdomen: Soft, Nontender, No distention, Normal Bowel sounds, No hepatomegaly Extremities: No C/C/E, pulses palpable 2+ Neuro:grossly normal , follows commands Psych:appropriate Labs: Results:  
   
Chemistry Recent Labs  
  12/23/20 
0045 12/22/20 
1455 12/21/20 
0535 * 183* 82 * 129* 131* K 4.6 4.0 4.4 CL 97* 98* 99* CO2 23 22 24 BUN 10 8 7 CREA 0.69 0.89 0.65 CA 8.7 8.6 7.9* AGAP 8 9 8 BUCR 14 9* 11* AP  --   --  139* TP  --   --  6.3* ALB  --   --  1.8*  
GLOB  --   --  4.5* AGRAT  --   --  0.4* CBC w/Diff Recent Labs  
  12/23/20 0045 12/22/20 
1455 12/21/20 
0535 WBC 4.9 10.0 5.6  
RBC 2.89* 2.90* 3.30* HGB 8.7* 8.8* 9.7* HCT 24.7* 25.0* 28.4*  
 306 301 GRANS 91* 95* 85* LYMPH 4* 1* 6*  
EOS 1 2 0 Cardiac Enzymes No results for input(s): CPK, CKND1, ANIKA in the last 72 hours. No lab exists for component: Glenda Letters Coagulation No results for input(s): PTP, INR, APTT, INREXT in the last 72 hours. Lipid Panel No results found for: CHOL, CHOLPOCT, CHOLX, CHLST, CHOLV, 338025, HDL, HDLP, LDL, LDLC, DLDLP, 048058, VLDLC, VLDL, TGLX, TRIGL, TRIGP, TGLPOCT, CHHD, CHHDX  
BNP No results for input(s): BNPP in the last 72 hours. Liver Enzymes Recent Labs  
  12/21/20 
0535 TP 6.3* ALB 1.8* * Thyroid Studies No results found for: T4, T3U, TSH, TSHEXT Procedures/imaging: see electronic medical records for all procedures/Xrays and details which were not copied into this note but were reviewed prior to creation of Plan Tatyana Dunne MD

## 2020-12-23 NOTE — PROGRESS NOTES
[id number]: The pt rested well overnight with no complaints. No new clinical issues. Pt was placed on a continuous pulse Ox to monitorO2 saturations. Night shift chart check completed

## 2020-12-23 NOTE — PROGRESS NOTES
Physical Therapy Evaluation/Treatment Attempt Chart reviewed. Attempted Physical Therapy Evaluation, however, patient unable to be seen due to: 
[]  Nausea/vomiting 
[]  Eating 
[]  Pain 
[]  Patient too lethargic 
[]  Off Unit for testing/procedure 
[]  Dialysis treatment in progress  
[x]  Telemetry Results: Per Nurse  Eladia, pt with continued tachycardia at rest today, is not appropriate for PT evaluation at this time. Nursing requesting to hold PT evaluation for today. []  Other:  
  
Will follow up tomorrow as appropriate.   
Thank you for this referral. 
 
Maryjane Charles, PT, DPT

## 2020-12-23 NOTE — PROGRESS NOTES
Bedside shift change report given to [address] [place] [address] (oncoming nurse) by Ted Herron RN (offgoing nurse). Report included the following information SBAR.  
 
 
0745 Shift assessment complete. 1800 Sputum sample sent to lab from Dr. Ramesh reaves. Shift Summary: Shift uneventful. No complaints of chest pain or shortness of breath. Call light is within reach.

## 2020-12-23 NOTE — CONSULTS
Brief PJP note He's better, but slooooowly. I'll dump his meds into IV (18.75mg/kg/day) overnight and see how he goes. I'll be in tomorrow to see him. Isak Brower MD 
Cell [phone] [facility]

## 2020-12-24 NOTE — PROGRESS NOTES
12/23/20 2009 PEP Therapy Details Duration (min) 5 Number of Breaths 10 PEP Device Oscillating positive expiratory device Patient instructed and demonstrated effective use of device

## 2020-12-24 NOTE — ROUTINE PROCESS
Bedside and Verbal shift change report given to Madeleine Weems RN (oncoming nurse) by Danielle Garcia RN (offgoing nurse). Report included the following information SBAR and Kardex.

## 2020-12-24 NOTE — ROUTINE PROCESS
Bedside and Verbal shift change report given to Sosa Prajapati RN (oncoming nurse) by Meet Rea RN (offgoing nurse). Report included the following information SBAR and Kardex.

## 2020-12-24 NOTE — PROGRESS NOTES
Bedside shift change report given to Cynthia Trujillo RN (oncoming nurse) by Eladia Hong RN (offgoing nurse). Report included the following information SBAR, Kardex and ED Summary.

## 2020-12-24 NOTE — CONSULTS
[facility] 
(A Division of [address] [place] [address]) Follow-up Note Date of Admission: 12/17/2020       Date of Note:  12/24/2020 Summary:   
Mr Chantal Fletcher is a 33y MSM AAM with underlying/untreated HIV who was in usual state of health until approx 2-3wks ago with onset of gradual dry cough and associated/progressive dyspnea (rest and exertion).  No f/s/c.  Has had 50-60lbs wt loss over the last year. Eldon Raman known about his HIV disease for a couple of years, but has not sought care yet.  Works at [employer]; wears a mask.  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  Feels the same today. 
  
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 ..losing weight, needs THC edibles, can't smoke due to his lungs. Wants to start ART right away. DOES feel better this morning (has only had a bag/two of the IV tmp-smx). Current Antimicrobials:    Prior Antimicrobials: 1. Tmp-smx DS PO/IV (12/18-) #6 1. azithro IV (12/17) 2. CAX IV (12/17) Assessment: Rec / Plan: PJP pneumonia 12/17:  PCT 0.57ng/mL;  Just had a bad/two of the IV tmp-smx; give it some more time. The fact that he has the breath to gripe is a very good sign. Give him a few more days IV, then we can readress switching back to PO to complete the 21 day course. ->Tmp-smx #6/21 Keep pushing AIDS No rush to start ART. It's cost-prohibitive for him/hospital without [facility] funding. It can wait. He didn't get this way overnight. Syphilis RPR titer returned high at 1:64, which is several titers down from his high 1:512 back in 2018, but still high enough that I'd retreat 3 weekly shots, if not LP to see if he needs IV PCN. This can wait until PJP better. Microbiology:                12/18-  Resp nl dequan/yeast 
                                                    Legionella/Spneumo Ag (-)                                        21/58 - Flu (-)                                                     BCx x2 (-)                                                     COVID-19 (-) 
  Lines / Catheters:         peripheral 
 
 
 
Patient Active Problem List  
Diagnosis Code  AIDS (acquired immune deficiency syndrome) ([facility]) B20  
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  
 Acute respiratory distress R06.03  
 Hypoxia R09.02  
 
 
Current Facility-Administered Medications Medication Dose Route Frequency  multivitamin, tx-iron-ca-min (THERA-M w/ IRON) tablet 1 Tab  1 Tab Oral DAILY  melatonin tablet 10 mg  10 mg Oral QHS  sodium bicarbonate tablet 325 mg  325 mg Oral BID  trimethoprim-sulfamethoxazole (BACTRIM) 500 mg in dextrose 5% 500 mL  500 mg IntraVENous Q8H  
 levalbuterol (XOPENEX) nebulizer soln 1.25 mg/0.5 ml  1.25 mg Nebulization Q4H RT  
 ipratropium (ATROVENT) 0.02 % nebulizer solution 0.5 mg  0.5 mg Nebulization Q4H PRN  
 budesonide (PULMICORT) 500 mcg/2 ml nebulizer suspension  500 mcg Nebulization BID RT  
 levalbuterol (XOPENEX) nebulizer soln 1.25 mg/0.5 ml  1.25 mg Nebulization Q4H PRN  
 enoxaparin (LOVENOX) injection 40 mg  40 mg SubCUTAneous Q24H  
 influenza vaccine 2020-21 (6 mos+)(PF) (FLUARIX/FLULAVAL/FLUZONE QUAD) injection 0.5 mL  0.5 mL IntraMUSCular PRIOR TO DISCHARGE  calcium carbonate (TUMS) chewable tablet 200 mg [elemental]  200 mg Oral TID PRN  
 ascorbic acid (vitamin C) (VITAMIN C) tablet 500 mg  500 mg Oral BID  predniSONE (DELTASONE) tablet 40 mg  40 mg Oral BID WITH MEALS  
 [START ON 1/1/2021] predniSONE (DELTASONE) tablet 20 mg  20 mg Oral DAILY WITH BREAKFAST  [START ON 2020] predniSONE (DELTASONE) tablet 20 mg  20 mg Oral BID WITH MEALS  sodium chloride (NS) flush 5-10 mL  5-10 mL IntraVENous PRN  
 sodium chloride (NS) flush 5-40 mL  5-40 mL IntraVENous PRN  promethazine (PHENERGAN) tablet 12.5 mg  12.5 mg Oral Q6H PRN Or  
 ondansetron (ZOFRAN) injection 4 mg  4 mg IntraVENous Q6H PRN  
 bisacodyL (DULCOLAX) tablet 5 mg  5 mg Oral DAILY PRN  
 famotidine (PEPCID) tablet 20 mg  20 mg Oral BID Review of Systems - General ROS: negative for - chills, fever or night sweats Respiratory ROS: positive for - cough and shortness of breath 
negative for - hemoptysis, sputum changes or tachypnea Cardiovascular ROS: positive for - dyspnea on exertion and shortness of breath 
negative for - chest pain Gastrointestinal ROS: no abdominal pain, change in bowel habits, or black or bloody stools Objective: 
 
Visit Vitals /80 (BP 1 Location: Left arm, BP Patient Position: At rest;Supine) Pulse (!) 112 Temp 97.4 °F (36.3 °C) Resp 18 Ht 5' 7\" (1.702 m) Wt 60.4 kg (133 lb 2.5 oz) SpO2 99% BMI 20.86 kg/m² Temp (24hrs), Av.8 °F (36.6 °C), Min:97.3 °F (36.3 °C), Max:98.2 °F (36.8 °C) GEN: emaciated/cachectic AAM with 4-6 word dyspnea (better) - in NAD HEENT: anicteric CHEST: Rales bilat CVS: tachy ABD: NT 
EXT: no rash Lab results: 
 
Chemistry Recent Labs  
  20 
0510 20 
0045 20 
1455 * 138* 183* * 128* 129*  
K 4.7 4.6 4.0  
CL 94* 97* 98* CO2 21 23 22 BUN 13 10 8 CREA 0.78 0.69 0.89 CA 9.0 8.7 8.6 AGAP 11 8 9 BUCR 17 14 9* CBC w/ Diff Recent Labs  
  20 
0510 20 
0045 20 
1455 WBC 4.2* 4.9 10.0  
RBC 3.22* 2.89* 2.90* HGB 9.6* 8.7* 8.8* HCT 27.6* 24.7* 25.0*  
 285 306 GRANS 87* 91* 95* LYMPH 6* 4* 1* EOS 1 1 2 Microbiology All Micro Results Procedure Component Value Units Date/Time P. JIROVECI BY PCR [[id number]] Collected: 12/23/20 1845 Order Status: Completed Updated: 12/23/20 1959 CULTURE, BLOOD [[id number]] Collected: 12/17/20 1820 Order Status: Completed Specimen: Blood Updated: 12/23/20 7784 Special Requests: NO SPECIAL REQUESTS Culture result: NO GROWTH 6 DAYS     
 CULTURE, BLOOD [[id number]] Collected: 12/17/20 1810 Order Status: Completed Specimen: Blood Updated: 12/23/20 1606 Special Requests: NO SPECIAL REQUESTS Culture result: NO GROWTH 6 DAYS     
 CULTURE, RESPIRATORY/SPUTUM/BRONCH [place] STAIN [[id number]]  (Abnormal) Collected: 12/18/20 0014 Order Status: Completed Specimen: Sputum Updated: 12/20/20 1030 Special Requests: NO SPECIAL REQUESTS     
  GRAM STAIN MODERATE WBCS SEEN     
      
  RARE EPITHELIAL CELLS SEEN  
     
   FEW GRAM NEGATIVE RODS     
      
  RARE GRAM POSITIVE COCCI IN CHAINS  
     
   RARE GRAM POSITIVE RODS Culture result:    
  LIGHT NORMAL RESPIRATORY PRANAY  
     
      
  FEW YEAST, (APPARENT CANDIDA ALBICANS) LEGIONELLA PNEUMOPHILA AG, URINE [[id number]] Collected: 12/18/20 0014 Order Status: Completed Specimen: Urine, random Updated: 12/18/20 1131 Legionella Ag, urine Negative Bernell Curtis, URINE [[id number]] Collected: 12/18/20 0014 Order Status: Completed Specimen: Urine, random Updated: 12/18/20 1131 Strep pneumo Ag, urine Negative INFLUENZA A & B AG (RAPID TEST) [[id number]] Collected: 12/17/20 1900 Order Status: Completed Specimen: Nasopharyngeal from Nasal washing Updated: 12/17/20 1925 Influenza A Antigen Negative Comment: A negative result does not exclude influenza virus infection, seasonal or H1N1 due to suboptimal sensitivity. If influenza is circulating in your community, a diagnosis of influenza should be considered based on a patients clinical presentation and empiric antiviral treatment should be considered, if indicated. Influenza B Antigen Negative Carlos Rangel MD 
Cell [phone] [facility] 12/24/2020  
10:44 AM

## 2020-12-24 NOTE — PROGRESS NOTES
Comprehensive Nutrition Assessment Type and Reason for Visit: Sunday [place] Nutrition Recommendations/Plan: monitor bowel function, no BM since 12/21 Will replace ensure compact with Ensure Enlive--pt prefers chocolate or strawberry. Encourage PO intakes >50% of meals and supplements Will order MVI+M, ? Need for additional iron supplementation--defer to MD at this time Nutrition Assessment:  PMH: asthma, chlamydia, herpes, HIV, syphilis. Pt admit with pneumonia of both lungs. Not on antivirals for HIV. ID MD following. Estimated Daily Nutrient Needs: 
Energy (kcal): (P) 2416; Weight Used for Energy Requirements: (P) Current Protein (g): (P) 121; Weight Used for Protein Requirements: (P) Current(2g/kg) Fluid (ml/day): (P) 2416; Method Used for Fluid Requirements: (P) 1 ml/kcal 
 
 
Nutrition Related Findings:  (P) Labs reviewed Na-126. Meds reviewed: Pepcid, deltasone, sodium bicarb, vitamin C 500mg. No pressure area. BM 12/21. Spoke with pt, pt admits to wt loss however unable to quantify at this time and wt hx was limited in chart review. Pt reports he feels like he is burning more calories than he can take in. He is probably catabolic with the HIV and pneumonia. Spoke with pt regarding ensure enlive, options post discharge, and taking MIV+M. Pt was very cooperative. No additional questions at this time Wounds:   
(P) None Current Nutrition Therapies: DIET REGULAR 
DIET NUTRITIONAL SUPPLEMENTS All Meals; Ensure Compact Anthropometric Measures: 
· Height:  5' 7\" (170.2 cm) · Current Body Wt:  60.3 kg (133 lb) · Admission Body Wt:  (P) 151 lb 14.4 oz · Usual Body Wt:  (P) (unable to assess at this time) · Ideal Body Wt:  148 lbs:  89.9 % · Adjusted Body Weight:   ; Weight Adjustment for: (P) No adjustment · Adjusted BMI:      
· BMI Category:  (P) Normal weight (BMI 18.5-24. 9) Nutrition Diagnosis: · (P) Inadequate oral intake related to (P) catabolic illness as evidenced by (P) weight loss Nutrition Interventions:  
Food and/or Nutrient Delivery: (P) Continue current diet, Modify oral nutrition supplement Nutrition Education and Counseling: (P) No recommendations at this time Coordination of Nutrition Care: (P) Interdisciplinary rounds, Continue to monitor while inpatient Goals: (P) PO intakes of meals and supplements >50% throughout the next 3-5 days Nutrition Monitoring and Evaluation:  
Behavioral-Environmental Outcomes: (P) None identified Food/Nutrient Intake Outcomes: (P) Food and nutrient intake, Supplement intake, Vitamin/mineral intake Physical Signs/Symptoms Outcomes: (P) GI status, Weight Discharge Planning:   
(P) Too soon to determine Electronically signed by Gonzalez Martínez on 12/24/2020 at 9:06 AM

## 2020-12-24 NOTE — PROGRESS NOTES
Physical Therapy Evaluation/Treatment Attempt Chart reviewed. Attempted Physical Therapy Evaluation, however, patient unable to be seen due to: 
[]  Nausea/vomiting 
[]  Eating 
[]  Pain 
[]  Patient too lethargic 
[]  Off Unit for testing/procedure 
[]  Dialysis treatment in progress  
[x]  Telemetry Results: Pt with continued tachycardia at rest d/t respiratory distress. Discussed with KIRAN Gould, plan to hold PT evaluation until pt's respiratory status improves. []  Other:  
  
Will follow up later as appropriate.   
Thank you for this referral. 
 
Cathi Murillo, PT, DPT

## 2020-12-24 NOTE — ROUTINE PROCESS
Bedside and Verbal shift change report given to Carrington Fitzgerald RN(oncoming nurse) by Pedro Goetz RN (offgoing nurse). Report included the following information SBAR, Kardex, Intake/Output, MAR, and Cardiac Rhythm ST.

## 2020-12-24 NOTE — PROGRESS NOTES
[facility] Pulmonary, Critical Care, and Sleep Medicine Name: Tonna Najjar MRN: [id number] : 1990 Hospital: [facility] [place] Date: 2020 [facility] Note Consult requesting physician: Dr. Yana Frias Reason for Consult: Likely PJP pneumonia, hypoxia IMPRESSION:  
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  
Diagnosis Code  AIDS (acquired immune deficiency syndrome) (HCC) B20  
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  
 Acute respiratory distress R06.03  
 Hypoxia R09.02  
 
  
  
RECOMMENDATIONS:  
Bilateral extensive pneumonia with hypoxemia in a patient with HIV, COVID-19 negative; likely PJP pneumonia. Continue Bactrim for 21 days. Continue prednisone p.o.  as scheduled for total 21 days. Continue O2, currently on 6 LPM NC. Titrate to keep SPO2 more than 92%. Fungitell elevated 455. LDH elevated: 560. Expectorated sputum for PJP: Pending. Sputum culture: Light normal dequan. Urine antigen panel: Negative. COVID-19: Negative. CT chest with bilateral diffuse GGO and underlying emphysema. He may have mild pulmonary fibrosis in the background but due to motion artifact it is difficult with exclude. Bronchodilators: Budesonide twice daily, Atrovent as needed. Airway clearance: Continue incentive spirometer; advised to use often. Out of bed. FiO2 to keep SpO2 >=92%, HOB >=30 degree, aspiration precautions, aggressive pulmonary toileting, incentive spirometry, PT/OT eval and treat, mobilization. DVT Prophylaxis: Lovenox GI Prophylaxis: Pepcid Other issues management by primary team and respective consultants. Further recommendations will be based on the patient's response to recommended treatment and results of the investigation ordered. Quality Care: PPI, DVT prophylaxis, HOB elevated, infection control all reviewed and addressed. Discussed with patient, radiologic work up showed, answered all questions to their satisfaction. Discussed with Dr. Kathy Hazel. Care plan discussed with nursing. Subjective/History:  
 
Bj Carter is a 31 y.o. male with PMHx significant for HIV diagnosed couple years ago, not on treatment; admitted with weight loss, ESCOTO; COVID-19 negative x2; diagnosed with atypical looking pneumonia/GGO on CT chest which is negative for PE, suspected for PJP pneumonia. Initially treated with Rocephin and Zithromax. Seen by Dr. Kathy Hazel: Elevated LDH, PCT 0.57. Antibiotics changed to Bactrim and started on steroids. HIV viral load elevated. 12/24/2020 Seen in room 342. Still on NC 6 LPM O2. Complains of fatigue and tiredness. Dyspnea with minimal exertion. Not able to walk in the room without dyspnea. Cough with clear white expectoration, no hemoptysis. No fever or night sweats. No other overnight issues reported. Review of Systems:  
HEENT: No epistaxis, no nasal drainage, no difficulty in swallowing, no redness in eyes Respiratory: as above Cardiovascular: no palpitations, no chronic leg edema, no syncope Gastrointestinal: no abd pain, no vomiting, no diarrhea, no bleeding symptoms Genitourinary: No urinary symptoms or hematuria Integument/breast: No ulcers or rashes Musculoskeletal:Neg 
Neurological: No focal weakness, no seizures, no headaches Behvioral/Psych: No anxiety, no depression Constitutional: No fever, no chills,  no night sweats No Known Allergies Past Medical History:  
Diagnosis Date  Asthma  Chlamydia  Herpes zoster  HIV (human immunodeficiency virus infection) ([facility] [address]  Syphilis History reviewed. No pertinent surgical history. History reviewed. No pertinent family history. Social History Tobacco Use  Smoking status: Former Smoker  Smokeless tobacco: Never Used Substance Use Topics  Alcohol use: Yes Comment: socially Prior to Admission medications Not on File Current Facility-Administered Medications Medication Dose Route Frequency  multivitamin, tx-iron-ca-min (THERA-M w/ IRON) tablet 1 Tab  1 Tab Oral DAILY  guaiFENesin (ROBITUSSIN) 100 mg/5 mL oral liquid 100 mg  100 mg Oral Q4H PRN  
 melatonin tablet 10 mg  10 mg Oral QHS  sodium bicarbonate tablet 325 mg  325 mg Oral BID  trimethoprim-sulfamethoxazole (BACTRIM) 500 mg in dextrose 5% 500 mL  500 mg IntraVENous Q8H  
 levalbuterol (XOPENEX) nebulizer soln 1.25 mg/0.5 ml  1.25 mg Nebulization Q4H RT  
 ipratropium (ATROVENT) 0.02 % nebulizer solution 0.5 mg  0.5 mg Nebulization Q4H PRN  
 budesonide (PULMICORT) 500 mcg/2 ml nebulizer suspension  500 mcg Nebulization BID RT  
 levalbuterol (XOPENEX) nebulizer soln 1.25 mg/0.5 ml  1.25 mg Nebulization Q4H PRN  
 enoxaparin (LOVENOX) injection 40 mg  40 mg SubCUTAneous Q24H  
 influenza vaccine 2020-21 (6 mos+)(PF) (FLUARIX/FLULAVAL/FLUZONE QUAD) injection 0.5 mL  0.5 mL IntraMUSCular PRIOR TO DISCHARGE  calcium carbonate (TUMS) chewable tablet 200 mg [elemental]  200 mg Oral TID PRN  
 ascorbic acid (vitamin C) (VITAMIN C) tablet 500 mg  500 mg Oral BID  predniSONE (DELTASONE) tablet 40 mg  40 mg Oral BID WITH MEALS  
 [START ON 1/1/2021] predniSONE (DELTASONE) tablet 20 mg  20 mg Oral DAILY WITH BREAKFAST  [START ON 12/25/2020] predniSONE (DELTASONE) tablet 20 mg  20 mg Oral BID WITH MEALS  sodium chloride (NS) flush 5-10 mL  5-10 mL IntraVENous PRN  
 sodium chloride (NS) flush 5-40 mL  5-40 mL IntraVENous PRN  promethazine (PHENERGAN) tablet 12.5 mg  12.5 mg Oral Q6H PRN  Or  
  ondansetron (ZOFRAN) injection 4 mg  4 mg IntraVENous Q6H PRN  
 bisacodyL (DULCOLAX) tablet 5 mg  5 mg Oral DAILY PRN  
 famotidine (PEPCID) tablet 20 mg  20 mg Oral BID Objective:  
Vital Signs:   
Visit Vitals /80 (BP 1 Location: Left arm, BP Patient Position: Supine) Pulse (!) 112 Temp 98.4 °F (36.9 °C) Resp 18 Ht 5' 7\" (1.702 m) Wt 60.4 kg (133 lb 2.5 oz) SpO2 98% BMI 20.86 kg/m² O2 Device: Nasal cannula O2 Flow Rate (L/min): 6 l/min Temp (24hrs), Av °F (36.7 °C), Min:97.4 °F (36.3 °C), Max:98.4 °F (36.9 °C) Intake/Output:  
Last shift:       0701 -  190 In: 240 [P.O.:240] Out: 175 [Urine:175] Last 3 shifts:  190 -  0700 In: 720 [P.O.:720] Out: 2550 [Urine:2550] Intake/Output Summary (Last 24 hours) at 2020 1442 Last data filed at 2020 1400 Gross per 24 hour Intake 240 ml Output 1475 ml Net -1235 ml Physical Exam:  
 
General/Neurology: Alert, awake and oriented. NAD. Head:   NCAT. Eye:   EOM intact. No icterus/pallor/cyanosis. Neck:   Trachea midline. No palpable cervical lymphadenopathy. Lung: Moderate air entry bilateral equal.  No rales, rhonchi. No wheezing or stridor. No prolonged expiration or accessory muscle use. Heart:   S1 S2 present. No murmur or JVD. Abdomen:  Soft. NT. ND. Extremities:  No edema. No cyanosis or clubbing. Pulses: 2+ and symmetric in DP. Lymphatic:  No cervical or supraclavicular palpable lymphadenopathy. Data:  
   
Recent Results (from the past 24 hour(s)) CBC WITH AUTOMATED DIFF Collection Time: 20  5:10 AM  
Result Value Ref Range WBC 4.2 (L) 4.6 - 13.2 K/uL  
 RBC 3.22 (L) 4.70 - 5.50 M/uL HGB 9.6 (L) 13.0 - 16.0 g/dL HCT 27.6 (L) 36.0 - 48.0 % MCV 85.7 74.0 - 97.0 FL  
 MCH 29.8 24.0 - 34.0 PG  
 MCHC 34.8 31.0 - 37.0 g/dL  
 RDW 14.0 11.6 - 14.5 % PLATELET 447 665 - 933 K/uL  MPV 9.7 9.2 - 11.8 FL  
 NEUTROPHILS 87 (H) 40 - 73 % LYMPHOCYTES 6 (L) 21 - 52 % MONOCYTES 6 3 - 10 % EOSINOPHILS 1 0 - 5 % BASOPHILS 0 0 - 2 %  
 ABS. NEUTROPHILS 3.6 1.8 - 8.0 K/UL  
 ABS. LYMPHOCYTES 0.3 (L) 0.9 - 3.6 K/UL  
 ABS. MONOCYTES 0.2 0.05 - 1.2 K/UL  
 ABS. EOSINOPHILS 0.0 0.0 - 0.4 K/UL  
 ABS. BASOPHILS 0.0 0.0 - 0.1 K/UL  
 DF AUTOMATED METABOLIC PANEL, BASIC Collection Time: 12/24/20  5:10 AM  
Result Value Ref Range Sodium 126 (L) 136 - 145 mmol/L Potassium 4.7 3.5 - 5.5 mmol/L Chloride 94 (L) 100 - 111 mmol/L  
 CO2 21 21 - 32 mmol/L Anion gap 11 3.0 - 18 mmol/L Glucose 168 (H) 74 - 99 mg/dL BUN 13 7.0 - 18 MG/DL Creatinine 0.78 0.6 - 1.3 MG/DL  
 BUN/Creatinine ratio 17 12 - 20 GFR est AA >60 >60 ml/min/1.73m2 GFR est non-AA >60 >60 ml/min/1.73m2 Calcium 9.0 8.5 - 10.1 MG/DL  
GLUCOSE, POC Collection Time: 12/24/20  6:18 AM  
Result Value Ref Range Glucose (POC) 116 (H) 70 - 110 mg/dL Chemistry Recent Labs  
  12/24/20 
0510 12/23/20 
1045 12/23/20 
0045 12/22/20 
1455 *  --  138* 183* *  --  128* 129*  
K 4.7  --  4.6 4.0  
CL 94*  --  97* 98* CO2 21  --  23 22 BUN 13  --  10 8 CREA 0.78  --  0.69 0.89 CA 9.0  --  8.7 8.6 PHOS  --  3.6  --   --   
AGAP 11  --  8 9 BUCR 17  --  14 9* Lactic Acid No results found for: LAC No results for input(s): LAC in the last 72 hours. Liver Enzymes Protein, total  
Date Value Ref Range Status 12/21/2020 6.3 (L) 6.4 - 8.2 g/dL Final  
 
Albumin Date Value Ref Range Status 12/21/2020 1.8 (L) 3.4 - 5.0 g/dL Final  
 
Globulin Date Value Ref Range Status 12/21/2020 4.5 (H) 2.0 - 4.0 g/dL Final  
 
A-G Ratio Date Value Ref Range Status 12/21/2020 0.4 (L) 0.8 - 1.7   Final  
 
Alk. phosphatase Date Value Ref Range Status 12/21/2020 139 (H) 45 - 117 U/L Final  
 
No results for input(s): TP, ALB, GLOB, AGRAT, AP, TBIL in the last 72 hours. No lab exists for component: SGOT, GPT, DBIL  
 
CBC w/Diff Recent Labs  
  12/24/20 
0510 12/23/20 
0045 12/22/20 
1455 WBC 4.2* 4.9 10.0  
RBC 3.22* 2.89* 2.90* HGB 9.6* 8.7* 8.8* HCT 27.6* 24.7* 25.0*  
 285 306 GRANS 87* 91* 95* LYMPH 6* 4* 1* EOS 1 1 2 Cardiac Enzymes No results found for: CPK, CK, CKMMB, CKMB, RCK3, CKMBT, CKNDX, CKND1, ANIKA, TROPT, TROIQ, ZOË, TROPT, TNIPOC, BNP, BNPP  
 
BNP No results found for: BNP, BNPP, XBNPT Coagulation No results for input(s): PTP, INR, APTT, INREXT, INREXT in the last 72 hours. Thyroid  Lab Results Component Value Date/Time TSH 2.10 12/23/2020 10:45 AM  
   
No results found for: T4  
 
Urinalysis Lab Results Component Value Date/Time Color YELLOW 12/18/2020 12:14 AM  
 Appearance CLEAR 12/18/2020 12:14 AM  
 Specific gravity <1.005 (L) 12/18/2020 12:14 AM  
 pH (UA) 7.0 12/18/2020 12:14 AM  
 Protein Negative 12/18/2020 12:14 AM  
 Glucose Negative 12/18/2020 12:14 AM  
 Ketone Negative 12/18/2020 12:14 AM  
 Bilirubin Negative 12/18/2020 12:14 AM  
 Urobilinogen 1.0 12/18/2020 12:14 AM  
 Nitrites Negative 12/18/2020 12:14 AM  
 Leukocyte Esterase Negative 12/18/2020 12:14 AM  
 Epithelial cells 1+ 03/08/2018 09:11 PM  
 Bacteria FEW (A) 03/08/2018 09:11 PM  
 WBC 4 to 6 03/08/2018 09:11 PM  
 RBC NEGATIVE  03/08/2018 09:11 PM  
  
 
Micro  No results for input(s): SDES, CULT in the last 72 hours. No results for input(s): CULT in the last 72 hours. ABG Recent Labs  
  12/22/20 
1250 PHI 7.45  
PCO2I 28.2*  
PO2I 74* HCO3I 19.4*  
FIO2I 40  
  
 
 
 
CT (Most Recent) (CT chest reviewed by me) Results from [facility] [place] encounter on 12/17/20 CTA CHEST W OR W WO CONT Narrative EXAM: CTA Chest 
 
INDICATION: Shortness of breath. Possible PE. COMPARISON: No prior study. TECHNIQUE: Axial CT imaging from the thoracic inlet through the diaphragm with intravenous contrast utilizing CTA study for pulmonary artery evaluation. Coronal and sagittal MIP reformations were generated at a separate workstation. One or more dose reduction techniques were used on this CT: automated exposure 
control, adjustment of the mAs and/or kVp according to patient size, and 
iterative reconstruction techniques. The specific techniques used on this CT 
exam have been documented in the patient's electronic medical record. Digital 
Imaging and Communications in Medicine (DICOM) format image data are available 
to nonaffiliated external healthcare facilities or entities on a secure, media 
free, reciprocally searchable basis with patient authorization for at least a 
12-month period after this study. _______________ FINDINGS: 
 
EXAM QUALITY: Overall exam quality is suboptimal. Pulmonary arterial enhancement 
is adequate. Respiratory motion artifact is present, limiting evaluation. PULMONARY ARTERIES: No convincing evidence of pulmonary embolism. MEDIASTINUM: Normal heart size. No evidence of right heart strain. Aorta is 
unremarkable. No pericardial effusion. LYMPH NODES: No pathologically enlarged lymph nodes lymph nodes. AIRWAY: Unremarkable. LUNGS: Centrilobular emphysema. Superimposed scattered bilateral groundglass 
opacities throughout the lungs. PLEURA: No pleural effusion or pneumothorax. UPPER ABDOMEN: Visualized upper abdomen is unremarkable. OTHER: No acute or aggressive osseous abnormalities identified. _______________ Impression IMPRESSION: 
 
Suboptimal study secondary to motion artifact and bolus timing. No evidence of obvious central pulmonary embolism. Diffuse bilateral groundglass opacities, suspicious for atypical infection. Emphysema. XR (Most Recent). CXR  reviewed by me and compared with previous CXR Results from [facility] Encounter encounter on 12/17/20 XR CHEST PORT  Narrative EXAM: XR CHEST PORT 
 
 CLINICAL INDICATION/HISTORY: SOB, PCJ pneumonia 
-Additional: None COMPARISON: 12/20/2020, 12/18/2020 TECHNIQUE: Frontal view of the chest 
 
_______________ FINDINGS: 
 
HEART AND MEDIASTINUM: Midline cardiac silhouette, normal in size. Stable 
mediastinal and hilar structures LUNGS AND PLEURAL SPACES: Multifocal bilateral confluent perihilar, mid and 
lower lung opacities with mild increased right diaphragmatic confluence may be 
secondary to slight the lung volumes. Unchanged bilateral interstitial 
prominence. No pneumothorax or significant pleural effusion. BONY THORAX AND SOFT TISSUES: No acute or destructive osseous abnormality. _______________ Impression IMPRESSION: 
 
1. Interval developing right diaphragmatic patchy confluent/consolidation, 
superimposed upon extensive bilateral alveolar pneumonia. Diagnostic 
considerations include atelectasis in the setting of slight decreased lung 
volumes versus progressive right basilar disease. ·Please note: Voice-recognition software may have been used to generate this report, which may have resulted in some phonetic-based errors in grammar and contents. Even though attempts were made to correct all the mistakes, some may have been missed, and remained in the body of the document. Hali Kramer MD 
12/24/2020

## 2020-12-24 NOTE — PROGRESS NOTES
Hospitalist Progress Note 
 
Patient: Sergey Lozada MRN: [id number]  [facility]: [id number]   
YOB: 1990  Age: 30 y.o.  Sex: male   
DOA: 12/17/2020 LOS:  LOS: 7 days     
   
 
Patient Active Problem List  
Diagnosis Code  
• AIDS (acquired immune deficiency syndrome) ([facility]) B20  
• Bilateral pneumonia J18.9  
• Sepsis ([facility]) A41.9  
• Hyponatremia E87.1  
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  
• Acute respiratory distress R06.03  
• Hypoxia R09.02  
  
 
IMPRESSION and Plan: 
 
Sergey Lozada is a 30 y.o. male with  
Patient Active Problem List  
 Diagnosis Date Noted  
• Hypoxia 12/23/2020  
• Acute respiratory distress 12/22/2020  
• Pneumonia of both lungs due to Pneumocystis jirovecii ([facility]) 12/18/2020  
• AIDS (acquired immune deficiency syndrome) ([facility]) 12/17/2020  
• Bilateral pneumonia 12/17/2020  
• Sepsis ([facility]) 12/17/2020  
• Hyponatremia 12/17/2020  
 
Principal Problem: 
  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 
 
Active Problems: 
  AIDS (acquired immune deficiency syndrome) ([facility]) (12/17/2020) 
 
  Bilateral pneumonia (12/17/2020) 
 
  Sepsis ([facility]) (12/17/2020) 
 
  Hyponatremia (12/17/2020) 
 
  Acute respiratory distress (12/22/2020) 
 
  Hypoxia (12/23/2020) 
 
 
Sepsis with pneumonia, sepsis is resolved 
HIV/AIDS-CD4 count 6, untreated, high risk for further OI 
PJ pneumonia-on high dose bactrim and steroids as per ID recss, continues to be SOB with minimal activity but feels he is getting better 
Ac resp distress, hypoxia-stable with NC 02, continue nebs 
Debility and weakness, trial PT when resp status improves 
Tachycardia due to resp distress 
Malnutrition severe-add ensures with meals, checked phos, it is wnl 
Mild hyponatremia-add sodium bicarb tabs 
  
  
Plan: 
 
appriciate ID input 
meds as ordered 
  
Pulmonology consult appricated 
  
Supportive care 
  
Palliatve care consult 
  
 
 
Patient's condition is fair 
 
 Recommend to continue hospitalization. Discussed with patient. Chief Complaints: Chief Complaint Patient presents with  Shortness of Breath SUBJECTIVE: 
Pt is seen and examined. Chart reviwed Feels little stronger today No CP or SOB No Fever, chills, Nausea, vomitting. Review of systems: 
 
Review of Systems Constitutional: Positive for malaise/fatigue. HENT: Negative. Eyes: Negative. Respiratory: Positive for shortness of breath. Cardiovascular: Positive for leg swelling. Negative for chest pain, palpitations and orthopnea. Gastrointestinal: Negative. Negative for abdominal pain, diarrhea and heartburn. Genitourinary: Negative for dysuria and hematuria. Skin: Negative. Neurological: Positive for weakness. Psychiatric/Behavioral: Negative for depression, substance abuse and suicidal ideas. The patient is not nervous/anxious. PE: 
Patient Vitals for the past 24 hrs: 
 BP Temp Pulse Resp SpO2 Height Weight 12/24/20 1122 124/80 98.4 °F (36.9 °C) (!) 112 18 98 %    
12/24/20 0858      5' 7\" (1.702 m)   
12/24/20 0749 126/80 97.4 °F (36.3 °C) (!) 112 18 99 %    
12/24/20 0729     93 %    
12/24/20 0629       60.4 kg (133 lb 2.5 oz) 12/24/20 0322 130/85 98.2 °F (36.8 °C) 96 18 100 %    
12/24/20 0010 125/81 98.2 °F (36.8 °C) (!) 105 20 97 %    
12/23/20 2009     92 %    
12/23/20 1952 132/85 97.9 °F (36.6 °C) (!) 113 18 95 %    
12/23/20 1744 125/79 97.8 °F (36.6 °C) (!) 108 18 96 %    
12/23/20 1544   (!) 128      
12/23/20 1534     96 %   Intake/Output Summary (Last 24 hours) at 12/24/2020 1253 Last data filed at 12/24/2020 1124 Gross per 24 hour Intake 480 ml Output 1475 ml Net -995 ml Patient Vitals for the past 120 hrs: 
 Weight 12/19/20 1325 64.4 kg (142 lb) 12/21/20 0825 64.3 kg (141 lb 11.2 oz) 12/23/20 0040 101.8 kg (224 lb 6.9 oz) 12/24/20 0629 60.4 kg (133 lb 2.5 oz) Physical Exam 
Vitals signs and nursing note reviewed. Constitutional:   
   General: He is in acute distress. Neck: Musculoskeletal: Normal range of motion and neck supple. Vascular: No JVD. Cardiovascular:  
   Rate and Rhythm: Normal rate and regular rhythm. Heart sounds: Normal heart sounds. Pulmonary:  
   Effort: Respiratory distress present. Breath sounds: Normal breath sounds. Abdominal:  
   General: Bowel sounds are normal. There is no distension. Palpations: Abdomen is soft. Tenderness: There is no abdominal tenderness. There is no rebound. Musculoskeletal: Normal range of motion. Skin: 
   General: Skin is warm and dry. Neurological:  
   Mental Status: He is alert and oriented to person, place, and time. Psychiatric:     
   Mood and Affect: Affect normal.  
 
 
 
 
 
Intake and Output: 
Current Shift:  12/24 0701 - 12/24 1900 In: -  
Out: 426 [XXXGK:670] Last three shifts:  12/22 1901 - 12/24 0700 In: 720 [P.O.:720] Out: 2550 [Urine:2550] Lab/Data Reviewed: 
Recent Results (from the past 8 hour(s)) CBC WITH AUTOMATED DIFF Collection Time: 12/24/20  5:10 AM  
Result Value Ref Range WBC 4.2 (L) 4.6 - 13.2 K/uL  
 RBC 3.22 (L) 4.70 - 5.50 M/uL HGB 9.6 (L) 13.0 - 16.0 g/dL HCT 27.6 (L) 36.0 - 48.0 % MCV 85.7 74.0 - 97.0 FL  
 MCH 29.8 24.0 - 34.0 PG  
 MCHC 34.8 31.0 - 37.0 g/dL  
 RDW 14.0 11.6 - 14.5 % PLATELET 308 627 - 286 K/uL MPV 9.7 9.2 - 11.8 FL  
 NEUTROPHILS 87 (H) 40 - 73 % LYMPHOCYTES 6 (L) 21 - 52 % MONOCYTES 6 3 - 10 % EOSINOPHILS 1 0 - 5 % BASOPHILS 0 0 - 2 %  
 ABS. NEUTROPHILS 3.6 1.8 - 8.0 K/UL  
 ABS. LYMPHOCYTES 0.3 (L) 0.9 - 3.6 K/UL  
 ABS. MONOCYTES 0.2 0.05 - 1.2 K/UL  
 ABS. EOSINOPHILS 0.0 0.0 - 0.4 K/UL  
 ABS. BASOPHILS 0.0 0.0 - 0.1 K/UL  
 DF AUTOMATED METABOLIC PANEL, BASIC Collection Time: 12/24/20  5:10 AM  
Result Value Ref Range  Sodium 126 (L) 136 - 145 mmol/L  
 Potassium 4.7 3.5 - 5.5 mmol/L Chloride 94 (L) 100 - 111 mmol/L  
 CO2 21 21 - 32 mmol/L Anion gap 11 3.0 - 18 mmol/L Glucose 168 (H) 74 - 99 mg/dL BUN 13 7.0 - 18 MG/DL Creatinine 0.78 0.6 - 1.3 MG/DL  
 BUN/Creatinine ratio 17 12 - 20 GFR est AA >60 >60 ml/min/1.73m2 GFR est non-AA >60 >60 ml/min/1.73m2 Calcium 9.0 8.5 - 10.1 MG/DL  
GLUCOSE, POC Collection Time: 12/24/20  6:18 AM  
Result Value Ref Range Glucose (POC) 116 (H) 70 - 110 mg/dL Medications: 
Current Facility-Administered Medications Medication Dose Route Frequency  multivitamin, tx-iron-ca-min (THERA-M w/ IRON) tablet 1 Tab  1 Tab Oral DAILY  guaiFENesin (ROBITUSSIN) 100 mg/5 mL oral liquid 100 mg  100 mg Oral Q4H PRN  
 melatonin tablet 10 mg  10 mg Oral QHS  sodium bicarbonate tablet 325 mg  325 mg Oral BID  trimethoprim-sulfamethoxazole (BACTRIM) 500 mg in dextrose 5% 500 mL  500 mg IntraVENous Q8H  
 levalbuterol (XOPENEX) nebulizer soln 1.25 mg/0.5 ml  1.25 mg Nebulization Q4H RT  
 ipratropium (ATROVENT) 0.02 % nebulizer solution 0.5 mg  0.5 mg Nebulization Q4H PRN  
 budesonide (PULMICORT) 500 mcg/2 ml nebulizer suspension  500 mcg Nebulization BID RT  
 levalbuterol (XOPENEX) nebulizer soln 1.25 mg/0.5 ml  1.25 mg Nebulization Q4H PRN  
 enoxaparin (LOVENOX) injection 40 mg  40 mg SubCUTAneous Q24H  
 influenza vaccine 2020-21 (6 mos+)(PF) (FLUARIX/FLULAVAL/FLUZONE QUAD) injection 0.5 mL  0.5 mL IntraMUSCular PRIOR TO DISCHARGE  calcium carbonate (TUMS) chewable tablet 200 mg [elemental]  200 mg Oral TID PRN  
 ascorbic acid (vitamin C) (VITAMIN C) tablet 500 mg  500 mg Oral BID  predniSONE (DELTASONE) tablet 40 mg  40 mg Oral BID WITH MEALS  
 [START ON 1/1/2021] predniSONE (DELTASONE) tablet 20 mg  20 mg Oral DAILY WITH BREAKFAST  [START ON 12/25/2020] predniSONE (DELTASONE) tablet 20 mg  20 mg Oral BID WITH MEALS  
  sodium chloride (NS) flush 5-10 mL  5-10 mL IntraVENous PRN  
 sodium chloride (NS) flush 5-40 mL  5-40 mL IntraVENous PRN  promethazine (PHENERGAN) tablet 12.5 mg  12.5 mg Oral Q6H PRN Or  
 ondansetron (ZOFRAN) injection 4 mg  4 mg IntraVENous Q6H PRN  
 bisacodyL (DULCOLAX) tablet 5 mg  5 mg Oral DAILY PRN  
 famotidine (PEPCID) tablet 20 mg  20 mg Oral BID Recent Results (from the past 24 hour(s)) CBC WITH AUTOMATED DIFF Collection Time: 12/24/20  5:10 AM  
Result Value Ref Range WBC 4.2 (L) 4.6 - 13.2 K/uL  
 RBC 3.22 (L) 4.70 - 5.50 M/uL HGB 9.6 (L) 13.0 - 16.0 g/dL HCT 27.6 (L) 36.0 - 48.0 % MCV 85.7 74.0 - 97.0 FL  
 MCH 29.8 24.0 - 34.0 PG  
 MCHC 34.8 31.0 - 37.0 g/dL  
 RDW 14.0 11.6 - 14.5 % PLATELET 575 565 - 234 K/uL MPV 9.7 9.2 - 11.8 FL  
 NEUTROPHILS 87 (H) 40 - 73 % LYMPHOCYTES 6 (L) 21 - 52 % MONOCYTES 6 3 - 10 % EOSINOPHILS 1 0 - 5 % BASOPHILS 0 0 - 2 %  
 ABS. NEUTROPHILS 3.6 1.8 - 8.0 K/UL  
 ABS. LYMPHOCYTES 0.3 (L) 0.9 - 3.6 K/UL  
 ABS. MONOCYTES 0.2 0.05 - 1.2 K/UL  
 ABS. EOSINOPHILS 0.0 0.0 - 0.4 K/UL  
 ABS. BASOPHILS 0.0 0.0 - 0.1 K/UL  
 DF AUTOMATED METABOLIC PANEL, BASIC Collection Time: 12/24/20  5:10 AM  
Result Value Ref Range Sodium 126 (L) 136 - 145 mmol/L Potassium 4.7 3.5 - 5.5 mmol/L Chloride 94 (L) 100 - 111 mmol/L  
 CO2 21 21 - 32 mmol/L Anion gap 11 3.0 - 18 mmol/L Glucose 168 (H) 74 - 99 mg/dL BUN 13 7.0 - 18 MG/DL Creatinine 0.78 0.6 - 1.3 MG/DL  
 BUN/Creatinine ratio 17 12 - 20 GFR est AA >60 >60 ml/min/1.73m2 GFR est non-AA >60 >60 ml/min/1.73m2 Calcium 9.0 8.5 - 10.1 MG/DL  
GLUCOSE, POC Collection Time: 12/24/20  6:18 AM  
Result Value Ref Range Glucose (POC) 116 (H) 70 - 110 mg/dL Procedures/imaging: see electronic medical records for all procedures/Xrays and details which were not copied into this note but were reviewed prior to creation of Plan Diannah Schwab, MD  
12/24/2020, 12:53 PM

## 2020-12-24 NOTE — PROGRESS NOTES
2330-Resting in bed, stable. Call light and personal items within reach. White board updated. 2355-Assessment complete. Stable. Call light and personal items within reach. See shift assessment notes for details. 0242-Resting quietly in bed. Stable. Call light and personal items within reach. 0629-Resting quietly in bed. Stable. 0740-Patient requesting cough syrup to help with cough and soreness to chest. Respiratory Therapist notified previously  of patient complaint; breathing treatment to be administered by Respiratory Therapist. 
 
Shift Summary:  Stable at shift change report.

## 2020-12-24 NOTE — PROGRESS NOTES
7998 
Assumed care of pt at this time. Assessment complete. Pt alert and oriented x 4. Shows no sign of distress. Fall risk arm band in place. Denies SOB and chest pain. Pt lungs clear on 6L NC O2 bilaterally. Cap refill  less than 3 seconds. Pt denies numbness and tingling to all extremities. Stated pain 6/10. Pt has 20 G IV to R AC. Pt has no dressing skin intact. On lovonox for VTE ncentive spirometer at bedside. Pt encouraged to continue use of IS. Pt verbalized understanding. Ice pack applied. Call light and possessions within reach. Bed locked and in low position. Will continue to monitor. [address] Verbal order with readback for robitussin order from Dr Vikki Schirmer Shift summary Pt is alert and oriented x 4. Pt had uneventful shift. Plan to continue abx, steroids and robitussin

## 2020-12-24 NOTE — PROGRESS NOTES
Problem: Risk for Spread of Infection Goal: Prevent transmission of infectious organism to others Description: Prevent the transmission of infectious organisms to other patients, staff members, and visitors. Outcome: Progressing Towards Goal 
  
Problem: Patient Education:  Go to Education Activity Goal: Patient/Family Education Outcome: Progressing Towards Goal 
  
Problem: Pressure Injury - Risk of 
Goal: *Prevention of pressure injury Description: Document Josep Scale and appropriate interventions in the flowsheet. Outcome: Progressing Towards Goal 
Note: Pressure Injury Interventions: 
Sensory Interventions: Assess changes in LOC, Assess need for specialty bed, Avoid rigorous massage over bony prominences Moisture Interventions: Absorbent underpads, Apply protective barrier, creams and emollients, Assess need for specialty bed Activity Interventions: Assess need for specialty bed, Increase time out of bed, Pressure redistribution bed/mattress(bed type), PT/OT evaluation Mobility Interventions: Assess need for specialty bed, HOB 30 degrees or less, Pressure redistribution bed/mattress (bed type), PT/OT evaluation Nutrition Interventions: Document food/fluid/supplement intake, Discuss nutritional consult with provider Friction and Shear Interventions: Apply protective barrier, creams and emollients, Feet elevated on foot rest, Foam dressings/transparent film/skin sealants, HOB 30 degrees or less Problem: Patient Education: Go to Patient Education Activity Goal: Patient/Family Education Outcome: Progressing Towards Goal 
  
Problem: Falls - Risk of 
Goal: *Absence of Falls Description: Document Carlos Stokes Fall Risk and appropriate interventions in the flowsheet. Outcome: Progressing Towards Goal 
Note: Fall Risk Interventions: Mobility Interventions: Assess mobility with egress test, Bed/chair exit alarm, Communicate number of staff needed for ambulation/transfer, OT consult for ADLs, Patient to call before getting OOB Mentation Interventions: Adequate sleep, hydration, pain control, Bed/chair exit alarm, Door open when patient unattended, Evaluate medications/consider consulting pharmacy Medication Interventions: Assess postural VS orthostatic hypotension, Evaluate medications/consider consulting pharmacy, Patient to call before getting OOB, Teach patient to arise slowly Elimination Interventions: Bed/chair exit alarm, Call light in reach, Elevated toilet seat Problem: Patient Education: Go to Patient Education Activity Goal: Patient/Family Education Outcome: Progressing Towards Goal

## 2020-12-24 NOTE — PROGRESS NOTES
[id number]: The pt rested well with no complaints. No new clinical issues. Pt still tolerating 6 L NC on humidity well. No complaints of pain.

## 2020-12-25 NOTE — PROGRESS NOTES
Hospitalist Progress Note Patient: Ashleigh Villalpando MRN: [id number]  CSN: [id number] YOB: 1990  Age: 31 y.o. Sex: male DOA: 12/17/2020 LOS:  LOS: 8 days Patient Active Problem List  
Diagnosis Code  AIDS (acquired immune deficiency syndrome) ([facility]) B20  
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  
 Acute respiratory distress R06.03  
 Hypoxia R09.02 IMPRESSION and Plan: 
 
Ashleigh Villalpando is a 31 y.o. male with Patient Active Problem List  
 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 
PJ pneumonia-on high dose bactrim and steroids as per ID recss, continues to be SOB with minimal activity but feels he is getting better Ac resp distress, hypoxia-stable with NC 02, continue nebs Debility and weakness, trial PT when resp status improves Tachycardia due to resp distress Malnutrition severe-add ensures with meals, checked phos, it is wnl Mild hyponatremia-add sodium bicarb tabs 
  
  
Plan: 
Repeat cxr 
OOB and ambulate O2 as ordeered 
appriciate ID  And pulmonary 
input 
meds as ordered 
  
Pulmonology consult appricated 
  
Supportive care 
  
 Palliatve care consult Patient's condition is fair Recommend to continue hospitalization. Discussed with patient. Chief Complaints: Chief Complaint Patient presents with  Shortness of Breath SUBJECTIVE: 
Pt is seen and examined. \"I can breath better today\" Review of systems: 
 
Review of Systems Constitutional: Positive for malaise/fatigue. HENT: Negative. Eyes: Negative. Respiratory: Positive for shortness of breath. Cardiovascular: Positive for leg swelling. Negative for chest pain, palpitations and orthopnea. Gastrointestinal: Negative. Negative for abdominal pain, diarrhea and heartburn. Genitourinary: Negative for dysuria and hematuria. Skin: Negative. Neurological: Positive for weakness. Psychiatric/Behavioral: Negative for depression, substance abuse and suicidal ideas. The patient is not nervous/anxious. PE: 
Patient Vitals for the past 24 hrs: 
 BP Temp Pulse Resp SpO2 Weight 12/25/20 1204 100/78 98.2 °F (36.8 °C) 99 18 93 %   
12/25/20 1116     90 %   
12/25/20 1100     (!) 89 %   
12/25/20 0806 120/79 97.8 °F (36.6 °C) (!) 110 18 94 %   
12/25/20 0736     90 %   
12/25/20 0431 122/83 97.6 °F (36.4 °C) (!) 107 18  60.3 kg (133 lb) 12/24/20 2324 119/84 97.6 °F (36.4 °C) 92 18 94 %   
12/24/20 1942 128/82 98.7 °F (37.1 °C) (!) 103 18 93 %   
12/24/20 1549     98 %   
12/24/20 1545 125/80 97.3 °F (36.3 °C) (!) 112 18 99 %  Intake/Output Summary (Last 24 hours) at 12/25/2020 1221 Last data filed at 12/25/2020 0801 Gross per 24 hour Intake 240 ml Output 600 ml Net -360 ml Patient Vitals for the past 120 hrs: 
 Weight 12/21/20 0825 64.3 kg (141 lb 11.2 oz) 12/23/20 0040 101.8 kg (224 lb 6.9 oz) 12/24/20 0629 60.4 kg (133 lb 2.5 oz) 12/25/20 0431 60.3 kg (133 lb) Physical Exam 
Vitals signs and nursing note reviewed. Constitutional:   
   General: He is in acute distress. Neck: Musculoskeletal: Normal range of motion and neck supple. Vascular: No JVD. Cardiovascular:  
   Rate and Rhythm: Normal rate and regular rhythm. Heart sounds: Normal heart sounds. Pulmonary:  
   Effort: Respiratory distress present. Breath sounds: Normal breath sounds. Abdominal:  
   General: Bowel sounds are normal. There is no distension. Palpations: Abdomen is soft. Tenderness: There is no abdominal tenderness. There is no rebound. Musculoskeletal: Normal range of motion. Skin: 
   General: Skin is warm and dry. Neurological:  
   Mental Status: He is alert and oriented to person, place, and time. Psychiatric:     
   Mood and Affect: Affect normal.  
 
 
 
 
 
Intake and Output: 
Current Shift:  No intake/output data recorded. Last three shifts:  12/23 1901 - 12/25 0700 In: 240 [P.O.:240] Out: 1275 [QRZEY:1420] Lab/Data Reviewed: 
Recent Results (from the past 8 hour(s)) GLUCOSE, POC Collection Time: 12/25/20  6:16 AM  
Result Value Ref Range Glucose (POC) 174 (H) 70 - 110 mg/dL GLUCOSE, POC Collection Time: 12/25/20 12:03 PM  
Result Value Ref Range Glucose (POC) 107 70 - 110 mg/dL Medications: 
Current Facility-Administered Medications Medication Dose Route Frequency  multivitamin, tx-iron-ca-min (THERA-M w/ IRON) tablet 1 Tab  1 Tab Oral DAILY  guaiFENesin (ROBITUSSIN) 100 mg/5 mL oral liquid 100 mg  100 mg Oral Q4H PRN  
 melatonin tablet 10 mg  10 mg Oral QHS  sodium bicarbonate tablet 325 mg  325 mg Oral BID  trimethoprim-sulfamethoxazole (BACTRIM) 500 mg in dextrose 5% 500 mL  500 mg IntraVENous Q8H  
 levalbuterol (XOPENEX) nebulizer soln 1.25 mg/0.5 ml  1.25 mg Nebulization Q4H RT  
 ipratropium (ATROVENT) 0.02 % nebulizer solution 0.5 mg  0.5 mg Nebulization Q4H PRN  
 budesonide (PULMICORT) 500 mcg/2 ml nebulizer suspension  500 mcg Nebulization BID RT  
  levalbuterol (XOPENEX) nebulizer soln 1.25 mg/0.5 ml  1.25 mg Nebulization Q4H PRN  
 enoxaparin (LOVENOX) injection 40 mg  40 mg SubCUTAneous Q24H  
 influenza vaccine 2020-21 (6 mos+)(PF) (FLUARIX/FLULAVAL/FLUZONE QUAD) injection 0.5 mL  0.5 mL IntraMUSCular PRIOR TO DISCHARGE  calcium carbonate (TUMS) chewable tablet 200 mg [elemental]  200 mg Oral TID PRN  
 ascorbic acid (vitamin C) (VITAMIN C) tablet 500 mg  500 mg Oral BID  [START ON 1/1/2021] predniSONE (DELTASONE) tablet 20 mg  20 mg Oral DAILY WITH BREAKFAST  predniSONE (DELTASONE) tablet 20 mg  20 mg Oral BID WITH MEALS  sodium chloride (NS) flush 5-10 mL  5-10 mL IntraVENous PRN  
 sodium chloride (NS) flush 5-40 mL  5-40 mL IntraVENous PRN  promethazine (PHENERGAN) tablet 12.5 mg  12.5 mg Oral Q6H PRN Or  
 ondansetron (ZOFRAN) injection 4 mg  4 mg IntraVENous Q6H PRN  
 bisacodyL (DULCOLAX) tablet 5 mg  5 mg Oral DAILY PRN  
 famotidine (PEPCID) tablet 20 mg  20 mg Oral BID Recent Results (from the past 24 hour(s)) GLUCOSE, POC Collection Time: 12/24/20  9:17 PM  
Result Value Ref Range Glucose (POC) 200 (H) 70 - 110 mg/dL CBC WITH AUTOMATED DIFF Collection Time: 12/25/20  3:20 AM  
Result Value Ref Range WBC 3.5 (L) 4.6 - 13.2 K/uL  
 RBC 3.61 (L) 4.70 - 5.50 M/uL  
 HGB 10.9 (L) 13.0 - 16.0 g/dL HCT 30.7 (L) 36.0 - 48.0 % MCV 85.0 74.0 - 97.0 FL  
 MCH 30.2 24.0 - 34.0 PG  
 MCHC 35.5 31.0 - 37.0 g/dL  
 RDW 14.2 11.6 - 14.5 % PLATELET 526 799 - 278 K/uL MPV 10.2 9.2 - 11.8 FL  
 NEUTROPHILS 89 (H) 40 - 73 % LYMPHOCYTES 3 (L) 21 - 52 % MONOCYTES 7 3 - 10 % EOSINOPHILS 1 0 - 5 % BASOPHILS 0 0 - 2 %  
 ABS. NEUTROPHILS 3.1 1.8 - 8.0 K/UL  
 ABS. LYMPHOCYTES 0.1 (L) 0.9 - 3.6 K/UL  
 ABS. MONOCYTES 0.3 0.05 - 1.2 K/UL  
 ABS. EOSINOPHILS 0.0 0.0 - 0.4 K/UL  
 ABS. BASOPHILS 0.0 0.0 - 0.1 K/UL  
 DF AUTOMATED MAGNESIUM  Collection Time: 12/25/20  3:20 AM  
 Result Value Ref Range Magnesium 2.4 1.6 - 2.6 mg/dL METABOLIC PANEL, COMPREHENSIVE Collection Time: 12/25/20  3:20 AM  
Result Value Ref Range Sodium 125 (L) 136 - 145 mmol/L Potassium 5.2 3.5 - 5.5 mmol/L Chloride 92 (L) 100 - 111 mmol/L  
 CO2 23 21 - 32 mmol/L Anion gap 10 3.0 - 18 mmol/L Glucose 153 (H) 74 - 99 mg/dL BUN 14 7.0 - 18 MG/DL Creatinine 0.67 0.6 - 1.3 MG/DL  
 BUN/Creatinine ratio 21 (H) 12 - 20 GFR est AA >60 >60 ml/min/1.73m2 GFR est non-AA >60 >60 ml/min/1.73m2 Calcium 9.6 8.5 - 10.1 MG/DL Bilirubin, total 0.2 0.2 - 1.0 MG/DL  
 ALT (SGPT) 119 (H) 16 - 61 U/L  
 AST (SGOT) 57 (H) 10 - 38 U/L Alk. phosphatase 262 (H) 45 - 117 U/L Protein, total 8.1 6.4 - 8.2 g/dL Albumin 3.5 3.4 - 5.0 g/dL Globulin 4.6 (H) 2.0 - 4.0 g/dL A-G Ratio 0.8 0.8 - 1.7 PHOSPHORUS Collection Time: 12/25/20  3:20 AM  
Result Value Ref Range Phosphorus 5.8 (H) 2.5 - 4.9 MG/DL  
GLUCOSE, POC Collection Time: 12/25/20  6:16 AM  
Result Value Ref Range Glucose (POC) 174 (H) 70 - 110 mg/dL GLUCOSE, POC Collection Time: 12/25/20 12:03 PM  
Result Value Ref Range Glucose (POC) 107 70 - 110 mg/dL Procedures/imaging: see electronic medical records for all procedures/Xrays and details which were not copied into this note but were reviewed prior to creation of Siddhartha Lara MD  
12/25/2020, 12:53 PM

## 2020-12-25 NOTE — ROUTINE PROCESS
Bedside and Verbal shift change report given to [address] [place] [address] Avenue (oncoming nurse) by Alfredo Franks RN (offgoing nurse). Report included the following information SBAR, Kardex, MAR and Recent Results.

## 2020-12-25 NOTE — PROGRESS NOTES
Problem: Mobility Impaired (Adult and Pediatric) Goal: *Acute Goals and Plan of Care (Insert Text) Description: Physical Therapy Goals Initiated 12/25/2020 and to be accomplished within 7 day(s) 1. Patient will move from supine to sit and sit to supine  in bed with modified independence. 2.  Patient will transfer from bed to chair and chair to bed with modified independence using the least restrictive device. 3.  Patient will perform sit to stand with supervision/set-up. 4.  Patient will ambulate with supervision/set-up for 50 feet with the least restrictive device. Outcome: Progressing Towards Goal 
Note: PHYSICAL THERAPY EVALUATION Patient: Yvonne Yoon (18 y.o. male) Date: 12/25/2020 Primary Diagnosis: HIV infection ([facility] [address] [B20] Bilateral pneumonia [J18.9] Precautions:   Fall PLOF: Pt was completely independent prior to this. Never used any AD or had any mobility impairments. Lives in [address]st floor [address] with his Mom and Uncle. They have a lot of medical equipment from his Uncle including rollator, RW, pulse oximeter, shower chair, and extra wheelchair. ASSESSMENT : 
Based on the objective data described below, the patient presents with severely decreased activity tolerance and strength inhibiting his functional mobility. RN ok's PT eval. Pt supine upon arrival; agreeable to tx with encouragement and education about the purpose/benefits of PT. Pt states he got to the [facility] yesterday with the RN and it completely wiped him out so he does not want to do much today. Pt appears SOB even while laying in bed. O2 saturation at 91% at rest (4L O2). Supine to long sitting in bed with mod A. Pt immediately becomes increasingly SOB. Pt educated on PLB technique. Pt desats to 89% with activity. Pt requires time to recover but able to continue long sitting for ~5 minutes. Pt returns to supine with CGA. Pt educated on HEP for in bed exercises and encouraged to move a little every hour. Pt left in position of comfort, all needs within reach. RN updated. Patient will benefit from skilled intervention to address the above impairments. Patient's rehabilitation potential is considered to be Good Factors which may influence rehabilitation potential include:  
[]         None noted 
[]         Mental ability/status [x]         Medical condition 
[x]         Home/family situation and support systems 
[x]         Safety awareness 
[]         Pain tolerance/management 
[]         Other: PLAN : 
Recommendations and Planned Interventions:  
[x]           Bed Mobility Training             [x]    Neuromuscular Re-Education 
[x]           Transfer Training                   []    Orthotic/Prosthetic Training 
[x]           Gait Training                          []    Modalities [x]           Therapeutic Exercises           []    Edema Management/Control 
[x]           Therapeutic Activities            [x]    Family Training/Education 
[x]           Patient Education 
[]           Other (comment): Frequency/Duration: Patient will be followed by physical therapy 1-2 times per day/4-7 days per week to address goals. Discharge Recommendations: Home Health Further Equipment Recommendations for Discharge: N/A  
 
SUBJECTIVE:  
Patient stated I don't feel like doing much today.  OBJECTIVE DATA SUMMARY:  
 
Past Medical History:  
Diagnosis Date Asthma Chlamydia Herpes zoster HIV (human immunodeficiency virus infection) ([facility] [address] Syphilis History reviewed. No pertinent surgical history. Barriers to Learning/Limitations: yes;  emotional 
Compensate with: Visual Cues, Verbal Cues, and Tactile Cues Home Situation: 
Home Situation Home Environment: Private residence # Steps to Enter: 0 One/Two Story Residence: One story Living Alone: No 
Support Systems: Family member(s) Patient Expects to be Discharged to[de-identified] Private residence Current DME Used/Available at Home: Shower chair, Walker, rollator, Walker, rolling, Wheelchair, Other (comment)(pulse oximeter) Tub or Shower Type: Tub/Shower combination Critical Behavior: 
Neurologic State: Alert; Appropriate for age Orientation Level: Oriented X4 Cognition: Appropriate decision making Safety/Judgement: Awareness of environment Psychosocial 
Patient Behaviors: Calm; Cooperative;Lethargic Skin Condition/Temp: Warm;Dry;Flaky Skin Integrity: Intact Skin Integumentary Skin Condition/Temp: Warm;Dry;Flaky Skin Integrity: Intact Strength:   
Strength: Generally decreased, functional 
Tone & Sensation:  
Tone: Normal 
Sensation: Intact Range Of Motion: 
AROM: Generally decreased, functional 
Functional Mobility: 
Bed Mobility: 
Rolling: Independent Supine to Sit: Moderate assistance Sit to Supine: Contact guard assistance Scooting: Contact guard assistance Balance:  
Sitting: Intact; With support Therapeutic Exercises:  
AP, QS, GS, HS, UE elevation Pain: 
Pain level pre-treatment: 0/10 Pain level post-treatment: 0/10 Pain Intervention(s) : Medication (see MAR); Rest, Ice, Repositioning Response to intervention: Nurse notified, See doc flow Activity Tolerance:  
Poor Please refer to the flowsheet for vital signs taken during this treatment. After treatment:  
[]         Patient left in no apparent distress sitting up in chair 
[x]         Patient left in no apparent distress in bed 
[x]         Call bell left within reach [x]         Nursing notified 
[]         Caregiver present 
[]         Bed alarm activated 
[]         SCDs applied COMMUNICATION/EDUCATION:  
[x]         Role of Physical Therapy in the acute care setting. [x]         Fall prevention education was provided and the patient/caregiver indicated understanding. [x]         Patient/family have participated as able in goal setting and plan of care. [x]         Patient/family agree to work toward stated goals and plan of care. []         Patient understands intent and goals of therapy, but is neutral about his/her participation. []         Patient is unable to participate in goal setting/plan of care: ongoing with therapy staff. 
[]         Other: Thank you for this referral. 
Anand Baker, PT Time Calculation: 9 mins Eval Complexity: History: MEDIUM  Complexity : 1-2 comorbidities / personal factors will impact the outcome/ POC Exam:MEDIUM Complexity : 3 Standardized tests and measures addressing body structure, function, activity limitation and / or participation in recreation  Presentation: MEDIUM Complexity : Evolving with changing characteristics  Clinical Decision Making:Medium Complexity    Overall Complexity:MEDIUM

## 2020-12-25 NOTE — PROGRESS NOTES
0730: Bedside and Verbal shift change report given to [id number] (oncoming nurse) by Grzegorz Savage RN (offgoing nurse). Report included the following information SBAR and Kardex. 0845: MD Krista Tamez visit noted with pt, MD discuss tx plan for the day, pt acknowledge tx plan, continue to monitor pt for any change Radha Tamez RN 
 
1100: PT makes this nurse aware assessment pending Radha Tamez RN 
 
7246: pt refuses PT 
Radha Tamez RN 
 
[phone]: Rx contacts this nurse in reference to pt bactrim IV medication to see if it still needs to be administered, this nurse makes Rx aware medication needs to be administered Rx makes this nurse aware medication will be mixed/made and sent to the [facility] Radha Tamez RN 
 
6827: request transport to come at a later time in order to visit with family member Radha Tamez RN 
 
3150: transport to x ray Radha Tamez RN 
 
4672: return from x ray Radha Tamez RN 
 
9607: pt request tylenol for discomfort of the back, MD Krista Tamez give order for tylenol 650mg p.o q8hrs Radha Tamez RN 
 
9481: Rx made aware Bactrim IV needs to be brought to the [facility] Radha Tamez RN 
 
9842: report given to DREW Taemz RN

## 2020-12-25 NOTE — PROGRESS NOTES
Problem: Risk for Spread of Infection Goal: Prevent transmission of infectious organism to others Description: Prevent the transmission of infectious organisms to other patients, staff members, and visitors. Outcome: Progressing Towards Goal 
  
Problem: Patient Education:  Go to Education Activity Goal: Patient/Family Education Outcome: Progressing Towards Goal 
  
Problem: Pressure Injury - Risk of 
Goal: *Prevention of pressure injury Description: Document Josep Scale and appropriate interventions in the flowsheet. Outcome: Progressing Towards Goal 
Note: Pressure Injury Interventions: 
Sensory Interventions: Assess changes in LOC, Maintain/enhance activity level, Minimize linen layers, Keep linens dry and wrinkle-free, Monitor skin under medical devices, Pad between skin to skin, Pressure redistribution bed/mattress (bed type) Moisture Interventions: Absorbent underpads, Check for incontinence Q2 hours and as needed, Limit adult briefs Activity Interventions: Increase time out of bed, Pressure redistribution bed/mattress(bed type), PT/OT evaluation Mobility Interventions: HOB 30 degrees or less, Pressure redistribution bed/mattress (bed type), PT/OT evaluation Nutrition Interventions: Document food/fluid/supplement intake Friction and Shear Interventions: HOB 30 degrees or less, Lift sheet, Lift team/patient mobility team, Minimize layers Problem: Patient Education: Go to Patient Education Activity Goal: Patient/Family Education Outcome: Progressing Towards Goal 
  
Problem: Falls - Risk of 
Goal: *Absence of Falls Description: Document Leida Alcantara Fall Risk and appropriate interventions in the flowsheet. Outcome: Progressing Towards Goal 
Note: Fall Risk Interventions: 
Mobility Interventions: Assess mobility with egress test, Communicate number of staff needed for ambulation/transfer Mentation Interventions: Adequate sleep, hydration, pain control Medication Interventions: Teach patient to arise slowly, Patient to call before getting OOB Elimination Interventions: Call light in reach, Patient to call for help with toileting needs, Urinal in reach Problem: Patient Education: Go to Patient Education Activity Goal: Patient/Family Education Outcome: Progressing Towards Goal 
  
Problem: Nutrition Deficit Goal: *Optimize nutritional status Outcome: Progressing Towards Goal

## 2020-12-25 NOTE — PROGRESS NOTES
[facility] Pulmonary, Critical Care, and Sleep Medicine Name: Roz Rosales MRN: [id number] : 1990 Hospital: [facility] [place] Date: 2020 PCCM Note Consult requesting physician: Dr. Mary Orlando Reason for Consult: Likely PJP pneumonia, hypoxia IMPRESSION:  
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  
Diagnosis Code  AIDS (acquired immune deficiency syndrome) ([facility]) B20  
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  
 Acute respiratory distress R06.03  
 Hypoxia R09.02  
 
  
  
RECOMMENDATIONS:  
Bilateral extensive pneumonia with hypoxemia in a patient with HIV, COVID-19 negative; likely PJP pneumonia. Continue IV Bactrim per ID. Continue prednisone p.o. for 21-day course as planned. Continue O2 and titrate to keep SPO2 more than 91%. FiO2 requirement is improved to 4 LPM now. Fungitell elevated 455. LDH elevated: 560. Expectorated sputum for PJP: Pending. Sputum culture: Light normal dequan. Urine antigen panel: Negative. COVID-19: Negative. LFT elevated, defer to hospitalist. 
 
CT chest with bilateral diffuse GGO and underlying emphysema. He may have mild pulmonary fibrosis in the background but due to motion artifact it is difficult with exclude. Bronchodilators: Budesonide twice daily, Atrovent as needed. Airway clearance: Continue incentive spirometer; advised to use often. Out of bed. FiO2 to keep SpO2 >=92%, HOB >=30 degree, aspiration precautions, aggressive pulmonary toileting, incentive spirometry, PT/OT eval and treat, mobilization. DVT Prophylaxis: Lovenox GI Prophylaxis: Pepcid Other issues management by primary team and respective consultants. Further recommendations will be based on the patient's response to recommended treatment and results of the investigation ordered. Quality Care: PPI, DVT prophylaxis, HOB elevated, infection control all reviewed and addressed. Discussed with patient, radiologic work up showed, answered all questions to their satisfaction. Discussed with Dr. Lauri Singh. Care plan discussed with nursing. Subjective/History:  
 
Urszula Herman is a 31 y.o. male with PMHx significant for HIV diagnosed couple years ago, not on treatment; admitted with weight loss, ESCOTO; COVID-19 negative x2; diagnosed with atypical looking pneumonia/GGO on CT chest which is negative for PE, suspected for PJP pneumonia. Initially treated with Rocephin and Zithromax. Seen by Dr. Lauri Singh: Elevated LDH, PCT 0.57. Antibiotics changed to Bactrim and started on steroids. HIV viral load elevated. 12/25/2020 Remains in room 342. FiO2 reduced to 4 LPM now. Persistent fatigue and tiredness. Dyspnea with minimal exertion. Cough with clear to white expectoration. No hemoptysis. No fever, night sweats. No other overnight issues reported. Review of Systems:  
HEENT: No epistaxis, no nasal drainage, no difficulty in swallowing, no redness in eyes Respiratory: as above Cardiovascular: no palpitations, no chronic leg edema, no syncope Gastrointestinal: no abd pain, no vomiting, no diarrhea, no bleeding symptoms Genitourinary: No urinary symptoms or hematuria Integument/breast: No ulcers or rashes Musculoskeletal:Neg 
Neurological: No focal weakness, no seizures, no headaches Behvioral/Psych: No anxiety, no depression Constitutional: No fever, no chills,  no night sweats No Known Allergies Past Medical History:  
Diagnosis Date  Asthma  Chlamydia  Herpes zoster  HIV (human immunodeficiency virus infection) ([facility] [address]  Syphilis History reviewed. No pertinent surgical history. History reviewed. No pertinent family history. Social History Tobacco Use  Smoking status: Former Smoker  Smokeless tobacco: Never Used Substance Use Topics  Alcohol use: Yes Comment: socially Prior to Admission medications Not on File Current Facility-Administered Medications Medication Dose Route Frequency  multivitamin, tx-iron-ca-min (THERA-M w/ IRON) tablet 1 Tab  1 Tab Oral DAILY  guaiFENesin (ROBITUSSIN) 100 mg/5 mL oral liquid 100 mg  100 mg Oral Q4H PRN  
 melatonin tablet 10 mg  10 mg Oral QHS  sodium bicarbonate tablet 325 mg  325 mg Oral BID  trimethoprim-sulfamethoxazole (BACTRIM) 500 mg in dextrose 5% 500 mL  500 mg IntraVENous Q8H  
 levalbuterol (XOPENEX) nebulizer soln 1.25 mg/0.5 ml  1.25 mg Nebulization Q4H RT  
 ipratropium (ATROVENT) 0.02 % nebulizer solution 0.5 mg  0.5 mg Nebulization Q4H PRN  
 budesonide (PULMICORT) 500 mcg/2 ml nebulizer suspension  500 mcg Nebulization BID RT  
 levalbuterol (XOPENEX) nebulizer soln 1.25 mg/0.5 ml  1.25 mg Nebulization Q4H PRN  
 enoxaparin (LOVENOX) injection 40 mg  40 mg SubCUTAneous Q24H  
 influenza vaccine 2020-21 (6 mos+)(PF) (FLUARIX/FLULAVAL/FLUZONE QUAD) injection 0.5 mL  0.5 mL IntraMUSCular PRIOR TO DISCHARGE  calcium carbonate (TUMS) chewable tablet 200 mg [elemental]  200 mg Oral TID PRN  
 ascorbic acid (vitamin C) (VITAMIN C) tablet 500 mg  500 mg Oral BID  [START ON 1/1/2021] predniSONE (DELTASONE) tablet 20 mg  20 mg Oral DAILY WITH BREAKFAST  predniSONE (DELTASONE) tablet 20 mg  20 mg Oral BID WITH MEALS  sodium chloride (NS) flush 5-10 mL  5-10 mL IntraVENous PRN  
 sodium chloride (NS) flush 5-40 mL  5-40 mL IntraVENous PRN  promethazine (PHENERGAN) tablet 12.5 mg  12.5 mg Oral Q6H PRN  Or  
 ondansetron (ZOFRAN) injection 4 mg  4 mg IntraVENous Q6H PRN  
  bisacodyL (DULCOLAX) tablet 5 mg  5 mg Oral DAILY PRN  
 famotidine (PEPCID) tablet 20 mg  20 mg Oral BID Objective:  
Vital Signs:   
Visit Vitals /79 Pulse (!) 110 Temp 97.8 °F (36.6 °C) Resp 18 Ht 5' 7\" (1.702 m) Wt 60.3 kg (133 lb) SpO2 94% BMI 20.83 kg/m² O2 Device: Nasal cannula O2 Flow Rate (L/min): 4 l/min Temp (24hrs), Av.9 °F (36.6 °C), Min:97.3 °F (36.3 °C), Max:98.7 °F (37.1 °C) Intake/Output:  
Last shift:      No intake/output data recorded. Last 3 shifts:  1901 -  0700 In: 240 [P.O.:240] Out: 1275 [ROKAY:7484] Intake/Output Summary (Last 24 hours) at 2020 1046 Last data filed at 2020 8433 Gross per 24 hour Intake 240 ml Output 775 ml Net -535 ml Physical Exam:  
 
General/Neurology: Alert, awake and oriented. NAD. Head:   NCAT. Eye:   EOM intact. No icterus/pallor/cyanosis. Nose:   No nasal drainage/discharge. Neck:   Trachea midline. No palpable cervical lymphadenopathy. Lung: Moderate air entry bilateral equal.  No rales, rhonchi. No wheezing or stridor. No prolonged expiration or accessory muscle use. Heart:   S1 S2 present. No murmur or JVD. Abdomen:  Soft. NT. ND. No palpable masses. Extremities:  No edema. No cyanosis or clubbing. Pulses: 2+ and symmetric in DP. Data:  
   
Recent Results (from the past 24 hour(s)) GLUCOSE, POC Collection Time: 20  9:17 PM  
Result Value Ref Range Glucose (POC) 200 (H) 70 - 110 mg/dL CBC WITH AUTOMATED DIFF Collection Time: 20  3:20 AM  
Result Value Ref Range WBC 3.5 (L) 4.6 - 13.2 K/uL  
 RBC 3.61 (L) 4.70 - 5.50 M/uL  
 HGB 10.9 (L) 13.0 - 16.0 g/dL HCT 30.7 (L) 36.0 - 48.0 % MCV 85.0 74.0 - 97.0 FL  
 MCH 30.2 24.0 - 34.0 PG  
 MCHC 35.5 31.0 - 37.0 g/dL  
 RDW 14.2 11.6 - 14.5 % PLATELET 811 141 - 920 K/uL MPV 10.2 9.2 - 11.8 FL  
 NEUTROPHILS 89 (H) 40 - 73 % LYMPHOCYTES 3 (L) 21 - 52 % MONOCYTES 7 3 - 10 % EOSINOPHILS 1 0 - 5 % BASOPHILS 0 0 - 2 %  
 ABS. NEUTROPHILS 3.1 1.8 - 8.0 K/UL  
 ABS. LYMPHOCYTES 0.1 (L) 0.9 - 3.6 K/UL  
 ABS. MONOCYTES 0.3 0.05 - 1.2 K/UL  
 ABS. EOSINOPHILS 0.0 0.0 - 0.4 K/UL  
 ABS. BASOPHILS 0.0 0.0 - 0.1 K/UL  
 DF AUTOMATED MAGNESIUM Collection Time: 12/25/20  3:20 AM  
Result Value Ref Range Magnesium 2.4 1.6 - 2.6 mg/dL METABOLIC PANEL, COMPREHENSIVE Collection Time: 12/25/20  3:20 AM  
Result Value Ref Range Sodium 125 (L) 136 - 145 mmol/L Potassium 5.2 3.5 - 5.5 mmol/L Chloride 92 (L) 100 - 111 mmol/L  
 CO2 23 21 - 32 mmol/L Anion gap 10 3.0 - 18 mmol/L Glucose 153 (H) 74 - 99 mg/dL BUN 14 7.0 - 18 MG/DL Creatinine 0.67 0.6 - 1.3 MG/DL  
 BUN/Creatinine ratio 21 (H) 12 - 20 GFR est AA >60 >60 ml/min/1.73m2 GFR est non-AA >60 >60 ml/min/1.73m2 Calcium 9.6 8.5 - 10.1 MG/DL Bilirubin, total 0.2 0.2 - 1.0 MG/DL  
 ALT (SGPT) 119 (H) 16 - 61 U/L  
 AST (SGOT) 57 (H) 10 - 38 U/L Alk. phosphatase 262 (H) 45 - 117 U/L Protein, total 8.1 6.4 - 8.2 g/dL Albumin 3.5 3.4 - 5.0 g/dL Globulin 4.6 (H) 2.0 - 4.0 g/dL A-G Ratio 0.8 0.8 - 1.7 PHOSPHORUS Collection Time: 12/25/20  3:20 AM  
Result Value Ref Range Phosphorus 5.8 (H) 2.5 - 4.9 MG/DL  
GLUCOSE, POC Collection Time: 12/25/20  6:16 AM  
Result Value Ref Range Glucose (POC) 174 (H) 70 - 110 mg/dL Chemistry Recent Labs  
  12/25/20 
0320 12/24/20 
0510 12/23/20 
1045 12/23/20 
0045 * 168*  --  138* * 126*  --  128*  
K 5.2 4.7  --  4.6 CL 92* 94*  --  97* CO2 23 21  --  23 BUN 14 13  --  10  
CREA 0.67 0.78  --  0.69 CA 9.6 9.0  --  8.7 MG 2.4  --   --   --   
PHOS 5.8*  --  3.6  --   
AGAP 10 11  --  8  
BUCR 21* 17  --  14  
*  --   --   --   
TP 8.1  --   --   --   
ALB 3.5  --   --   --   
GLOB 4.6*  --   --   --   
AGRAT 0.8  --   --   --   
  
 
Lactic Acid No results found for: LAC 
 No results for input(s): LAC in the last 72 hours. Liver Enzymes Protein, total  
Date Value Ref Range Status 12/25/2020 8.1 6.4 - 8.2 g/dL Final  
 
Albumin Date Value Ref Range Status 12/25/2020 3.5 3.4 - 5.0 g/dL Final  
 
Globulin Date Value Ref Range Status 12/25/2020 4.6 (H) 2.0 - 4.0 g/dL Final  
 
A-G Ratio Date Value Ref Range Status 12/25/2020 0.8 0.8 - 1.7   Final  
 
Alk. phosphatase Date Value Ref Range Status 12/25/2020 262 (H) 45 - 117 U/L Final  
 
Recent Labs  
  12/25/20 
0320 TP 8.1 ALB 3.5 GLOB 4.6* AGRAT 0.8 * CBC w/Diff Recent Labs  
  12/25/20 
0320 12/24/20 
0510 12/23/20 
0045 WBC 3.5* 4.2* 4.9  
RBC 3.61* 3.22* 2.89* HGB 10.9* 9.6* 8.7* HCT 30.7* 27.6* 24.7*  
 296 285 GRANS 89* 87* 91* LYMPH 3* 6* 4* EOS 1 1 1 Cardiac Enzymes No results found for: CPK, CK, CKMMB, CKMB, RCK3, CKMBT, CKNDX, CKND1, ANIKA, TROPT, TROIQ, ZOË, TROPT, TNIPOC, BNP, BNPP  
 
BNP No results found for: BNP, BNPP, XBNPT Coagulation No results for input(s): PTP, INR, APTT, INREXT, INREXT in the last 72 hours. Thyroid  Lab Results Component Value Date/Time TSH 2.10 12/23/2020 10:45 AM  
   
No results found for: T4  
 
Urinalysis Lab Results Component Value Date/Time Color YELLOW 12/18/2020 12:14 AM  
 Appearance CLEAR 12/18/2020 12:14 AM  
 Specific gravity <1.005 (L) 12/18/2020 12:14 AM  
 pH (UA) 7.0 12/18/2020 12:14 AM  
 Protein Negative 12/18/2020 12:14 AM  
 Glucose Negative 12/18/2020 12:14 AM  
 Ketone Negative 12/18/2020 12:14 AM  
 Bilirubin Negative 12/18/2020 12:14 AM  
 Urobilinogen 1.0 12/18/2020 12:14 AM  
 Nitrites Negative 12/18/2020 12:14 AM  
 Leukocyte Esterase Negative 12/18/2020 12:14 AM  
 Epithelial cells 1+ 03/08/2018 09:11 PM  
 Bacteria FEW (A) 03/08/2018 09:11 PM  
 WBC 4 to 6 03/08/2018 09:11 PM  
 RBC NEGATIVE  03/08/2018 09:11 PM  
  
 
Micro  No results for input(s): SDES, CULT in the last 72 hours. No results for input(s): CULT in the last 72 hours. ABG Recent Labs  
  12/22/20 
1250 PHI 7.45  
PCO2I 28.2*  
PO2I 74* HCO3I 19.4*  
FIO2I 40  
  
 
 
 
CT (Most Recent) (CT chest reviewed by me) Results from [facility] Encounter encounter on 12/17/20 CTA CHEST W OR W WO CONT Narrative EXAM: CTA Chest 
 
INDICATION: Shortness of breath. Possible PE. COMPARISON: No prior study. TECHNIQUE: Axial CT imaging from the thoracic inlet through the diaphragm with 
intravenous contrast utilizing CTA study for pulmonary artery evaluation. Coronal and sagittal MIP reformations were generated at a separate workstation. One or more dose reduction techniques were used on this CT: automated exposure 
control, adjustment of the mAs and/or kVp according to patient size, and 
iterative reconstruction techniques. The specific techniques used on this CT 
exam have been documented in the patient's electronic medical record. Digital 
Imaging and Communications in Medicine (DICOM) format image data are available 
to nonaffiliated external healthcare facilities or entities on a secure, media 
free, reciprocally searchable basis with patient authorization for at least a 
12-month period after this study. _______________ FINDINGS: 
 
EXAM QUALITY: Overall exam quality is suboptimal. Pulmonary arterial enhancement 
is adequate. Respiratory motion artifact is present, limiting evaluation. PULMONARY ARTERIES: No convincing evidence of pulmonary embolism. MEDIASTINUM: Normal heart size. No evidence of right heart strain. Aorta is 
unremarkable. No pericardial effusion. LYMPH NODES: No pathologically enlarged lymph nodes lymph nodes. AIRWAY: Unremarkable. LUNGS: Centrilobular emphysema. Superimposed scattered bilateral groundglass 
opacities throughout the lungs. PLEURA: No pleural effusion or pneumothorax. UPPER ABDOMEN: Visualized upper abdomen is unremarkable. OTHER: No acute or aggressive osseous abnormalities identified. 
 
_______________ 
  
 Impression IMPRESSION: 
 
Suboptimal study secondary to motion artifact and bolus timing. 
 
No evidence of obvious central pulmonary embolism. 
 
Diffuse bilateral groundglass opacities, suspicious for atypical infection. 
 
Emphysema.  
  
 
 
 
XR (Most Recent). CXR  reviewed by me and compared with previous CXR Results from Hospital Encounter encounter on 12/17/20  
XR CHEST PORT  
 Narrative EXAM: XR CHEST PORT 
 
CLINICAL INDICATION/HISTORY: SOB, PCJ pneumonia 
-Additional: None 
 
COMPARISON: 12/20/2020, 12/18/2020 
 
TECHNIQUE: Frontal view of the chest 
 
_______________ 
 
FINDINGS: 
 
HEART AND MEDIASTINUM: Midline cardiac silhouette, normal in size. Stable 
mediastinal and hilar structures 
 
LUNGS AND PLEURAL SPACES: Multifocal bilateral confluent perihilar, mid and 
lower lung opacities with mild increased right diaphragmatic confluence may be 
secondary to slight the lung volumes. Unchanged bilateral interstitial 
prominence. No pneumothorax or significant pleural effusion. 
 
BONY THORAX AND SOFT TISSUES: No acute or destructive osseous abnormality. 
 
_______________ 
  
 Impression IMPRESSION: 
 
1. Interval developing right diaphragmatic patchy confluent/consolidation, 
superimposed upon extensive bilateral alveolar pneumonia. Diagnostic 
considerations include atelectasis in the setting of slight decreased lung 
volumes versus progressive right basilar disease.  
  
 
 
·Please note: Voice-recognition software may have been used to generate this report, which may have resulted in some phonetic-based errors in grammar and contents. Even though attempts were made to correct all the mistakes, some may have been missed, and remained in the body of the document. 
 
Sam Felton MD 
12/25/2020

## 2020-12-25 NOTE — PROGRESS NOTES
Problem: Risk for Spread of Infection Goal: Prevent transmission of infectious organism to others Description: Prevent the transmission of infectious organisms to other patients, staff members, and visitors. Outcome: Progressing Towards Goal 
  
Problem: Patient Education:  Go to Education Activity Goal: Patient/Family Education Outcome: Progressing Towards Goal 
  
Problem: Pressure Injury - Risk of 
Goal: *Prevention of pressure injury Description: Document Josep Scale and appropriate interventions in the flowsheet. Outcome: Progressing Towards Goal 
Note: Pressure Injury Interventions: 
Sensory Interventions: Assess changes in LOC, Assess need for specialty bed, Avoid rigorous massage over bony prominences, Chair cushion, Check visual cues for pain, Discuss PT/OT consult with provider, Float heels, Keep linens dry and wrinkle-free, Maintain/enhance activity level, Minimize linen layers, Monitor skin under medical devices, Pad between skin to skin, Pressure redistribution bed/mattress (bed type), Turn and reposition approx. every two hours (pillows and wedges if needed) Moisture Interventions: Absorbent underpads, Assess need for specialty bed, Check for incontinence Q2 hours and as needed, Maintain skin hydration (lotion/cream), Minimize layers, Offer toileting Q_hr, Limit adult briefs Activity Interventions: Assess need for specialty bed, Chair cushion, Pressure redistribution bed/mattress(bed type), Increase time out of bed, PT/OT evaluation Mobility Interventions: Assess need for specialty bed, Chair cushion, Float heels, Pressure redistribution bed/mattress (bed type), PT/OT evaluation, Turn and reposition approx. every two hours(pillow and wedges) Nutrition Interventions: Document food/fluid/supplement intake, Discuss nutritional consult with provider, Offer support with meals,snacks and hydration Friction and Shear Interventions: Feet elevated on foot rest, Foam dressings/transparent film/skin sealants, Lift sheet, Lift team/patient mobility team, Minimize layers, Transferring/repositioning devices Problem: Patient Education: Go to Patient Education Activity Goal: Patient/Family Education Outcome: Progressing Towards Goal 
  
Problem: Falls - Risk of 
Goal: *Absence of Falls Description: Document Sally Tam Fall Risk and appropriate interventions in the flowsheet. Outcome: Progressing Towards Goal 
Note: Fall Risk Interventions: 
Mobility Interventions: Bed/chair exit alarm, Communicate number of staff needed for ambulation/transfer, OT consult for ADLs, Patient to call before getting OOB, PT Consult for mobility concerns, PT Consult for assist device competence, Strengthening exercises (ROM-active/passive), Utilize gait belt for transfers/ambulation, Utilize walker, cane, or other assistive device Mentation Interventions: Adequate sleep, hydration, pain control, Bed/chair exit alarm, Evaluate medications/consider consulting pharmacy, Familiar objects from home, Increase mobility, More frequent rounding, Update white board, Toileting rounds, Room close to nurse's station(request door closed, call bell in reach and bed alarm on ) Medication Interventions: Bed/chair exit alarm, Evaluate medications/consider consulting pharmacy, Patient to call before getting OOB, Teach patient to arise slowly Elimination Interventions: Bed/chair exit alarm, Call light in reach, Elevated toilet seat, Patient to call for help with toileting needs, Stay With Me (per policy), Toilet paper/wipes in reach, Toileting schedule/hourly rounds, Urinal in reach Problem: Patient Education: Go to Patient Education Activity Goal: Patient/Family Education Outcome: Progressing Towards Goal 
  
Problem: Nutrition Deficit Goal: *Optimize nutritional status Outcome: Progressing Towards Goal 
  
 Problem: Patient Education: Go to Patient Education Activity Goal: Patient/Family Education Outcome: Progressing Towards Goal

## 2020-12-25 NOTE — PROGRESS NOTES
1915 
Assumed care of pt  
0962 Shift assessment complete Pt resting quietly with eyes closed with chest rising and falling evenly [place] 137 Reassessment complete 9798 Reassessment complete Shift uneventful  
 
3 [place] [facility] Night Shift Chart Audit Chart Audit completed? YES Bedside and Verbal shift change report given to Armando Overton (oncoming nurse) by Cheyenne Pak RN (offgoing nurse). Report included the following information SBAR, Kardex, ED Summary, Intake/Output, MAR, Recent Results, Med Rec Status and Cardiac Rhythm NSR; ST.

## 2020-12-26 NOTE — PROGRESS NOTES
20:55 Assessment completed. O2  remains @ 5 LPM per NC  Lungs are clear bilat. SOB @ rest & with speaking. Resting quietly in bed x for voiding per urinal w/o difficulty. 22:35 Shift assessment completed. See nsg flow sheet for details. 03:00 Reassessed with 0 changes noted. Resting quietly in bed with eyes closed x for voiding per urinal w/o difficulty. 07:20 Bedside and Verbal shift change report given to Rashmi Partida RN (oncoming nurse) by Caitlin Palmer RN (offgoing nurse). Report included the following information SBAR.

## 2020-12-26 NOTE — PROGRESS NOTES
Hospitalist Progress Note Patient: Albania Hughes MRN: [id number]  [facility]: [id number] YOB: 1990  Age: 31 y.o. Sex: male DOA: 12/17/2020 LOS:  LOS: 9 days Patient Active Problem List  
Diagnosis Code  AIDS (acquired immune deficiency syndrome) (Formerly [facility]) B20  
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  
 Acute respiratory distress R06.03  
 Hypoxia R09.02 IMPRESSION and Plan: 
 
Albania Hughes is a 31 y.o. male with Patient Active Problem List  
 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 
PJ pneumonia-on high dose bactrim and steroids as per ID recss, continues to be SOB with minimal activity but feels he is getting better Ac resp distress, hypoxia-stable with NC 02, continue nebs Debility and weakness, trial PT when resp status improves Tachycardia due to resp distress Malnutrition severe-add ensures with meals, checked phos, it is wnl Mild hyponatremia-add sodium bicarb tabs 
  
  
Plan: 
Repeat cxr appears to be worse OOB and ambulate O2  And nebs as ordeered 
appriciate ID  And pulmonary 
input Will change to po Bactrim given he is refusing. His xray is worse. Will await ID and pulmonary input regarding further abx choice Elvated lFT's --  Obtain abd us. ? Bactrim. Cont to monitor 
  
  
  
Supportive care 
  
Palliatve care consult Patient's condition is fair Recommend to continue hospitalization. Discussed with patient. Chief Complaints: Chief Complaint Patient presents with  Shortness of Breath SUBJECTIVE: 
Pt is seen and examined. Refuses to take IV Bactrim states \" It is making me nauseous and racing my heart when it is infusing\" Review of systems: 
 
Review of Systems Constitutional: Positive for malaise/fatigue. HENT: Negative. Eyes: Negative. Respiratory: Positive for shortness of breath. Cardiovascular: Positive for leg swelling. Negative for chest pain, palpitations and orthopnea. Gastrointestinal: Negative. Negative for abdominal pain, diarrhea and heartburn. Genitourinary: Negative for dysuria and hematuria. Skin: Negative. Neurological: Positive for weakness. Psychiatric/Behavioral: Negative for depression, substance abuse and suicidal ideas. The patient is not nervous/anxious. PE: 
Patient Vitals for the past 24 hrs: 
 BP Temp Pulse Resp SpO2  
12/26/20 1111 131/85 98.1 °F (36.7 °C) (!) 129 16 93 % 12/26/20 0930     92 % 12/26/20 0919   (!) 154  (!) 80 % 12/26/20 0739     97 % 12/26/20 0655  98.4 °F (36.9 °C) (!) 106 16 97 % 12/26/20 0411 122/84 97.4 °F (36.3 °C) (!) 109 18 96 % 12/26/20 0000 124/75 97.4 °F (36.3 °C) (!) 110 18 95 % 12/25/20 2018 108/82 98.4 °F (36.9 °C) 98 18 93 % 12/25/20 1750 110/82 98.4 °F (36.9 °C) 98 18 94 % 12/25/20 1536     91 % 12/25/20 1204 100/78 98.2 °F (36.8 °C) 99 18 93 % Intake/Output Summary (Last 24 hours) at 12/26/2020 1201 Last data filed at 12/26/2020 1118 Gross per 24 hour Intake  Output 3400 ml Net -3400 ml Patient Vitals for the past 120 hrs: 
 Weight 12/23/20 0040 101.8 kg (224 lb 6.9 oz) 12/24/20 0629 60.4 kg (133 lb 2.5 oz) 12/25/20 0431 60.3 kg (133 lb) Physical Exam 
Vitals signs and nursing note reviewed. Constitutional:   
   General: He is in acute distress. Neck: Musculoskeletal: Normal range of motion and neck supple. Vascular: No JVD. Cardiovascular:  
   Rate and Rhythm: Normal rate and regular rhythm. Heart sounds: Normal heart sounds. Pulmonary:  
   Effort: Respiratory distress present. Breath sounds: Normal breath sounds. Abdominal:  
   General: Bowel sounds are normal. There is no distension. Palpations: Abdomen is soft. Tenderness: There is no abdominal tenderness. There is no rebound. Musculoskeletal: Normal range of motion. Skin: 
   General: Skin is warm and dry. Neurological:  
   Mental Status: He is alert and oriented to person, place, and time. Psychiatric:     
   Mood and Affect: Affect normal.  
 
 
 
 
 
Intake and Output: 
Current Shift:  12/26 0701 - 12/26 1900 In: -  
Out: 250 [Urine:250] Last three shifts:  12/24 1901 - 12/26 0700 In: 480 [P.O.:480] Out: 3750 [[id number]] Lab/Data Reviewed: 
Recent Results (from the past 8 hour(s)) GLUCOSE, POC Collection Time: 12/26/20 11:17 AM  
Result Value Ref Range Glucose (POC) 120 (H) 70 - 110 mg/dL Medications: 
Current Facility-Administered Medications Medication Dose Route Frequency  morphine injection 0.5 mg  0.5 mg IntraVENous Q4H PRN  
 acetaminophen (TYLENOL) tablet 650 mg  650 mg Oral Q8H PRN  
 multivitamin, tx-iron-ca-min (THERA-M w/ IRON) tablet 1 Tab  1 Tab Oral DAILY  guaiFENesin (ROBITUSSIN) 100 mg/5 mL oral liquid 100 mg  100 mg Oral Q4H PRN  
 melatonin tablet 10 mg  10 mg Oral QHS  sodium bicarbonate tablet 325 mg  325 mg Oral BID  trimethoprim-sulfamethoxazole (BACTRIM) 500 mg in dextrose 5% 500 mL  500 mg IntraVENous Q8H  
  levalbuterol (XOPENEX) nebulizer soln 1.25 mg/0.5 ml  1.25 mg Nebulization Q4H RT  
 ipratropium (ATROVENT) 0.02 % nebulizer solution 0.5 mg  0.5 mg Nebulization Q4H PRN  
 budesonide (PULMICORT) 500 mcg/2 ml nebulizer suspension  500 mcg Nebulization BID RT  
 levalbuterol (XOPENEX) nebulizer soln 1.25 mg/0.5 ml  1.25 mg Nebulization Q4H PRN  
 enoxaparin (LOVENOX) injection 40 mg  40 mg SubCUTAneous Q24H  
 influenza vaccine 2020-21 (6 mos+)(PF) (FLUARIX/FLULAVAL/FLUZONE QUAD) injection 0.5 mL  0.5 mL IntraMUSCular PRIOR TO DISCHARGE  calcium carbonate (TUMS) chewable tablet 200 mg [elemental]  200 mg Oral TID PRN  
 ascorbic acid (vitamin C) (VITAMIN C) tablet 500 mg  500 mg Oral BID  [START ON 1/1/2021] predniSONE (DELTASONE) tablet 20 mg  20 mg Oral DAILY WITH BREAKFAST  predniSONE (DELTASONE) tablet 20 mg  20 mg Oral BID WITH MEALS  sodium chloride (NS) flush 5-10 mL  5-10 mL IntraVENous PRN  
 sodium chloride (NS) flush 5-40 mL  5-40 mL IntraVENous PRN  promethazine (PHENERGAN) tablet 12.5 mg  12.5 mg Oral Q6H PRN Or  
 ondansetron (ZOFRAN) injection 4 mg  4 mg IntraVENous Q6H PRN  
 bisacodyL (DULCOLAX) tablet 5 mg  5 mg Oral DAILY PRN  
 famotidine (PEPCID) tablet 20 mg  20 mg Oral BID Recent Results (from the past 24 hour(s)) GLUCOSE, POC Collection Time: 12/25/20 12:03 PM  
Result Value Ref Range Glucose (POC) 107 70 - 110 mg/dL CBC WITH AUTOMATED DIFF Collection Time: 12/26/20  1:10 AM  
Result Value Ref Range WBC 5.6 4.6 - 13.2 K/uL  
 RBC 3.78 (L) 4.70 - 5.50 M/uL  
 HGB 11.3 (L) 13.0 - 16.0 g/dL HCT 32.3 (L) 36.0 - 48.0 % MCV 85.4 74.0 - 97.0 FL  
 MCH 29.9 24.0 - 34.0 PG  
 MCHC 35.0 31.0 - 37.0 g/dL  
 RDW 14.3 11.6 - 14.5 % PLATELET 445 018 - 793 K/uL MPV 9.7 9.2 - 11.8 FL  
 NEUTROPHILS 94 (H) 40 - 73 % LYMPHOCYTES 3 (L) 21 - 52 % MONOCYTES 3 3 - 10 % EOSINOPHILS 0 0 - 5 % BASOPHILS 0 0 - 2 % ABS. NEUTROPHILS 5.3 1.8 - 8.0 K/UL  
 ABS. LYMPHOCYTES 0.2 (L) 0.9 - 3.6 K/UL  
 ABS. MONOCYTES 0.2 0.05 - 1.2 K/UL  
 ABS. EOSINOPHILS 0.0 0.0 - 0.4 K/UL  
 ABS. BASOPHILS 0.0 0.0 - 0.1 K/UL  
 DF AUTOMATED METABOLIC PANEL, COMPREHENSIVE Collection Time: 12/26/20  1:10 AM  
Result Value Ref Range Sodium 124 (L) 136 - 145 mmol/L Potassium 5.1 3.5 - 5.5 mmol/L Chloride 91 (L) 100 - 111 mmol/L  
 CO2 25 21 - 32 mmol/L Anion gap 8 3.0 - 18 mmol/L Glucose 241 (H) 74 - 99 mg/dL BUN 13 7.0 - 18 MG/DL Creatinine 0.84 0.6 - 1.3 MG/DL  
 BUN/Creatinine ratio 15 12 - 20 GFR est AA >60 >60 ml/min/1.73m2 GFR est non-AA >60 >60 ml/min/1.73m2 Calcium 9.1 8.5 - 10.1 MG/DL Bilirubin, total 0.2 0.2 - 1.0 MG/DL  
 ALT (SGPT) 152 (H) 16 - 61 U/L  
 AST (SGOT) 53 (H) 10 - 38 U/L Alk. phosphatase 334 (H) 45 - 117 U/L Protein, total 7.7 6.4 - 8.2 g/dL Albumin 3.2 (L) 3.4 - 5.0 g/dL Globulin 4.5 (H) 2.0 - 4.0 g/dL A-G Ratio 0.7 (L) 0.8 - 1.7 PHOSPHORUS Collection Time: 12/26/20  1:10 AM  
Result Value Ref Range Phosphorus 4.2 2.5 - 4.9 MG/DL MAGNESIUM Collection Time: 12/26/20  1:10 AM  
Result Value Ref Range Magnesium 2.1 1.6 - 2.6 mg/dL GLUCOSE, POC Collection Time: 12/26/20 11:17 AM  
Result Value Ref Range Glucose (POC) 120 (H) 70 - 110 mg/dL EXAM: CHEST X-RAY   
  
Technique:  Frontal and Lateral views of the chest. 
  
History: Cough 
  
Comparison: 12/22/2020, 12/20/2020 
  
_______________ 
  
FINDINGS: 
  
HEART AND MEDIASTINUM: Midline cardiac silhouette, normal in size. Unremarkable 
hilar vascular structures. 
  
LUNGS AND PLEURAL SPACES: Continued progressive diffuse bilateral upper and 
lower lung, hazy confluent interstitial and alveolar opacities. The lateral 
projection demonstrates the majority airspace disease is in the posterior 
thorax.  No pneumothorax or effusion. 
  
 BONY THORAX AND SOFT TISSUES: No acute or destructive osseous abnormality. _______________ 
  
IMPRESSION IMPRESSION: 
  
1. Interval progressive worsening bilateral airspace disease/pneumonia. Procedures/imaging: see electronic medical records for all procedures/Xrays and details which were not copied into this note but were reviewed prior to creation of Estee Wen MD  
12/26/2020, 12:53 PM

## 2020-12-26 NOTE — PROGRESS NOTES
RESPIRATORY NOTE: 
   PRN neb given for shortness of breath and coughing, increased RR noted, tolerated neb tx well, no wheezes noted, wet sounding cough with nasal congestion noted, O2 at 6 LPM for 92% SPO2 at this time,  will monitor.

## 2020-12-26 NOTE — PROGRESS NOTES
Problem: Risk for Spread of Infection Goal: Prevent transmission of infectious organism to others Description: Prevent the transmission of infectious organisms to other patients, staff members, and visitors. Outcome: Progressing Towards Goal 
  
Problem: Falls - Risk of 
Goal: *Absence of Falls Description: Document Cameron Sol Fall Risk and appropriate interventions in the flowsheet. Outcome: Progressing Towards Goal 
Note: Fall Risk Interventions: 
Mobility Interventions: Assess mobility with egress test, PT Consult for mobility concerns Mentation Interventions: Adequate sleep, hydration, pain control Medication Interventions: Teach patient to arise slowly Elimination Interventions: Call light in reach, Urinal in reach Problem: Nutrition Deficit Goal: *Optimize nutritional status Outcome: Progressing Towards Goal

## 2020-12-26 NOTE — PROGRESS NOTES
Problem: Falls - Risk of 
Goal: *Absence of Falls Description: Document Leida Alcantara Fall Risk and appropriate interventions in the flowsheet. Note: Fall Risk Interventions: 
Mobility Interventions: Communicate number of staff needed for ambulation/transfer, Patient to call before getting OOB Mentation Interventions: Adequate sleep, hydration, pain control Medication Interventions: Assess postural VS orthostatic hypotension, Patient to call before getting OOB, Teach patient to arise slowly Elimination Interventions: Call light in reach, Patient to call for help with toileting needs, Urinal in reach

## 2020-12-26 NOTE — PROGRESS NOTES
[facility] Pulmonary, Critical Care, and Sleep Medicine Name: Scot Martínez MRN: [id number] : 1990 Hospital: [facility] [place] Date: 2020 [facility] Note Consult requesting physician: Dr. Rodrigo Paris Reason for Consult: Likely PJP pneumonia, hypoxia IMPRESSION:  
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  
Diagnosis Code  AIDS (acquired immune deficiency syndrome) ([facility]) B20  
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  
 Acute respiratory distress R06.03  
 Hypoxia R09.02  
 
  
  
RECOMMENDATIONS:  
Bilateral extensive pneumonia with hypoxemia in a patient with HIV, COVID-19 negative; likely PJP pneumonia. FiO2 requirement increased to 5 LPM. CXR 2020: Interval worsening bilateral infiltrates. Continue O2 to keep SPO2 more than 91%. Continue IV Bactrim per ID. Due to worsening CXR findings and increased O2 requirement, will change prednisone to IV Solu-Medrol 40 mg daily. Bronchodilators: Pulmicort twice daily, Xopenex every 4 hours. Xopenex and Atrovent as needed. Fungitell elevated 455. LDH elevated: 560. Expectorated sputum for PJP: Pending. Sputum culture: Light normal dequan. Urine antigen panel: Negative. COVID-19: Negative. LFT elevated and hyponatremia; defer to hospitalist. 
 
CT chest with bilateral diffuse GGO and underlying emphysema. He may have mild pulmonary fibrosis in the background but due to motion artifact it is difficult with exclude. Bronchodilators: Budesonide twice daily, Atrovent as needed. Airway clearance: Continue incentive spirometer; advised to use often. Out of bed. FiO2 to keep SpO2 >=92%, HOB >=30 degree, aspiration precautions, aggressive pulmonary toileting, incentive spirometry, PT/OT eval and treat, mobilization. DVT Prophylaxis: Lovenox GI Prophylaxis: Pepcid Other issues management by primary team and respective consultants. Further recommendations will be based on the patient's response to recommended treatment and results of the investigation ordered. Quality Care: PPI, DVT prophylaxis, HOB elevated, infection control all reviewed and addressed. Discussed with patient, radiologic work up showed, answered all questions to their satisfaction. Care plan discussed with nursing. Subjective/History:  
 
Urszula Herman is a 31 y.o. male with PMHx significant for HIV diagnosed couple years ago, not on treatment; admitted with weight loss, ESCOTO; COVID-19 negative x2; diagnosed with atypical looking pneumonia/GGO on CT chest which is negative for PE, suspected for PJP pneumonia. Initially treated with Rocephin and Zithromax. Seen by Dr. Lauri Singh: Elevated LDH, PCT 0.57. Antibiotics changed to Bactrim and started on steroids. HIV viral load elevated. 12/26/2020 Seen in room 342. CXR 12/25/2020: Worsening interstitial infiltrates. FiO2 requirement is back up to 5 LPM now. Feels fatigued and tired. Dyspnea with minimal exertion. Cough with clear white expectoration without hemoptysis. No fever or night sweats. No chest pain or leg edema. Complains of coughing after IV Bactrim. No nausea or vomiting. No other overnight issues reported. Review of Systems:  
HEENT: No epistaxis, no nasal drainage, no difficulty in swallowing, no redness in eyes Respiratory: as above Cardiovascular: no palpitations, no chronic leg edema, no syncope Gastrointestinal: no abd pain, no vomiting, no diarrhea, no bleeding symptoms Genitourinary: No urinary symptoms or hematuria Integument/breast: No ulcers or rashes Musculoskeletal:Neg 
 Neurological: No focal weakness, no seizures, no headaches Behvioral/Psych: No anxiety, no depression Constitutional: No fever, no chills,  no night sweats No Known Allergies Past Medical History:  
Diagnosis Date  Asthma  Chlamydia  Herpes zoster  HIV (human immunodeficiency virus infection) ([facility] [address]  Syphilis History reviewed. No pertinent surgical history. History reviewed. No pertinent family history. Social History Tobacco Use  Smoking status: Former Smoker  Smokeless tobacco: Never Used Substance Use Topics  Alcohol use: Yes Comment: socially Prior to Admission medications Not on File Current Facility-Administered Medications Medication Dose Route Frequency  morphine injection 0.5 mg  0.5 mg IntraVENous Q4H PRN  
 acetaminophen (TYLENOL) tablet 650 mg  650 mg Oral Q8H PRN  
 multivitamin, tx-iron-ca-min (THERA-M w/ IRON) tablet 1 Tab  1 Tab Oral DAILY  guaiFENesin (ROBITUSSIN) 100 mg/5 mL oral liquid 100 mg  100 mg Oral Q4H PRN  
 melatonin tablet 10 mg  10 mg Oral QHS  sodium bicarbonate tablet 325 mg  325 mg Oral BID  trimethoprim-sulfamethoxazole (BACTRIM) 500 mg in dextrose 5% 500 mL  500 mg IntraVENous Q8H  
 levalbuterol (XOPENEX) nebulizer soln 1.25 mg/0.5 ml  1.25 mg Nebulization Q4H RT  
 ipratropium (ATROVENT) 0.02 % nebulizer solution 0.5 mg  0.5 mg Nebulization Q4H PRN  
 budesonide (PULMICORT) 500 mcg/2 ml nebulizer suspension  500 mcg Nebulization BID RT  
 levalbuterol (XOPENEX) nebulizer soln 1.25 mg/0.5 ml  1.25 mg Nebulization Q4H PRN  
 enoxaparin (LOVENOX) injection 40 mg  40 mg SubCUTAneous Q24H  
 influenza vaccine 2020-21 (6 mos+)(PF) (FLUARIX/FLULAVAL/FLUZONE QUAD) injection 0.5 mL  0.5 mL IntraMUSCular PRIOR TO DISCHARGE  calcium carbonate (TUMS) chewable tablet 200 mg [elemental]  200 mg Oral TID PRN  
 ascorbic acid (vitamin C) (VITAMIN C) tablet 500 mg  500 mg Oral BID  
  [START ON 2021] predniSONE (DELTASONE) tablet 20 mg  20 mg Oral DAILY WITH BREAKFAST  predniSONE (DELTASONE) tablet 20 mg  20 mg Oral BID WITH MEALS  sodium chloride (NS) flush 5-10 mL  5-10 mL IntraVENous PRN  
 sodium chloride (NS) flush 5-40 mL  5-40 mL IntraVENous PRN  promethazine (PHENERGAN) tablet 12.5 mg  12.5 mg Oral Q6H PRN Or  
 ondansetron (ZOFRAN) injection 4 mg  4 mg IntraVENous Q6H PRN  
 bisacodyL (DULCOLAX) tablet 5 mg  5 mg Oral DAILY PRN  
 famotidine (PEPCID) tablet 20 mg  20 mg Oral BID Objective:  
Vital Signs:   
Visit Vitals /84 Pulse (!) 154 Temp 98.4 °F (36.9 °C) Resp 16 Ht 5' 7\" (1.702 m) Wt 60.3 kg (133 lb) SpO2 92% BMI 20.83 kg/m² O2 Device: Nasal cannula O2 Flow Rate (L/min): 6 l/min Temp (24hrs), Av °F (36.7 °C), Min:97.4 °F (36.3 °C), Max:98.4 °F (36.9 °C) Intake/Output:  
Last shift:      No intake/output data recorded. Last 3 shifts:  1901 -  0700 In: 480 [P.O.:480] Out: 3750 [[id number]] Intake/Output Summary (Last 24 hours) at 2020 1117 Last data filed at 2020 6158 Gross per 24 hour Intake  Output 3150 ml Net -3150 ml Physical Exam:  
General/Neurology: Alert, awake and oriented. NAD. O2 NC. Head:   NCAT. Eye:   EOM intact. No icterus/pallor/cyanosis. Nose:   No nasal drainage/discharge. Neck:   Trachea midline. No palpable cervical lymphadenopathy. Lung: Moderate air entry bilateral equal.  No rales, rhonchi. No wheezing or stridor. No prolonged expiration or accessory muscle use. Heart:   S1 S2 present. No murmur or JVD. Abdomen:  Soft. NT. ND. No palpable masses. Extremities:  No edema. No cyanosis or clubbing. Pulses: 2+ and symmetric in DP. Data:  
   
Recent Results (from the past 24 hour(s)) GLUCOSE, POC Collection Time: 20 12:03 PM  
Result Value Ref Range Glucose (POC) 107 70 - 110 mg/dL CBC WITH AUTOMATED DIFF  
 Collection Time: 12/26/20  1:10 AM  
Result Value Ref Range WBC 5.6 4.6 - 13.2 K/uL  
 RBC 3.78 (L) 4.70 - 5.50 M/uL  
 HGB 11.3 (L) 13.0 - 16.0 g/dL HCT 32.3 (L) 36.0 - 48.0 % MCV 85.4 74.0 - 97.0 FL  
 MCH 29.9 24.0 - 34.0 PG  
 MCHC 35.0 31.0 - 37.0 g/dL  
 RDW 14.3 11.6 - 14.5 % PLATELET 438 267 - 810 K/uL MPV 9.7 9.2 - 11.8 FL  
 NEUTROPHILS 94 (H) 40 - 73 % LYMPHOCYTES 3 (L) 21 - 52 % MONOCYTES 3 3 - 10 % EOSINOPHILS 0 0 - 5 % BASOPHILS 0 0 - 2 %  
 ABS. NEUTROPHILS 5.3 1.8 - 8.0 K/UL  
 ABS. LYMPHOCYTES 0.2 (L) 0.9 - 3.6 K/UL  
 ABS. MONOCYTES 0.2 0.05 - 1.2 K/UL  
 ABS. EOSINOPHILS 0.0 0.0 - 0.4 K/UL  
 ABS. BASOPHILS 0.0 0.0 - 0.1 K/UL  
 DF AUTOMATED METABOLIC PANEL, COMPREHENSIVE Collection Time: 12/26/20  1:10 AM  
Result Value Ref Range Sodium 124 (L) 136 - 145 mmol/L Potassium 5.1 3.5 - 5.5 mmol/L Chloride 91 (L) 100 - 111 mmol/L  
 CO2 25 21 - 32 mmol/L Anion gap 8 3.0 - 18 mmol/L Glucose 241 (H) 74 - 99 mg/dL BUN 13 7.0 - 18 MG/DL Creatinine 0.84 0.6 - 1.3 MG/DL  
 BUN/Creatinine ratio 15 12 - 20 GFR est AA >60 >60 ml/min/1.73m2 GFR est non-AA >60 >60 ml/min/1.73m2 Calcium 9.1 8.5 - 10.1 MG/DL Bilirubin, total 0.2 0.2 - 1.0 MG/DL  
 ALT (SGPT) 152 (H) 16 - 61 U/L  
 AST (SGOT) 53 (H) 10 - 38 U/L Alk. phosphatase 334 (H) 45 - 117 U/L Protein, total 7.7 6.4 - 8.2 g/dL Albumin 3.2 (L) 3.4 - 5.0 g/dL Globulin 4.5 (H) 2.0 - 4.0 g/dL A-G Ratio 0.7 (L) 0.8 - 1.7 PHOSPHORUS Collection Time: 12/26/20  1:10 AM  
Result Value Ref Range Phosphorus 4.2 2.5 - 4.9 MG/DL MAGNESIUM Collection Time: 12/26/20  1:10 AM  
Result Value Ref Range Magnesium 2.1 1.6 - 2.6 mg/dL Chemistry Recent Labs  
  12/26/20 
0110 12/25/20 
0320 12/24/20 
0510 * 153* 168* * 125* 126*  
K 5.1 5.2 4.7 CL 91* 92* 94* CO2 25 23 21 BUN 13 14 13 CREA 0.84 0.67 0.78 CA 9.1 9.6 9.0 MG 2.1 2.4  --   
 PHOS 4.2 5.8*  --   
AGAP 8 10 11 BUCR 15 21* 17  
* 262*  --   
TP 7.7 8.1  --   
ALB 3.2* 3.5  --   
GLOB 4.5* 4.6*  --   
AGRAT 0.7* 0.8  -- Lactic Acid No results found for: LAC No results for input(s): LAC in the last 72 hours. Liver Enzymes Protein, total  
Date Value Ref Range Status 12/26/2020 7.7 6.4 - 8.2 g/dL Final  
 
Albumin Date Value Ref Range Status 12/26/2020 3.2 (L) 3.4 - 5.0 g/dL Final  
 
Globulin Date Value Ref Range Status 12/26/2020 4.5 (H) 2.0 - 4.0 g/dL Final  
 
A-G Ratio Date Value Ref Range Status 12/26/2020 0.7 (L) 0.8 - 1.7   Final  
 
Alk. phosphatase Date Value Ref Range Status 12/26/2020 334 (H) 45 - 117 U/L Final  
 
Recent Labs  
  12/26/20 
0110 12/25/20 
0320 TP 7.7 8.1 ALB 3.2* 3.5 GLOB 4.5* 4.6* AGRAT 0.7* 0.8 * 262* CBC w/Diff Recent Labs  
  12/26/20 
0110 12/25/20 
0320 12/24/20 
0510 WBC 5.6 3.5* 4.2*  
RBC 3.78* 3.61* 3.22* HGB 11.3* 10.9* 9.6* HCT 32.3* 30.7* 27.6*  
 303 296 GRANS 94* 89* 87* LYMPH 3* 3* 6*  
EOS 0 1 1 Cardiac Enzymes No results found for: CPK, CK, CKMMB, CKMB, RCK3, CKMBT, CKNDX, CKND1, ANIKA, TROPT, TROIQ, ZOË, TROPT, TNIPOC, BNP, BNPP  
 
BNP No results found for: BNP, BNPP, XBNPT Coagulation No results for input(s): PTP, INR, APTT, INREXT, INREXT in the last 72 hours. Thyroid  Lab Results Component Value Date/Time TSH 2.10 12/23/2020 10:45 AM  
   
No results found for: T4  
 
Urinalysis Lab Results Component Value Date/Time  Color YELLOW 12/18/2020 12:14 AM  
 Appearance CLEAR 12/18/2020 12:14 AM  
 Specific gravity <1.005 (L) 12/18/2020 12:14 AM  
 pH (UA) 7.0 12/18/2020 12:14 AM  
 Protein Negative 12/18/2020 12:14 AM  
 Glucose Negative 12/18/2020 12:14 AM  
 Ketone Negative 12/18/2020 12:14 AM  
 Bilirubin Negative 12/18/2020 12:14 AM  
 Urobilinogen 1.0 12/18/2020 12:14 AM  
 Nitrites Negative 12/18/2020 12:14 AM  
 Leukocyte Esterase Negative 12/18/2020 12:14 AM  
 Epithelial cells 1+ 03/08/2018 09:11 PM  
 Bacteria FEW (A) 03/08/2018 09:11 PM  
 WBC 4 to 6 03/08/2018 09:11 PM  
 RBC NEGATIVE  03/08/2018 09:11 PM  
  
 
Micro  No results for input(s): SDES, CULT in the last 72 hours. No results for input(s): CULT in the last 72 hours. ABG No results for input(s): PHI, PHI, POC2, PCO2I, PO2, PO2I, HCO3, HCO3I, FIO2, FIO2I in the last 72 hours. CT (Most Recent) (CT chest reviewed by me) Results from [facility] [place] encounter on 12/17/20 CTA CHEST W OR W WO CONT Narrative EXAM: CTA Chest 
 
INDICATION: Shortness of breath. Possible PE. COMPARISON: No prior study. TECHNIQUE: Axial CT imaging from the thoracic inlet through the diaphragm with 
intravenous contrast utilizing CTA study for pulmonary artery evaluation. Coronal and sagittal MIP reformations were generated at a separate workstation. One or more dose reduction techniques were used on this CT: automated exposure 
control, adjustment of the mAs and/or kVp according to patient size, and 
iterative reconstruction techniques. The specific techniques used on this CT 
exam have been documented in the patient's electronic medical record. Digital 
Imaging and Communications in Medicine (DICOM) format image data are available 
to nonaffiliated external healthcare facilities or entities on a secure, media 
free, reciprocally searchable basis with patient authorization for at least a 
12-month period after this study. _______________ FINDINGS: 
 
EXAM QUALITY: Overall exam quality is suboptimal. Pulmonary arterial enhancement 
is adequate. Respiratory motion artifact is present, limiting evaluation. PULMONARY ARTERIES: No convincing evidence of pulmonary embolism. MEDIASTINUM: Normal heart size. No evidence of right heart strain. Aorta is 
unremarkable. No pericardial effusion. LYMPH NODES: No pathologically enlarged lymph nodes lymph nodes. AIRWAY: Unremarkable. LUNGS: Centrilobular emphysema. Superimposed scattered bilateral groundglass 
opacities throughout the lungs. PLEURA: No pleural effusion or pneumothorax. UPPER ABDOMEN: Visualized upper abdomen is unremarkable. OTHER: No acute or aggressive osseous abnormalities identified. _______________ Impression IMPRESSION: 
 
Suboptimal study secondary to motion artifact and bolus timing. No evidence of obvious central pulmonary embolism. Diffuse bilateral groundglass opacities, suspicious for atypical infection. Emphysema. XR (Most Recent). CXR  reviewed by me and compared with previous CXR Results from [facility] Encounter encounter on 12/17/20 XR CHEST PA LAT Narrative EXAM: CHEST X-RAY Technique:  Frontal and Lateral views of the chest. 
 
History: Cough Comparison: 12/22/2020, 12/20/2020 
 
_______________ FINDINGS: 
 
HEART AND MEDIASTINUM: Midline cardiac silhouette, normal in size. Unremarkable 
hilar vascular structures. LUNGS AND PLEURAL SPACES: Continued progressive diffuse bilateral upper and 
lower lung, hazy confluent interstitial and alveolar opacities. The lateral 
projection demonstrates the majority airspace disease is in the posterior 
thorax. No pneumothorax or effusion. BONY THORAX AND SOFT TISSUES: No acute or destructive osseous abnormality. _______________ Impression IMPRESSION: 
 
1. Interval progressive worsening bilateral airspace disease/pneumonia. ·Please note: Voice-recognition software may have been used to generate this report, which may have resulted in some phonetic-based errors in grammar and contents. Even though attempts were made to correct all the mistakes, some may have been missed, and remained in the body of the document. Rosy Lei MD 
12/26/2020

## 2020-12-26 NOTE — PROGRESS NOTES
0700 : assumed care of patient from [facility] 30 : patient in coughing fit, O2 saturation dropped to 78% . Sat patient up in bed, attempted to do some deep breathing in through the nose. Pt stated he is anxious and in pain, and that the tylenol is not doing anything to help it.  called respiratory for PRN treatment. Paged Dr. Yoandy Morrow regarding vital signs. Waiting call back. 1630 : Dr. Yoandy Morrow made aware of vital signs and oxygen. Will place order for morphine. 9864 : assessed patient after PRN respiratory treatment. No signs of distress, patient sitting up in bed playing Xbox. Stated he is feeling much better. 1019 : pain medication given, see MAR. Pain 8/10  
1029 : MD changing antibiotic from IV to PO, infusion stopped. 1053 : pt requested cough medication. See MAR for administration 1335 : no complaints, resting in bed. 
1400 : patient up to bedside commode, dyspnea on exertion. Pt requested respiratory treatement. Called respiratory stated they would be up to do it. 1426 : treatment given. 1750 : patient resting in bed talking on the phone to family. No complaints. 1900 : patient had an uneventful day.  No complaints, report given to night RN

## 2020-12-27 NOTE — PROGRESS NOTES
0700 : assumed care of patient from [address], report given at bedside. 0388 : patient called nurse for Cough medication, morning meds and shift assessment completed. Pt requested  Pain medication while assessing. See MAR for administration 1015 : Pt HR jumped to 155 with coughing fit, respiratory called for PRN breathing treatment. Checked High flow to make sure pt was getting O2. Respiratory stated they would try to track down a new machine or find a missing piece. 1030 :  Resp in room giving PRN treatment. 1126 : pt tempt 101.7 . Paged DR. Susana Borden regarding findings. 1138 : Dr. Susana Borden stated he wants to notify ID,  Obtain blood cultures and lactic. 
1141 : Paged dr Gilda Austin, answering service went straight to [employer]. Left message to call back. 1215 : lab up to patients room to draw blood cultures. Pt stated \" come back in like 45 minutes after im done eating\" pt sitting in bed, trying to eat lunch. Given PRN cough medication. 1258 : spoke with Dr. Gilda Austin on the phone regarding temperature. He stated blood cultures and tylenol were fine. Temp was most likely due to switching the routes/times of some of his medications yesterday/last night. Will continue to monitor and if patient is continuing to decline MD stated he may have to go downstairs. 1304 : tylenol given. See MAR, will recheck temp 1503 : temp came down to 98.2 
1518 : Spoke with [facility] , regarding critical result. Dr. Susana Borden paged. 1520 : Dr. Susana Borden aware, ordered lactic recheck in 6 hours. 1703 : Dr. Susana Borden paged, pt having high POC checks and no coverage is ordered. 1737 : repaged Dr. Susana Borden. 1744 : Dr. Susana Borden ordered sliding scale insulin ACHS for patient. 1819 : pt educated on need for insulin sliding scale. Administered first dose for elevated POC 1900 : Bedside shift change report given to Ute Yang RN (oncoming nurse) by Ismael Scherer RN (offgoing nurse). Report included the following information SBAR, Kardex, Intake/Output and MAR.

## 2020-12-27 NOTE — PROGRESS NOTES
Pt recently transitioned to high flow O2, not able to participate in PT at this time. Will follow up as pt schedule allows.

## 2020-12-27 NOTE — PROGRESS NOTES
Results from [facility] Encounter encounter on 12/17/20 XR CHEST PORT Narrative EXAM: XR CHEST PORT 
 
CLINICAL INDICATION/HISTORY: pneumonia 
-Additional: None COMPARISON: 12/25/2020, 12/22/2020, 12/20/2020 TECHNIQUE: Frontal view of the chest 
 
_______________ FINDINGS: 
 
HEART AND MEDIASTINUM: Midline cardiac silhouette, normal in size. Unremarkable 
hilar vascular structures. LUNGS AND PLEURAL SPACES: Persistent mild hypoinflation with no interval change 
in appearance of the diffuse bilateral alveolar and interstitial opacities. No 
pneumothorax or effusion. BONY THORAX AND SOFT TISSUES: No acute or destructive osseous abnormality. _______________ Impression IMPRESSION: 
 
1. No interval change in appearance of the diffuse bilateral alveolar and 
interstitial opacity/pneumonia. No new finding since preceding day. cxr unchanged. Will c/w our plan as outlined in assessment and plan today.  
 
Cristiano Dow MD 12/27/2020 12:38 PM

## 2020-12-27 NOTE — PROGRESS NOTES
Hospitalist Progress Note Patient: Violette Fried MRN: [id number]  CSN: [id number] YOB: 1990  Age: 31 y.o. Sex: male DOA: 12/17/2020 LOS:  LOS: 10 days Patient Active Problem List  
Diagnosis Code  AIDS (acquired immune deficiency syndrome) ([facility]) B20  
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  
 Acute respiratory distress R06.03  
 Hypoxia R09.02 IMPRESSION and Plan: 
 
Violette Fried is a 31 y.o. male with Patient Active Problem List  
 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 
PJ pneumonia-on high dose bactrim and steroids as per ID recss, continues to be SOB with minimal activity but feels he is getting better Ac resp distress, hypoxia-stable with NC 02, continue nebs Debility and weakness, trial PT when resp status improves Tachycardia due to resp distress Malnutrition severe-add ensures with meals, checked phos, it is wnl Mild hyponatremia-add sodium bicarb tabs 
  
  
Plan: On po bactrim and IV solumedrol 
 
givne recurrent fever, repeat BC, cxr. Will notify ID  
 
OOB and ambulate O2  And nebs as ordeered 
appriciate ID  And pulmonary input On po Elvated lFT's --  abd us pending LFT's somewhat improved 
  
  
  
Supportive care 
  
Palliatve care consult Patient's condition is fair Recommend to continue hospitalization. Discussed with patient. Chief Complaints: Chief Complaint Patient presents with  Shortness of Breath SUBJECTIVE: 
Pt is seen and examined. Now having recurrent fever. Feels \"Okay though\" Review of systems: 
 
Review of Systems Constitutional: Positive for malaise/fatigue. HENT: Negative. Eyes: Negative. Respiratory: Positive for shortness of breath. Cardiovascular: Positive for leg swelling. Negative for chest pain, palpitations and orthopnea. Gastrointestinal: Negative. Negative for abdominal pain, diarrhea and heartburn. Genitourinary: Negative for dysuria and hematuria. Skin: Negative. Neurological: Positive for weakness. Psychiatric/Behavioral: Negative for depression, substance abuse and suicidal ideas. The patient is not nervous/anxious. PE: 
Patient Vitals for the past 24 hrs: 
 BP Temp Pulse Resp SpO2 Weight 12/27/20 1119 122/73 (!) 101.7 °F (38.7 °C) (!) 150 18 96 %   
12/27/20 0820     96 %   
12/27/20 0719 133/88 99.5 °F (37.5 °C) (!) 136 18 90 %   
12/27/20 0521   (!) 125     
12/27/20 0446 133/79 99 °F (37.2 °C) (!) 131 18 92 % 60.9 kg (134 lb 3.2 oz) 12/27/20 0210     96 %   
12/26/20 2353 123/72 98.2 °F (36.8 °C) (!) 112 18 97 %   
12/26/20 2313     92 %   
12/26/20 1953     91 %   
12/26/20 1930 125/89 98.3 °F (36.8 °C) (!) 108 16 97 %   
12/26/20 1456 119/75 98.7 °F (37.1 °C) (!) 101 17 93 %   
12/26/20 1427     93 %  Intake/Output Summary (Last 24 hours) at 12/27/2020 1139 Last data filed at 12/27/2020 1122 Gross per 24 hour Intake 360 ml Output 1451 ml Net -1091 ml Patient Vitals for the past 120 hrs: 
 Weight 12/23/20 0040 101.8 kg (224 lb 6.9 oz) 12/24/20 0629 60.4 kg (133 lb 2.5 oz) 12/25/20 0431 60.3 kg (133 lb) 12/27/20 0446 60.9 kg (134 lb 3.2 oz) Physical Exam 
Vitals signs and nursing note reviewed. Constitutional:   
   General: He is in acute distress. Neck: Musculoskeletal: Normal range of motion and neck supple. Vascular: No JVD. Cardiovascular:  
   Rate and Rhythm: Normal rate and regular rhythm. Heart sounds: Normal heart sounds. Pulmonary:  
   Effort: Respiratory distress present. Breath sounds: Normal breath sounds. Abdominal:  
   General: Bowel sounds are normal. There is no distension. Palpations: Abdomen is soft. Tenderness: There is no abdominal tenderness. There is no rebound. Musculoskeletal: Normal range of motion. Skin: 
   General: Skin is warm and dry. Neurological:  
   Mental Status: He is alert and oriented to person, place, and time. Psychiatric:     
   Mood and Affect: Affect normal.  
 
 
 
 
 
Intake and Output: 
Current Shift:  12/27 0701 - 12/27 1900 In: -  
Out: 450 [Urine:450] Last three shifts:  12/25 1901 - 12/27 0700 In: 360 [P.O.:360] Out: 4101 [Urine:4100] Lab/Data Reviewed: 
No results found for this or any previous visit (from the past 8 hour(s)). Medications: 
Current Facility-Administered Medications Medication Dose Route Frequency  methylPREDNISolone (PF) (SOLU-MEDROL) injection 40 mg  40 mg IntraVENous Q12H  
 morphine injection 0.5 mg  0.5 mg IntraVENous Q4H PRN  
 trimethoprim-sulfamethoxazole (BACTRIM DS, SEPTRA DS) 160-800 mg per tablet 2 Tab  2 Tab Oral TIDAC  acetaminophen (TYLENOL) tablet 650 mg  650 mg Oral Q8H PRN  
 multivitamin, tx-iron-ca-min (THERA-M w/ IRON) tablet 1 Tab  1 Tab Oral DAILY  guaiFENesin (ROBITUSSIN) 100 mg/5 mL oral liquid 100 mg  100 mg Oral Q4H PRN  
 melatonin tablet 10 mg  10 mg Oral QHS  sodium bicarbonate tablet 325 mg  325 mg Oral BID  
  levalbuterol (XOPENEX) nebulizer soln 1.25 mg/0.5 ml  1.25 mg Nebulization Q4H RT  
 ipratropium (ATROVENT) 0.02 % nebulizer solution 0.5 mg  0.5 mg Nebulization Q4H PRN  
 budesonide (PULMICORT) 500 mcg/2 ml nebulizer suspension  500 mcg Nebulization BID RT  
 levalbuterol (XOPENEX) nebulizer soln 1.25 mg/0.5 ml  1.25 mg Nebulization Q4H PRN  
 enoxaparin (LOVENOX) injection 40 mg  40 mg SubCUTAneous Q24H  
 influenza vaccine 2020-21 (6 mos+)(PF) (FLUARIX/FLULAVAL/FLUZONE QUAD) injection 0.5 mL  0.5 mL IntraMUSCular PRIOR TO DISCHARGE  calcium carbonate (TUMS) chewable tablet 200 mg [elemental]  200 mg Oral TID PRN  
 ascorbic acid (vitamin C) (VITAMIN C) tablet 500 mg  500 mg Oral BID  [Held by provider] predniSONE (DELTASONE) tablet 20 mg  20 mg Oral DAILY WITH BREAKFAST  [Held by provider] predniSONE (DELTASONE) tablet 20 mg  20 mg Oral BID WITH MEALS  sodium chloride (NS) flush 5-10 mL  5-10 mL IntraVENous PRN  
 sodium chloride (NS) flush 5-40 mL  5-40 mL IntraVENous PRN  promethazine (PHENERGAN) tablet 12.5 mg  12.5 mg Oral Q6H PRN Or  
 ondansetron (ZOFRAN) injection 4 mg  4 mg IntraVENous Q6H PRN  
 bisacodyL (DULCOLAX) tablet 5 mg  5 mg Oral DAILY PRN  
 famotidine (PEPCID) tablet 20 mg  20 mg Oral BID Recent Results (from the past 24 hour(s)) GLUCOSE, POC Collection Time: 12/26/20  4:30 PM  
Result Value Ref Range Glucose (POC) 231 (H) 70 - 110 mg/dL CBC WITH AUTOMATED DIFF Collection Time: 12/27/20  1:10 AM  
Result Value Ref Range WBC 4.8 4.6 - 13.2 K/uL  
 RBC 3.63 (L) 4.70 - 5.50 M/uL  
 HGB 10.8 (L) 13.0 - 16.0 g/dL HCT 31.0 (L) 36.0 - 48.0 % MCV 85.4 74.0 - 97.0 FL  
 MCH 29.8 24.0 - 34.0 PG  
 MCHC 34.8 31.0 - 37.0 g/dL  
 RDW 14.1 11.6 - 14.5 % PLATELET 283 374 - 802 K/uL MPV 9.9 9.2 - 11.8 FL  
 NEUTROPHILS 93 (H) 40 - 73 % LYMPHOCYTES 3 (L) 21 - 52 % MONOCYTES 3 3 - 10 % EOSINOPHILS 1 0 - 5 % BASOPHILS 0 0 - 2 % ABS. NEUTROPHILS 4.4 1.8 - 8.0 K/UL  
 ABS. LYMPHOCYTES 0.2 (L) 0.9 - 3.6 K/UL  
 ABS. MONOCYTES 0.2 0.05 - 1.2 K/UL  
 ABS. EOSINOPHILS 0.1 0.0 - 0.4 K/UL  
 ABS. BASOPHILS 0.0 0.0 - 0.1 K/UL  
 DF AUTOMATED MAGNESIUM Collection Time: 12/27/20  1:10 AM  
Result Value Ref Range Magnesium 2.1 1.6 - 2.6 mg/dL METABOLIC PANEL, COMPREHENSIVE Collection Time: 12/27/20  1:10 AM  
Result Value Ref Range Sodium 125 (L) 136 - 145 mmol/L Potassium 5.2 3.5 - 5.5 mmol/L Chloride 90 (L) 100 - 111 mmol/L  
 CO2 27 21 - 32 mmol/L Anion gap 8 3.0 - 18 mmol/L Glucose 212 (H) 74 - 99 mg/dL BUN 15 7.0 - 18 MG/DL Creatinine 0.70 0.6 - 1.3 MG/DL  
 BUN/Creatinine ratio 21 (H) 12 - 20 GFR est AA >60 >60 ml/min/1.73m2 GFR est non-AA >60 >60 ml/min/1.73m2 Calcium 9.2 8.5 - 10.1 MG/DL Bilirubin, total 0.2 0.2 - 1.0 MG/DL  
 ALT (SGPT) 144 (H) 16 - 61 U/L  
 AST (SGOT) 44 (H) 10 - 38 U/L Alk. phosphatase 313 (H) 45 - 117 U/L Protein, total 7.2 6.4 - 8.2 g/dL Albumin 2.8 (L) 3.4 - 5.0 g/dL Globulin 4.4 (H) 2.0 - 4.0 g/dL A-G Ratio 0.6 (L) 0.8 - 1.7 PHOSPHORUS Collection Time: 12/27/20  1:10 AM  
Result Value Ref Range Phosphorus 4.5 2.5 - 4.9 MG/DL  
LIPASE Collection Time: 12/27/20  2:18 AM  
Result Value Ref Range Lipase 72 (L) 73 - 393 U/L  
EXAM: CHEST X-RAY   
  
Technique:  Frontal and Lateral views of the chest. 
  
History: Cough 
  
Comparison: 12/22/2020, 12/20/2020 
  
_______________ 
  
FINDINGS: 
  
HEART AND MEDIASTINUM: Midline cardiac silhouette, normal in size. Unremarkable 
hilar vascular structures. 
  
LUNGS AND PLEURAL SPACES: Continued progressive diffuse bilateral upper and 
lower lung, hazy confluent interstitial and alveolar opacities. The lateral 
projection demonstrates the majority airspace disease is in the posterior 
thorax. No pneumothorax or effusion. 
  
BONY THORAX AND SOFT TISSUES: No acute or destructive osseous abnormality. _______________ 
  
IMPRESSION IMPRESSION: 
  
1. Interval progressive worsening bilateral airspace disease/pneumonia. Procedures/imaging: see electronic medical records for all procedures/Xrays and details which were not copied into this note but were reviewed prior to creation of Siddhartha Lara MD  
12/27/2020, 12:53 PM

## 2020-12-27 NOTE — ROUTINE PROCESS
Bedside shift change report given to [id number] (oncoming nurse) by Reynaldo Bermeo RN (offgoing nurse). Report included the following information SBAR, Kardex, Intake/Output and MAR.

## 2020-12-27 NOTE — PROGRESS NOTES
1900 Received report from AJAY Correa RN.  
 
2111 Pt c/o cough and pain while coughing 7/10. Nurse treated per mar.  
 
0321 Monitor tech notified nurse that the patients HR increases to 150s and oxygen decreased to 89%. Nurse assessed patient. He c/o cough and pain while coughing 8/10. Nurse treated per mar. 0715 Bedside and Verbal shift change report given to AJAY Correa RN (oncoming nurse) by Nicole Peguero. Young Joe RN (offgoing nurse). Report included the following information SBAR, Kardex, Accordion and Cardiac Rhythm ST.

## 2020-12-27 NOTE — CONSULTS
Brief TMP-SMX note Will dose up his PO dose (2 DS tabs TID). One tablet BID works for UTIs, but need higher dose for PJP Back on the prednisone taper. Not tolerating this, it gets more expensive for whoever is paying his bills. Meena Rodriguez MD 
Cell [phone] [facility]

## 2020-12-27 NOTE — PROGRESS NOTES
Problem: Pressure Injury - Risk of 
Goal: *Prevention of pressure injury Description: Document Josep Scale and appropriate interventions in the flowsheet. Outcome: Progressing Towards Goal 
Note: Pressure Injury Interventions: 
Sensory Interventions: Assess changes in LOC, Assess need for specialty bed, Discuss PT/OT consult with provider, Keep linens dry and wrinkle-free, Maintain/enhance activity level, Minimize linen layers, Monitor skin under medical devices Moisture Interventions: Absorbent underpads, Assess need for specialty bed, Limit adult briefs, Minimize layers Activity Interventions: PT/OT evaluation, Pressure redistribution bed/mattress(bed type), Increase time out of bed, Assess need for specialty bed Mobility Interventions: Assess need for specialty bed, HOB 30 degrees or less, Pressure redistribution bed/mattress (bed type), PT/OT evaluation Nutrition Interventions: Document food/fluid/supplement intake Friction and Shear Interventions: HOB 30 degrees or less, Lift sheet, Lift team/patient mobility team, Minimize layers Problem: Patient Education: Go to Patient Education Activity Goal: Patient/Family Education Outcome: Progressing Towards Goal 
  
Problem: Falls - Risk of 
Goal: *Absence of Falls Description: Document Nallely Gong Fall Risk and appropriate interventions in the flowsheet. Outcome: Progressing Towards Goal 
Note: Fall Risk Interventions: 
Mobility Interventions: Assess mobility with egress test, Bed/chair exit alarm, Communicate number of staff needed for ambulation/transfer, Patient to call before getting OOB Mentation Interventions: Adequate sleep, hydration, pain control, Door open when patient unattended, More frequent rounding Medication Interventions: Teach patient to arise slowly Elimination Interventions: Call light in reach, Patient to call for help with toileting needs, Toileting schedule/hourly rounds Problem: Patient Education: Go to Patient Education Activity Goal: Patient/Family Education Outcome: Progressing Towards Goal

## 2020-12-27 NOTE — PROGRESS NOTES
[facility] Pulmonary, Critical Care, and Sleep Medicine Name: Nando Urbina MRN: [id number] : 1990 Hospital: [facility] [place] Date: 2020 PCCM Note Consult requesting physician: Dr. Maria Isabel Herrera Reason for Consult: Likely PJP pneumonia, hypoxia IMPRESSION:  
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  
Diagnosis Code  AIDS (acquired immune deficiency syndrome) ([facility]) B20  
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  
 Acute respiratory distress R06.03  
 Hypoxia R09.02  
 
  
  
RECOMMENDATIONS:  
Bilateral extensive pneumonia with hypoxemia in a patient with HIV, COVID-19 negative; likely PJP pneumonia. FiO2 requirement increased to now on HFNC 35 L. Keep SPO2 more than 91%. CXR 2020: Interval worsening bilateral infiltrates. Repeat CXR ordered. Continue IV Bactrim, dose has been increased to 2 tablets 3 times daily. Due to worsening CXR findings and increased O2 requirement, prednisone was changed to Solu-Medrol on 2020 which was discontinued by pharmacist.  I called and spoke with pharmacist today that I am increasing steroids and changing to IV due to hypoxemia persistently getting worse. Stopped p.o. prednisone. Start IV Solu-Medrol 40 mg IV every 12 hours, ordered. Bronchodilators: Pulmicort twice daily, Xopenex every 4 hours. Xopenex and Atrovent as needed. Fungitell elevated 455. LDH elevated: 560. Expectorated sputum for PJP: Pending. Sputum culture: Light normal dequan. Urine antigen panel: Negative. COVID-19: Negative.  
 
LFT elevated but improving and hyponatremia; defer to hospitalist. 
 
 CT chest with bilateral diffuse GGO and underlying emphysema. He may have mild pulmonary fibrosis in the background but due to motion artifact it is difficult with exclude. Bronchodilators: Budesonide twice daily, Atrovent as needed. Airway clearance: Continue incentive spirometer; advised to use often. Out of bed. FiO2 to keep SpO2 >=92%, HOB >=30 degree, aspiration precautions, aggressive pulmonary toileting, incentive spirometry, PT/OT eval and treat, mobilization. DVT Prophylaxis: Lovenox GI Prophylaxis: Pepcid Other issues management by primary team and respective consultants. Further recommendations will be based on the patient's response to recommended treatment and results of the investigation ordered. Quality Care: PPI, DVT prophylaxis, HOB elevated, infection control all reviewed and addressed. Discussed with patient, radiologic work up showed, answered all questions to their satisfaction. Care plan discussed with nursing, pharmacist. 
  
 
 
Subjective/History:  
 
Adelaida Tee is a 31 y.o. male with PMHx significant for HIV diagnosed couple years ago, not on treatment; admitted with weight loss, ESCOTO; COVID-19 negative x2; diagnosed with atypical looking pneumonia/GGO on CT chest which is negative for PE, suspected for PJP pneumonia. Initially treated with Rocephin and Zithromax. Seen by Dr. Ellis Matter: Elevated LDH, PCT 0.57. Antibiotics changed to Bactrim and started on steroids. HIV viral load elevated. 12/27/2020 Seen in room 342. CXR 12/25/2020 with worsening interstitial infiltrates. FiO2 requirement has slowly increased, now on HFNC 35 L. No fever. Cough with clear white expectoration. No hemoptysis. No chest pain. No GI symptoms. Dyspnea with minimal exertion. Appears chronically ill and tired. Bactrim dose was increased to 2 tablets 3 times daily on 12/26/2020. No other overnight pulmonary issues reported. Review of Systems: HEENT: No epistaxis, no nasal drainage, no difficulty in swallowing, no redness in eyes Respiratory: as above Cardiovascular: no palpitations, no chronic leg edema, no syncope Gastrointestinal: no abd pain, no vomiting, no diarrhea, no bleeding symptoms Genitourinary: No urinary symptoms or hematuria Integument/breast: No ulcers or rashes Musculoskeletal:Neg 
Neurological: No focal weakness, no seizures, no headaches Behvioral/Psych: No anxiety, no depression Constitutional: No fever, no chills,  no night sweats No Known Allergies Past Medical History:  
Diagnosis Date  Asthma  Chlamydia  Herpes zoster  HIV (human immunodeficiency virus infection) ([facility]  Syphilis History reviewed. No pertinent surgical history. History reviewed. No pertinent family history. Social History Tobacco Use  Smoking status: Former Smoker  Smokeless tobacco: Never Used Substance Use Topics  Alcohol use: Yes Comment: socially Prior to Admission medications Not on File Current Facility-Administered Medications Medication Dose Route Frequency  methylPREDNISolone (PF) (SOLU-MEDROL) injection 40 mg  40 mg IntraVENous Q12H  
 morphine injection 0.5 mg  0.5 mg IntraVENous Q4H PRN  
 trimethoprim-sulfamethoxazole (BACTRIM DS, SEPTRA DS) 160-800 mg per tablet 2 Tab  2 Tab Oral TIDAC  acetaminophen (TYLENOL) tablet 650 mg  650 mg Oral Q8H PRN  
 multivitamin, tx-iron-ca-min (THERA-M w/ IRON) tablet 1 Tab  1 Tab Oral DAILY  guaiFENesin (ROBITUSSIN) 100 mg/5 mL oral liquid 100 mg  100 mg Oral Q4H PRN  
 melatonin tablet 10 mg  10 mg Oral QHS  sodium bicarbonate tablet 325 mg  325 mg Oral BID  levalbuterol (XOPENEX) nebulizer soln 1.25 mg/0.5 ml  1.25 mg Nebulization Q4H RT  
 ipratropium (ATROVENT) 0.02 % nebulizer solution 0.5 mg  0.5 mg Nebulization Q4H PRN  
  budesonide (PULMICORT) 500 mcg/2 ml nebulizer suspension  500 mcg Nebulization BID RT  
 levalbuterol (XOPENEX) nebulizer soln 1.25 mg/0.5 ml  1.25 mg Nebulization Q4H PRN  
 enoxaparin (LOVENOX) injection 40 mg  40 mg SubCUTAneous Q24H  
 influenza vaccine - (6 mos+)(PF) (FLUARIX/FLULAVAL/FLUZONE QUAD) injection 0.5 mL  0.5 mL IntraMUSCular PRIOR TO DISCHARGE  calcium carbonate (TUMS) chewable tablet 200 mg [elemental]  200 mg Oral TID PRN  
 ascorbic acid (vitamin C) (VITAMIN C) tablet 500 mg  500 mg Oral BID  [Held by provider] predniSONE (DELTASONE) tablet 20 mg  20 mg Oral DAILY WITH BREAKFAST  [Held by provider] predniSONE (DELTASONE) tablet 20 mg  20 mg Oral BID WITH MEALS  sodium chloride (NS) flush 5-10 mL  5-10 mL IntraVENous PRN  
 sodium chloride (NS) flush 5-40 mL  5-40 mL IntraVENous PRN  promethazine (PHENERGAN) tablet 12.5 mg  12.5 mg Oral Q6H PRN Or  
 ondansetron (ZOFRAN) injection 4 mg  4 mg IntraVENous Q6H PRN  
 bisacodyL (DULCOLAX) tablet 5 mg  5 mg Oral DAILY PRN  
 famotidine (PEPCID) tablet 20 mg  20 mg Oral BID Objective:  
Vital Signs:   
Visit Vitals /88 Pulse (!) 136 Temp 99.5 °F (37.5 °C) Resp 18 Ht 5' 7\" (1.702 m) Wt 60.9 kg (134 lb 3.2 oz) SpO2 96% BMI 21.02 kg/m² O2 Device: Heated, Hi flow nasal cannula O2 Flow Rate (L/min): 35 l/min Temp (24hrs), Av.6 °F (37 °C), Min:98.1 °F (36.7 °C), Max:99.5 °F (37.5 °C) Intake/Output:  
Last shift:      No intake/output data recorded. Last 3 shifts:  1901 -  0700 In: 360 [P.O.:360] Out: 4101 [Urine:4100] Intake/Output Summary (Last 24 hours) at 2020 1036 Last data filed at 2020 2356 Gross per 24 hour Intake 360 ml Output 1251 ml Net -891 ml Physical Exam:  
 
General/Neurology: Alert, awake and oriented. NAD. Appears chronically ill. Head:   NCAT. Eye:   EOM intact. No icterus/pallor/cyanosis. Nose:   No nasal drainage/discharge. Throat:  Moist mucosa. Neck:   Trachea midline. Lung: Moderate air entry bilateral equal.  No rales, rhonchi. No wheezing or stridor. No prolonged expiration or accessory muscle use. Heart:   S1 S2 present. No murmur or JVD. Abdomen:  Soft. NT. ND. No palpable masses. Extremities:  No edema. No cyanosis or clubbing. Pulses: 2+ and symmetric in DP. Data:  
   
Recent Results (from the past 24 hour(s)) GLUCOSE, POC Collection Time: 12/26/20 11:17 AM  
Result Value Ref Range Glucose (POC) 120 (H) 70 - 110 mg/dL GLUCOSE, POC Collection Time: 12/26/20  4:30 PM  
Result Value Ref Range Glucose (POC) 231 (H) 70 - 110 mg/dL CBC WITH AUTOMATED DIFF Collection Time: 12/27/20  1:10 AM  
Result Value Ref Range WBC 4.8 4.6 - 13.2 K/uL  
 RBC 3.63 (L) 4.70 - 5.50 M/uL  
 HGB 10.8 (L) 13.0 - 16.0 g/dL HCT 31.0 (L) 36.0 - 48.0 % MCV 85.4 74.0 - 97.0 FL  
 MCH 29.8 24.0 - 34.0 PG  
 MCHC 34.8 31.0 - 37.0 g/dL  
 RDW 14.1 11.6 - 14.5 % PLATELET 939 306 - 005 K/uL MPV 9.9 9.2 - 11.8 FL  
 NEUTROPHILS 93 (H) 40 - 73 % LYMPHOCYTES 3 (L) 21 - 52 % MONOCYTES 3 3 - 10 % EOSINOPHILS 1 0 - 5 % BASOPHILS 0 0 - 2 %  
 ABS. NEUTROPHILS 4.4 1.8 - 8.0 K/UL  
 ABS. LYMPHOCYTES 0.2 (L) 0.9 - 3.6 K/UL  
 ABS. MONOCYTES 0.2 0.05 - 1.2 K/UL  
 ABS. EOSINOPHILS 0.1 0.0 - 0.4 K/UL  
 ABS. BASOPHILS 0.0 0.0 - 0.1 K/UL  
 DF AUTOMATED MAGNESIUM Collection Time: 12/27/20  1:10 AM  
Result Value Ref Range Magnesium 2.1 1.6 - 2.6 mg/dL METABOLIC PANEL, COMPREHENSIVE Collection Time: 12/27/20  1:10 AM  
Result Value Ref Range Sodium 125 (L) 136 - 145 mmol/L Potassium 5.2 3.5 - 5.5 mmol/L Chloride 90 (L) 100 - 111 mmol/L  
 CO2 27 21 - 32 mmol/L Anion gap 8 3.0 - 18 mmol/L Glucose 212 (H) 74 - 99 mg/dL BUN 15 7.0 - 18 MG/DL  Creatinine 0.70 0.6 - 1.3 MG/DL  
 BUN/Creatinine ratio 21 (H) 12 - 20    
 GFR est AA >60 >60 ml/min/1.73m2 GFR est non-AA >60 >60 ml/min/1.73m2 Calcium 9.2 8.5 - 10.1 MG/DL Bilirubin, total 0.2 0.2 - 1.0 MG/DL  
 ALT (SGPT) 144 (H) 16 - 61 U/L  
 AST (SGOT) 44 (H) 10 - 38 U/L Alk. phosphatase 313 (H) 45 - 117 U/L Protein, total 7.2 6.4 - 8.2 g/dL Albumin 2.8 (L) 3.4 - 5.0 g/dL Globulin 4.4 (H) 2.0 - 4.0 g/dL A-G Ratio 0.6 (L) 0.8 - 1.7 PHOSPHORUS Collection Time: 12/27/20  1:10 AM  
Result Value Ref Range Phosphorus 4.5 2.5 - 4.9 MG/DL  
LIPASE Collection Time: 12/27/20  2:18 AM  
Result Value Ref Range Lipase 72 (L) 73 - 393 U/L Chemistry Recent Labs  
  12/27/20 
0110 12/26/20 
0110 12/25/20 
0320 * 241* 153* * 124* 125*  
K 5.2 5.1 5.2 CL 90* 91* 92* CO2 27 25 23 BUN 15 13 14 CREA 0.70 0.84 0.67 CA 9.2 9.1 9.6 MG 2.1 2.1 2.4 PHOS 4.5 4.2 5.8* AGAP 8 8 10 BUCR 21* 15 21* * 334* 262* TP 7.2 7.7 8.1 ALB 2.8* 3.2* 3.5 GLOB 4.4* 4.5* 4.6* AGRAT 0.6* 0.7* 0.8 Lactic Acid No results found for: LAC No results for input(s): LAC in the last 72 hours. Liver Enzymes Protein, total  
Date Value Ref Range Status 12/27/2020 7.2 6.4 - 8.2 g/dL Final  
 
Albumin Date Value Ref Range Status 12/27/2020 2.8 (L) 3.4 - 5.0 g/dL Final  
 
Globulin Date Value Ref Range Status 12/27/2020 4.4 (H) 2.0 - 4.0 g/dL Final  
 
A-G Ratio Date Value Ref Range Status 12/27/2020 0.6 (L) 0.8 - 1.7   Final  
 
Alk. phosphatase Date Value Ref Range Status 12/27/2020 313 (H) 45 - 117 U/L Final  
 
Recent Labs  
  12/27/20 
0110 12/26/20 
0110 12/25/20 
0320 TP 7.2 7.7 8.1 ALB 2.8* 3.2* 3.5 GLOB 4.4* 4.5* 4.6* AGRAT 0.6* 0.7* 0.8 * 334* 262* CBC w/Diff Recent Labs  
  12/27/20 
0110 12/26/20 
0110 12/25/20 
0320 WBC 4.8 5.6 3.5*  
RBC 3.63* 3.78* 3.61* HGB 10.8* 11.3* 10.9* HCT 31.0* 32.3* 30.7*  290 303 GRANS 93* 94* 89* LYMPH 3* 3* 3* EOS 1 0 1  
  
 
 Cardiac Enzymes No results found for: CPK, CK, CKMMB, CKMB, RCK3, CKMBT, CKNDX, CKND1, ANIKA, TROPT, TROIQ, ZOË, TROPT, TNIPOC, BNP, BNPP  
 
BNP No results found for: BNP, BNPP, XBNPT Coagulation No results for input(s): PTP, INR, APTT, INREXT, INREXT in the last 72 hours. Thyroid  Lab Results Component Value Date/Time TSH 2.10 12/23/2020 10:45 AM  
   
No results found for: T4  
 
Urinalysis Lab Results Component Value Date/Time Color YELLOW 12/18/2020 12:14 AM  
 Appearance CLEAR 12/18/2020 12:14 AM  
 Specific gravity <1.005 (L) 12/18/2020 12:14 AM  
 pH (UA) 7.0 12/18/2020 12:14 AM  
 Protein Negative 12/18/2020 12:14 AM  
 Glucose Negative 12/18/2020 12:14 AM  
 Ketone Negative 12/18/2020 12:14 AM  
 Bilirubin Negative 12/18/2020 12:14 AM  
 Urobilinogen 1.0 12/18/2020 12:14 AM  
 Nitrites Negative 12/18/2020 12:14 AM  
 Leukocyte Esterase Negative 12/18/2020 12:14 AM  
 Epithelial cells 1+ 03/08/2018 09:11 PM  
 Bacteria FEW (A) 03/08/2018 09:11 PM  
 WBC 4 to 6 03/08/2018 09:11 PM  
 RBC NEGATIVE  03/08/2018 09:11 PM  
  
 
Micro  No results for input(s): SDES, CULT in the last 72 hours. No results for input(s): CULT in the last 72 hours. ABG No results for input(s): PHI, PHI, POC2, PCO2I, PO2, PO2I, HCO3, HCO3I, FIO2, FIO2I in the last 72 hours. CT (Most Recent) (CT chest reviewed by me) [facility] [place] CTA CHEST W OR W WO CONT Narrative EXAM: CTA Chest 
 
INDICATION: Shortness of breath. Possible PE. COMPARISON: No prior study. TECHNIQUE: Axial CT imaging from the thoracic inlet through the diaphragm with 
intravenous contrast utilizing CTA study for pulmonary artery evaluation. Coronal and sagittal MIP reformations were generated at a separate workstation.  
One or more dose reduction techniques were used on this CT: automated exposure 
control, adjustment of the mAs and/or kVp according to patient size, and 
 iterative reconstruction techniques. The specific techniques used on this CT 
exam have been documented in the patient's electronic medical record. Digital 
Imaging and Communications in Medicine (DICOM) format image data are available 
to nonaffiliated external healthcare facilities or entities on a secure, media 
free, reciprocally searchable basis with patient authorization for at least a 
12-month period after this study. _______________ FINDINGS: 
 
EXAM QUALITY: Overall exam quality is suboptimal. Pulmonary arterial enhancement 
is adequate. Respiratory motion artifact is present, limiting evaluation. PULMONARY ARTERIES: No convincing evidence of pulmonary embolism. MEDIASTINUM: Normal heart size. No evidence of right heart strain. Aorta is 
unremarkable. No pericardial effusion. LYMPH NODES: No pathologically enlarged lymph nodes lymph nodes. AIRWAY: Unremarkable. LUNGS: Centrilobular emphysema. Superimposed scattered bilateral groundglass 
opacities throughout the lungs. PLEURA: No pleural effusion or pneumothorax. UPPER ABDOMEN: Visualized upper abdomen is unremarkable. OTHER: No acute or aggressive osseous abnormalities identified. _______________ Impression IMPRESSION: 
 
Suboptimal study secondary to motion artifact and bolus timing. No evidence of obvious central pulmonary embolism. Diffuse bilateral groundglass opacities, suspicious for atypical infection. Emphysema. XR (Most Recent). CXR  reviewed by me and compared with previous CXR [place] [facility] [place]20 XR CHEST PA LAT Narrative EXAM: CHEST X-RAY Technique:  Frontal and Lateral views of the chest. 
 
History: Cough Comparison: 12/22/2020, 12/20/2020 
 
_______________ FINDINGS: 
 
HEART AND MEDIASTINUM: Midline cardiac silhouette, normal in size. Unremarkable 
hilar vascular structures. LUNGS AND PLEURAL SPACES: Continued progressive diffuse bilateral upper and 
lower lung, hazy confluent interstitial and alveolar opacities. The lateral 
projection demonstrates the majority airspace disease is in the posterior 
thorax. No pneumothorax or effusion. BONY THORAX AND SOFT TISSUES: No acute or destructive osseous abnormality. _______________ Impression IMPRESSION: 
 
1. Interval progressive worsening bilateral airspace disease/pneumonia. ·Please note: Voice-recognition software may have been used to generate this report, which may have resulted in some phonetic-based errors in grammar and contents. Even though attempts were made to correct all the mistakes, some may have been missed, and remained in the body of the document. Luanne Valdovinos MD 
12/27/2020

## 2020-12-28 NOTE — PROGRESS NOTES
Bedside shift change report given to [address] [place] [address] (oncoming nurse) by Cale Estrada RN (offgoing nurse). Report included the following information SBAR, Kardex and ED Summary. 5810 Shift assessment complete. 1700 Patient refused pcn shot. Dr. Lauri Singh aware. Shift Summary: Shift uneventful. No complaints of chest pain or shortness of breath. Call light is within reach.

## 2020-12-28 NOTE — CONSULTS
Palliative Medicine Consult DR. GARCIA'[facility]: [phone] ([phone]) [facility]: [phone] [facility]: [phone] Patient Name: Amrita Garcia YOB: 1990 Date of Initial Consult: 12/28/2020 Reason for Consult: overwhelming symptoms Requesting Provider:  Robyn Davis MD 
Primary Care Physician: None SUMMARY:  
Amrita Garcia is a 31 y.o. male with a past history of HIV, syphilis and asthma who was admitted on 12/17/2020 from home with a diagnosis of pneumocystis pneumonia. Current medical issues leading to Palliative Medicine involvement include: HIV and overwhelming symptoms. PALLIATIVE DIAGNOSES:  
1. Advanced care decisions 2. Acute respiratory failure 3. Pneumonia 4. HIV 5. Syphilis PLAN:  
1. Advanced care decisions: This NP met with patient at bedside. He is awake, alert, oriented and able to make own healthcare decisions. Patient is single and has no children. Patient lives with his mother who although uses walker and wheelchair is independent in all ADL's. His uncle also lives in the home. Discussed importance of AMD document and patient agreed to complete today. Patient delegated MPOA to his mother, Sonia Clarke (telephone number [phone]) and no backup surrogate decision maker. Discussed risks and benefits of CPR in the event of cardiopulmonary arrest and patient wishes to remain full code. GOALS OF CARE: FULL CODE with FULL INTERVENTIONS. 2. Acute respiratory failure with hypoxia: secondary to #3 below. Currently on HFNC at 35LPM with 45% FiO2. Pulmonary consulted. On steroids, bronchodilators. Primary team managing. 3. Pneumonia: secondary to #4. CT chest with bilateral diffuse ground glass opacities and underlying emphysema. IV antibiotics. 4. HIV: Initially diagnosed ~ 2 years ago after donating plasma. Patient has had no treatment secondary to denial \"if I didn't have blood drawn to know numbers, I wasn't bad\". Family was informed of diagnosis the day prior to admission to the ED. CD 4 count is 6 and HIV RNA viral load is 1,420,000. Seems to be willing to participate in treatment and accept referrals. ID consulted. 5. Syphillis: Diagnosed and treated two years ago. ID consulted. 6. Initial consult note routed to primary continuity provider 7. Communicated plan of care with: Palliative IDT 
 
GOALS OF CARE: 
Patient/Health Care Proxy Stated Goals: Prolong life TREATMENT PREFERENCES:  
Code Status: Full Code Advance Care Planning: No flowsheet data found. Medical Interventions: Full interventions Other: As far as possible, the palliative care team has discussed with patient/health care proxy about goals of care/treatment preferences for patient. HISTORY:  
 
History obtained from: chart CHIEF COMPLAINT: shortness of breath HPI/SUBJECTIVE: The patient is:  
[x] Verbal and participatory [] Non-participatory due to:  
Patient admits to shortness of breath. Denies pain and nausea. Is eating well although prior to hospitalization noted weight loss. States he finally told his family about HIV when he \"felt something shift in my body and I could no longer ignore it\". Clinical Pain Assessment (nonverbal scale for nonverbal patients): Clinical Pain Assessment Severity: 0 Duration: for how long has pt been experiencing pain (e.g., 2 days, 1 month, years) Frequency: how often pain is an issue (e.g., several times per day, once every few days, constant) FUNCTIONAL ASSESSMENT:  
 
Palliative Performance Scale (PPS): PPS: 40 ECOG 
ECOG Status : Limited self-care  PSYCHOSOCIAL/SPIRITUAL SCREENING:  
  
Any spiritual / Jewish concerns: 
[] Yes /  [x] No 
 
Caregiver Burnout: 
 [] Yes /  [] No /  [x] No Caregiver Present Anticipatory grief assessment:  
[x] Normal  / [] Maladaptive REVIEW OF SYSTEMS:  
 
Positive and pertinent negative findings in ROS are noted above in HPI. The following systems were [x] reviewed / [] unable to be reviewed as noted in HPI Other findings are noted below. Systems: constitutional, ears/nose/mouth/throat, respiratory, gastrointestinal, genitourinary, musculoskeletal, integumentary, neurologic, psychiatric, endocrine. Positive findings noted below. Modified ESAS Completed by: provider Pain: 0 Nausea: 0 Dyspnea: 10 Stool Occurrence(s): 1 PHYSICAL EXAM:  
 
Wt Readings from Last 3 Encounters:  
12/28/20 63.1 kg (139 lb 3.2 oz) 12/07/20 86.2 kg (190 lb)  
03/16/18 88.5 kg (195 lb) Blood pressure 132/80, pulse (!) 114, temperature 97.9 °F (36.6 °C), resp. rate 18, height 5' 7\" (1.702 m), weight 63.1 kg (139 lb 3.2 oz), SpO2 94 %. Pain: 
Pain Scale 1: Numeric (0 - 10) Pain Intensity 1: 0 Pain Onset 1: coughing fit Pain Location 1: Chest(lungs) Pain Orientation 1: Anterior Pain Description 1: Miguel Angel Hernandez Pain Intervention(s) 1: Medication (see MAR) Constitutional: Young adult male, sitting up in bed in mild respiratory distress on HFNC Eyes: pupils equal, anicteric ENMT: no nasal discharge, moist mucous membranes Respiratory: breathing moderately labored, HFNC, speaks in short sentences Musculoskeletal: no deformity, no tenderness to palpation Skin: warm, dry Neurologic: following commands, moving all extremities Psychiatric: full affect, no hallucinations HISTORY:  
 
Principal Problem: 
  Pneumonia of both lungs due to Pneumocystis jirovecii ([facility] [address] (12/18/2020) Active Problems: AIDS (acquired immune deficiency syndrome) ([facility] [address] (12/17/2020) Bilateral pneumonia (12/17/2020) Sepsis ([facility] [address] (12/17/2020) Hyponatremia (12/17/2020) Acute respiratory distress (12/22/2020) Hypoxia (12/23/2020) Past Medical History:  
Diagnosis Date  Asthma  Chlamydia  Herpes zoster  HIV (human immunodeficiency virus infection) ([facility] [address]  Syphilis History reviewed. No pertinent surgical history. History reviewed. No pertinent family history. History reviewed, no pertinent family history. Social History Tobacco Use  Smoking status: Former Smoker  Smokeless tobacco: Never Used Substance Use Topics  Alcohol use: Yes Comment: socially No Known Allergies Current Facility-Administered Medications Medication Dose Route Frequency  methylPREDNISolone (PF) (SOLU-MEDROL) injection 40 mg  40 mg IntraVENous Q12H  
 insulin lispro (HUMALOG) injection   SubCUTAneous AC&HS  morphine injection 0.5 mg  0.5 mg IntraVENous Q4H PRN  
 trimethoprim-sulfamethoxazole (BACTRIM DS, SEPTRA DS) 160-800 mg per tablet 2 Tab  2 Tab Oral TIDAC  acetaminophen (TYLENOL) tablet 650 mg  650 mg Oral Q8H PRN  
 multivitamin, tx-iron-ca-min (THERA-M w/ IRON) tablet 1 Tab  1 Tab Oral DAILY  guaiFENesin (ROBITUSSIN) 100 mg/5 mL oral liquid 100 mg  100 mg Oral Q4H PRN  
 melatonin tablet 10 mg  10 mg Oral QHS  sodium bicarbonate tablet 325 mg  325 mg Oral BID  levalbuterol (XOPENEX) nebulizer soln 1.25 mg/0.5 ml  1.25 mg Nebulization Q4H RT  
 ipratropium (ATROVENT) 0.02 % nebulizer solution 0.5 mg  0.5 mg Nebulization Q4H PRN  
 budesonide (PULMICORT) 500 mcg/2 ml nebulizer suspension  500 mcg Nebulization BID RT  
 levalbuterol (XOPENEX) nebulizer soln 1.25 mg/0.5 ml  1.25 mg Nebulization Q4H PRN  
 enoxaparin (LOVENOX) injection 40 mg  40 mg SubCUTAneous Q24H  
 influenza vaccine 2020-21 (6 mos+)(PF) (FLUARIX/FLULAVAL/FLUZONE QUAD) injection 0.5 mL  0.5 mL IntraMUSCular PRIOR TO DISCHARGE  calcium carbonate (TUMS) chewable tablet 200 mg [elemental]  200 mg Oral TID PRN  
 ascorbic acid (vitamin C) (VITAMIN C) tablet 500 mg  500 mg Oral BID  
  [Held by provider] predniSONE (DELTASONE) tablet 20 mg  20 mg Oral DAILY WITH BREAKFAST  [Held by provider] predniSONE (DELTASONE) tablet 20 mg  20 mg Oral BID WITH MEALS  sodium chloride (NS) flush 5-10 mL  5-10 mL IntraVENous PRN  
 sodium chloride (NS) flush 5-40 mL  5-40 mL IntraVENous PRN  promethazine (PHENERGAN) tablet 12.5 mg  12.5 mg Oral Q6H PRN Or  
 ondansetron (ZOFRAN) injection 4 mg  4 mg IntraVENous Q6H PRN  
 bisacodyL (DULCOLAX) tablet 5 mg  5 mg Oral DAILY PRN  
 famotidine (PEPCID) tablet 20 mg  20 mg Oral BID  
 
 
 LAB AND IMAGING FINDINGS:  
 
Lab Results Component Value Date/Time WBC 6.0 12/28/2020 01:00 AM  
 HGB 9.8 (L) 12/28/2020 01:00 AM  
 PLATELET 658 50/62/1195 01:00 AM  
 
Lab Results Component Value Date/Time Sodium 127 (L) 12/28/2020 01:00 AM  
 Potassium 4.9 12/28/2020 01:00 AM  
 Chloride 92 (L) 12/28/2020 01:00 AM  
 CO2 27 12/28/2020 01:00 AM  
 BUN 14 12/28/2020 01:00 AM  
 Creatinine 0.70 12/28/2020 01:00 AM  
 Calcium 8.7 12/28/2020 01:00 AM  
 Magnesium 2.0 12/28/2020 01:00 AM  
 Phosphorus 4.0 12/28/2020 01:00 AM  
  
Lab Results Component Value Date/Time Alk. phosphatase 325 (H) 12/28/2020 01:00 AM  
 Protein, total 6.6 12/28/2020 01:00 AM  
 Albumin 2.5 (L) 12/28/2020 01:00 AM  
 Globulin 4.1 (H) 12/28/2020 01:00 AM  
 
Lab Results Component Value Date/Time INR 1.0 12/20/2020 04:34 AM  
 Prothrombin time 12.8 12/20/2020 04:34 AM  
  
Lab Results Component Value Date/Time Iron 29 (L) 12/23/2020 12:45 AM  
 TIBC 241 (L) 12/23/2020 12:45 AM  
 Iron % saturation 12 (L) 12/23/2020 12:45 AM  
 Ferritin 1,525 (H) 12/17/2020 11:00 PM  
  
No results found for: PH, PCO2, PO2 No components found for: [place] Lab Results Component Value Date/Time CK 65 12/17/2020 06:10 PM  
 CK - MB 1.0 12/17/2020 06:10 PM  
  
 
   
 
Total time: 70 minutes Counseling / coordination time, spent as noted above > 50% counseling / coordination: 50 minutes with patient. Prolonged service was provided for  []30 min   []75 min in face to face time in the presence of the patient, spent as noted above. Time Start:  
Time End:  
Note: this can only be billed with 15682 (initial) or 79984 (follow up). If multiple start / stop times, list each separately.

## 2020-12-28 NOTE — PROGRESS NOTES
Hospitalist Progress Note Patient: Roosevelt Grajeda MRN: [id number]  CSN: [id number] YOB: 1990  Age: 31 y.o. Sex: male DOA: 12/17/2020 LOS:  LOS: 11 days Chief Complaint: 
 
pneumonia Assessment/Plan  
31 you male with untreated HIV over 3 years, came with SOB and fevers 
covid negative Ac hypoxia requiring high flow 02 
sepsis PJ pneumonia HIV/AIDS, untreated, CD4 count 6 Hx positive RPR Fevers He feels better but is now on high flow 02 at 35% Overall he does appear to be more comfortable On IV steroids, PO bactrim high dose DVT proph-lovenox Continue supportive care His blood cx are negative thus far 
repeat RPR titer Prognosis guarded, he is still unable to get oob with out significant dyspnea Disposition : 
Patient Active Problem List  
Diagnosis Code  AIDS (acquired immune deficiency syndrome) ([facility]) B20  
 Bilateral pneumonia J18.9  Sepsis ([facility] [address] A41.9  Hyponatremia E87.1  Pneumonia of both lungs due to Pneumocystis jirovecii ([facility]) B59  
 Acute respiratory distress R06.03  
 Hypoxia R09.02 Subjective: I do feel better today Feels high flow is more comfortable and working better for him Less anxious Eating well, appetite is good Review of systems: 
 
Constitutional: denies chills, myalgias Respiratory:  SOB, cough Cardiovascular: denies chest pain, palpitations Gastrointestinal: denies nausea, vomiting, diarrhea Vital signs/Intake and Output: 
Visit Vitals /88 Pulse 97 Temp 98.9 °F (37.2 °C) Resp 18 Ht 5' 7\" (1.702 m) Wt 63.1 kg (139 lb 3.2 oz) SpO2 97% BMI 21.80 kg/m² Current Shift:  No intake/output data recorded. Last three shifts:  12/26 1901 - 12/28 0700 In: 240 [P.O.:240] Out: 3185 [IPIML:[id number]] Exam: 
 
General: frail thin AAM, appears debilitated, alert, NAD, OX3 Head/Neck: NCAT, supple, No masses, No lymphadenopathy CVS:Regular rate and rhythm, no M/R/G, S1/S2 heard, no thrill Lungs:Coarse BS BL, no wheezes, rhonchi, or rales Abdomen: Soft, Nontender, No distention, Normal Bowel sounds, No hepatomegaly Extremities: No C/C/E, pulses palpable 2+ Neuro:grossly normal , follows commands Psych:appropriate Labs: Results:  
   
Chemistry Recent Labs  
  12/28/20 0100 12/27/20 0110 12/26/20 0110 * 212* 241* * 125* 124* K 4.9 5.2 5.1 CL 92* 90* 91* CO2 27 27 25 BUN 14 15 13 CREA 0.70 0.70 0.84 CA 8.7 9.2 9.1 AGAP 8 8 8 BUCR 20 21* 15  
* 313* 334* TP 6.6 7.2 7.7 ALB 2.5* 2.8* 3.2*  
GLOB 4.1* 4.4* 4.5* AGRAT 0.6* 0.6* 0.7* CBC w/Diff Recent Labs  
  12/28/20 0100 12/27/20 0110 12/26/20 0110 WBC 6.0 4.8 5.6  
RBC 3.31* 3.63* 3.78* HGB 9.8* 10.8* 11.3* HCT 28.4* 31.0* 32.3*  
 288 290 GRANS 95* 93* 94* LYMPH 2* 3* 3* EOS 1 1 0 Cardiac Enzymes No results for input(s): CPK, CKND1, ANIKA in the last 72 hours. No lab exists for component: Carlotta Homans Coagulation No results for input(s): PTP, INR, APTT, INREXT in the last 72 hours. Lipid Panel No results found for: CHOL, CHOLPOCT, CHOLX, CHLST, CHOLV, 598338, HDL, HDLP, LDL, LDLC, DLDLP, 921914, VLDLC, VLDL, TGLX, TRIGL, TRIGP, TGLPOCT, CHHD, CHHDX  
BNP No results for input(s): BNPP in the last 72 hours. Liver Enzymes Recent Labs  
  12/28/20 0100 TP 6.6 ALB 2.5* * Thyroid Studies Lab Results Component Value Date/Time TSH 2.10 12/23/2020 10:45 AM  
    
Procedures/imaging: see electronic medical records for all procedures/Xrays and details which were not copied into this note but were reviewed prior to creation of Plan Justin Caballero MD

## 2020-12-28 NOTE — PROGRESS NOTES
Comprehensive Nutrition Assessment Type and Reason for Visit: Jack Door Nutrition Recommendations/Plan: Other: continue w/ POC Nutrition Assessment:  (P) pt admitted w/ bilateral PNA, sepsis, hyponatremia, acute resp distress, hypoxia, AIDS Estimated Daily Nutrient Needs: 
Energy (kcal): (P) 2416; Weight Used for Energy Requirements: (P) Admission Protein (g): (P) 121; Weight Used for Protein Requirements: (P) Admission Fluid (ml/day): (P) 2416; Method Used for Fluid Requirements: (P) 1 ml/kcal 
 
 
Nutrition Related Findings:  (P) Labs: Na-127 glucose-190 ALT-135 AST-42 Meds: vit c, dulcolax, tums, pepcid, humalog, melatonin, MVI, zofran, predisolone, sodium bicarbonate +BM 12/26/20; pt reports that appetite has been really awesome and he is taking his Ensures Wounds:   
(P) None Current Nutrition Therapies: DIET NUTRITIONAL SUPPLEMENTS All Meals; Ensure [employer] DIET REGULAR Anthropometric Measures: 
· Height:  (P) 5' 7\" (170.2 cm) · Current Body Wt:  (P) 63 kg (139 lb) · Admission Body Wt:  (P) 151 lb · Usual Body Wt:  (unable to assess at this time) · Ideal Body Wt:  (P) 148 lbs:  (P) 93.9 % · Adjusted Body Weight:   ; Weight Adjustment for: No adjustment · Adjusted BMI:      
· BMI Category:  (P) Normal weight (BMI 18.5-24. 9) Nutrition Diagnosis:  
· Inadequate oral intake related to catabolic illness as evidenced by weight loss, intake 26-50% Nutrition Interventions:  
Food and/or Nutrient Delivery: (P) Continue current diet, Continue oral nutrition supplement Nutrition Education and Counseling: (P) No recommendations at this time Coordination of Nutrition Care: (P) Continue to monitor while inpatient, Interdisciplinary rounds Goals: (P) PO intakes of meals and supplements >50% throughout the next 3-5days Nutrition Monitoring and Evaluation:  
Behavioral-Environmental Outcomes: None identified Food/Nutrient Intake Outcomes: (P) Food and nutrient intake, Supplement intake, Vitamin/mineral intake Physical Signs/Symptoms Outcomes: (P) GI status, Biochemical data, Skin, Weight, Nutrition focused physical findings Discharge Planning: (P) Continue current diet, Continue oral nutrition supplement Electronically signed by Pj Cuellar on 12/28/2020 at 10:42 AM

## 2020-12-28 NOTE — PROGRESS NOTES
Problem: Risk for Spread of Infection Goal: Prevent transmission of infectious organism to others Description: Prevent the transmission of infectious organisms to other patients, staff members, and visitors. Outcome: Progressing Towards Goal 
  
Problem: Patient Education:  Go to Education Activity Goal: Patient/Family Education Outcome: Progressing Towards Goal 
  
Problem: Pressure Injury - Risk of 
Goal: *Prevention of pressure injury Description: Document Josep Scale and appropriate interventions in the flowsheet. Outcome: Progressing Towards Goal 
Note: Pressure Injury Interventions: 
Sensory Interventions: Assess changes in LOC, Assess need for specialty bed Moisture Interventions: Absorbent underpads, Apply protective barrier, creams and emollients, Assess need for specialty bed Activity Interventions: Assess need for specialty bed, Increase time out of bed, PT/OT evaluation, Pressure redistribution bed/mattress(bed type) Mobility Interventions: Assess need for specialty bed, HOB 30 degrees or less Nutrition Interventions: Document food/fluid/supplement intake, Discuss nutritional consult with provider Friction and Shear Interventions: Apply protective barrier, creams and emollients, Foam dressings/transparent film/skin sealants, HOB 30 degrees or less, Lift sheet Problem: Patient Education: Go to Patient Education Activity Goal: Patient/Family Education Outcome: Progressing Towards Goal 
  
Problem: Falls - Risk of 
Goal: *Absence of Falls Description: Document Jenn Radhika Fall Risk and appropriate interventions in the flowsheet. Outcome: Progressing Towards Goal 
Note: Fall Risk Interventions: 
Mobility Interventions: Assess mobility with egress test, Bed/chair exit alarm, Communicate number of staff needed for ambulation/transfer, OT consult for ADLs, Patient to call before getting OOB Mentation Interventions: Adequate sleep, hydration, pain control, Bed/chair exit alarm, Door open when patient unattended, Evaluate medications/consider consulting pharmacy Medication Interventions: Assess postural VS orthostatic hypotension, Bed/chair exit alarm, Evaluate medications/consider consulting pharmacy, Patient to call before getting OOB Elimination Interventions: Bed/chair exit alarm, Call light in reach, Elevated toilet seat Problem: Patient Education: Go to Patient Education Activity Goal: Patient/Family Education Outcome: Progressing Towards Goal 
  
Problem: Nutrition Deficit Goal: *Optimize nutritional status Outcome: Progressing Towards Goal 
  
Problem: Patient Education: Go to Patient Education Activity Goal: Patient/Family Education Outcome: Progressing Towards Goal

## 2020-12-28 NOTE — PROGRESS NOTES
Problem: Mobility Impaired (Adult and Pediatric) Goal: *Acute Goals and Plan of Care (Insert Text) Description: Physical Therapy Goals Initiated 12/25/2020 and to be accomplished within 7 day(s) 1. Patient will move from supine to sit and sit to supine  in bed with modified independence. 2.  Patient will transfer from bed to chair and chair to bed with modified independence using the least restrictive device. 3.  Patient will perform sit to stand with supervision/set-up. 4.  Patient will ambulate with supervision/set-up for 50 feet with the least restrictive device. Outcome: Not Progressing Towards Goal 
  
Pt refused PT due to: 
[x]  \"I need time to prepare. Give me 30 min\" (1st attempt) [x]  \"I'm eating now. So.. Nicole Almonte \" (2nd attempt) [x]  \"I'm not doing it today. \" (3rd attempt) []  Pt lethargic 
[]  Off Unit Will f/u tomorrow. Educated pt on need for participation. Thank you.  
Enrique Brown, PTA

## 2020-12-28 NOTE — PROGRESS NOTES
[facility] Pulmonary, Critical Care, and Sleep Medicine Name: Scot Martínez MRN: [id number] : 1990 Hospital: [facility] [place] Date: 2020 [facility] Note Consult requesting physician: Dr. Rodrigo Paris Reason for Consult: Likely PJP pneumonia, hypoxia IMPRESSION:  
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  
Diagnosis Code  AIDS (acquired immune deficiency syndrome) ([facility]) B20  
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  
 Acute respiratory distress R06.03  
 Hypoxia R09.02  
  
RECOMMENDATIONS:  
Continue HFNC at 35 Lpm and 45% FiO2. Titrate flow and fio2 for goal SPO2 > 91% XR chest in AM for follow up Bilateral extensive pneumonia with hypoxemia in a patient with HIV; COVID-19 negative; likely PJP pneumonia. Continue IV Bactrim at increased dose to 2 tablets 3 times daily. Due to worsening CXR findings and increased O2 requirement, prednisone is changed to Solu-Medrol on 2020 Continue IV Solu-Medrol 40 mg IV every 12 hours, ordered. Bronchodilators: Pulmicort twice daily, Xopenex every 4 hours. Xopenex and Atrovent as needed. Repeat sputum culture Fungitell elevated 455. LDH elevated: 560. Expectorated sputum for PJP: Pending. Sputum culture: Light normal dequan. Urine antigen panel: Negative. COVID-19: Negative. LFT elevated but stable and hyponatremia management defer to hospitalist. 
 
CT chest with bilateral diffuse GGO and underlying emphysema. He may have mild pulmonary fibrosis in the background but due to motion artifact it is difficult with exclude. Bronchodilators: Budesonide twice daily, Atrovent as needed. Airway clearance: Continue incentive spirometer; advised to use often. Out of bed. FiO2 to keep SpO2 >=92%, HOB >=30 degree, aspiration precautions, aggressive pulmonary toileting, incentive spirometry, PT/OT eval and treat, mobilization. DVT Prophylaxis: Lovenox GI Prophylaxis: Pepcid Other issues management by primary team and respective consultants. Further recommendations will be based on the patient's response to recommended treatment and results of the investigation ordered. Quality Care: PPI, DVT prophylaxis, HOB elevated, infection control all reviewed and addressed. Discussed with patient, radiologic work up showed, answered all questions to their satisfaction. Care plan discussed with nursing, RT Complex decision making performed with > 50% time spent in face to face assessment of this patinet Subjective/History: Mr. Sotelo  is a 31 y.o. male with PMHx significant for HIV diagnosed couple years ago, not on treatment; admitted with weight loss, ESCOTO; COVID-19 negative x2; diagnosed with atypical looking pneumonia/GGO on CT chest which is negative for PE, suspected for PJP pneumonia. Initially treated with Rocephin and Zithromax. Seen by Dr. Jordan Matters: Elevated LDH, PCT 0.57. Antibiotics changed to Bactrim and started on steroids. HIV viral load elevated. 12/28/2020 Patient seen at bedside in  342 Remained on HFNC at 35 Lpm, 45% FiO2 Reports stable dyspnea Cough with phlegm production stable The patient denies chest pain, wheezing or hemoptysis. No fever. No GI symptoms. Appears chronically ill and tired. No other overnight pulmonary issues reported. Review of Systems:  
Constitutional: No fever, no chills,  no night sweats HEENT: No epistaxis, no nasal drainage, no difficulty in swallowing, no redness in eyes Respiratory: as above Cardiovascular: no palpitations, no chronic leg edema, no syncope Gastrointestinal: no abd pain, no vomiting, no diarrhea, no bleeding symptoms 
Genitourinary: No urinary symptoms or hematuria 
Neurological: No focal weakness, no seizures, no headaches 
Behvioral/Psych: No anxiety, no depression 
  
 
 
No Known Allergies  
 
Past Medical History:  
Diagnosis Date  
• Asthma   
• Chlamydia   
• Herpes zoster   
• HIV (human immunodeficiency virus infection) (HCC)   
• Syphilis   
  
 
History reviewed. No pertinent surgical history.  
 
History reviewed. No pertinent family history.  
 
Social History  
 
Tobacco Use  
• Smoking status: Former Smoker  
• Smokeless tobacco: Never Used  
Substance Use Topics  
• Alcohol use: Yes  
  Comment: socially  
  
 
Prior to Admission medications   
Not on File  
 
 
Current Facility-Administered Medications  
Medication Dose Route Frequency  
• penicillin g benzathine (BICILLIN LA) 1,200,000 unit/2 mL IM injection 2.4 Million Units  2.4 Million Units IntraMUSCular Q7D  
• methylPREDNISolone (PF) (SOLU-MEDROL) injection 40 mg  40 mg IntraVENous Q12H  
• insulin lispro (HUMALOG) injection   SubCUTAneous AC&HS  
• morphine injection 0.5 mg  0.5 mg IntraVENous Q4H PRN  
• trimethoprim-sulfamethoxazole (BACTRIM DS, SEPTRA DS) 160-800 mg per tablet 2 Tab  2 Tab Oral TIDAC  
• acetaminophen (TYLENOL) tablet 650 mg  650 mg Oral Q8H PRN  
• multivitamin, tx-iron-ca-min (THERA-M w/ IRON) tablet 1 Tab  1 Tab Oral DAILY  
• guaiFENesin (ROBITUSSIN) 100 mg/5 mL oral liquid 100 mg  100 mg Oral Q4H PRN  
• melatonin tablet 10 mg  10 mg Oral QHS  
• sodium bicarbonate tablet 325 mg  325 mg Oral BID  
• levalbuterol (XOPENEX) nebulizer soln 1.25 mg/0.5 ml  1.25 mg Nebulization Q4H RT  
• ipratropium (ATROVENT) 0.02 % nebulizer solution 0.5 mg  0.5 mg Nebulization Q4H PRN  
• budesonide (PULMICORT) 500 mcg/2 ml nebulizer suspension  500 mcg Nebulization BID RT  
• levalbuterol (XOPENEX) nebulizer soln 1.25 mg/0.5 ml  1.25 mg Nebulization Q4H PRN  
  enoxaparin (LOVENOX) injection 40 mg  40 mg SubCUTAneous Q24H  
 influenza vaccine - (6 mos+)(PF) (FLUARIX/FLULAVAL/FLUZONE QUAD) injection 0.5 mL  0.5 mL IntraMUSCular PRIOR TO DISCHARGE  calcium carbonate (TUMS) chewable tablet 200 mg [elemental]  200 mg Oral TID PRN  
 ascorbic acid (vitamin C) (VITAMIN C) tablet 500 mg  500 mg Oral BID  [Held by provider] predniSONE (DELTASONE) tablet 20 mg  20 mg Oral DAILY WITH BREAKFAST  [Held by provider] predniSONE (DELTASONE) tablet 20 mg  20 mg Oral BID WITH MEALS  sodium chloride (NS) flush 5-10 mL  5-10 mL IntraVENous PRN  
 sodium chloride (NS) flush 5-40 mL  5-40 mL IntraVENous PRN  promethazine (PHENERGAN) tablet 12.5 mg  12.5 mg Oral Q6H PRN Or  
 ondansetron (ZOFRAN) injection 4 mg  4 mg IntraVENous Q6H PRN  
 bisacodyL (DULCOLAX) tablet 5 mg  5 mg Oral DAILY PRN  
 famotidine (PEPCID) tablet 20 mg  20 mg Oral BID Objective:  
Vital Signs:   
Blood pressure 128/85, pulse (!) 117, temperature 97.8 °F (36.6 °C), resp. rate 18, height 5' 7\" (1.702 m), weight 63.1 kg (139 lb 3.2 oz), SpO2 97 %. Body mass index is 21.8 kg/m². O2 Device: Heated, Hi flow nasal cannula O2 Flow Rate (L/min): 35 l/min Temp (24hrs), Av °F (36.7 °C), Min:97.8 °F (36.6 °C), Max:98.9 °F (37.2 °C) Intake/Output:  
Last shift:      701 - 1900 In: -  
Out: 400 [Urine:400] Last 3 shifts: 1901 -  07 In: 240 [P.O.:240] Out: 3185 [CZNHF:0295] Intake/Output Summary (Last 24 hours) at 2020 1860 Last data filed at 2020 1201 Gross per 24 hour Intake 240 ml Output 1635 ml Net -1395 ml Physical Exam:  
General: in no respiratory distress and acyanotic and oriented times 3, cooperative, appears older than stated age, on HFNC HEENT: PERRLA, EOMI, fundi benign, throat normal without erythema or exudate Neck: No abnormally enlarged lymph nodes or thyroid, supple Chest: normal 
 Lungs: normal air entry, rhonchi scattered bilaterally, normal percussion bilaterally, no tenderness/ rash Heart: Regular rate and rhythm, S1S2 present or without murmur or extra heart sounds Abdomen: non distended, bowel sounds normoactive, tympanic, abdomen is soft without significant tenderness, masses, organomegaly or guarding Extremity: negative for edema, cyanosis, clubbing Neuro:  alert, oriented x 3, no defects noted in general exam. 
Skin: Skin color, texture, turgor fair Data:  
   
Recent Results (from the past 24 hour(s)) GLUCOSE, POC Collection Time: 12/27/20  9:11 PM  
Result Value Ref Range Glucose (POC) 241 (H) 70 - 110 mg/dL CBC WITH AUTOMATED DIFF Collection Time: 12/28/20  1:00 AM  
Result Value Ref Range WBC 6.0 4.6 - 13.2 K/uL  
 RBC 3.31 (L) 4.70 - 5.50 M/uL HGB 9.8 (L) 13.0 - 16.0 g/dL HCT 28.4 (L) 36.0 - 48.0 % MCV 85.8 74.0 - 97.0 FL  
 MCH 29.6 24.0 - 34.0 PG  
 MCHC 34.5 31.0 - 37.0 g/dL  
 RDW 14.2 11.6 - 14.5 % PLATELET 176 280 - 991 K/uL MPV 10.4 9.2 - 11.8 FL  
 NEUTROPHILS 95 (H) 40 - 73 % LYMPHOCYTES 2 (L) 21 - 52 % MONOCYTES 2 (L) 3 - 10 % EOSINOPHILS 1 0 - 5 % BASOPHILS 0 0 - 2 %  
 ABS. NEUTROPHILS 5.7 1.8 - 8.0 K/UL  
 ABS. LYMPHOCYTES 0.1 (L) 0.9 - 3.6 K/UL  
 ABS. MONOCYTES 0.1 0.05 - 1.2 K/UL  
 ABS. EOSINOPHILS 0.1 0.0 - 0.4 K/UL  
 ABS. BASOPHILS 0.0 0.0 - 0.1 K/UL  
 RBC COMMENTS OVALOCYTES 1+ 
    
 DF AUTOMATED MAGNESIUM Collection Time: 12/28/20  1:00 AM  
Result Value Ref Range Magnesium 2.0 1.6 - 2.6 mg/dL METABOLIC PANEL, COMPREHENSIVE Collection Time: 12/28/20  1:00 AM  
Result Value Ref Range Sodium 127 (L) 136 - 145 mmol/L Potassium 4.9 3.5 - 5.5 mmol/L Chloride 92 (L) 100 - 111 mmol/L  
 CO2 27 21 - 32 mmol/L Anion gap 8 3.0 - 18 mmol/L Glucose 190 (H) 74 - 99 mg/dL BUN 14 7.0 - 18 MG/DL  Creatinine 0.70 0.6 - 1.3 MG/DL  
 BUN/Creatinine ratio 20 12 - 20    
 GFR est AA >60 >60 ml/min/1.73m2 GFR est non-AA >60 >60 ml/min/1.73m2 Calcium 8.7 8.5 - 10.1 MG/DL Bilirubin, total 0.2 0.2 - 1.0 MG/DL  
 ALT (SGPT) 135 (H) 16 - 61 U/L  
 AST (SGOT) 42 (H) 10 - 38 U/L Alk. phosphatase 325 (H) 45 - 117 U/L Protein, total 6.6 6.4 - 8.2 g/dL Albumin 2.5 (L) 3.4 - 5.0 g/dL Globulin 4.1 (H) 2.0 - 4.0 g/dL A-G Ratio 0.6 (L) 0.8 - 1.7 PHOSPHORUS Collection Time: 12/28/20  1:00 AM  
Result Value Ref Range Phosphorus 4.0 2.5 - 4.9 MG/DL  
GLUCOSE, POC Collection Time: 12/28/20  6:07 AM  
Result Value Ref Range Glucose (POC) 179 (H) 70 - 110 mg/dL GLUCOSE, POC Collection Time: 12/28/20 11:46 AM  
Result Value Ref Range Glucose (POC) 275 (H) 70 - 110 mg/dL GLUCOSE, POC Collection Time: 12/28/20  4:09 PM  
Result Value Ref Range Glucose (POC) 384 (H) 70 - 110 mg/dL Chemistry Recent Labs  
  12/28/20 
0100 12/27/20 
0110 12/26/20 
0110 * 212* 241* * 125* 124* K 4.9 5.2 5.1 CL 92* 90* 91* CO2 27 27 25 BUN 14 15 13 CREA 0.70 0.70 0.84 CA 8.7 9.2 9.1 MG 2.0 2.1 2.1 PHOS 4.0 4.5 4.2 AGAP 8 8 8 BUCR 20 21* 15  
* 313* 334* TP 6.6 7.2 7.7 ALB 2.5* 2.8* 3.2*  
GLOB 4.1* 4.4* 4.5* AGRAT 0.6* 0.6* 0.7* Lactic Acid Lactic acid Date Value Ref Range Status 12/27/2020 2.2 (HH) 0.4 - 2.0 MMOL/L Final  
  Comment:  
  CALLED TO AND CORRECTLY REPEATED BY: 
JEAN LONDON RN 3N TO [place] at [address] [place] [id number] Recent Labs  
  12/27/20 
1400 LAC 2.2* Liver Enzymes Protein, total  
Date Value Ref Range Status 12/28/2020 6.6 6.4 - 8.2 g/dL Final  
 
Albumin Date Value Ref Range Status 12/28/2020 2.5 (L) 3.4 - 5.0 g/dL Final  
 
Globulin Date Value Ref Range Status 12/28/2020 4.1 (H) 2.0 - 4.0 g/dL Final  
 
A-G Ratio Date Value Ref Range Status 12/28/2020 0.6 (L) 0.8 - 1.7   Final  
 
Alk. phosphatase Date Value Ref Range Status 12/28/2020 325 (H) 45 - 117 U/L Final  
 
 Recent Labs  
  12/28/20 
0100 12/27/20 
0110 12/26/20 
0110 TP 6.6 7.2 7.7 ALB 2.5* 2.8* 3.2*  
GLOB 4.1* 4.4* 4.5* AGRAT 0.6* 0.6* 0.7* * 313* 334* CBC w/Diff Recent Labs  
  12/28/20 
0100 12/27/20 
0110 12/26/20 
0110 WBC 6.0 4.8 5.6  
RBC 3.31* 3.63* 3.78* HGB 9.8* 10.8* 11.3* HCT 28.4* 31.0* 32.3*  
 288 290 GRANS 95* 93* 94* LYMPH 2* 3* 3* EOS 1 1 0 Cardiac Enzymes No results found for: CPK, CK, CKMMB, CKMB, RCK3, CKMBT, CKNDX, CKND1, ANIKA, TROPT, TROIQ, ZOË, TROPT, TNIPOC, BNP, BNPP  
 
BNP No results found for: BNP, BNPP, XBNPT Coagulation No results for input(s): PTP, INR, APTT, INREXT, INREXT in the last 72 hours. Thyroid  Lab Results Component Value Date/Time TSH 2.10 12/23/2020 10:45 AM  
   
No results found for: T4  
 
Urinalysis Lab Results Component Value Date/Time Color YELLOW 12/18/2020 12:14 AM  
 Appearance CLEAR 12/18/2020 12:14 AM  
 Specific gravity <1.005 (L) 12/18/2020 12:14 AM  
 pH (UA) 7.0 12/18/2020 12:14 AM  
 Protein Negative 12/18/2020 12:14 AM  
 Glucose Negative 12/18/2020 12:14 AM  
 Ketone Negative 12/18/2020 12:14 AM  
 Bilirubin Negative 12/18/2020 12:14 AM  
 Urobilinogen 1.0 12/18/2020 12:14 AM  
 Nitrites Negative 12/18/2020 12:14 AM  
 Leukocyte Esterase Negative 12/18/2020 12:14 AM  
 Epithelial cells 1+ 03/08/2018 09:11 PM  
 Bacteria FEW (A) 03/08/2018 09:11 PM  
 WBC 4 to 6 03/08/2018 09:11 PM  
 RBC NEGATIVE  03/08/2018 09:11 PM  
  
 
Micro  Recent Labs  
  12/27/20 
1400 12/27/20 
1350 CULT NO GROWTH AFTER 18 HOURS NO GROWTH AFTER 18 HOURS Recent Labs  
  12/27/20 
1400 12/27/20 
1350 CULT NO GROWTH AFTER 18 HOURS NO GROWTH AFTER 18 HOURS  
  
 
ABG No results for input(s): PHI, PHI, POC2, PCO2I, PO2, PO2I, HCO3, HCO3I, FIO2, FIO2I in the last 72 hours. CT (Most Recent) (CT chest reviewed by me) [place] [facility] [place] CTA CHEST W OR W WO CONT Narrative EXAM: CTA Chest 
 
INDICATION: Shortness of breath. Possible PE. COMPARISON: No prior study. TECHNIQUE: Axial CT imaging from the thoracic inlet through the diaphragm with 
intravenous contrast utilizing CTA study for pulmonary artery evaluation. Coronal and sagittal MIP reformations were generated at a separate workstation. One or more dose reduction techniques were used on this CT: automated exposure 
control, adjustment of the mAs and/or kVp according to patient size, and 
iterative reconstruction techniques. The specific techniques used on this CT 
exam have been documented in the patient's electronic medical record. Digital 
Imaging and Communications in Medicine (DICOM) format image data are available 
to nonaffiliated external healthcare facilities or entities on a secure, media 
free, reciprocally searchable basis with patient authorization for at least a 
12-month period after this study. _______________ FINDINGS: 
 
EXAM QUALITY: Overall exam quality is suboptimal. Pulmonary arterial enhancement 
is adequate. Respiratory motion artifact is present, limiting evaluation. PULMONARY ARTERIES: No convincing evidence of pulmonary embolism. MEDIASTINUM: Normal heart size. No evidence of right heart strain. Aorta is 
unremarkable. No pericardial effusion. LYMPH NODES: No pathologically enlarged lymph nodes lymph nodes. AIRWAY: Unremarkable. LUNGS: Centrilobular emphysema. Superimposed scattered bilateral groundglass 
opacities throughout the lungs. PLEURA: No pleural effusion or pneumothorax. UPPER ABDOMEN: Visualized upper abdomen is unremarkable. OTHER: No acute or aggressive osseous abnormalities identified. _______________ Impression IMPRESSION: 
 
Suboptimal study secondary to motion artifact and bolus timing. No evidence of obvious central pulmonary embolism. Diffuse bilateral groundglass opacities, suspicious for atypical infection. Emphysema. XR (Most Recent). CXR  reviewed by me and compared with previous CXR Results from [facility] [place]counter encounter on 12/17/20 XR CHEST PORT Narrative EXAM: XR CHEST PORT 
 
CLINICAL INDICATION/HISTORY: pneumonia 
-Additional: None COMPARISON: 12/25/2020, 12/22/2020, 12/20/2020 TECHNIQUE: Frontal view of the chest 
 
_______________ FINDINGS: 
 
HEART AND MEDIASTINUM: Midline cardiac silhouette, normal in size. Unremarkable 
hilar vascular structures. LUNGS AND PLEURAL SPACES: Persistent mild hypoinflation with no interval change 
in appearance of the diffuse bilateral alveolar and interstitial opacities. No 
pneumothorax or effusion. BONY THORAX AND SOFT TISSUES: No acute or destructive osseous abnormality. _______________ Impression IMPRESSION: 
 
1. No interval change in appearance of the diffuse bilateral alveolar and 
interstitial opacity/pneumonia. No new finding since preceding day. Paul Caldwell MD 
12/28/2020

## 2020-12-28 NOTE — ROUTINE PROCESS
Assume care of patient from Akshat Li, [facility]. Patient received in bed awake. Patient A&Ox4, denies pain and discomfort. No distress noted. On high flow O2. Bed locked in low position. Call bell within reach and patient verbalized understanding of use for assistance and needs. 5140- Bedside and Verbal shift change report given to [employer] RN (oncoming nurse) by Nesha Hale RN (offgoing nurse). Report included the following information SBAR, Kardex, Intake/Output, MAR and Recent Results. 3 [facility] Night Shift Chart Audit Chart Audit completed?  YES

## 2020-12-28 NOTE — DIABETES MGMT
GLYCEMIC CONTROL PROGRESS NOTE: 
 
- no known h/o T2DM 
- Solumedrol 40 mg Q 12 hours, steroid associated hyperglycemia - BG out of target range : < 180 mg/dL 
- both FBG & PPG out of target range recommend initiate basal/bolus regimen if steroids to contiue *Lantus 5 units daily *Humalog 3 units qac Jeronimo Vale MS, RN, CDE Glycemic Control Team 
[phone] Pager [phone] (M-TH 8:00-4:30P) *After Hours pager [phone]

## 2020-12-28 NOTE — PROGRESS NOTES
Care manager rounded on patient; on high flow O2 but appears to be breathing calmer and stated that he is feeling \" much better. \"  Patient is not ready for discharge planning and care manager will continue to assist as needed.

## 2020-12-28 NOTE — CONSULTS
[facility] 
(A Division of [address] [place] [address]) Follow-up Note Date of Admission: 12/17/2020       Date of Note:  12/28/2020 Summary:   
Mr Rhae Gosselin is a 33y MSM AAM with underlying/untreated HIV who was in usual state of health until approx 2-3wks ago with onset of gradual dry cough and associated/progressive dyspnea (rest and exertion).  No f/s/c.  Has had 50-60lbs wt loss over the last year. Ping Albarran known about his HIV disease for a couple of years, but has not sought care yet.  Works at [employer]; wears a mask.  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  Feels the same today. 
  
Barista at [employer] [facility] [employer]:  \"Better over last day\" Interval History: 
Events over weekend noted -- on/off tmp-smx and steroids; now back on PO TMP-SMX DS 2 pills tid + IV steroids (prednisone held). Feels ok. Current Antimicrobials:    Prior Antimicrobials: 1. Tmp-smx DS PO/IV (12/18-) #10 1. azithro IV (12/17) 2. CAX IV (12/17) Assessment: Rec / Plan: PJP pneumonia 12/17:  PCT 0.57ng/mL;  Keep pushing. No doubt in my mind that this is what he has in his lungs. ->Tmp-smx #10/21 
  
  
Keep pushing AIDS And with PJP, my AIDS guys can get other concomitant infections, which in his case could be dMAC (alk phos goes high, which his has done over the past week of him getting tmp-smx -- drug or bug?). I have asked for some AFB BCx tomorrow. No isolation needed. -> 
 
AFB BCx requested for tomorrow morning. Booger to treat - as it would require 3 abx (azithro/rifampin/ethambutol) all that are potentially hepatoxic. Syphilis I am reconsidering treatment empirically, as secondary syphilis can cause hepatic dysfunction too -- easy to treat Benzathin G LA 2.4M units IM  Since I wrote this note earlier this afternoon, he's not going anywhere for the next few weeks -- if his alk phos is elevated due to syphilis, the PCN treatment will help this. Microbiology:                12/18-  Resp nl dequan/yeast 
                                                    Legionella/Spneumo Ag (-)                                        62/92 - Flu (-)                                                     BCx x2 (-)                                                     FRNLO-01 (-) 
  Lines / Catheters:         peripheral 
 
 
 
Patient Active Problem List  
Diagnosis Code  AIDS (acquired immune deficiency syndrome) ([facility]) B20  
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  
 Acute respiratory distress R06.03  
 Hypoxia R09.02  
 
 
Current Facility-Administered Medications Medication Dose Route Frequency  methylPREDNISolone (PF) (SOLU-MEDROL) injection 40 mg  40 mg IntraVENous Q12H  
 insulin lispro (HUMALOG) injection   SubCUTAneous AC&HS  morphine injection 0.5 mg  0.5 mg IntraVENous Q4H PRN  
 trimethoprim-sulfamethoxazole (BACTRIM DS, SEPTRA DS) 160-800 mg per tablet 2 Tab  2 Tab Oral TIDAC  acetaminophen (TYLENOL) tablet 650 mg  650 mg Oral Q8H PRN  
 multivitamin, tx-iron-ca-min (THERA-M w/ IRON) tablet 1 Tab  1 Tab Oral DAILY  guaiFENesin (ROBITUSSIN) 100 mg/5 mL oral liquid 100 mg  100 mg Oral Q4H PRN  
 melatonin tablet 10 mg  10 mg Oral QHS  sodium bicarbonate tablet 325 mg  325 mg Oral BID  levalbuterol (XOPENEX) nebulizer soln 1.25 mg/0.5 ml  1.25 mg Nebulization Q4H RT  
 ipratropium (ATROVENT) 0.02 % nebulizer solution 0.5 mg  0.5 mg Nebulization Q4H PRN  
  budesonide (PULMICORT) 500 mcg/2 ml nebulizer suspension  500 mcg Nebulization BID RT  
 levalbuterol (XOPENEX) nebulizer soln 1.25 mg/0.5 ml  1.25 mg Nebulization Q4H PRN  
 enoxaparin (LOVENOX) injection 40 mg  40 mg SubCUTAneous Q24H  
 influenza vaccine 2020-21 (6 mos+)(PF) (FLUARIX/FLULAVAL/FLUZONE QUAD) injection 0.5 mL  0.5 mL IntraMUSCular PRIOR TO DISCHARGE  calcium carbonate (TUMS) chewable tablet 200 mg [elemental]  200 mg Oral TID PRN  
 ascorbic acid (vitamin C) (VITAMIN C) tablet 500 mg  500 mg Oral BID  [Held by provider] predniSONE (DELTASONE) tablet 20 mg  20 mg Oral DAILY WITH BREAKFAST  [Held by provider] predniSONE (DELTASONE) tablet 20 mg  20 mg Oral BID WITH MEALS  sodium chloride (NS) flush 5-10 mL  5-10 mL IntraVENous PRN  
 sodium chloride (NS) flush 5-40 mL  5-40 mL IntraVENous PRN  promethazine (PHENERGAN) tablet 12.5 mg  12.5 mg Oral Q6H PRN Or  
 ondansetron (ZOFRAN) injection 4 mg  4 mg IntraVENous Q6H PRN  
 bisacodyL (DULCOLAX) tablet 5 mg  5 mg Oral DAILY PRN  
 famotidine (PEPCID) tablet 20 mg  20 mg Oral BID Review of Systems - General ROS: positive for  - fever 
negative for - night sweats Respiratory ROS: positive for - shortness of breath 
negative for - cough or sputum changes Cardiovascular ROS: positive for - dyspnea on exertion and shortness of breath 
negative for - chest pain Gastrointestinal ROS: no abdominal pain, change in bowel habits, or black or bloody stools Objective: 
 
Visit Vitals /80 Pulse (!) 114 Temp 97.9 °F (36.6 °C) Resp 18 Ht 5' 7\" (1.702 m) Wt 63.1 kg (139 lb 3.2 oz) SpO2 94% BMI 21.80 kg/m² Temp (24hrs), Av.1 °F (36.7 °C), Min:97.8 °F (36.6 °C), Max:98.9 °F (37.2 °C) GEN: thin cachectic AAM in NAD HEENT: anicteric CHEST: Rales CVS:Tachy ABD: NT 
EXT: no fong rash Lab results: 
 
Chemistry Recent Labs  
  200 200 20 * 212* 241* * 125* 124* K 4.9 5.2 5.1 CL 92* 90* 91* CO2 27 27 25 BUN 14 15 13 CREA 0.70 0.70 0.84 CA 8.7 9.2 9.1 AGAP 8 8 8 BUCR 20 21* 15  
* 313* 334* TP 6.6 7.2 7.7 ALB 2.5* 2.8* 3.2*  
GLOB 4.1* 4.4* 4.5* AGRAT 0.6* 0.6* 0.7* CBC w/ Diff Recent Labs  
  12/28/20 
0100 12/27/20 
0110 12/26/20 
0110 WBC 6.0 4.8 5.6  
RBC 3.31* 3.63* 3.78* HGB 9.8* 10.8* 11.3* HCT 28.4* 31.0* 32.3*  
 288 290 GRANS 95* 93* 94* LYMPH 2* 3* 3* EOS 1 1 0 Microbiology All Micro Results Procedure Component Value Units Date/Time CULTURE, BLOOD [[id number]] Collected: 12/27/20 1350 Order Status: Completed Specimen: Blood Updated: 12/28/20 0699 Special Requests: NO SPECIAL REQUESTS Culture result: NO GROWTH AFTER 18 HOURS     
 CULTURE, BLOOD [[id number]] Collected: 12/27/20 1400 Order Status: Completed Specimen: Blood Updated: 12/28/20 0787 Special Requests: NO SPECIAL REQUESTS Culture result: NO GROWTH AFTER 18 HOURS     
 P.JIROVECI BY PCR [[id number]] Collected: 12/23/20 1845 Order Status: Completed Updated: 12/23/20 1959 CULTURE, BLOOD [[id number]] Collected: 12/17/20 1820 Order Status: Completed Specimen: Blood Updated: 12/23/20 5567 Special Requests: NO SPECIAL REQUESTS Culture result: NO GROWTH 6 DAYS     
 CULTURE, BLOOD [[id number]] Collected: 12/17/20 1810 Order Status: Completed Specimen: Blood Updated: 12/23/20 7400 Special Requests: NO SPECIAL REQUESTS Culture result: NO GROWTH 6 DAYS     
 CULTURE, RESPIRATORY/SPUTUM/BRONCH Jimbo Mckusick STAIN [[id number]]  (Abnormal) Collected: 12/18/20 0014 Order Status: Completed Specimen: Sputum Updated: 12/20/20 1030 Special Requests: NO SPECIAL REQUESTS     
  GRAM STAIN MODERATE WBCS SEEN     
      
  RARE EPITHELIAL CELLS SEEN  
     
   FEW GRAM NEGATIVE RODS     
      
  RARE GRAM POSITIVE COCCI IN CHAINS RARE GRAM POSITIVE RODS Culture result:    
  LIGHT NORMAL RESPIRATORY PRANAY  
     
      
  FEW YEAST, (APPARENT CANDIDA ALBICANS) LEGIONELLA PNEUMOPHILA AG, URINE [[id number]] Collected: 12/18/20 0014 Order Status: Completed Specimen: Urine, random Updated: 12/18/20 1131 Legionella Ag, urine Negative Prudy Mylar, URINE [[id number]] Collected: 12/18/20 0014 Order Status: Completed Specimen: Urine, random Updated: 12/18/20 1131 Strep pneumo Ag, urine Negative INFLUENZA A & B AG (RAPID TEST) [[id number]] Collected: 12/17/20 1900 Order Status: Completed Specimen: Nasopharyngeal from Nasal washing Updated: 12/17/20 1925 Influenza A Antigen Negative Comment: A negative result does not exclude influenza virus infection, seasonal or H1N1 due to suboptimal sensitivity. If influenza is circulating in your community, a diagnosis of influenza should be considered based on a patients clinical presentation and empiric antiviral treatment should be considered, if indicated. Influenza B Antigen Negative Lena Contreras MD 
Cell [phone] [facility] 12/28/2020  
3:16 PM

## 2020-12-28 NOTE — PROGRESS NOTES
Problem: Risk for Spread of Infection Goal: Prevent transmission of infectious organism to others Description: Prevent the transmission of infectious organisms to other patients, staff members, and visitors. 12/28/2020 0148 by Clarissa Llamas Outcome: Progressing Towards Goal 
12/28/2020 0146 by Clarissa Llamas Outcome: Progressing Towards Goal 
  
Problem: Patient Education:  Go to Education Activity Goal: Patient/Family Education 12/28/2020 0148 by Clarissa Llamas Outcome: Progressing Towards Goal 
12/28/2020 0146 by Clarissa Llamas Outcome: Progressing Towards Goal 
  
Problem: Pressure Injury - Risk of 
Goal: *Prevention of pressure injury Description: Document Josep Scale and appropriate interventions in the flowsheet. 12/28/2020 0148 by Clarissa Llamas Outcome: Progressing Towards Goal 
Note: Pressure Injury Interventions: 
Sensory Interventions: Assess changes in LOC Moisture Interventions: Absorbent underpads, Check for incontinence Q2 hours and as needed, Internal/External urinary devices, Moisture barrier, Minimize layers Activity Interventions: Increase time out of bed, PT/OT evaluation, Pressure redistribution bed/mattress(bed type) Mobility Interventions: HOB 30 degrees or less, Pressure redistribution bed/mattress (bed type), PT/OT evaluation Nutrition Interventions: Document food/fluid/supplement intake, Offer support with meals,snacks and hydration Friction and Shear Interventions: Minimize layers 12/28/2020 0146 by Clarissa Llamas Outcome: Progressing Towards Goal 
Note: Pressure Injury Interventions: 
Sensory Interventions: Assess changes in LOC Moisture Interventions: Absorbent underpads, Check for incontinence Q2 hours and as needed, Internal/External urinary devices, Moisture barrier, Minimize layers Activity Interventions: Increase time out of bed, PT/OT evaluation, Pressure redistribution bed/mattress(bed type) Mobility Interventions: HOB 30 degrees or less, Pressure redistribution bed/mattress (bed type), PT/OT evaluation Nutrition Interventions: Document food/fluid/supplement intake, Offer support with meals,snacks and hydration Friction and Shear Interventions: Minimize layers Problem: Patient Education: Go to Patient Education Activity Goal: Patient/Family Education 12/28/2020 0148 by [employer] Sour Outcome: Progressing Towards Goal 
12/28/2020 0146 by [place] Sour Outcome: Progressing Towards Goal 
  
Problem: Falls - Risk of 
Goal: *Absence of Falls Description: Document Laverne Mason Fall Risk and appropriate interventions in the flowsheet. 12/28/2020 0148 by [place] Sour Outcome: Progressing Towards Goal 
Note: Fall Risk Interventions: 
Mobility Interventions: Bed/chair exit alarm, Communicate number of staff needed for ambulation/transfer, Patient to call before getting OOB, Utilize walker, cane, or other assistive device Mentation Interventions: Bed/chair exit alarm, Door open when patient unattended, More frequent rounding, Increase mobility, Reorient patient, Update white board Medication Interventions: Bed/chair exit alarm, Patient to call before getting OOB, Teach patient to arise slowly Elimination Interventions: Bed/chair exit alarm, Call light in reach, Patient to call for help with toileting needs, Toilet paper/wipes in reach, Toileting schedule/hourly rounds 12/28/2020 0146 by [place] Sour Outcome: Progressing Towards Goal 
Note: Fall Risk Interventions: 
Mobility Interventions: Bed/chair exit alarm, Communicate number of staff needed for ambulation/transfer, Patient to call before getting OOB, Utilize walker, cane, or other assistive device Mentation Interventions: Bed/chair exit alarm, Door open when patient unattended, More frequent rounding, Increase mobility, Reorient patient, Update white board Medication Interventions: Bed/chair exit alarm, Patient to call before getting OOB, Teach patient to arise slowly Elimination Interventions: Bed/chair exit alarm, Call light in reach, Patient to call for help with toileting needs, Toilet paper/wipes in reach, Toileting schedule/hourly rounds Problem: Patient Education: Go to Patient Education Activity Goal: Patient/Family Education 12/28/2020 0148 by Clarissa Llamas Outcome: Progressing Towards Goal 
12/28/2020 0146 by Clarissa Llamas Outcome: Progressing Towards Goal 
  
Problem: Nutrition Deficit Goal: *Optimize nutritional status 12/28/2020 0148 by Clarissa Llamas Outcome: Progressing Towards Goal 
12/28/2020 0146 by Clarissa Llamas Outcome: Progressing Towards Goal 
  
Problem: Patient Education: Go to Patient Education Activity Goal: Patient/Family Education 12/28/2020 0148 by Clarissa Llamas Outcome: Progressing Towards Goal 
12/28/2020 0146 by Clarissa Llamas Outcome: Progressing Towards Goal

## 2020-12-29 NOTE — PROGRESS NOTES
[facility] Pulmonary, Critical Care, and Sleep Medicine Name: Marlon Crespo MRN: [id number] : 1990 Hospital: [facility] [place] Date: 2020 PCCM Note Consult requesting physician: Dr. Shay Márquez Reason for Consult: Likely PJP pneumonia, hypoxia IMPRESSION:  
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  
Diagnosis Code  AIDS (acquired immune deficiency syndrome) ([facility]) B20  
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  
 Acute respiratory distress R06.03  
 Hypoxia R09.02  
  
RECOMMENDATIONS:  
Continue HFNC at 45 Lpm and 55% FiO2. Titrate flow and fio2 for goal SPO2 > 91% XR chest in AM for follow up stable overall. Patient declined for ABG for follow up Bilateral extensive pneumonia with hypoxemia in a patient with HIV; COVID-19 negative; likely PJP pneumonia. Continue Bactrim at dose to 2 tablets 3 times daily. Due to worsening CXR findings and increased O2 requirement, prednisone is changed to Solu-Medrol on 2020. Continue IV Solu-Medrol 40 mg IV every 12 hours, ordered. Bronchodilators: Pulmicort twice daily, Xopenex every 4 hours. Xopenex and Atrovent as needed. Await G6PD. Monitor Hgb Repeat sputum culture - await collection Fungitell elevated 455. LDH elevated: 560. Expectorated sputum for PJP: Pending. Sputum culture: Light normal dequan. Urine antigen panel: Negative. COVID-19: Negative. LFT elevated but stable and hyponatremia management defer to hospitalist. 
 
CT chest with bilateral diffuse GGO and underlying emphysema. He may have mild pulmonary fibrosis in the background but due to motion artifact it is difficult to exclude. Bronchodilators: Budesonide twice daily, Atrovent as needed. Airway clearance: Continue incentive spirometer; advised to use often. Out of bed. FiO2 to keep SpO2 >=92%, HOB >=30 degree, aspiration precautions, aggressive pulmonary toileting, incentive spirometry, PT/OT eval and treat, mobilization. DVT Prophylaxis: Lovenox GI Prophylaxis: Pepcid Other issues management by primary team and respective consultants. Further recommendations will be based on the patient's response to recommended treatment and results of the investigation ordered. Quality Care: PPI, DVT prophylaxis, HOB elevated, infection control all reviewed and addressed. Discussed with patient, radiologic work up showed, answered all questions to their satisfaction. Care plan discussed with nursing, RT Complex decision making performed with > 50% time spent in face to face assessment of this patinet Subjective/History: Mr. Roosevelt Grajeda is a 31 y.o. male with PMHx significant for HIV diagnosed couple years ago, not on treatment; admitted with weight loss, ESCOTO; COVID-19 negative x2; diagnosed with atypical looking pneumonia/GGO on CT chest which is negative for PE, suspected for PJP pneumonia. Initially treated with Rocephin and Zithromax. Seen by Dr. Yvrose Farrar: Elevated LDH, PCT 0.57. Antibiotics changed to Bactrim and started on steroids. HIV viral load elevated. 12/29/2020 Patient remained in rm 342 and seen at bedside Overnight desaturation episode required HFNC at 45 Lpm, 55% FiO2 Reports stable dyspnea, phlegm production The patient denies chest pain, wheezing or hemoptysis. Afebrile. No GI symptoms. Appears chronically ill. I was not contacted by staff on anything about patient overnight. Review of Systems:  
Constitutional: No fever, no chills,  no night sweats HEENT: No epistaxis, no nasal drainage, no difficulty in swallowing, no redness in eyes Respiratory: as above Cardiovascular: no palpitations, no chronic leg edema, no syncope Gastrointestinal: no abd pain, no vomiting, no diarrhea, no bleeding symptoms Genitourinary: No urinary symptoms or hematuria Neurological: No focal weakness, no seizures, no headaches Behvioral/Psych: No anxiety, no depression No Known Allergies Past Medical History:  
Diagnosis Date  Asthma  Chlamydia  Herpes zoster  HIV (human immunodeficiency virus infection) ([facility] [address]  Syphilis History reviewed. No pertinent surgical history. History reviewed. No pertinent family history. Social History Tobacco Use  Smoking status: Former Smoker  Smokeless tobacco: Never Used Substance Use Topics  Alcohol use: Yes Comment: socially Prior to Admission medications Not on File Current Facility-Administered Medications Medication Dose Route Frequency  penicillin g benzathine (BICILLIN LA) 1,200,000 unit/2 mL IM injection 2.4 Million Units  2.4 Million Units IntraMUSCular Q7D  
 methylPREDNISolone (PF) (SOLU-MEDROL) injection 40 mg  40 mg IntraVENous Q12H  
 insulin lispro (HUMALOG) injection   SubCUTAneous AC&HS  morphine injection 0.5 mg  0.5 mg IntraVENous Q4H PRN  
 trimethoprim-sulfamethoxazole (BACTRIM DS, SEPTRA DS) 160-800 mg per tablet 2 Tab  2 Tab Oral TIDAC  acetaminophen (TYLENOL) tablet 650 mg  650 mg Oral Q8H PRN  
 multivitamin, tx-iron-ca-min (THERA-M w/ IRON) tablet 1 Tab  1 Tab Oral DAILY  guaiFENesin (ROBITUSSIN) 100 mg/5 mL oral liquid 100 mg  100 mg Oral Q4H PRN  
 melatonin tablet 10 mg  10 mg Oral QHS  sodium bicarbonate tablet 325 mg  325 mg Oral BID  levalbuterol (XOPENEX) nebulizer soln 1.25 mg/0.5 ml  1.25 mg Nebulization Q4H RT  
 ipratropium (ATROVENT) 0.02 % nebulizer solution 0.5 mg  0.5 mg Nebulization Q4H PRN  
 budesonide (PULMICORT) 500 mcg/2 ml nebulizer suspension  500 mcg Nebulization BID RT  
  levalbuterol (XOPENEX) nebulizer soln 1.25 mg/0.5 ml  1.25 mg Nebulization Q4H PRN  
 enoxaparin (LOVENOX) injection 40 mg  40 mg SubCUTAneous Q24H  
 influenza vaccine 2020-21 (6 mos+)(PF) (FLUARIX/FLULAVAL/FLUZONE QUAD) injection 0.5 mL  0.5 mL IntraMUSCular PRIOR TO DISCHARGE  calcium carbonate (TUMS) chewable tablet 200 mg [elemental]  200 mg Oral TID PRN  
 ascorbic acid (vitamin C) (VITAMIN C) tablet 500 mg  500 mg Oral BID  [Held by provider] predniSONE (DELTASONE) tablet 20 mg  20 mg Oral DAILY WITH BREAKFAST  [Held by provider] predniSONE (DELTASONE) tablet 20 mg  20 mg Oral BID WITH MEALS  sodium chloride (NS) flush 5-10 mL  5-10 mL IntraVENous PRN  
 sodium chloride (NS) flush 5-40 mL  5-40 mL IntraVENous PRN  promethazine (PHENERGAN) tablet 12.5 mg  12.5 mg Oral Q6H PRN Or  
 ondansetron (ZOFRAN) injection 4 mg  4 mg IntraVENous Q6H PRN  
 bisacodyL (DULCOLAX) tablet 5 mg  5 mg Oral DAILY PRN  
 famotidine (PEPCID) tablet 20 mg  20 mg Oral BID Objective:  
Vital Signs:   
Blood pressure 139/82, pulse (!) 113, temperature 98.5 °F (36.9 °C), resp. rate 20, height 5' 7\" (1.702 m), weight 63.1 kg (139 lb 3.2 oz), SpO2 94 %. Body mass index is 21.8 kg/m². O2 Device: Heated, Hi flow nasal cannula O2 Flow Rate (L/min): 45 l/min Temp (24hrs), Av.5 °F (36.9 °C), Min:97.8 °F (36.6 °C), Max:99.8 °F (37.7 °C) Intake/Output:  
Last shift:       07 - 1900 In: 1440 [P.O.:1440] Out: 850 [Urine:850] Last 3 shifts: 1901 -  0700 In: 240 [P.O.:240] Out: 2135 [Urine:2135] Intake/Output Summary (Last 24 hours) at 2020 1619 Last data filed at 2020 7590 Gross per 24 hour Intake 1440 ml Output 1350 ml Net 90 ml Physical Exam:  
General: in no respiratory distress and AAO x 3, cooperative, appears older than stated age, on HFNC HEENT: PERRLA, fundi benign, throat normal without erythema or exudate Neck: No abnormally enlarged lymph nodes or thyroid, supple Chest: normal 
Lungs: normal air entry, rhonchi scattered bilaterally, normal percussion bilaterally, no tenderness/ rash Heart: Regular rate and rhythm, S1S2 present or without murmur or extra heart sounds Abdomen: non distended, bowel sounds normoactive, tympanic, soft without significant tenderness, masses, organomegaly or guarding Extremity: negative for edema, cyanosis, clubbing Neuro:  aao x 3, no defects noted in general exam. 
Skin: Skin color, texture, turgor fair Data:  
   
Recent Results (from the past 24 hour(s)) GLUCOSE, POC Collection Time: 12/28/20  9:41 PM  
Result Value Ref Range Glucose (POC) 163 (H) 70 - 110 mg/dL GLUCOSE, POC Collection Time: 12/29/20  6:23 AM  
Result Value Ref Range Glucose (POC) 216 (H) 70 - 110 mg/dL LACTIC ACID Collection Time: 12/29/20  6:34 AM  
Result Value Ref Range Lactic acid 2.6 (HH) 0.4 - 2.0 MMOL/L  
METABOLIC PANEL, BASIC Collection Time: 12/29/20  6:40 AM  
Result Value Ref Range Sodium 126 (L) 136 - 145 mmol/L Potassium 4.7 3.5 - 5.5 mmol/L Chloride 91 (L) 100 - 111 mmol/L  
 CO2 26 21 - 32 mmol/L Anion gap 9 3.0 - 18 mmol/L Glucose 243 (H) 74 - 99 mg/dL BUN 13 7.0 - 18 MG/DL Creatinine 0.67 0.6 - 1.3 MG/DL  
 BUN/Creatinine ratio 19 12 - 20 GFR est AA >60 >60 ml/min/1.73m2 GFR est non-AA >60 >60 ml/min/1.73m2 Calcium 8.3 (L) 8.5 - 10.1 MG/DL  
CBC WITH AUTOMATED DIFF Collection Time: 12/29/20  6:40 AM  
Result Value Ref Range WBC 6.1 4.6 - 13.2 K/uL  
 RBC 3.00 (L) 4.70 - 5.50 M/uL HGB 9.0 (L) 13.0 - 16.0 g/dL HCT 26.8 (L) 36.0 - 48.0 % MCV 89.3 74.0 - 97.0 FL  
 MCH 30.0 24.0 - 34.0 PG  
 MCHC 33.6 31.0 - 37.0 g/dL  
 RDW 13.6 11.6 - 14.5 % PLATELET 596 480 - 176 K/uL MPV 9.6 9.2 - 11.8 FL  
 NEUTROPHILS 95 (H) 40 - 73 % LYMPHOCYTES 2 (L) 21 - 52 % MONOCYTES 2 (L) 3 - 10 % EOSINOPHILS 1 0 - 5 % BASOPHILS 0 0 - 2 %  
 ABS. NEUTROPHILS 5.8 1.8 - 8.0 K/UL  
 ABS. LYMPHOCYTES 0.1 (L) 0.9 - 3.6 K/UL  
 ABS. MONOCYTES 0.1 0.05 - 1.2 K/UL  
 ABS. EOSINOPHILS 0.1 0.0 - 0.4 K/UL  
 ABS. BASOPHILS 0.0 0.0 - 0.1 K/UL  
 DF AUTOMATED    
GLUCOSE, POC Collection Time: 12/29/20 11:07 AM  
Result Value Ref Range Glucose (POC) 143 (H) 70 - 110 mg/dL Chemistry Recent Labs  
  12/29/20 
0640 12/28/20 
0100 12/27/20 
0110 * 190* 212* * 127* 125* K 4.7 4.9 5.2 CL 91* 92* 90* CO2 26 27 27 BUN 13 14 15 CREA 0.67 0.70 0.70 CA 8.3* 8.7 9.2 MG  --  2.0 2.1 PHOS  --  4.0 4.5 AGAP 9 8 8 BUCR 19 20 21* AP  --  325* 313* TP  --  6.6 7.2 ALB  --  2.5* 2.8*  
GLOB  --  4.1* 4.4* AGRAT  --  0.6* 0.6* Lactic Acid Lactic acid Date Value Ref Range Status 12/29/2020 2.6 (HH) 0.4 - 2.0 MMOL/L Final  
  Comment:  
  CALLED TO AND CORRECTLY REPEATED BY: 
Conner MUJICA RN 3N TO [facility] [id number] AT [id number] Recent Labs  
  12/29/20 
0634 12/27/20 
1400 LAC 2.6* 2.2* Liver Enzymes Protein, total  
Date Value Ref Range Status 12/28/2020 6.6 6.4 - 8.2 g/dL Final  
 
Albumin Date Value Ref Range Status 12/28/2020 2.5 (L) 3.4 - 5.0 g/dL Final  
 
Globulin Date Value Ref Range Status 12/28/2020 4.1 (H) 2.0 - 4.0 g/dL Final  
 
A-G Ratio Date Value Ref Range Status 12/28/2020 0.6 (L) 0.8 - 1.7   Final  
 
Alk. phosphatase Date Value Ref Range Status 12/28/2020 325 (H) 45 - 117 U/L Final  
 
Recent Labs  
  12/28/20 
0100 12/27/20 
0110 TP 6.6 7.2 ALB 2.5* 2.8*  
GLOB 4.1* 4.4* AGRAT 0.6* 0.6* * 313* CBC w/Diff Recent Labs  
  12/29/20 
0640 12/28/20 
0100 12/27/20 
0110 WBC 6.1 6.0 4.8  
RBC 3.00* 3.31* 3.63* HGB 9.0* 9.8* 10.8* HCT 26.8* 28.4* 31.0*  
 273 288 GRANS 95* 95* 93* LYMPH 2* 2* 3* EOS 1 1 1  
  
 
 Cardiac Enzymes No results found for: CPK, CK, CKMMB, CKMB, RCK3, CKMBT, CKNDX, CKND1, ANIKA, TROPT, TROIQ, ZOË, TROPT, TNIPOC, BNP, BNPP  
 
BNP No results found for: BNP, BNPP, XBNPT Coagulation No results for input(s): PTP, INR, APTT, INREXT, INREXT in the last 72 hours. Thyroid  Lab Results Component Value Date/Time TSH 2.10 12/23/2020 10:45 AM  
   
No results found for: T4  
 
Urinalysis Lab Results Component Value Date/Time Color YELLOW 12/18/2020 12:14 AM  
 Appearance CLEAR 12/18/2020 12:14 AM  
 Specific gravity <1.005 (L) 12/18/2020 12:14 AM  
 pH (UA) 7.0 12/18/2020 12:14 AM  
 Protein Negative 12/18/2020 12:14 AM  
 Glucose Negative 12/18/2020 12:14 AM  
 Ketone Negative 12/18/2020 12:14 AM  
 Bilirubin Negative 12/18/2020 12:14 AM  
 Urobilinogen 1.0 12/18/2020 12:14 AM  
 Nitrites Negative 12/18/2020 12:14 AM  
 Leukocyte Esterase Negative 12/18/2020 12:14 AM  
 Epithelial cells 1+ 03/08/2018 09:11 PM  
 Bacteria FEW (A) 03/08/2018 09:11 PM  
 WBC 4 to 6 03/08/2018 09:11 PM  
 RBC NEGATIVE  03/08/2018 09:11 PM  
  
 
Micro  Recent Labs  
  12/27/20 
1400 12/27/20 
1350 CULT NO GROWTH 2 DAYS NO GROWTH 2 DAYS Recent Labs  
  12/27/20 
1400 12/27/20 
1350 CULT NO GROWTH 2 DAYS NO GROWTH 2 DAYS  
  
 
ABG No results for input(s): PHI, PHI, POC2, PCO2I, PO2, PO2I, HCO3, HCO3I, FIO2, FIO2I in the last 72 hours. CT (Most Recent) (CT chest reviewed by me) Results from [facility]counter encounter on 12/17/20 CTA CHEST W OR W WO CONT Narrative EXAM: CTA Chest 
 
INDICATION: Shortness of breath. Possible PE. COMPARISON: No prior study. TECHNIQUE: Axial CT imaging from the thoracic inlet through the diaphragm with 
intravenous contrast utilizing CTA study for pulmonary artery evaluation. Coronal and sagittal MIP reformations were generated at a separate workstation. One or more dose reduction techniques were used on this CT: automated exposure control, adjustment of the mAs and/or kVp according to patient size, and 
iterative reconstruction techniques. The specific techniques used on this CT 
exam have been documented in the patient's electronic medical record. Digital 
Imaging and Communications in Medicine (DICOM) format image data are available 
to nonaffiliated external healthcare facilities or entities on a secure, media 
free, reciprocally searchable basis with patient authorization for at least a 
12-month period after this study. _______________ FINDINGS: 
 
EXAM QUALITY: Overall exam quality is suboptimal. Pulmonary arterial enhancement 
is adequate. Respiratory motion artifact is present, limiting evaluation. PULMONARY ARTERIES: No convincing evidence of pulmonary embolism. MEDIASTINUM: Normal heart size. No evidence of right heart strain. Aorta is 
unremarkable. No pericardial effusion. LYMPH NODES: No pathologically enlarged lymph nodes lymph nodes. AIRWAY: Unremarkable. LUNGS: Centrilobular emphysema. Superimposed scattered bilateral groundglass 
opacities throughout the lungs. PLEURA: No pleural effusion or pneumothorax. UPPER ABDOMEN: Visualized upper abdomen is unremarkable. OTHER: No acute or aggressive osseous abnormalities identified. _______________ Impression IMPRESSION: 
 
Suboptimal study secondary to motion artifact and bolus timing. No evidence of obvious central pulmonary embolism. Diffuse bilateral groundglass opacities, suspicious for atypical infection. Emphysema. XR (Most Recent). CXR  reviewed by me and compared with previous CXR Results from [facility] Encounter encounter on 12/17/20 XR CHEST PORT Narrative EXAM: CHEST RADIOGRAPH, SINGLE VIEW CLINICAL INDICATION/HISTORY: Pneumonia COMPARISON: Single view chest 12/27/2020 TECHNIQUE: Portable frontal view of the chest was obtained.  
 
_______________ FINDINGS: 
 
 SUPPORT DEVICES: Cardiac monitor leads overlie the chest. 
 
HEART AND MEDIASTINUM: Cardiomediastinal silhouette appears within normal 
limits. Normal caliber thoracic aorta. No central vascular congestion. LUNGS AND PLEURAL SPACES: Diffuse bilateral interstitial infiltrates remain 
without focal alveolar consolidation. No pleural effusion or pneumothorax. BONY THORAX AND SOFT TISSUES: No acute osseous abnormality. _______________ Impression IMPRESSION: 
 
Stable chest with diffuse bilateral interstitial infiltrates. Wyatt Sears MD 
12/29/2020

## 2020-12-29 NOTE — PROGRESS NOTES
Problem: Risk for Spread of Infection Goal: Prevent transmission of infectious organism to others Description: Prevent the transmission of infectious organisms to other patients, staff members, and visitors. Outcome: Progressing Towards Goal 
  
Problem: Patient Education:  Go to Education Activity Goal: Patient/Family Education Outcome: Progressing Towards Goal 
  
Problem: Pressure Injury - Risk of 
Goal: *Prevention of pressure injury Description: Document Josep Scale and appropriate interventions in the flowsheet. Outcome: Progressing Towards Goal 
Note: Pressure Injury Interventions: 
Sensory Interventions: Assess changes in LOC Moisture Interventions: Absorbent underpads Activity Interventions: Pressure redistribution bed/mattress(bed type) Mobility Interventions: HOB 30 degrees or less, Pressure redistribution bed/mattress (bed type) Nutrition Interventions: Document food/fluid/supplement intake Friction and Shear Interventions: Apply protective barrier, creams and emollients

## 2020-12-29 NOTE — PROGRESS NOTES
Problem: Risk for Spread of Infection Goal: Prevent transmission of infectious organism to others Description: Prevent the transmission of infectious organisms to other patients, staff members, and visitors. Outcome: Progressing Towards Goal 
  
Problem: Risk for Spread of Infection Goal: Prevent transmission of infectious organism to others Description: Prevent the transmission of infectious organisms to other patients, staff members, and visitors. Outcome: Progressing Towards Goal 
  
Problem: Pressure Injury - Risk of 
Goal: *Prevention of pressure injury Description: Document Joesp Scale and appropriate interventions in the flowsheet. Outcome: Progressing Towards Goal 
Note: Pressure Injury Interventions: 
Sensory Interventions: Assess changes in LOC, Maintain/enhance activity level, Float heels, Keep linens dry and wrinkle-free, Minimize linen layers, Monitor skin under medical devices, Pad between skin to skin, Pressure redistribution bed/mattress (bed type) Moisture Interventions: Absorbent underpads, Apply protective barrier, creams and emollients, Maintain skin hydration (lotion/cream), Limit adult briefs Activity Interventions: Pressure redistribution bed/mattress(bed type), PT/OT evaluation Mobility Interventions: Pressure redistribution bed/mattress (bed type) Nutrition Interventions: Document food/fluid/supplement intake Friction and Shear Interventions: Apply protective barrier, creams and emollients, Foam dressings/transparent film/skin sealants, HOB 30 degrees or less, Lift sheet, Minimize layers

## 2020-12-29 NOTE — PROGRESS NOTES
Written shift change report given to Mendel Rack RN (oncoming nurse) by Ricarda Brunner RN (offgoing nurse). Report included the following information SBAR, Kardex and ED Summary.

## 2020-12-29 NOTE — DIABETES MGMT
GLYCEMIC CONTROL PROGRESS NOTE: 
 
- no known h/o T2DM 
- Solumedrol 40 mg Q 12 hours, steroid associated hyperglycemia - BG out of target range : < 180 mg/dL - TDD = 20 units - Humalog Normal Insulin Sensitivity Corrective Coverage 
- both FBG & PPG out of target range recommend initiate basal/bolus regimen if steroids to contiue *Lantus 6 units daily *Humalog 4 units qac Recent Glucose Results:  
Lab Results Component Value Date/Time  (H) 12/29/2020 06:40 AM  
 GLUCPOC 216 (H) 12/29/2020 06:23 AM  
 GLUCPOC 163 (H) 12/28/2020 09:41 PM  
 GLUCPOC 384 (H) 12/28/2020 04:09 PM  
 
 
Nyla Moreno MS, RN, CDE Glycemic Control Team 
[phone] Pager [phone] (M-TH 8:00-4:30P) *After Hours pager [phone]

## 2020-12-29 NOTE — PROGRESS NOTES
Problem: Mobility Impaired (Adult and Pediatric) Goal: *Acute Goals and Plan of Care (Insert Text) Description: Physical Therapy Goals Initiated 12/25/2020 and to be accomplished within 7 day(s) 1. Patient will move from supine to sit and sit to supine  in bed with modified independence. 2.  Patient will transfer from bed to chair and chair to bed with modified independence using the least restrictive device. 3.  Patient will perform sit to stand with supervision/set-up. 4.  Patient will ambulate with supervision/set-up for 50 feet with the least restrictive device. Note: PHYSICAL THERAPY TREATMENT Patient: Sheryl Nicole (73 y.o. 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 ASSESSMENT: 
Pt reports frustration and depression today. Listened and comforted patient as able and attempted to encourage and motivate to participate in mobility. Pt did tolerate supine there ex with no c/o pain or difficulty. Discussed sitting up next session. Pt somewhat agreeable. Progression toward goals: 
[]      Improving appropriately and progressing toward goals 
[]      Improving slowly and progressing toward goals 
[]      Not making progress toward goals and plan of care will be adjusted PLAN: 
Patient continues to benefit from skilled intervention to address the above impairments. Continue treatment per established plan of care. Discharge Recommendations:  Home Health Further Equipment Recommendations for Discharge:  TBD SUBJECTIVE:  
Patient stated I am getting depressed.  OBJECTIVE DATA SUMMARY:  
Critical Behavior: 
Neurologic State: Alert Orientation Level: Oriented X4 Cognition: Follows commands Safety/Judgement: Awareness of environment Therapeutic Exercises: Supine there ex: SLR, resisted hip ext, ankle pumps, hip abd/add, x10 reps ea Pain: 
Pain Scale 1: Numeric (0 - 10) Pain Intensity 1: 1 Pain Location 1: Generalized; Throat Pain Description 1: Aching Pain Intervention(s) 1: Medication (see MAR) Activity Tolerance:  
Fair Please refer to the flowsheet for vital signs taken during this treatment. After treatment:  
[] Patient left in no apparent distress sitting up in chair 
[x] Patient left in no apparent distress in bed 
[x] Call bell left within reach [x] Nursing notified 
[] Caregiver present 
[] Bed alarm activated Jennifer Fulling Time Calculation: 25 mins

## 2020-12-29 NOTE — PROGRESS NOTES
0720: Assumed patient care from off going nurse Shubham Barfield RN  
 
2564: Received call from Zhanna Morrow. In Lab for Critical Lactic acid 2.6  
 
7430: Paged Rose Escort to notify her of patients Lactic acid, waiting for call back. 1159: Shift assessment completed at this time patient c/o pain due from coughing will give patient pain medication. Bed locked in lowest position call bell in reach

## 2020-12-29 NOTE — PROGRESS NOTES
2000 Assumed care of patient 2100 Assessment completed, patient is alert and oriented x's 4, no c/o pain. ST on the monitor with a HR in the 110s. Lung fields are clear throughout on hiflow, 02 sat sustained in the mid 90s with a productive cough throughout. Patient is experiencing dyspnea at rest.  Patient tolerating a regular diet without any N/V or gastric distention. Patient is currently sitting up in bed resting, call bell and personal belongings within reach, will continue to monitor. 2252 Scheduled medications administered as ordered without any complications. Patient continue to cough and c/o pain with cough. PRN pain medication administered per patient's request, will continue to monitor. 2302 This nurse received notification that patient's 02 sat decreased to 80%. RT notified by MT, RT stated that they would come and assess. 2319 Bedside and Verbal shift change report given to Mohan Conn RN (oncoming nurse) by Graham Cornejo Rn (offgoing nurse). Report included the following information SBAR, MAR, Accordion and Cardiac Rhythm ST.

## 2020-12-29 NOTE — PROGRESS NOTES
Called by RN to assess patient d/t drop in sats. Upon arrival patient was coughing and nasal flaring. Patient given nebulizer treatment and increased HFNC to 45F/65%. Patient sats are currently 100, RR 28 with .

## 2020-12-30 NOTE — PROGRESS NOTES
Comprehensive Nutrition Assessment Type and Reason for Visit: (P) Reassess Nutrition Recommendations/Plan: Other: continue w/ POC Nutrition Assessment:  (P) pt admitted w/ bilateral PNA, sepsis, hyponatremia, acute resp distress, hypoxia, AIDS Estimated Daily Nutrient Needs: 
Energy (kcal): (P) 2416; Weight Used for Energy Requirements: (P) Current Protein (g): (P) 121; Weight Used for Protein Requirements: (P) Admission Fluid (ml/day): (P) 2416; Method Used for Fluid Requirements: (P) 1 ml/kcal 
 
 
Nutrition Related Findings:  (P) Na-127 Glucose-214 ALT-83 AST-43, Meds: dulcolax, tums, pepcid, humalog, melatonin, methylprediolone, predisolone, sodium bicarbonate; no new BM; continues to eat well Wounds:   
(P) None Current Nutrition Therapies: DIET NUTRITIONAL SUPPLEMENTS All Meals; Ensure [employer] DIET REGULAR FR 1800ML Anthropometric Measures: 
Height:  (P) 5' 7\" (170.2 cm) Current Body Wt:  (P) 63 kg (139 lb) Admission Body Wt:  (P) 151 lb Usual Body Wt:  (unable to assess at this time) Ideal Body Wt:  (P) 148 lbs:  (P) 93.9 % Adjusted Body Weight:   ; Weight Adjustment for: No adjustment Adjusted BMI:      
BMI Category:  (P) Normal weight (BMI 18.5-24. 9) Nutrition Diagnosis: (P) Inadequate oral intake related to (P) catabolic illness as evidenced by (P) weight loss Nutrition Interventions:  
Food and/or Nutrient Delivery: (P) Continue current diet, Continue oral nutrition supplement Nutrition Education and Counseling: (P) No recommendations at this time Coordination of Nutrition Care: (P) Continue to monitor while inpatient, Interdisciplinary rounds Goals: (P) PO intakes of meals and supplements >50% throughout the next 3-5days Nutrition Monitoring and Evaluation:  
Behavioral-Environmental Outcomes: None identified Food/Nutrient Intake Outcomes: (P) Diet advancement/tolerance, Food and nutrient intake, Supplement intake Physical Signs/Symptoms Outcomes: (P) Biochemical data, Nutrition focused physical findings, Skin, Weight, GI status Discharge Planning: (P) Continue oral nutrition supplement, Continue current diet Electronically signed by Grecia Packer on 12/30/2020 at 12:27 PM

## 2020-12-30 NOTE — PROGRESS NOTES
Hospitalist Progress Note Patient: Silvia Collado MRN: [id number]  CSN: [id number] YOB: 1990  Age: 31 y.o. Sex: male DOA: 12/17/2020 LOS:  LOS: 13 days Chief Complaint: 
 
 
sepsis Assessment/Plan  
 
31 you male with untreated HIV/AIDS over 3 years, came with SOB and fevers 
covid negative 
  
Acute hypoxia resp failure requiring high flow 02-continued SOB and ESCOTO Sepsis-resolved 
pneumocystis pneumonia-on high dose bactrim and steroids Hyperglycemia with steroids-SSI PRN 
HIV/AIDS, untreated, CD4 count 6 Secondary syphyllis, W[place] reactive, titer 1:64-started on IM bicillin Hyponatremia-inc sodium bicarb tabs, fluid restriction Depression and anxiety-we discussed starting SSRI and anxiolytic medicine which he very much wants to do-start zoloft and buspar, ativan ONLY if needed for severe anxiety 
  
 
DVT proph-lovenox 
  
Continue supportive care Tele monitoring 
 
guarded prognosis Appreciate ID and pulmonolgy's help with case 
  
Disposition : 
Patient Active Problem List  
Diagnosis Code  AIDS (acquired immune deficiency syndrome) ([facility]) B20  
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) B59  
 Acute respiratory distress R06.03  
 Hypoxia R09.02 Subjective: He feels a lot of depression and anxiety he says No suicidal just very down about his health, prognosis and continued hospitalization Anxiety from SOB makes his breathing worse he states Review of systems: 
 
Constitutional: denies fevers, chills Respiratory:SOB, cough Cardiovascular: denies chest pain, palpitations Gastrointestinal: denies nausea, vomiting, diarrhea Vital signs/Intake and Output: 
Visit Vitals /89 Pulse 97 Temp 97.9 °F (36.6 °C) Resp 22 Ht 5' 7\" (1.702 m) Wt 63.1 kg (139 lb 3.2 oz) SpO2 93% BMI 21.80 kg/m² Current Shift:  No intake/output data recorded. Last three shifts:  12/28 1901 - 12/30 0700 In: 1920 [P.O.:1920] Out: 3280 [[facility]:[id number]] Exam: 
 
General: thin ill appearing AAM alert, NAD, OX3 Head/Neck: NCAT, supple, No masses, No lymphadenopathy CVS:Regular rate and rhythm, no M/R/G, S1/S2 heard, no thrill Lungs:Clear to auscultation bilaterally, no wheezes, rhonchi, or rales Abdomen: Soft, Nontender, No distention, Normal Bowel sounds, No hepatomegaly Extremities: No C/C/E, pulses palpable 2+ Neuro:grossly normal , follows commands Psych:appropriate, but anxious Labs: Results:  
   
Chemistry Recent Labs 12/30/20 0247 12/29/20 
0640 12/28/20 
0100 * 243* 190* * 126* 127* K 4.7 4.7 4.9 CL 91* 91* 92* CO2 30 26 27 BUN 13 13 14 CREA 0.76 0.67 0.70 CA 8.3* 8.3* 8.7 AGAP 6 9 8 BUCR 17 19 20 *  --  325* TP 6.0*  --  6.6 ALB 2.2*  --  2.5*  
GLOB 3.8  --  4.1* AGRAT 0.6*  --  0.6* CBC w/Diff Recent Labs 12/30/20 0247 12/29/20 
0640 12/28/20 
0100 WBC 5.6 6.1 6.0  
RBC 2.97* 3.00* 3.31* HGB 9.0* 9.0* 9.8* HCT 26.9* 26.8* 28.4*  
 233 273 GRANS 96* 95* 95* LYMPH 2* 2* 2* EOS 1 1 1 Cardiac Enzymes No results for input(s): CPK, CKND1, ANIKA in the last 72 hours. No lab exists for component: Bryan Innocent Coagulation No results for input(s): PTP, INR, APTT, INREXT in the last 72 hours. Lipid Panel No results found for: CHOL, CHOLPOCT, CHOLX, CHLST, CHOLV, 344889, HDL, HDLP, LDL, LDLC, DLDLP, 138112, VLDLC, VLDL, TGLX, TRIGL, TRIGP, TGLPOCT, CHHD, CHHDX  
BNP No results for input(s): BNPP in the last 72 hours. Liver Enzymes Recent Labs 12/30/20 
0247 TP 6.0* ALB 2.2* * Thyroid Studies Lab Results Component Value Date/Time  TSH 2.10 12/23/2020 10:45 AM  
    
 Procedures/imaging: see electronic medical records for all procedures/Xrays and details which were not copied into this note but were reviewed prior to creation of Plan Mimi Car MD

## 2020-12-30 NOTE — PROGRESS NOTES
Bedside and Verbal shift change report given to Kathleen Srivastava RN  (oncoming nurse) by Bahman Ladd RN  (offgoing nurse). Report included the following information SBAR, Kardex, Intake/Output, MAR and Recent Results.

## 2020-12-30 NOTE — PROGRESS NOTES
approx 1615 pt MEWs 5, pt stable, s/p mild coughing spell and c/o pain, pt given prn robitussin as well as prn IV pain rx,, ronn rn

## 2020-12-30 NOTE — PROGRESS NOTES
Attempted to see pt for PT session. Pt states he doesn't want to attempt participation in PT until after 2 pm. Refusing at this time. Will follow up as schedule allows.

## 2020-12-30 NOTE — PROGRESS NOTES
[facility] Pulmonary, Critical Care, and Sleep Medicine Name: Ginger Bright MRN: [id number] : 1990 Hospital: [facility] [place] Date: 2020 PCCM Note Consult requesting physician: Dr. Elena Bales Reason for Consult: Likely PJP pneumonia, hypoxia IMPRESSION:  
Pneumonia of both lungs due to Pneumocystis jirovecii ([facility] [address] B59 Acute hypoxic respiratory failure J96.01 · Patient Active Problem List  
Diagnosis Code  AIDS (acquired immune deficiency syndrome) ([facility]) B20  
 Bilateral pneumonia J18.9  Sepsis ([facility] [address] A41.9  Hyponatremia E87.1  Pneumonia of both lungs due to Pneumocystis jirovecii ([facility]) B59  
 Acute respiratory distress R06.03  
 Hypoxia R09.02  
  
RECOMMENDATIONS:  
On HFNC at 45 Lpm and 55% FiO2. Titrate flow and fio2 for goal SPO2 > 91% XR chest 20 stable overall. Patient declined for ABG for follow up Bilateral extensive pneumonia with hypoxemia in a patient with HIV; COVID-19 negative; likely PJP pneumonia. Continue Bactrim at dose to 2 tablets 3 times daily. Due to worsening CXR findings and increased O2 requirement, prednisone is changed to Solu-Medrol on 2020. Continue IV Solu-Medrol 40 mg IV every 12 hours, ordered. Bronchodilators: Pulmicort twice daily, Xopenex every 4 hours. Xopenex and Atrovent as needed. G6PD normal. Monitor Hgb - stable Repeat sputum culture - await collection - spoke with pt and RN again today Fungitell elevated 455. LDH elevated: 560. Expectorated sputum for PJP: Pending. Sputum culture: Light normal dequan. Urine antigen panel: Negative. COVID-19: Negative. LFT elevated but stable and hyponatremia management defer to hospitalist. 
 
CT chest with bilateral diffuse GGO and underlying emphysema, pulmonary fibrosis in the background but due to motion artifact it is difficult to exclude. Bronchodilators: Budesonide twice daily, Atrovent as needed. Airway clearance: Continue incentive spirometer; advised to use often. HOB >=30 degree, aspiration precautions, aggressive pulmonary toileting, incentive spirometry, PT/OT eval and treat, mobilization. DVT Prophylaxis: Lovenox GI Prophylaxis: Pepcid Other issues management by primary team and respective consultants. Further recommendations will be based on the patient's response to recommended treatment and results of the investigation ordered. Quality Care: PPI, DVT prophylaxis, HOB elevated, infection control all reviewed and addressed. Discussed with patient, radiologic work up showed, answered all questions to their satisfaction. Care plan discussed with nursing, RT Complex decision making performed with > 50% time spent in face to face assessment of this patinet Subjective/History: Mr. Emerson Goldberg is a 31 y.o. male with PMHx significant for HIV diagnosed couple years ago, not on treatment; admitted with weight loss, ESCOTO; COVID-19 negative x2; diagnosed with atypical looking pneumonia/GGO on CT chest which is negative for PE, suspected for PJP pneumonia. Initially treated with Rocephin and Zithromax. Seen by Dr. Cherylyn Leventhal: Elevated LDH, PCT 0.57. Antibiotics changed to Bactrim and started on steroids. HIV viral load elevated. 12/30/2020 Patient seen at bedside in  342 Feels throat hurting from coughing off and on Unfortunately for reasons unclear sputum cx not collected Remained on HFNC at 45 Lpm, 55% FiO2 Reports stable dyspnea, phlegm production Some chest discomfort from coughing; no wheezing or hemoptysis. Afebrile. No GI symptoms. Appears chronically ill. Pulmonary was not contacted by staff on anything about patient overnight. Review of Systems:  
Constitutional: No fever, no chills,  no night sweats HEENT: No epistaxis, no nasal drainage, no difficulty in swallowing, no redness in eyes Respiratory: as above Cardiovascular: no palpitations, no chronic leg edema, no syncope Gastrointestinal: no abd pain, no vomiting, no diarrhea, no bleeding symptoms Genitourinary: No urinary symptoms or hematuria Neurological: No focal weakness, no seizures, no headaches Behvioral/Psych: No anxiety, no depression No Known Allergies Past Medical History:  
Diagnosis Date  Asthma  Chlamydia  Herpes zoster  HIV (human immunodeficiency virus infection) ([facility] [address]  Syphilis History reviewed. No pertinent surgical history. History reviewed. No pertinent family history. Social History Tobacco Use  Smoking status: Former Smoker  Smokeless tobacco: Never Used Substance Use Topics  Alcohol use: Yes Comment: socially Prior to Admission medications Not on File Current Facility-Administered Medications Medication Dose Route Frequency  sodium bicarbonate tablet 650 mg  650 mg Oral BID  sertraline (ZOLOFT) tablet 50 mg  50 mg Oral DAILY  busPIRone (BUSPAR) tablet 5 mg  5 mg Oral TID  morphine injection 2 mg  2 mg IntraVENous Q4H PRN  
 LORazepam (ATIVAN) tablet 0.5 mg  0.5 mg Oral Q6H PRN  penicillin g benzathine (BICILLIN LA) 1,200,000 unit/2 mL IM injection 2.4 Million Units  2.4 Million Units IntraMUSCular Q7D  
 methylPREDNISolone (PF) (SOLU-MEDROL) injection 40 mg  40 mg IntraVENous Q12H  
 insulin lispro (HUMALOG) injection   SubCUTAneous AC&HS  trimethoprim-sulfamethoxazole (BACTRIM DS, SEPTRA DS) 160-800 mg per tablet 2 Tab  2 Tab Oral TIDAC  acetaminophen (TYLENOL) tablet 650 mg  650 mg Oral Q8H PRN  
 multivitamin, tx-iron-ca-min (THERA-M w/ IRON) tablet 1 Tab  1 Tab Oral DAILY  guaiFENesin (ROBITUSSIN) 100 mg/5 mL oral liquid 100 mg  100 mg Oral Q4H PRN  
 melatonin tablet 10 mg  10 mg Oral QHS  levalbuterol (XOPENEX) nebulizer soln 1.25 mg/0.5 ml  1.25 mg Nebulization Q4H RT  
  ipratropium (ATROVENT) 0.02 % nebulizer solution 0.5 mg  0.5 mg Nebulization Q4H PRN  
 budesonide (PULMICORT) 500 mcg/2 ml nebulizer suspension  500 mcg Nebulization BID RT  
 levalbuterol (XOPENEX) nebulizer soln 1.25 mg/0.5 ml  1.25 mg Nebulization Q4H PRN  
 enoxaparin (LOVENOX) injection 40 mg  40 mg SubCUTAneous Q24H  
 influenza vaccine 2020-21 (6 mos+)(PF) (FLUARIX/FLULAVAL/FLUZONE QUAD) injection 0.5 mL  0.5 mL IntraMUSCular PRIOR TO DISCHARGE  calcium carbonate (TUMS) chewable tablet 200 mg [elemental]  200 mg Oral TID PRN  
 [Held by provider] predniSONE (DELTASONE) tablet 20 mg  20 mg Oral DAILY WITH BREAKFAST  [Held by provider] predniSONE (DELTASONE) tablet 20 mg  20 mg Oral BID WITH MEALS  sodium chloride (NS) flush 5-10 mL  5-10 mL IntraVENous PRN  
 sodium chloride (NS) flush 5-40 mL  5-40 mL IntraVENous PRN  promethazine (PHENERGAN) tablet 12.5 mg  12.5 mg Oral Q6H PRN Or  
 ondansetron (ZOFRAN) injection 4 mg  4 mg IntraVENous Q6H PRN  
 bisacodyL (DULCOLAX) tablet 5 mg  5 mg Oral DAILY PRN  
 famotidine (PEPCID) tablet 20 mg  20 mg Oral BID Objective:  
Vital Signs:   
Blood pressure (!) 141/77, pulse (!) 132, temperature 99.9 °F (37.7 °C), resp. rate 24, height 5' 7\" (1.702 m), weight 63.1 kg (139 lb 3.2 oz), SpO2 96 %. Body mass index is 21.8 kg/m². O2 Device: Hi flow nasal cannula O2 Flow Rate (L/min): 45 l/min Temp (24hrs), Av.7 °F (37.1 °C), Min:97.9 °F (36.6 °C), Max:99.9 °F (37.7 °C) Intake/Output:  
Last shift:       0701 -  1900 In: 480 [P.O.:480] Out: 500 [Urine:500] Last 3 shifts:  1901 -  0700 In: 1920 [P.O.:1920] Out: 3350 [WHLYB:1152] Intake/Output Summary (Last 24 hours) at 2020 1747 Last data filed at 2020 1155 Gross per 24 hour Intake 960 ml Output 2500 ml Net -1540 ml Physical Exam:  
General: in no distress and AAO x 3, appears older than stated age, on HFNC 
 HEENT: PERRLA, throat normal without erythema or exudate Neck: No abnormally enlarged lymph nodes or thyroid, supple Chest: normal 
Lungs: normal air entry, few rhonchi scattered bilaterally, normal percussion bilaterally, no tenderness/ rash Heart: Regular rate and rhythm, S1S2 present or without murmur or extra heart sounds Abdomen: non distended, bowel sounds normoactive, tympanic, soft without significant tenderness, masses, organomegaly or guarding Extremity: negative for edema, cyanosis, clubbing Neuro:  aao x 3, no defects noted in general exam. 
Skin: Skin color, texture, turgor fair Data:  
   
Recent Results (from the past 24 hour(s)) GLUCOSE, POC Collection Time: 12/29/20  9:23 PM  
Result Value Ref Range Glucose (POC) 246 (H) 70 - 110 mg/dL CBC WITH AUTOMATED DIFF Collection Time: 12/30/20  2:47 AM  
Result Value Ref Range WBC 5.6 4.6 - 13.2 K/uL  
 RBC 2.97 (L) 4.70 - 5.50 M/uL HGB 9.0 (L) 13.0 - 16.0 g/dL HCT 26.9 (L) 36.0 - 48.0 % MCV 90.6 74.0 - 97.0 FL  
 MCH 30.3 24.0 - 34.0 PG  
 MCHC 33.5 31.0 - 37.0 g/dL  
 RDW 13.4 11.6 - 14.5 % PLATELET 997 280 - 644 K/uL MPV 10.5 9.2 - 11.8 FL  
 NEUTROPHILS 96 (H) 40 - 73 % LYMPHOCYTES 2 (L) 21 - 52 % MONOCYTES 1 (L) 3 - 10 % EOSINOPHILS 1 0 - 5 % BASOPHILS 0 0 - 2 %  
 ABS. NEUTROPHILS 5.4 1.8 - 8.0 K/UL  
 ABS. LYMPHOCYTES 0.1 (L) 0.9 - 3.6 K/UL  
 ABS. MONOCYTES 0.1 0.05 - 1.2 K/UL  
 ABS. EOSINOPHILS 0.0 0.0 - 0.4 K/UL  
 ABS. BASOPHILS 0.0 0.0 - 0.1 K/UL  
 DF AUTOMATED METABOLIC PANEL, COMPREHENSIVE Collection Time: 12/30/20  2:47 AM  
Result Value Ref Range Sodium 127 (L) 136 - 145 mmol/L Potassium 4.7 3.5 - 5.5 mmol/L Chloride 91 (L) 100 - 111 mmol/L  
 CO2 30 21 - 32 mmol/L Anion gap 6 3.0 - 18 mmol/L Glucose 214 (H) 74 - 99 mg/dL BUN 13 7.0 - 18 MG/DL Creatinine 0.76 0.6 - 1.3 MG/DL  
 BUN/Creatinine ratio 17 12 - 20 GFR est AA >60 >60 ml/min/1.73m2 GFR est non-AA >60 >60 ml/min/1.73m2 Calcium 8.3 (L) 8.5 - 10.1 MG/DL Bilirubin, total 0.2 0.2 - 1.0 MG/DL  
 ALT (SGPT) 83 (H) 16 - 61 U/L  
 AST (SGOT) 43 (H) 10 - 38 U/L Alk. phosphatase 267 (H) 45 - 117 U/L Protein, total 6.0 (L) 6.4 - 8.2 g/dL Albumin 2.2 (L) 3.4 - 5.0 g/dL Globulin 3.8 2.0 - 4.0 g/dL A-G Ratio 0.6 (L) 0.8 - 1.7 GLUCOSE, POC Collection Time: 12/30/20  6:06 AM  
Result Value Ref Range Glucose (POC) 171 (H) 70 - 110 mg/dL GLUCOSE, POC Collection Time: 12/30/20  5:17 PM  
Result Value Ref Range Glucose (POC) 215 (H) 70 - 110 mg/dL Chemistry Recent Labs 12/30/20 
0247 12/29/20 
0640 12/28/20 
0100 * 243* 190* * 126* 127* K 4.7 4.7 4.9 CL 91* 91* 92* CO2 30 26 27 BUN 13 13 14 CREA 0.76 0.67 0.70 CA 8.3* 8.3* 8.7 MG  --   --  2.0 PHOS  --   --  4.0 AGAP 6 9 8 BUCR 17 19 20 *  --  325* TP 6.0*  --  6.6 ALB 2.2*  --  2.5*  
GLOB 3.8  --  4.1* AGRAT 0.6*  --  0.6* Lactic Acid Lactic acid Date Value Ref Range Status 12/29/2020 2.6 (HH) 0.4 - 2.0 MMOL/L Final  
  Comment:  
  CALLED TO AND CORRECTLY REPEATED BY: 
Shayna MUJICA RN 3N TO CAF [id number] AT [id number] Recent Labs  
  12/29/20 
2602 LAC 2.6* Liver Enzymes Protein, total  
Date Value Ref Range Status 12/30/2020 6.0 (L) 6.4 - 8.2 g/dL Final  
 
Albumin Date Value Ref Range Status 12/30/2020 2.2 (L) 3.4 - 5.0 g/dL Final  
 
Globulin Date Value Ref Range Status 12/30/2020 3.8 2.0 - 4.0 g/dL Final  
 
A-G Ratio Date Value Ref Range Status 12/30/2020 0.6 (L) 0.8 - 1.7   Final  
 
Alk. phosphatase Date Value Ref Range Status 12/30/2020 267 (H) 45 - 117 U/L Final  
 
Recent Labs 12/30/20 
0247 12/28/20 
0100 TP 6.0* 6.6 ALB 2.2* 2.5*  
GLOB 3.8 4.1* AGRAT 0.6* 0.6* * 325* CBC w/Diff Recent Labs 12/30/20 
0247 12/29/20 
0640 12/28/20 
0100 WBC 5.6 6.1 6.0  
RBC 2.97* 3.00* 3.31* HGB 9.0* 9.0* 9.8* HCT 26.9* 26.8* 28.4*  
 233 273 GRANS 96* 95* 95* LYMPH 2* 2* 2* EOS 1 1 1 Cardiac Enzymes No results found for: CPK, CK, CKMMB, CKMB, RCK3, CKMBT, CKNDX, CKND1, ANIKA, TROPT, TROIQ, ZOË, TROPT, TNIPOC, BNP, BNPP  
 
BNP No results found for: BNP, BNPP, XBNPT Coagulation No results for input(s): PTP, INR, APTT, INREXT, INREXT in the last 72 hours. Thyroid  Lab Results Component Value Date/Time TSH 2.10 12/23/2020 10:45 AM  
   
No results found for: T4  
 
Urinalysis Lab Results Component Value Date/Time Color YELLOW 12/18/2020 12:14 AM  
 Appearance CLEAR 12/18/2020 12:14 AM  
 Specific gravity <1.005 (L) 12/18/2020 12:14 AM  
 pH (UA) 7.0 12/18/2020 12:14 AM  
 Protein Negative 12/18/2020 12:14 AM  
 Glucose Negative 12/18/2020 12:14 AM  
 Ketone Negative 12/18/2020 12:14 AM  
 Bilirubin Negative 12/18/2020 12:14 AM  
 Urobilinogen 1.0 12/18/2020 12:14 AM  
 Nitrites Negative 12/18/2020 12:14 AM  
 Leukocyte Esterase Negative 12/18/2020 12:14 AM  
 Epithelial cells 1+ 03/08/2018 09:11 PM  
 Bacteria FEW (A) 03/08/2018 09:11 PM  
 WBC 4 to 6 03/08/2018 09:11 PM  
 RBC NEGATIVE  03/08/2018 09:11 PM  
  
 
Micro  No results for input(s): SDES, CULT in the last 72 hours. No results for input(s): CULT in the last 72 hours. ABG No results for input(s): PHI, PHI, POC2, PCO2I, PO2, PO2I, HCO3, HCO3I, FIO2, FIO2I in the last 72 hours. CT (Most Recent) (CT chest reviewed by me) Results from [facility] Encounter encounter on 12/17/20 CTA CHEST W OR W WO CONT Narrative EXAM: CTA Chest 
 
INDICATION: Shortness of breath. Possible PE. COMPARISON: No prior study. TECHNIQUE: Axial CT imaging from the thoracic inlet through the diaphragm with 
intravenous contrast utilizing CTA study for pulmonary artery evaluation. Coronal and sagittal MIP reformations were generated at a separate workstation. One or more dose reduction techniques were used on this CT: automated exposure 
control, adjustment of the mAs and/or kVp according to patient size, and 
iterative reconstruction techniques. The specific techniques used on this CT 
exam have been documented in the patient's electronic medical record. Digital 
Imaging and Communications in Medicine (DICOM) format image data are available 
to nonaffiliated external healthcare facilities or entities on a secure, media 
free, reciprocally searchable basis with patient authorization for at least a 
12-month period after this study. _______________ FINDINGS: 
 
EXAM QUALITY: Overall exam quality is suboptimal. Pulmonary arterial enhancement 
is adequate. Respiratory motion artifact is present, limiting evaluation. PULMONARY ARTERIES: No convincing evidence of pulmonary embolism. MEDIASTINUM: Normal heart size. No evidence of right heart strain. Aorta is 
unremarkable. No pericardial effusion. LYMPH NODES: No pathologically enlarged lymph nodes lymph nodes. AIRWAY: Unremarkable. LUNGS: Centrilobular emphysema. Superimposed scattered bilateral groundglass 
opacities throughout the lungs. PLEURA: No pleural effusion or pneumothorax. UPPER ABDOMEN: Visualized upper abdomen is unremarkable. OTHER: No acute or aggressive osseous abnormalities identified. _______________ Impression IMPRESSION: 
 
Suboptimal study secondary to motion artifact and bolus timing. No evidence of obvious central pulmonary embolism. Diffuse bilateral groundglass opacities, suspicious for atypical infection. Emphysema. XR (Most Recent). CXR  reviewed by me and compared with previous CXR Results from [facility] Encounter encounter on 12/17/20 XR CHEST PORT Narrative EXAM: CHEST RADIOGRAPH, SINGLE VIEW CLINICAL INDICATION/HISTORY: Pneumonia COMPARISON: Single view chest 12/27/2020 TECHNIQUE: Portable frontal view of the chest was obtained.  
 
_______________ FINDINGS: 
 
SUPPORT DEVICES: Cardiac monitor leads overlie the chest. 
 
HEART AND MEDIASTINUM: Cardiomediastinal silhouette appears within normal 
limits. Normal caliber thoracic aorta. No central vascular congestion. LUNGS AND PLEURAL SPACES: Diffuse bilateral interstitial infiltrates remain 
without focal alveolar consolidation. No pleural effusion or pneumothorax. BONY THORAX AND SOFT TISSUES: No acute osseous abnormality. _______________ Impression IMPRESSION: 
 
Stable chest with diffuse bilateral interstitial infiltrates. Sharif Mays MD 
12/30/2020

## 2020-12-30 NOTE — DIABETES MGMT
GLYCEMIC CONTROL PROGRESS NOTE: 
 
- no known h/o T2DM 
- Solumedrol 40 mg Q 12 hours, steroid associated hyperglycemia - BG out of target range : < 180 mg/dL,, trending down - TDD = 8 - Humalog Normal Insulin Sensitivity Corrective Coverage Recent Glucose Results:  
Lab Results Component Value Date/Time  (H) 12/30/2020 02:47 AM  
 GLUCPOC 171 (H) 12/30/2020 06:06 AM  
 GLUCPOC 246 (H) 12/29/2020 09:23 PM  
 GLUCPOC 248 (H) 12/29/2020 04:15 PM  
 
 
Alvino Esqueda MS, RN, CDE Glycemic Control Team 
[phone] Pager [phone] (M-TH 8:00-4:30P) *After Hours pager [phone]

## 2020-12-30 NOTE — PROGRESS NOTES
Hospitalist Progress Note Patient: Magan Mendoza MRN: [id number]  CSN: [id number] YOB: 1990  Age: 31 y.o. Sex: male DOA: 12/17/2020 LOS:  LOS: 12 days Chief Complaint: 
 
pneumonia Assessment/Plan  
31 you male with untreated HIV over 3 years, came with SOB and fevers 
covid negative Acute hypoxia resp failure requiring high flow 02 
sepsis BL PJ pneumonia HIV/AIDS, untreated, CD4 count 6 Hx positive RPR Fevers He feels better but is now on high flow 02 at 35% Overall he does appear to be more comfortable On IV steroids, PO bactrim high dose DVT proph-lovenox Continue supportive care His blood cx are negative thus far 
repeat RPR titer Prognosis guarded, he is still unable to get oob with out significant dyspnea Disposition : 
Patient Active Problem List  
Diagnosis Code  AIDS (acquired immune deficiency syndrome) ([facility]) B20  
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  
 Acute respiratory distress R06.03  
 Hypoxia R09.02 Subjective: 
 
Says that he is trying to stay positive about his overall condition. SOB even with minimal activity Review of systems: 
 
Constitutional: denies chills, myalgias Respiratory:  SOB, cough Cardiovascular: denies chest pain, palpitations Gastrointestinal: denies nausea, vomiting, diarrhea Vital signs/Intake and Output: 
Visit Vitals /82 Pulse (!) 113 Temp 98.5 °F (36.9 °C) Resp 20 Ht 5' 7\" (1.702 m) Wt 63.1 kg (139 lb 3.2 oz) SpO2 94% BMI 21.80 kg/m² Current Shift:  No intake/output data recorded. Last three shifts:  12/28 0701 - 12/29 1900 In: 1920 [P.O.:1920] Out: 4528 [ZGYZL:[id number]] Exam: 
 
General: frail thin AAM, appears debilitated, alert, NAD, OX3 Head/Neck: NCAT, supple, No masses, No lymphadenopathy CVS:Regular rate and rhythm, no M/R/G, S1/S2 heard, no thrill Lungs:Coarse BS BL, no wheezes, rhonchi, or rales Abdomen: Soft, Nontender, No distention, Normal Bowel sounds, No hepatomegaly Extremities: No C/C/E, pulses palpable 2+ Neuro:grossly normal , follows commands Psych:appropriate Labs: Results:  
   
Chemistry Recent Labs  
  12/29/20 
0640 12/28/20 0100 12/27/20 
0110 * 190* 212* * 127* 125* K 4.7 4.9 5.2 CL 91* 92* 90* CO2 26 27 27 BUN 13 14 15 CREA 0.67 0.70 0.70 CA 8.3* 8.7 9.2 AGAP 9 8 8 BUCR 19 20 21* AP  --  325* 313* TP  --  6.6 7.2 ALB  --  2.5* 2.8*  
GLOB  --  4.1* 4.4* AGRAT  --  0.6* 0.6* CBC w/Diff Recent Labs  
  12/29/20 
0640 12/28/20 0100 12/27/20 
0110 WBC 6.1 6.0 4.8  
RBC 3.00* 3.31* 3.63* HGB 9.0* 9.8* 10.8* HCT 26.8* 28.4* 31.0*  
 273 288 GRANS 95* 95* 93* LYMPH 2* 2* 3* EOS 1 1 1 Cardiac Enzymes No results for input(s): CPK, CKND1, ANIKA in the last 72 hours. No lab exists for component: Sofiya Goldstein Coagulation No results for input(s): PTP, INR, APTT, INREXT, INREXT in the last 72 hours. Lipid Panel No results found for: CHOL, CHOLPOCT, CHOLX, CHLST, CHOLV, 121118, HDL, HDLP, LDL, LDLC, DLDLP, 740502, VLDLC, VLDL, TGLX, TRIGL, TRIGP, TGLPOCT, CHHD, CHHDX  
BNP No results for input(s): BNPP in the last 72 hours. Liver Enzymes Recent Labs  
  12/28/20 
0100 TP 6.6 ALB 2.5* * Thyroid Studies Lab Results Component Value Date/Time TSH 2.10 12/23/2020 10:45 AM  
    
Procedures/imaging: see electronic medical records for all procedures/Xrays and details which were not copied into this note but were reviewed prior to creation of Plan Kvng Montoya MD

## 2020-12-31 NOTE — PROGRESS NOTES
Problem: Risk for Spread of Infection Goal: Prevent transmission of infectious organism to others Description: Prevent the transmission of infectious organisms to other patients, staff members, and visitors. Outcome: Progressing Towards Goal 
  
Problem: Pressure Injury - Risk of 
Goal: *Prevention of pressure injury Description: Document Josep Scale and appropriate interventions in the flowsheet. Outcome: Progressing Towards Goal 
Note: Pressure Injury Interventions: 
Sensory Interventions: Assess changes in LOC, Float heels, Keep linens dry and wrinkle-free, Minimize linen layers, Maintain/enhance activity level, Monitor skin under medical devices, Pad between skin to skin, Pressure redistribution bed/mattress (bed type), Turn and reposition approx. every two hours (pillows and wedges if needed) Moisture Interventions: Absorbent underpads, Apply protective barrier, creams and emollients, Maintain skin hydration (lotion/cream), Minimize layers, Offer toileting Q_hr Activity Interventions: Increase time out of bed, Pressure redistribution bed/mattress(bed type), PT/OT evaluation Mobility Interventions: HOB 30 degrees or less, Pressure redistribution bed/mattress (bed type), PT/OT evaluation Nutrition Interventions: Document food/fluid/supplement intake, Discuss nutritional consult with provider, Offer support with meals,snacks and hydration Friction and Shear Interventions: Feet elevated on foot rest, HOB 30 degrees or less, Lift sheet, Minimize layers

## 2020-12-31 NOTE — PROGRESS NOTES
Pt MEWS has ranged from 3-5 the majority of the shift, pt is stable however he does present with tachypnea and tachycardia r/t SOB at rest, pt rarely changes position,sits, or stands pt primarily supine HOB elevated, pt has frequent bouts of anxiety and panic attacks, with medication administration and therapeutic touch pt slowly returns to baseline, nurse will continue to monitor,, pfreeman rn

## 2020-12-31 NOTE — PROGRESS NOTES
[facility] Pulmonary, Critical Care, and Sleep Medicine Name: Silvia Collado MRN: [id number] : 1990 Hospital: [facility] [place] Date: 2020 PCCM Note Consult requesting physician: Dr. Jose Arredondo Reason for Consult: Likely PJP pneumonia, hypoxia IMPRESSION:  
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  
Diagnosis Code  AIDS (acquired immune deficiency syndrome) ([facility]) B20  
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  
 Acute respiratory distress R06.03  
 Hypoxia R09.02  
  
RECOMMENDATIONS:  
On HFNC at 45 Lpm and 55% FiO2. Titrate flow and fio2 for goal SPO2 > 91% Patient has episodes of anxiety and panic around later afternoon when due for his pain meds [Morphine] and Ativan 0.5 mg. Prefers to take them all together at the same time. Gets very tachycardiac and tachypnea. Once receives these meds, he calms down and feels better with improvement in HR and RR At this point he has tolerated this medications w/o any respiratory worsening rather they have appeared to have helped control his anxiety, panic and pain related hyperventilation with limited reserves XR chest 20 stable overall. Patient declined for ABG for follow up again EKG showed ST. He remained asymptomatic from cardiac standpoint XR chest for follow up in AM 
If persist to have significant ST then may transfer to ICU for close monitoring. Staff aware Bilateral extensive pneumonia with hypoxemia in a patient with HIV; COVID-19 negative; PJP pneumonia. Given inability to wean FiO2 or flow will change Bactrim to 20 mg/kg/d in divided 3 times daily. Due to worsening CXR findings and increased O2 requirement, prednisone is already changed to Solu-Medrol on 12/27/2020. Continue IV Solu-Medrol 40 mg IV every 12 hours, ordered. I personally submitted his sputum cx today to lab for G/S and AFB Any empirical antibiotics for broadening coverage per clinical course Await Lactic acid Check LD in AM 
 
Bronchodilators: Pulmicort twice daily, Xopenex every 4 hours. Xopenex and Atrovent as needed. G6PD normal. Monitor Hgb - stable Fungitell elevated 455. LD elevated: 560. Expectorated sputum for PJP: Pending. Sputum culture 12/18/20: Light normal dequan. Urine antigen panel: Negative. COVID-19: Negative. Alk P elevated but stable and hyponatremia management defer to hospitalist. 
 
CT chest with bilateral diffuse GGO and underlying emphysema, pulmonary fibrosis in the background but due to motion artifact it is difficult to exclude. Bronchodilators: Budesonide twice daily; Xopenex, Atrovent as needed. Airway clearance: Continue incentive spirometer; advised to use often. HOB >=30 degree, aspiration precautions, aggressive pulmonary toileting, incentive spirometry, Ronny  Patient prefers to hold off PT/OT eval and treat, mobilization. DVT Prophylaxis: Lovenox GI Prophylaxis: Pepcid Other issues management by primary team and respective consultants. Further recommendations will be based on the patient's response to recommended treatment and results of the investigation ordered. Quality Care: PPI, DVT prophylaxis, HOB elevated, infection control all reviewed and addressed. Discussed with patient, radiologic work up showed, answered all questions to their satisfaction. Care plan discussed with nursing, RT High Complexity decision making performed with > 50% time spent in face to face assessment of this patinet Subjective/History: Mr. Lalo Rios is a 31 y.o. male with PMHx significant for HIV diagnosed couple years ago, not on treatment; admitted with weight loss, ESCOTO; COVID-19 negative x2; diagnosed with atypical looking pneumonia/GGO on CT chest which is negative for PE, suspected for PJP pneumonia. Initially treated with Rocephin and Zithromax. Seen by Dr. Valentine Pump: Elevated LDH, PCT 0.57. Antibiotics changed to Bactrim and started on steroids. HIV viral load elevated. 12/31/2020 Seen at bedside in rm 342 Patient has been very anxious citing awaiting for his pain and anxiety medications Prefers to take them together Tachypnea and tachycardia Similar episode y'day noon No worsening in hypoxia or increase in HFNC support needs Patient denies any chest pain, palpitations, syncope, orthopnea, pnd  
Coughing off and on; reports less with Robitussin Reports stable dyspnea, phlegm production; no wheezing or hemoptysis Some chest discomfort from coughing Afebrile. No GI symptoms. Appears chronically ill and easy to get short winded on long conversation Pulmonary was not contacted by staff on anything about patient overnight. Review of Systems:  
Constitutional: No fever, no chills,  no night sweats HEENT: No epistaxis, no nasal drainage, no difficulty in swallowing, no redness in eyes Respiratory: as above Cardiovascular: no palpitations, no chronic leg edema, no syncope Gastrointestinal: no abd pain, no vomiting, no diarrhea, no bleeding symptoms Genitourinary: No urinary symptoms or hematuria Neurological: No focal weakness, no seizures, no headaches Behvioral/Psych: No anxiety, no depression No Known Allergies Past Medical History:  
Diagnosis Date  Asthma  Chlamydia  Herpes zoster  HIV (human immunodeficiency virus infection) ([facility] [address]  Syphilis History reviewed. No pertinent surgical history. History reviewed. No pertinent family history. Social History Tobacco Use  Smoking status: Former Smoker  Smokeless tobacco: Never Used Substance Use Topics  Alcohol use: Yes Comment: socially Prior to Admission medications Not on File Current Facility-Administered Medications Medication Dose Route Frequency  sodium bicarbonate tablet 650 mg  650 mg Oral BID  sertraline (ZOLOFT) tablet 50 mg  50 mg Oral DAILY  busPIRone (BUSPAR) tablet 5 mg  5 mg Oral TID  morphine injection 2 mg  2 mg IntraVENous Q4H PRN  
 LORazepam (ATIVAN) tablet 0.5 mg  0.5 mg Oral Q6H PRN  penicillin g benzathine (BICILLIN LA) 1,200,000 unit/2 mL IM injection 2.4 Million Units  2.4 Million Units IntraMUSCular Q7D  
 methylPREDNISolone (PF) (SOLU-MEDROL) injection 40 mg  40 mg IntraVENous Q12H  
 insulin lispro (HUMALOG) injection   SubCUTAneous AC&HS  trimethoprim-sulfamethoxazole (BACTRIM DS, SEPTRA DS) 160-800 mg per tablet 2 Tab  2 Tab Oral TIDAC  acetaminophen (TYLENOL) tablet 650 mg  650 mg Oral Q8H PRN  
 multivitamin, tx-iron-ca-min (THERA-M w/ IRON) tablet 1 Tab  1 Tab Oral DAILY  guaiFENesin (ROBITUSSIN) 100 mg/5 mL oral liquid 100 mg  100 mg Oral Q4H PRN  
 melatonin tablet 10 mg  10 mg Oral QHS  levalbuterol (XOPENEX) nebulizer soln 1.25 mg/0.5 ml  1.25 mg Nebulization Q4H RT  
 ipratropium (ATROVENT) 0.02 % nebulizer solution 0.5 mg  0.5 mg Nebulization Q4H PRN  
 budesonide (PULMICORT) 500 mcg/2 ml nebulizer suspension  500 mcg Nebulization BID RT  
 levalbuterol (XOPENEX) nebulizer soln 1.25 mg/0.5 ml  1.25 mg Nebulization Q4H PRN  
 enoxaparin (LOVENOX) injection 40 mg  40 mg SubCUTAneous Q24H  
 influenza vaccine 2020-21 (6 mos+)(PF) (FLUARIX/FLULAVAL/FLUZONE QUAD) injection 0.5 mL  0.5 mL IntraMUSCular PRIOR TO DISCHARGE  calcium carbonate (TUMS) chewable tablet 200 mg [elemental]  200 mg Oral TID PRN  
 [Held by provider] predniSONE (DELTASONE) tablet 20 mg  20 mg Oral DAILY WITH BREAKFAST  [Held by provider] predniSONE (DELTASONE) tablet 20 mg  20 mg Oral BID WITH MEALS  sodium chloride (NS) flush 5-10 mL  5-10 mL IntraVENous PRN  
 sodium chloride (NS) flush 5-40 mL  5-40 mL IntraVENous PRN  promethazine (PHENERGAN) tablet 12.5 mg  12.5 mg Oral Q6H PRN Or  
 ondansetron (ZOFRAN) injection 4 mg  4 mg IntraVENous Q6H PRN  
 bisacodyL (DULCOLAX) tablet 5 mg  5 mg Oral DAILY PRN  
 famotidine (PEPCID) tablet 20 mg  20 mg Oral BID Objective:  
Vital Signs:   
Blood pressure (!) 148/93, pulse (!) 135, temperature 98.2 °F (36.8 °C), resp. rate 22, height 5' 7\" (1.702 m), weight 65.8 kg (145 lb), SpO2 91 %. Body mass index is 22.71 kg/m². O2 Device: Hi flow nasal cannula O2 Flow Rate (L/min): 46 l/min Temp (24hrs), Av.1 °F (36.7 °C), Min:97.2 °F (36.2 °C), Max:99.9 °F (37.7 °C) Intake/Output:  
Last shift:       07 - 1900 In: -  
Out: 030 [QMTLP:310] Last 3 shifts: 1901 -  0700 In: 480 [P.O.:480] Out: 3200 [TYFEB:2875] Intake/Output Summary (Last 24 hours) at 2020 1544 Last data filed at 2020 7158 Gross per 24 hour Intake  Output 1300 ml Net -1300 ml Physical Exam:  
General: very anxious looking, AAO x 3, appears older than stated age, on HFNC HEENT: PERRLA, throat normal without erythema or exudate Neck: No abnormally enlarged lymph nodes or thyroid, supple Chest: normal 
Lungs: moderate air entry, stable few rhonchi scattered bilaterally, normal percussion bilaterally, no tenderness/ rash Heart: Regular rate and rhythm, S1S2 present or without murmur or extra heart sounds Abdomen: non distended, bowel sounds normoactive, tympanic, soft without significant tenderness, masses, organomegaly or guarding Extremity: negative for edema, cyanosis, clubbing Neuro:  Anxious, aao x 3, no defects noted in limited exam. 
Skin: Skin color, texture, turgor fair Data:  
   
 Recent Results (from the past 24 hour(s)) GLUCOSE, POC Collection Time: 12/30/20  5:17 PM  
Result Value Ref Range Glucose (POC) 215 (H) 70 - 110 mg/dL GLUCOSE, POC Collection Time: 12/30/20  9:41 PM  
Result Value Ref Range Glucose (POC) 215 (H) 70 - 110 mg/dL CBC WITH AUTOMATED DIFF Collection Time: 12/31/20  3:15 AM  
Result Value Ref Range WBC 4.7 4.6 - 13.2 K/uL  
 RBC 3.02 (L) 4.70 - 5.50 M/uL HGB 9.2 (L) 13.0 - 16.0 g/dL HCT 27.2 (L) 36.0 - 48.0 % MCV 90.1 74.0 - 97.0 FL  
 MCH 30.5 24.0 - 34.0 PG  
 MCHC 33.8 31.0 - 37.0 g/dL  
 RDW 14.1 11.6 - 14.5 % PLATELET 083 160 - 426 K/uL MPV 9.9 9.2 - 11.8 FL  
 NEUTROPHILS 93 (H) 40 - 73 % LYMPHOCYTES 5 (L) 21 - 52 % MONOCYTES 2 (L) 3 - 10 % EOSINOPHILS 0 0 - 5 % BASOPHILS 0 0 - 2 %  
 ABS. NEUTROPHILS 4.4 1.8 - 8.0 K/UL  
 ABS. LYMPHOCYTES 0.2 (L) 0.9 - 3.6 K/UL  
 ABS. MONOCYTES 0.1 0.05 - 1.2 K/UL  
 ABS. EOSINOPHILS 0.0 0.0 - 0.4 K/UL  
 ABS. BASOPHILS 0.0 0.0 - 0.1 K/UL  
 DF AUTOMATED METABOLIC PANEL, COMPREHENSIVE Collection Time: 12/31/20  3:15 AM  
Result Value Ref Range Sodium 128 (L) 136 - 145 mmol/L Potassium 4.2 3.5 - 5.5 mmol/L Chloride 91 (L) 100 - 111 mmol/L  
 CO2 28 21 - 32 mmol/L Anion gap 9 3.0 - 18 mmol/L Glucose 319 (H) 74 - 99 mg/dL BUN 14 7.0 - 18 MG/DL Creatinine 0.65 0.6 - 1.3 MG/DL  
 BUN/Creatinine ratio 22 (H) 12 - 20 GFR est AA >60 >60 ml/min/1.73m2 GFR est non-AA >60 >60 ml/min/1.73m2 Calcium 8.0 (L) 8.5 - 10.1 MG/DL Bilirubin, total 0.2 0.2 - 1.0 MG/DL  
 ALT (SGPT) 61 16 - 61 U/L  
 AST (SGOT) 26 10 - 38 U/L Alk. phosphatase 239 (H) 45 - 117 U/L Protein, total 5.9 (L) 6.4 - 8.2 g/dL Albumin 2.0 (L) 3.4 - 5.0 g/dL Globulin 3.9 2.0 - 4.0 g/dL A-G Ratio 0.5 (L) 0.8 - 1.7 GLUCOSE, POC Collection Time: 12/31/20  6:24 AM  
Result Value Ref Range Glucose (POC) 202 (H) 70 - 110 mg/dL GLUCOSE, POC  
 Collection Time: 12/31/20 12:42 PM  
Result Value Ref Range Glucose (POC) 99 70 - 110 mg/dL Chemistry Recent Labs 12/31/20 0315 12/30/20 
0247 12/29/20 
0559 * 214* 243* * 127* 126*  
K 4.2 4.7 4.7 CL 91* 91* 91* CO2 28 30 26 BUN 14 13 13 CREA 0.65 0.76 0.67 CA 8.0* 8.3* 8.3* AGAP 9 6 9 BUCR 22* 17 19 * 267*  --   
TP 5.9* 6.0*  --   
ALB 2.0* 2.2*  --   
GLOB 3.9 3.8  --   
AGRAT 0.5* 0.6*  --   
  
 
Lactic Acid Lactic acid Date Value Ref Range Status 12/29/2020 2.6 (HH) 0.4 - 2.0 MMOL/L Final  
  Comment:  
  CALLED TO AND CORRECTLY REPEATED BY: 
Lorene MUJICA RN 3N TO [facility] [id number] AT 6608 Recent Labs  
  12/29/20 
2482 LAC 2.6* Liver Enzymes Protein, total  
Date Value Ref Range Status 12/31/2020 5.9 (L) 6.4 - 8.2 g/dL Final  
 
Albumin Date Value Ref Range Status 12/31/2020 2.0 (L) 3.4 - 5.0 g/dL Final  
 
Globulin Date Value Ref Range Status 12/31/2020 3.9 2.0 - 4.0 g/dL Final  
 
A-G Ratio Date Value Ref Range Status 12/31/2020 0.5 (L) 0.8 - 1.7   Final  
 
Alk. phosphatase Date Value Ref Range Status 12/31/2020 239 (H) 45 - 117 U/L Final  
 
Recent Labs 12/31/20 0315 12/30/20 
0247 TP 5.9* 6.0* ALB 2.0* 2.2*  
GLOB 3.9 3.8 AGRAT 0.5* 0.6* * 267* CBC w/Diff Recent Labs 12/31/20 0315 12/30/20 
0247 12/29/20 
8727 WBC 4.7 5.6 6.1  
RBC 3.02* 2.97* 3.00* HGB 9.2* 9.0* 9.0*  
HCT 27.2* 26.9* 26.8*  
 246 233 GRANS 93* 96* 95* LYMPH 5* 2* 2*  
EOS 0 1 1 Cardiac Enzymes No results found for: CPK, CK, CKMMB, CKMB, RCK3, CKMBT, CKNDX, CKND1, ANIKA, TROPT, TROIQ, ZOË, TROPT, TNIPOC, BNP, BNPP  
 
BNP No results found for: BNP, BNPP, XBNPT Coagulation No results for input(s): PTP, INR, APTT, INREXT, INREXT in the last 72 hours. Thyroid  Lab Results Component Value Date/Time TSH 2.10 12/23/2020 10:45 AM  
   
No results found for: T4  
 
Urinalysis Lab Results Component Value Date/Time Color YELLOW 12/18/2020 12:14 AM  
 Appearance CLEAR 12/18/2020 12:14 AM  
 Specific gravity <1.005 (L) 12/18/2020 12:14 AM  
 pH (UA) 7.0 12/18/2020 12:14 AM  
 Protein Negative 12/18/2020 12:14 AM  
 Glucose Negative 12/18/2020 12:14 AM  
 Ketone Negative 12/18/2020 12:14 AM  
 Bilirubin Negative 12/18/2020 12:14 AM  
 Urobilinogen 1.0 12/18/2020 12:14 AM  
 Nitrites Negative 12/18/2020 12:14 AM  
 Leukocyte Esterase Negative 12/18/2020 12:14 AM  
 Epithelial cells 1+ 03/08/2018 09:11 PM  
 Bacteria FEW (A) 03/08/2018 09:11 PM  
 WBC 4 to 6 03/08/2018 09:11 PM  
 RBC NEGATIVE  03/08/2018 09:11 PM  
  
 
Micro  No results for input(s): SDES, CULT in the last 72 hours. No results for input(s): CULT in the last 72 hours. ABG No results for input(s): PHI, PHI, POC2, PCO2I, PO2, PO2I, HCO3, HCO3I, FIO2, FIO2I in the last 72 hours. CT (Most Recent) (CT chest reviewed by me) Results from [facility] encounter on 12/17/20 CTA CHEST W OR W WO CONT Narrative EXAM: CTA Chest 
 
INDICATION: Shortness of breath. Possible PE. COMPARISON: No prior study. TECHNIQUE: Axial CT imaging from the thoracic inlet through the diaphragm with 
intravenous contrast utilizing CTA study for pulmonary artery evaluation. Coronal and sagittal MIP reformations were generated at a separate workstation. One or more dose reduction techniques were used on this CT: automated exposure 
control, adjustment of the mAs and/or kVp according to patient size, and 
iterative reconstruction techniques. The specific techniques used on this CT 
exam have been documented in the patient's electronic medical record. Digital 
Imaging and Communications in Medicine (DICOM) format image data are available 
to nonaffiliated external healthcare facilities or entities on a secure, media 
free, reciprocally searchable basis with patient authorization for at least a 
12-month period after this study. _______________ FINDINGS: 
 
EXAM QUALITY: Overall exam quality is suboptimal. Pulmonary arterial enhancement 
is adequate. Respiratory motion artifact is present, limiting evaluation. PULMONARY ARTERIES: No convincing evidence of pulmonary embolism. MEDIASTINUM: Normal heart size. No evidence of right heart strain. Aorta is 
unremarkable. No pericardial effusion. LYMPH NODES: No pathologically enlarged lymph nodes lymph nodes. AIRWAY: Unremarkable. LUNGS: Centrilobular emphysema. Superimposed scattered bilateral groundglass 
opacities throughout the lungs. PLEURA: No pleural effusion or pneumothorax. UPPER ABDOMEN: Visualized upper abdomen is unremarkable. OTHER: No acute or aggressive osseous abnormalities identified. _______________ Impression IMPRESSION: 
 
Suboptimal study secondary to motion artifact and bolus timing. No evidence of obvious central pulmonary embolism. Diffuse bilateral groundglass opacities, suspicious for atypical infection. Emphysema. XR (Most Recent). CXR  reviewed by me and compared with previous CXR Results from [facility] Encounter encounter on 12/17/20 XR CHEST PORT Narrative EXAM: CHEST RADIOGRAPH, SINGLE VIEW CLINICAL INDICATION/HISTORY: Pneumonia COMPARISON: Single view chest 12/27/2020 TECHNIQUE: Portable frontal view of the chest was obtained.  
 
_______________ FINDINGS: 
 
SUPPORT DEVICES: Cardiac monitor leads overlie the chest. 
 
HEART AND MEDIASTINUM: Cardiomediastinal silhouette appears within normal 
limits. Normal caliber thoracic aorta. No central vascular congestion. LUNGS AND PLEURAL SPACES: Diffuse bilateral interstitial infiltrates remain 
without focal alveolar consolidation. No pleural effusion or pneumothorax. BONY THORAX AND SOFT TISSUES: No acute osseous abnormality. _______________ Impression IMPRESSION: 
 
Stable chest with diffuse bilateral interstitial infiltrates. Sahara Humphrey MD 
12/31/2020

## 2020-12-31 NOTE — PROGRESS NOTES
Hospitalist Progress Note Patient: Ginger Bright MRN: [id number]  CSN: [id number] YOB: 1990  Age: 31 y.o. Sex: male DOA: 12/17/2020 LOS:  LOS: 14 days Chief Complaint: 
 
tachycardia Assessment/Plan  
 
31 you male with untreated HIV/AIDS over 3 years, came with SOB and fevers 
covid negative 
  
Acute hypoxia resp failure requiring high flow 02-continued SOB and ESCOTO Sepsis-resolved Tachycardia-likely due to advanced lung infection, will check also lactic acid, EKG, and give fluid bolus Has had CTA to rule out PE earlier in admission, anxiolytics on order PRN, hold off on xopenex for now 
pneumocystis pneumonia-on high dose bactrim and steroids Hyperglycemia with steroids-SSI PRN, BG down to 99 last check HIV/AIDS, untreated, CD4 count 6 Secondary syphyllis, CO reactive, titer 1:64-started on IM bicillin 
  
Hyponatremia-sodium bicarb tabs, fluid restriction 
  
Depression and anxiety-SSRI and anxiolytic medicine- zoloft and buspar, ativan ONLY if needed for severe anxiety 
  
DVT proph-lovenox 
  
Continue supportive care 
  
Tele monitoring 
  
guarded prognosis, he is really not making much recovery, this is likely to be very slow long road for him, and risk for further morbidity is high with AIDS and opportunistic infection presenting in severe fashion 
  
Disposition : 
Patient Active Problem List  
Diagnosis Code  AIDS (acquired immune deficiency syndrome) ([facility]) B20  
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  
 Acute respiratory distress R06.03  
 Hypoxia R09.02 Subjective: This am was requesting ativan and morhine, we discussed they cannot be given together and must be used judiciously for his symptoms as can cause worsened resp deression if used too much or together High heart rate this afternoon, 150's w/ sinus tach, see orders Review of systems: 
 
Constitutional: denies fevers, chills Respiratory: +SOB, cough Cardiovascular: denies chest pain, palpitations Gastrointestinal: denies nausea, vomiting, diarrhea Vital signs/Intake and Output: 
Visit Vitals BP (!) 140/95 (BP 1 Location: Left arm, BP Patient Position: At rest) Pulse 100 Temp 98 °F (36.7 °C) Resp 22 Ht 5' 7\" (1.702 m) Wt 65.8 kg (145 lb) SpO2 93% BMI 22.71 kg/m² Current Shift:  12/31 0701 - 12/31 1900 In: -  
Out: 068 [ULIYJ:220] Last three shifts:  12/29 1901 - 12/31 0700 In: 480 [P.O.:480] Out: 3200 [BFDTK:5584] Exam: 
 
General: cachectic ill appearing young AAM alert, NAD, OX3 Head/Neck: NCAT, supple, No masses, No lymphadenopathy CVS:regular, tachy, no M/R/G, S1/S2 heard, no thrill Lungs:Clear to auscultation bilaterally, no wheezes, rhonchi, or rales Abdomen: Soft, Nontender, No distention, Normal Bowel sounds, No hepatomegaly Extremities: No C/C/E, pulses palpable 2+ Neuro:grossly normal , follows commands Psych:appropriate Labs: Results:  
   
Chemistry Recent Labs 12/31/20 0315 12/30/20 0247 12/29/20 
6585 * 214* 243* * 127* 126*  
K 4.2 4.7 4.7 CL 91* 91* 91* CO2 28 30 26 BUN 14 13 13 CREA 0.65 0.76 0.67 CA 8.0* 8.3* 8.3* AGAP 9 6 9 BUCR 22* 17 19 * 267*  --   
TP 5.9* 6.0*  --   
ALB 2.0* 2.2*  --   
GLOB 3.9 3.8  --   
AGRAT 0.5* 0.6*  --   
  
CBC w/Diff Recent Labs 12/31/20 0315 12/30/20 0247 12/29/20 
8317 WBC 4.7 5.6 6.1  
RBC 3.02* 2.97* 3.00* HGB 9.2* 9.0* 9.0*  
HCT 27.2* 26.9* 26.8*  
 246 233 GRANS 93* 96* 95* LYMPH 5* 2* 2*  
EOS 0 1 1 Cardiac Enzymes No results for input(s): CPK, CKND1, ANIKA in the last 72 hours. No lab exists for component: Sukh Sprung Coagulation No results for input(s): PTP, INR, APTT, INREXT in the last 72 hours. Lipid Panel No results found for: CHOL, CHOLPOCT, CHOLX, CHLST, CHOLV, 120642, HDL, HDLP, LDL, LDLC, DLDLP, 153396, VLDLC, VLDL, TGLX, TRIGL, TRIGP, TGLPOCT, CHHD, CHHDX  
BNP No results for input(s): BNPP in the last 72 hours. Liver Enzymes Recent Labs 12/31/20 
0315 TP 5.9* ALB 2.0*  
* Thyroid Studies Lab Results Component Value Date/Time TSH 2.10 12/23/2020 10:45 AM  
    
Procedures/imaging: see electronic medical records for all procedures/Xrays and details which were not copied into this note but were reviewed prior to creation of Plan Ashok Welch MD

## 2020-12-31 NOTE — PROGRESS NOTES
Problem: Mobility Impaired (Adult and Pediatric) Goal: *Acute Goals and Plan of Care (Insert Text) Description: Physical Therapy Goals Initiated 12/25/2020 and to be accomplished within 7 day(s) 1. Patient will move from supine to sit and sit to supine  in bed with modified independence. 2.  Patient will transfer from bed to chair and chair to bed with modified independence using the least restrictive device. 3.  Patient will perform sit to stand with supervision/set-up. 4.  Patient will ambulate with supervision/set-up for 50 feet with the least restrictive device. Note: PHYSICAL THERAPY TREATMENT Patient: Hannah Childress (32 y.o. male) Date: 12/31/2020 Diagnosis: HIV infection ([facility] [address] [B20] Bilateral pneumonia [J18.9] Pneumonia of both lungs due to Pneumocystis jirovecii ([facility] [address] Precautions: Fall ASSESSMENT: 
Pt supine in bed on PT entry, reporting chest and throat pain rated 9/10. Pt still anxious and fearful about OOB mobility at this time, resistant to attempting. Pt required encouragement to scoot up in bed in order to improve posture and allow for incr lung expansion, pt able to scoot up in bed with incr time, SBA, vc, and encouragement. Pt reporting t/o remainder of session that his breathing felt improved slightly. Pt performed supine ther-ex as described below with incr time, min vc for proper form t/o. Pt encouraged to attempt sitting EOB in next therapy session, pt still hesitant. Pt would benefit from continued skilled therapy in order to incr functional mobility and activity tolerance. Recommend on discharge. Progression toward goals:  
[]      Improving appropriately and progressing toward goals [x]      Improving slowly and progressing toward goals 
[]      Not making progress toward goals and plan of care will be adjusted PLAN: 
 Patient continues to benefit from skilled intervention to address the above impairments. Continue treatment per established plan of care. Discharge Recommendations: To Be Determined Further Equipment Recommendations for Discharge:  N/A  
 
SUBJECTIVE:  
Patient stated I just want to go home.  OBJECTIVE DATA SUMMARY:  
Critical Behavior: 
Neurologic State: Alert Orientation Level: Oriented X4 Cognition: Appropriate decision making, Appropriate for age attention/concentration, Appropriate safety awareness, Follows commands Safety/Judgement: Awareness of environment Functional Mobility Training: 
Bed Mobility: 
Scooting: Stand-by assistance(vc) Therapeutic Exercises:  
 
 
EXERCISE Sets Reps Active Active Assist  
Passive Self ROM Comments Ankle Pumps 1 15  [x] [] [] [] Quad Sets/Glut Sets 1 10  [x] [] [] [] Hold for 5 secs Hamstring Sets   [] [] [] [] Short Arc Quads   [] [] [] [] Heel Slides 1 10 [x] [] [] [] Straight Leg Raises 1 10 [x] [] [] []   
 
Pain: 
Pain level pre-treatment: 9/10 Pain level post-treatment: 9/10 Pain Intervention(s): Medication (see MAR); Rest, Ice, Repositioning Response to intervention: Nurse notified, See doc flow Activity Tolerance:  
Pt tolerated supine therapeutic exercise with no incr in chest pain. Pt fearful about initiating OOB mobility, provided encouragement and education on benefits of sitting EOB. Please refer to the flowsheet for vital signs taken during this treatment. After treatment:  
[] Patient left in no apparent distress sitting up in chair 
[x] Patient left in no apparent distress in bed 
[x] Call bell left within reach [x] Nursing notified 
[] Caregiver present 
[] Bed alarm activated 
[] SCDs applied COMMUNICATION/EDUCATION:  
[x]         Role of Physical Therapy in the acute care setting. [x]         Fall prevention education was provided and the patient/caregiver indicated understanding. [x]         Patient/family have participated as able in working toward goals and plan of care. [x]         Patient/family agree to work toward stated goals and plan of care. []         Patient understands intent and goals of therapy, but is neutral about his/her participation. []         Patient is unable to participate in stated goals/plan of care: ongoing with therapy staff. 
[]         Other: Russell Cameron Time Calculation: 28 mins

## 2020-12-31 NOTE — PROGRESS NOTES
Pt given IV bolus, oral hydrattion, prn Morphine and Robitussin, HOB elevated, pt more relaxed, will monitor. .. pfreeman rn

## 2020-12-31 NOTE — PROGRESS NOTES
[facility] care from Dang Greenwood RN. Shift uneventful. 0710 Bedside and Verbal shift change report given to Dang Greenwood RN (oncoming nurse) by Roland Reich RN 
 (offgoing nurse). Report included the following information SBAR, Kardex, ED Summary, Procedure Summary, Intake/Output, MAR, Recent Results and Med Rec Status.

## 2020-12-31 NOTE — PROGRESS NOTES
Care manager rounded on patient, noted he remains on high flow O2 and is appearing dyspneic even during brief conversations. Care manager will continue to follow for discharge needs. Phoned the number provided. It is the school number and states school hours end at 4pm so there was no option to have Basia paged. I did leave a message informing that  is not in the office tomorrow and I would send the message to the pool to be addressed on Monday.

## 2020-12-31 NOTE — PROGRESS NOTES
12/31/20 0809 PEP Therapy Details Duration (min) 5 Number of Breaths 2 PEP Device Oscillating positive expiratory device Patient report using more but only used for 1 breath in 10 minutes this AM

## 2021-01-01 ENCOUNTER — APPOINTMENT (OUTPATIENT)
Dept: GENERAL RADIOLOGY | Age: 31
DRG: 890 | End: 2021-01-01
Attending: INTERNAL MEDICINE
Payer: MEDICAID

## 2021-01-01 ENCOUNTER — ANESTHESIA EVENT (OUTPATIENT)
Dept: ICU | Age: 31
DRG: 890 | End: 2021-01-01
Payer: MEDICAID

## 2021-01-01 ENCOUNTER — APPOINTMENT (OUTPATIENT)
Dept: GENERAL RADIOLOGY | Age: 31
DRG: 890 | End: 2021-01-01
Attending: FAMILY MEDICINE
Payer: MEDICAID

## 2021-01-01 ENCOUNTER — ANESTHESIA (OUTPATIENT)
Dept: ICU | Age: 31
DRG: 890 | End: 2021-01-01
Payer: MEDICAID

## 2021-01-01 VITALS
RESPIRATION RATE: 29 BRPM | HEIGHT: 67 IN | WEIGHT: 143 LBS | BODY MASS INDEX: 22.44 KG/M2 | SYSTOLIC BLOOD PRESSURE: 108 MMHG | TEMPERATURE: 102.3 F | HEART RATE: 151 BPM | DIASTOLIC BLOOD PRESSURE: 68 MMHG | OXYGEN SATURATION: 83 %

## 2021-01-01 LAB
ALBUMIN SERPL-MCNC: 1.5 G/DL (ref 3.4–5)
ALBUMIN SERPL-MCNC: 1.7 G/DL (ref 3.4–5)
ALBUMIN SERPL-MCNC: 1.8 G/DL (ref 3.4–5)
ALBUMIN SERPL-MCNC: 1.9 G/DL (ref 3.4–5)
ALBUMIN SERPL-MCNC: 1.9 G/DL (ref 3.4–5)
ALBUMIN/GLOB SERPL: 0.4 {RATIO} (ref 0.8–1.7)
ALBUMIN/GLOB SERPL: 0.5 {RATIO} (ref 0.8–1.7)
ALP SERPL-CCNC: 266 U/L (ref 45–117)
ALP SERPL-CCNC: 269 U/L (ref 45–117)
ALP SERPL-CCNC: 274 U/L (ref 45–117)
ALP SERPL-CCNC: 276 U/L (ref 45–117)
ALP SERPL-CCNC: 427 U/L (ref 45–117)
ALT SERPL-CCNC: 103 U/L (ref 16–61)
ALT SERPL-CCNC: 30 U/L (ref 16–61)
ALT SERPL-CCNC: 30 U/L (ref 16–61)
ALT SERPL-CCNC: 54 U/L (ref 16–61)
ALT SERPL-CCNC: 55 U/L (ref 16–61)
ANION GAP SERPL CALC-SCNC: 6 MMOL/L (ref 3–18)
ANION GAP SERPL CALC-SCNC: 7 MMOL/L (ref 3–18)
ANION GAP SERPL CALC-SCNC: 7 MMOL/L (ref 3–18)
ANION GAP SERPL CALC-SCNC: 8 MMOL/L (ref 3–18)
ANION GAP SERPL CALC-SCNC: 8 MMOL/L (ref 3–18)
APPEARANCE UR: ABNORMAL
ARTERIAL PATENCY WRIST A: YES
ARTERIAL PATENCY WRIST A: YES
AST SERPL-CCNC: 25 U/L (ref 10–38)
AST SERPL-CCNC: 26 U/L (ref 10–38)
AST SERPL-CCNC: 29 U/L (ref 10–38)
AST SERPL-CCNC: 49 U/L (ref 10–38)
AST SERPL-CCNC: 65 U/L (ref 10–38)
ATRIAL RATE: 145 BPM
BACTERIA SPEC CULT: NORMAL
BACTERIA URNS QL MICRO: ABNORMAL /HPF
BASE DEFICIT BLD-SCNC: 3 MMOL/L
BASE EXCESS BLD CALC-SCNC: 0 MMOL/L
BASOPHILS # BLD: 0 K/UL (ref 0–0.1)
BASOPHILS # BLD: 0.1 K/UL (ref 0–0.1)
BASOPHILS NFR BLD: 0 % (ref 0–2)
BASOPHILS NFR BLD: 1 % (ref 0–2)
BDY SITE: ABNORMAL
BDY SITE: ABNORMAL
BILIRUB SERPL-MCNC: 0.2 MG/DL (ref 0.2–1)
BILIRUB SERPL-MCNC: 0.3 MG/DL (ref 0.2–1)
BILIRUB SERPL-MCNC: 0.5 MG/DL (ref 0.2–1)
BILIRUB SERPL-MCNC: 0.6 MG/DL (ref 0.2–1)
BILIRUB SERPL-MCNC: 0.7 MG/DL (ref 0.2–1)
BILIRUB UR QL: ABNORMAL
BODY TEMPERATURE: 102
BUN SERPL-MCNC: 10 MG/DL (ref 7–18)
BUN SERPL-MCNC: 11 MG/DL (ref 7–18)
BUN SERPL-MCNC: 15 MG/DL (ref 7–18)
BUN SERPL-MCNC: 16 MG/DL (ref 7–18)
BUN SERPL-MCNC: 9 MG/DL (ref 7–18)
BUN/CREAT SERPL: 14 (ref 12–20)
BUN/CREAT SERPL: 17 (ref 12–20)
BUN/CREAT SERPL: 19 (ref 12–20)
BUN/CREAT SERPL: 20 (ref 12–20)
BUN/CREAT SERPL: 23 (ref 12–20)
CALCIUM SERPL-MCNC: 6.8 MG/DL (ref 8.5–10.1)
CALCIUM SERPL-MCNC: 7.5 MG/DL (ref 8.5–10.1)
CALCIUM SERPL-MCNC: 7.8 MG/DL (ref 8.5–10.1)
CALCIUM SERPL-MCNC: 8 MG/DL (ref 8.5–10.1)
CALCIUM SERPL-MCNC: 8.1 MG/DL (ref 8.5–10.1)
CALCULATED P AXIS, ECG09: 87 DEGREES
CALCULATED R AXIS, ECG10: 21 DEGREES
CALCULATED T AXIS, ECG11: 58 DEGREES
CHLORIDE SERPL-SCNC: 100 MMOL/L (ref 100–111)
CHLORIDE SERPL-SCNC: 89 MMOL/L (ref 100–111)
CHLORIDE SERPL-SCNC: 89 MMOL/L (ref 100–111)
CHLORIDE SERPL-SCNC: 90 MMOL/L (ref 100–111)
CHLORIDE SERPL-SCNC: 93 MMOL/L (ref 100–111)
CK SERPL-CCNC: 72 U/L (ref 39–308)
CO2 SERPL-SCNC: 25 MMOL/L (ref 21–32)
CO2 SERPL-SCNC: 26 MMOL/L (ref 21–32)
CO2 SERPL-SCNC: 28 MMOL/L (ref 21–32)
CO2 SERPL-SCNC: 28 MMOL/L (ref 21–32)
CO2 SERPL-SCNC: 29 MMOL/L (ref 21–32)
COLOR UR: ABNORMAL
CREAT SERPL-MCNC: 0.39 MG/DL (ref 0.6–1.3)
CREAT SERPL-MCNC: 0.63 MG/DL (ref 0.6–1.3)
CREAT SERPL-MCNC: 0.72 MG/DL (ref 0.6–1.3)
CREAT SERPL-MCNC: 0.79 MG/DL (ref 0.6–1.3)
CREAT SERPL-MCNC: 0.81 MG/DL (ref 0.6–1.3)
DIAGNOSIS, 93000: NORMAL
DIFFERENTIAL METHOD BLD: ABNORMAL
EOSINOPHIL # BLD: 0 K/UL (ref 0–0.4)
EOSINOPHIL NFR BLD: 0 % (ref 0–5)
EOSINOPHIL NFR BLD: 1 % (ref 0–5)
EOSINOPHIL NFR BLD: 1 % (ref 0–5)
ERYTHROCYTE [DISTWIDTH] IN BLOOD BY AUTOMATED COUNT: 13.7 % (ref 11.6–14.5)
ERYTHROCYTE [DISTWIDTH] IN BLOOD BY AUTOMATED COUNT: 13.7 % (ref 11.6–14.5)
ERYTHROCYTE [DISTWIDTH] IN BLOOD BY AUTOMATED COUNT: 13.9 % (ref 11.6–14.5)
ERYTHROCYTE [DISTWIDTH] IN BLOOD BY AUTOMATED COUNT: 14.3 % (ref 11.6–14.5)
ERYTHROCYTE [DISTWIDTH] IN BLOOD BY AUTOMATED COUNT: 14.3 % (ref 11.6–14.5)
GAS FLOW.O2 O2 DELIVERY SYS: ABNORMAL L/MIN
GAS FLOW.O2 O2 DELIVERY SYS: ABNORMAL L/MIN
GAS FLOW.O2 SETTING OXYMISER: 16 BPM
GAS FLOW.O2 SETTING OXYMISER: 60 L/M
GLOBULIN SER CALC-MCNC: 3.2 G/DL (ref 2–4)
GLOBULIN SER CALC-MCNC: 3.8 G/DL (ref 2–4)
GLOBULIN SER CALC-MCNC: 3.9 G/DL (ref 2–4)
GLOBULIN SER CALC-MCNC: 3.9 G/DL (ref 2–4)
GLOBULIN SER CALC-MCNC: 4.3 G/DL (ref 2–4)
GLUCOSE BLD STRIP.AUTO-MCNC: 101 MG/DL (ref 70–110)
GLUCOSE BLD STRIP.AUTO-MCNC: 106 MG/DL (ref 70–110)
GLUCOSE BLD STRIP.AUTO-MCNC: 109 MG/DL (ref 70–110)
GLUCOSE BLD STRIP.AUTO-MCNC: 110 MG/DL (ref 70–110)
GLUCOSE BLD STRIP.AUTO-MCNC: 113 MG/DL (ref 70–110)
GLUCOSE BLD STRIP.AUTO-MCNC: 114 MG/DL (ref 70–110)
GLUCOSE BLD STRIP.AUTO-MCNC: 122 MG/DL (ref 70–110)
GLUCOSE BLD STRIP.AUTO-MCNC: 123 MG/DL (ref 70–110)
GLUCOSE BLD STRIP.AUTO-MCNC: 131 MG/DL (ref 70–110)
GLUCOSE BLD STRIP.AUTO-MCNC: 145 MG/DL (ref 70–110)
GLUCOSE BLD STRIP.AUTO-MCNC: 151 MG/DL (ref 70–110)
GLUCOSE BLD STRIP.AUTO-MCNC: 156 MG/DL (ref 70–110)
GLUCOSE BLD STRIP.AUTO-MCNC: 194 MG/DL (ref 70–110)
GLUCOSE BLD STRIP.AUTO-MCNC: 207 MG/DL (ref 70–110)
GLUCOSE BLD STRIP.AUTO-MCNC: 248 MG/DL (ref 70–110)
GLUCOSE BLD STRIP.AUTO-MCNC: 66 MG/DL (ref 70–110)
GLUCOSE BLD STRIP.AUTO-MCNC: 71 MG/DL (ref 70–110)
GLUCOSE BLD STRIP.AUTO-MCNC: 72 MG/DL (ref 70–110)
GLUCOSE BLD STRIP.AUTO-MCNC: 75 MG/DL (ref 70–110)
GLUCOSE BLD STRIP.AUTO-MCNC: 82 MG/DL (ref 70–110)
GLUCOSE BLD STRIP.AUTO-MCNC: 83 MG/DL (ref 70–110)
GLUCOSE BLD STRIP.AUTO-MCNC: 86 MG/DL (ref 70–110)
GLUCOSE BLD STRIP.AUTO-MCNC: 87 MG/DL (ref 70–110)
GLUCOSE BLD STRIP.AUTO-MCNC: 89 MG/DL (ref 70–110)
GLUCOSE BLD STRIP.AUTO-MCNC: 99 MG/DL (ref 70–110)
GLUCOSE SERPL-MCNC: 102 MG/DL (ref 74–99)
GLUCOSE SERPL-MCNC: 112 MG/DL (ref 74–99)
GLUCOSE SERPL-MCNC: 133 MG/DL (ref 74–99)
GLUCOSE SERPL-MCNC: 358 MG/DL (ref 74–99)
GLUCOSE SERPL-MCNC: 82 MG/DL (ref 74–99)
GLUCOSE UR STRIP.AUTO-MCNC: NEGATIVE MG/DL
GRAM STN SPEC: NORMAL
GRAN CASTS URNS QL MICRO: ABNORMAL /LPF
HCO3 BLD-SCNC: 22.8 MMOL/L (ref 22–26)
HCO3 BLD-SCNC: 24 MMOL/L (ref 22–26)
HCT VFR BLD AUTO: 23.8 % (ref 36–48)
HCT VFR BLD AUTO: 28 % (ref 36–48)
HCT VFR BLD AUTO: 28.6 % (ref 36–48)
HCT VFR BLD AUTO: 29.1 % (ref 36–48)
HCT VFR BLD AUTO: 32.5 % (ref 36–48)
HGB BLD-MCNC: 10.6 G/DL (ref 13–16)
HGB BLD-MCNC: 8.1 G/DL (ref 13–16)
HGB BLD-MCNC: 9.2 G/DL (ref 13–16)
HGB BLD-MCNC: 9.4 G/DL (ref 13–16)
HGB BLD-MCNC: 9.7 G/DL (ref 13–16)
HGB UR QL STRIP: NEGATIVE
INSPIRATION.DURATION SETTING TIME VENT: 0.85 SEC
KETONES UR QL STRIP.AUTO: 15 MG/DL
LACTATE SERPL-SCNC: 1.5 MMOL/L (ref 0.4–2)
LDH SERPL L TO P-CCNC: 509 U/L (ref 81–234)
LDH SERPL L TO P-CCNC: 561 U/L (ref 81–234)
LEUKOCYTE ESTERASE UR QL STRIP.AUTO: ABNORMAL
LYMPHOCYTES # BLD: 0.1 K/UL (ref 0.9–3.6)
LYMPHOCYTES # BLD: 0.2 K/UL (ref 0.9–3.6)
LYMPHOCYTES # BLD: 0.6 K/UL (ref 0.9–3.6)
LYMPHOCYTES NFR BLD: 2 % (ref 21–52)
LYMPHOCYTES NFR BLD: 3 % (ref 21–52)
LYMPHOCYTES NFR BLD: 6 % (ref 21–52)
MCH RBC QN AUTO: 29.7 PG (ref 24–34)
MCH RBC QN AUTO: 29.8 PG (ref 24–34)
MCH RBC QN AUTO: 29.8 PG (ref 24–34)
MCH RBC QN AUTO: 30 PG (ref 24–34)
MCH RBC QN AUTO: 30.5 PG (ref 24–34)
MCHC RBC AUTO-ENTMCNC: 32.6 G/DL (ref 31–37)
MCHC RBC AUTO-ENTMCNC: 32.9 G/DL (ref 31–37)
MCHC RBC AUTO-ENTMCNC: 32.9 G/DL (ref 31–37)
MCHC RBC AUTO-ENTMCNC: 33.3 G/DL (ref 31–37)
MCHC RBC AUTO-ENTMCNC: 34 G/DL (ref 31–37)
MCV RBC AUTO: 89.3 FL (ref 74–97)
MCV RBC AUTO: 89.5 FL (ref 74–97)
MCV RBC AUTO: 90.2 FL (ref 74–97)
MCV RBC AUTO: 90.6 FL (ref 74–97)
MCV RBC AUTO: 92.1 FL (ref 74–97)
MONOCYTES # BLD: 0 K/UL (ref 0.05–1.2)
MONOCYTES # BLD: 0.1 K/UL (ref 0.05–1.2)
MONOCYTES # BLD: 0.1 K/UL (ref 0.05–1.2)
MONOCYTES # BLD: 0.2 K/UL (ref 0.05–1.2)
MONOCYTES # BLD: 0.5 K/UL (ref 0.05–1.2)
MONOCYTES NFR BLD: 1 % (ref 3–10)
MONOCYTES NFR BLD: 3 % (ref 3–10)
MONOCYTES NFR BLD: 5 % (ref 3–10)
NEUTS SEG # BLD: 3.7 K/UL (ref 1.8–8)
NEUTS SEG # BLD: 4.8 K/UL (ref 1.8–8)
NEUTS SEG # BLD: 5.7 K/UL (ref 1.8–8)
NEUTS SEG # BLD: 6.3 K/UL (ref 1.8–8)
NEUTS SEG # BLD: 8.6 K/UL (ref 1.8–8)
NEUTS SEG NFR BLD: 88 % (ref 40–73)
NEUTS SEG NFR BLD: 94 % (ref 40–73)
NEUTS SEG NFR BLD: 94 % (ref 40–73)
NEUTS SEG NFR BLD: 95 % (ref 40–73)
NEUTS SEG NFR BLD: 96 % (ref 40–73)
NITRITE UR QL STRIP.AUTO: POSITIVE
O2/TOTAL GAS SETTING VFR VENT: 100 %
O2/TOTAL GAS SETTING VFR VENT: 95 %
P JIROVECII DNA # BAL NAA+PROBE: NORMAL COPIES/ML
P-R INTERVAL, ECG05: 112 MS
PCO2 BLD: 33.9 MMHG (ref 35–45)
PCO2 BLD: 42.8 MMHG (ref 35–45)
PEEP RESPIRATORY: 10 CMH2O
PH BLD: 7.34 [PH] (ref 7.35–7.45)
PH BLD: 7.46 [PH] (ref 7.35–7.45)
PH UR STRIP: 6 [PH] (ref 5–8)
PLATELET # BLD AUTO: 192 K/UL (ref 135–420)
PLATELET # BLD AUTO: 192 K/UL (ref 135–420)
PLATELET # BLD AUTO: 218 K/UL (ref 135–420)
PLATELET # BLD AUTO: 228 K/UL (ref 135–420)
PLATELET # BLD AUTO: 232 K/UL (ref 135–420)
PMV BLD AUTO: 10.1 FL (ref 9.2–11.8)
PMV BLD AUTO: 10.3 FL (ref 9.2–11.8)
PMV BLD AUTO: 10.3 FL (ref 9.2–11.8)
PMV BLD AUTO: 10.6 FL (ref 9.2–11.8)
PMV BLD AUTO: 10.6 FL (ref 9.2–11.8)
PO2 BLD: 51 MMHG (ref 80–100)
PO2 BLD: 62 MMHG (ref 80–100)
POTASSIUM SERPL-SCNC: 4 MMOL/L (ref 3.5–5.5)
POTASSIUM SERPL-SCNC: 4 MMOL/L (ref 3.5–5.5)
POTASSIUM SERPL-SCNC: 4.4 MMOL/L (ref 3.5–5.5)
POTASSIUM SERPL-SCNC: 4.6 MMOL/L (ref 3.5–5.5)
POTASSIUM SERPL-SCNC: 4.9 MMOL/L (ref 3.5–5.5)
PROT SERPL-MCNC: 4.7 G/DL (ref 6.4–8.2)
PROT SERPL-MCNC: 5.7 G/DL (ref 6.4–8.2)
PROT SERPL-MCNC: 5.7 G/DL (ref 6.4–8.2)
PROT SERPL-MCNC: 5.8 G/DL (ref 6.4–8.2)
PROT SERPL-MCNC: 6 G/DL (ref 6.4–8.2)
PROT UR STRIP-MCNC: 30 MG/DL
Q-T INTERVAL, ECG07: 264 MS
QRS DURATION, ECG06: 78 MS
QTC CALCULATION (BEZET), ECG08: 410 MS
RBC # BLD AUTO: 2.66 M/UL (ref 4.7–5.5)
RBC # BLD AUTO: 3.09 M/UL (ref 4.7–5.5)
RBC # BLD AUTO: 3.17 M/UL (ref 4.7–5.5)
RBC # BLD AUTO: 3.26 M/UL (ref 4.7–5.5)
RBC # BLD AUTO: 3.53 M/UL (ref 4.7–5.5)
RBC #/AREA URNS HPF: ABNORMAL /HPF (ref 0–5)
RBC MORPH BLD: ABNORMAL
SAO2 % BLD: 87 % (ref 92–97)
SAO2 % BLD: 88 % (ref 92–97)
SERVICE CMNT-IMP: ABNORMAL
SERVICE CMNT-IMP: ABNORMAL
SERVICE CMNT-IMP: NORMAL
SODIUM SERPL-SCNC: 123 MMOL/L (ref 136–145)
SODIUM SERPL-SCNC: 126 MMOL/L (ref 136–145)
SODIUM SERPL-SCNC: 132 MMOL/L (ref 136–145)
SODIUM UR-SCNC: 88 MMOL/L (ref 20–110)
SOURCE, PJPB1: NORMAL
SP GR UR REFRACTOMETRY: 1.02 (ref 1–1.03)
SPECIMEN SOURCE: NORMAL
SPECIMEN TYPE: ABNORMAL
SPECIMEN TYPE: ABNORMAL
TOTAL RESP. RATE, ITRR: 34
TOTAL RESP. RATE, ITRR: 44
UROBILINOGEN UR QL STRIP.AUTO: 4 EU/DL (ref 0.2–1)
VENTILATION MODE VENT: ABNORMAL
VENTRICULAR RATE, ECG03: 145 BPM
VOLUME CONTROL PLUS IVLCP: YES
VT SETTING VENT: 420 ML
WBC # BLD AUTO: 3.9 K/UL (ref 4.6–13.2)
WBC # BLD AUTO: 5.1 K/UL (ref 4.6–13.2)
WBC # BLD AUTO: 5.8 K/UL (ref 4.6–13.2)
WBC # BLD AUTO: 6.7 K/UL (ref 4.6–13.2)
WBC # BLD AUTO: 9.8 K/UL (ref 4.6–13.2)
WBC URNS QL MICRO: ABNORMAL /HPF (ref 0–5)

## 2021-01-01 PROCEDURE — 74011000250 HC RX REV CODE- 250: Performed by: INTERNAL MEDICINE

## 2021-01-01 PROCEDURE — 74011250636 HC RX REV CODE- 250/636: Performed by: INTERNAL MEDICINE

## 2021-01-01 PROCEDURE — 82803 BLOOD GASES ANY COMBINATION: CPT

## 2021-01-01 PROCEDURE — 74011250636 HC RX REV CODE- 250/636: Performed by: FAMILY MEDICINE

## 2021-01-01 PROCEDURE — 82962 GLUCOSE BLOOD TEST: CPT

## 2021-01-01 PROCEDURE — 80053 COMPREHEN METABOLIC PANEL: CPT

## 2021-01-01 PROCEDURE — 74011250637 HC RX REV CODE- 250/637: Performed by: INTERNAL MEDICINE

## 2021-01-01 PROCEDURE — 87040 BLOOD CULTURE FOR BACTERIA: CPT

## 2021-01-01 PROCEDURE — 65660000000 HC RM CCU STEPDOWN

## 2021-01-01 PROCEDURE — 74011250636 HC RX REV CODE- 250/636: Performed by: HOSPITALIST

## 2021-01-01 PROCEDURE — 89220 SPUTUM SPECIMEN COLLECTION: CPT

## 2021-01-01 PROCEDURE — 94640 AIRWAY INHALATION TREATMENT: CPT

## 2021-01-01 PROCEDURE — 87252 VIRUS INOCULATION TISSUE: CPT

## 2021-01-01 PROCEDURE — 36415 COLL VENOUS BLD VENIPUNCTURE: CPT

## 2021-01-01 PROCEDURE — 87086 URINE CULTURE/COLONY COUNT: CPT

## 2021-01-01 PROCEDURE — 74011250637 HC RX REV CODE- 250/637: Performed by: FAMILY MEDICINE

## 2021-01-01 PROCEDURE — 77010033711 HC HIGH FLOW OXYGEN

## 2021-01-01 PROCEDURE — 65610000006 HC RM INTENSIVE CARE

## 2021-01-01 PROCEDURE — 87102 FUNGUS ISOLATION CULTURE: CPT

## 2021-01-01 PROCEDURE — 71045 X-RAY EXAM CHEST 1 VIEW: CPT

## 2021-01-01 PROCEDURE — 74011000250 HC RX REV CODE- 250: Performed by: HOSPITALIST

## 2021-01-01 PROCEDURE — 87206 SMEAR FLUORESCENT/ACID STAI: CPT

## 2021-01-01 PROCEDURE — 77030020362 HC SOL INJ STRL H2O 1000ML BG LF

## 2021-01-01 PROCEDURE — 85025 COMPLETE CBC W/AUTO DIFF WBC: CPT

## 2021-01-01 PROCEDURE — 94760 N-INVAS EAR/PLS OXIMETRY 1: CPT

## 2021-01-01 PROCEDURE — 94660 CPAP INITIATION&MGMT: CPT

## 2021-01-01 PROCEDURE — 99233 SBSQ HOSP IP/OBS HIGH 50: CPT | Performed by: NURSE PRACTITIONER

## 2021-01-01 PROCEDURE — 84300 ASSAY OF URINE SODIUM: CPT

## 2021-01-01 PROCEDURE — 94002 VENT MGMT INPAT INIT DAY: CPT

## 2021-01-01 PROCEDURE — 77030005513 HC CATH URETH FOL11 MDII -B

## 2021-01-01 PROCEDURE — 82550 ASSAY OF CK (CPK): CPT

## 2021-01-01 PROCEDURE — A4217 STERILE WATER/SALINE, 500 ML: HCPCS

## 2021-01-01 PROCEDURE — 74011250637 HC RX REV CODE- 250/637: Performed by: HOSPITALIST

## 2021-01-01 PROCEDURE — 74011636637 HC RX REV CODE- 636/637: Performed by: INTERNAL MEDICINE

## 2021-01-01 PROCEDURE — 36600 WITHDRAWAL OF ARTERIAL BLOOD: CPT

## 2021-01-01 PROCEDURE — 81001 URINALYSIS AUTO W/SCOPE: CPT

## 2021-01-01 PROCEDURE — 74011250636 HC RX REV CODE- 250/636: Performed by: NURSE ANESTHETIST, CERTIFIED REGISTERED

## 2021-01-01 PROCEDURE — 74011000250 HC RX REV CODE- 250: Performed by: FAMILY MEDICINE

## 2021-01-01 PROCEDURE — 83615 LACTATE (LD) (LDH) ENZYME: CPT

## 2021-01-01 PROCEDURE — 81381 HLA I TYPING 1 ALLELE HR: CPT

## 2021-01-01 PROCEDURE — 74011250636 HC RX REV CODE- 250/636

## 2021-01-01 PROCEDURE — 83605 ASSAY OF LACTIC ACID: CPT

## 2021-01-01 PROCEDURE — 87798 DETECT AGENT NOS DNA AMP: CPT

## 2021-01-01 PROCEDURE — 87205 SMEAR GRAM STAIN: CPT

## 2021-01-01 PROCEDURE — 87635 SARS-COV-2 COVID-19 AMP PRB: CPT

## 2021-01-01 RX ORDER — LORAZEPAM 2 MG/ML
1 INJECTION INTRAMUSCULAR
Status: DISCONTINUED | OUTPATIENT
Start: 2021-01-01 | End: 2021-01-01

## 2021-01-01 RX ORDER — PROPOFOL 10 MG/ML
INJECTION, EMULSION INTRAVENOUS
Status: COMPLETED
Start: 2021-01-01 | End: 2021-01-01

## 2021-01-01 RX ORDER — FENTANYL CITRATE 50 UG/ML
50 INJECTION, SOLUTION INTRAMUSCULAR; INTRAVENOUS
Status: DISCONTINUED | OUTPATIENT
Start: 2021-01-01 | End: 2021-01-01

## 2021-01-01 RX ORDER — ETHAMBUTOL HYDROCHLORIDE 400 MG/1
1200 TABLET, FILM COATED ORAL DAILY
Status: DISCONTINUED | OUTPATIENT
Start: 2021-01-01 | End: 2021-01-01

## 2021-01-01 RX ORDER — MIDAZOLAM HYDROCHLORIDE 1 MG/ML
2 INJECTION, SOLUTION INTRAMUSCULAR; INTRAVENOUS CONTINUOUS
Status: DISCONTINUED | OUTPATIENT
Start: 2021-01-01 | End: 2021-01-07 | Stop reason: HOSPADM

## 2021-01-01 RX ORDER — ACETAMINOPHEN 325 MG/1
650 TABLET ORAL
Status: DISCONTINUED | OUTPATIENT
Start: 2021-01-01 | End: 2021-01-01

## 2021-01-01 RX ORDER — SULFAMETHOXAZOLE AND TRIMETHOPRIM 800; 160 MG/1; MG/1
2 TABLET ORAL EVERY 8 HOURS
Status: DISCONTINUED | OUTPATIENT
Start: 2021-01-01 | End: 2021-01-01

## 2021-01-01 RX ORDER — RIFAMPIN 300 MG/1
600 CAPSULE ORAL
Status: DISCONTINUED | OUTPATIENT
Start: 2021-01-01 | End: 2021-01-01

## 2021-01-01 RX ORDER — LORAZEPAM 2 MG/ML
INJECTION INTRAMUSCULAR
Status: DISPENSED
Start: 2021-01-01 | End: 2021-01-01

## 2021-01-01 RX ORDER — SCOLOPAMINE TRANSDERMAL SYSTEM 1 MG/1
1 PATCH, EXTENDED RELEASE TRANSDERMAL
Status: DISCONTINUED | OUTPATIENT
Start: 2021-01-01 | End: 2021-01-01

## 2021-01-01 RX ORDER — FENTANYL CITRATE 50 UG/ML
25 INJECTION, SOLUTION INTRAMUSCULAR; INTRAVENOUS
Status: DISCONTINUED | OUTPATIENT
Start: 2021-01-01 | End: 2021-01-01

## 2021-01-01 RX ORDER — FENTANYL CITRATE 50 UG/ML
100 INJECTION, SOLUTION INTRAMUSCULAR; INTRAVENOUS ONCE
Status: COMPLETED | OUTPATIENT
Start: 2021-01-01 | End: 2021-01-01

## 2021-01-01 RX ORDER — FENTANYL CITRATE 50 UG/ML
100 INJECTION, SOLUTION INTRAMUSCULAR; INTRAVENOUS
Status: DISCONTINUED | OUTPATIENT
Start: 2021-01-01 | End: 2021-01-01

## 2021-01-01 RX ORDER — MORPHINE SULFATE 2 MG/ML
2 INJECTION, SOLUTION INTRAMUSCULAR; INTRAVENOUS ONCE
Status: COMPLETED | OUTPATIENT
Start: 2021-01-01 | End: 2021-01-01

## 2021-01-01 RX ORDER — MORPHINE SULFATE 2 MG/ML
2 INJECTION, SOLUTION INTRAMUSCULAR; INTRAVENOUS
Status: DISCONTINUED | OUTPATIENT
Start: 2021-01-01 | End: 2021-01-01

## 2021-01-01 RX ORDER — SODIUM CHLORIDE 9 MG/ML
50 INJECTION, SOLUTION INTRAVENOUS CONTINUOUS
Status: DISCONTINUED | OUTPATIENT
Start: 2021-01-01 | End: 2021-01-01

## 2021-01-01 RX ORDER — CISATRACURIUM BESYLATE 2 MG/ML
10 INJECTION, SOLUTION INTRAVENOUS ONCE
Status: COMPLETED | OUTPATIENT
Start: 2021-01-01 | End: 2021-01-01

## 2021-01-01 RX ORDER — SODIUM BICARBONATE 650 MG/1
650 TABLET ORAL 3 TIMES DAILY
Status: DISCONTINUED | OUTPATIENT
Start: 2021-01-01 | End: 2021-01-01

## 2021-01-01 RX ORDER — RIFAMPIN 300 MG/1
600 CAPSULE ORAL
Status: COMPLETED | OUTPATIENT
Start: 2021-01-01 | End: 2021-01-01

## 2021-01-01 RX ORDER — LORAZEPAM 1 MG/1
0.5 TABLET ORAL 4 TIMES DAILY
Status: DISCONTINUED | OUTPATIENT
Start: 2021-01-01 | End: 2021-01-01

## 2021-01-01 RX ORDER — SULFAMETHOXAZOLE AND TRIMETHOPRIM 200; 40 MG/5ML; MG/5ML
40 SUSPENSION ORAL EVERY 8 HOURS
Status: DISCONTINUED | OUTPATIENT
Start: 2021-01-01 | End: 2021-01-01

## 2021-01-01 RX ORDER — CHLORHEXIDINE GLUCONATE 1.2 MG/ML
10 RINSE ORAL EVERY 12 HOURS
Status: DISCONTINUED | OUTPATIENT
Start: 2021-01-01 | End: 2021-01-01

## 2021-01-01 RX ORDER — LORAZEPAM 2 MG/ML
2 INJECTION INTRAMUSCULAR
Status: DISCONTINUED | OUTPATIENT
Start: 2021-01-01 | End: 2021-01-01

## 2021-01-01 RX ORDER — AZITHROMYCIN 250 MG/1
500 TABLET, FILM COATED ORAL DAILY
Status: DISCONTINUED | OUTPATIENT
Start: 2021-01-01 | End: 2021-01-01

## 2021-01-01 RX ORDER — LEVALBUTEROL 1.25 MG/.5ML
1.25 SOLUTION, CONCENTRATE RESPIRATORY (INHALATION)
Status: DISCONTINUED | OUTPATIENT
Start: 2021-01-01 | End: 2021-01-01

## 2021-01-01 RX ORDER — MIDAZOLAM HYDROCHLORIDE 1 MG/ML
INJECTION, SOLUTION INTRAMUSCULAR; INTRAVENOUS
Status: COMPLETED
Start: 2021-01-01 | End: 2021-01-01

## 2021-01-01 RX ORDER — MORPHINE SULFATE 2 MG/ML
2 INJECTION, SOLUTION INTRAMUSCULAR; INTRAVENOUS
Status: DISCONTINUED | OUTPATIENT
Start: 2021-01-01 | End: 2021-01-07 | Stop reason: HOSPADM

## 2021-01-01 RX ORDER — CALCIUM CARB/MAGNESIUM CARB 311-232MG
10 TABLET ORAL
Status: DISCONTINUED | OUTPATIENT
Start: 2021-01-01 | End: 2021-01-01

## 2021-01-01 RX ORDER — PROPOFOL 10 MG/ML
0-50 VIAL (ML) INTRAVENOUS
Status: DISCONTINUED | OUTPATIENT
Start: 2021-01-01 | End: 2021-01-01

## 2021-01-01 RX ORDER — METOPROLOL TARTRATE 5 MG/5ML
5 INJECTION INTRAVENOUS
Status: DISCONTINUED | OUTPATIENT
Start: 2021-01-01 | End: 2021-01-01

## 2021-01-01 RX ORDER — SUCCINYLCHOLINE CHLORIDE 100 MG/5ML
SYRINGE (ML) INTRAVENOUS AS NEEDED
Status: DISCONTINUED | OUTPATIENT
Start: 2021-01-01 | End: 2021-01-01 | Stop reason: HOSPADM

## 2021-01-01 RX ORDER — MIDAZOLAM HYDROCHLORIDE 1 MG/ML
2 INJECTION, SOLUTION INTRAMUSCULAR; INTRAVENOUS ONCE
Status: COMPLETED | OUTPATIENT
Start: 2021-01-01 | End: 2021-01-01

## 2021-01-01 RX ORDER — CHLORHEXIDINE GLUCONATE 1.2 MG/ML
10 RINSE ORAL ONCE
Status: COMPLETED | OUTPATIENT
Start: 2021-01-01 | End: 2021-01-01

## 2021-01-01 RX ORDER — PROPOFOL 10 MG/ML
INJECTION, EMULSION INTRAVENOUS AS NEEDED
Status: DISCONTINUED | OUTPATIENT
Start: 2021-01-01 | End: 2021-01-01 | Stop reason: HOSPADM

## 2021-01-01 RX ORDER — INSULIN LISPRO 100 [IU]/ML
INJECTION, SOLUTION INTRAVENOUS; SUBCUTANEOUS EVERY 6 HOURS
Status: DISCONTINUED | OUTPATIENT
Start: 2021-01-01 | End: 2021-01-01

## 2021-01-01 RX ADMIN — BUSPIRONE HYDROCHLORIDE 5 MG: 5 TABLET ORAL at 22:12

## 2021-01-01 RX ADMIN — LORAZEPAM 1 MG: 2 INJECTION INTRAMUSCULAR; INTRAVENOUS at 02:39

## 2021-01-01 RX ADMIN — ACETAMINOPHEN 650 MG: 325 TABLET ORAL at 02:12

## 2021-01-01 RX ADMIN — LORAZEPAM 0.5 MG: 0.5 TABLET ORAL at 09:06

## 2021-01-01 RX ADMIN — RIFAMPIN 600 MG: 300 CAPSULE ORAL at 08:50

## 2021-01-01 RX ADMIN — FAMOTIDINE 20 MG: 20 TABLET, FILM COATED ORAL at 08:23

## 2021-01-01 RX ADMIN — BUDESONIDE 500 MCG: 0.5 INHALANT RESPIRATORY (INHALATION) at 19:53

## 2021-01-01 RX ADMIN — LORAZEPAM 1 MG: 2 INJECTION INTRAMUSCULAR; INTRAVENOUS at 21:45

## 2021-01-01 RX ADMIN — SODIUM BICARBONATE 650 MG: 650 TABLET ORAL at 08:32

## 2021-01-01 RX ADMIN — LORAZEPAM 0.5 MG: 0.5 TABLET ORAL at 23:29

## 2021-01-01 RX ADMIN — SODIUM BICARBONATE 650 MG: 650 TABLET ORAL at 21:27

## 2021-01-01 RX ADMIN — MORPHINE SULFATE 2 MG: 2 INJECTION, SOLUTION INTRAMUSCULAR; INTRAVENOUS at 21:06

## 2021-01-01 RX ADMIN — LEVALBUTEROL 1.25 MG: 1.25 SOLUTION, CONCENTRATE RESPIRATORY (INHALATION) at 05:14

## 2021-01-01 RX ADMIN — MORPHINE SULFATE 2 MG: 2 INJECTION, SOLUTION INTRAMUSCULAR; INTRAVENOUS at 19:29

## 2021-01-01 RX ADMIN — Medication 10 MG: at 21:28

## 2021-01-01 RX ADMIN — ETHAMBUTOL HYDROCHLORIDE 1200 MG: 400 TABLET, FILM COATED ORAL at 09:46

## 2021-01-01 RX ADMIN — IPRATROPIUM BROMIDE 0.5 MG: 0.5 SOLUTION RESPIRATORY (INHALATION) at 11:36

## 2021-01-01 RX ADMIN — LEVALBUTEROL 1.25 MG: 1.25 SOLUTION, CONCENTRATE RESPIRATORY (INHALATION) at 15:47

## 2021-01-01 RX ADMIN — SODIUM CHLORIDE 75 ML/HR: 900 INJECTION, SOLUTION INTRAVENOUS at 22:14

## 2021-01-01 RX ADMIN — BUDESONIDE 500 MCG: 0.5 INHALANT RESPIRATORY (INHALATION) at 07:27

## 2021-01-01 RX ADMIN — SODIUM BICARBONATE 650 MG: 650 TABLET ORAL at 08:50

## 2021-01-01 RX ADMIN — MIDAZOLAM HYDROCHLORIDE 2 MG: 1 INJECTION, SOLUTION INTRAMUSCULAR; INTRAVENOUS at 04:25

## 2021-01-01 RX ADMIN — MORPHINE SULFATE 2 MG: 2 INJECTION, SOLUTION INTRAMUSCULAR; INTRAVENOUS at 19:08

## 2021-01-01 RX ADMIN — BUSPIRONE HYDROCHLORIDE 5 MG: 5 TABLET ORAL at 08:49

## 2021-01-01 RX ADMIN — SERTRALINE HYDROCHLORIDE 50 MG: 50 TABLET ORAL at 09:47

## 2021-01-01 RX ADMIN — INSULIN LISPRO 2 UNITS: 100 INJECTION, SOLUTION INTRAVENOUS; SUBCUTANEOUS at 12:06

## 2021-01-01 RX ADMIN — Medication 10 MG: at 23:45

## 2021-01-01 RX ADMIN — FAMOTIDINE 20 MG: 20 TABLET, FILM COATED ORAL at 23:29

## 2021-01-01 RX ADMIN — FAMOTIDINE 20 MG: 20 TABLET, FILM COATED ORAL at 21:06

## 2021-01-01 RX ADMIN — BUDESONIDE 500 MCG: 0.5 INHALANT RESPIRATORY (INHALATION) at 19:12

## 2021-01-01 RX ADMIN — METHYLPREDNISOLONE SODIUM SUCCINATE 40 MG: 40 INJECTION, POWDER, FOR SOLUTION INTRAMUSCULAR; INTRAVENOUS at 23:29

## 2021-01-01 RX ADMIN — MORPHINE SULFATE 2 MG: 2 INJECTION, SOLUTION INTRAMUSCULAR; INTRAVENOUS at 17:24

## 2021-01-01 RX ADMIN — ONDANSETRON 4 MG: 2 INJECTION INTRAMUSCULAR; INTRAVENOUS at 17:14

## 2021-01-01 RX ADMIN — LEVALBUTEROL 1.25 MG: 1.25 SOLUTION, CONCENTRATE RESPIRATORY (INHALATION) at 19:05

## 2021-01-01 RX ADMIN — GUAIFENESIN 100 MG: 200 SOLUTION ORAL at 02:16

## 2021-01-01 RX ADMIN — BUSPIRONE HYDROCHLORIDE 5 MG: 5 TABLET ORAL at 15:10

## 2021-01-01 RX ADMIN — RIFAMPIN 600 MG: 300 CAPSULE ORAL at 08:23

## 2021-01-01 RX ADMIN — BUSPIRONE HYDROCHLORIDE 5 MG: 5 TABLET ORAL at 10:06

## 2021-01-01 RX ADMIN — SULFAMETHOXAZOLE AND TRIMETHOPRIM 2 TABLET: 800; 160 TABLET ORAL at 08:23

## 2021-01-01 RX ADMIN — Medication 10 MG: at 23:29

## 2021-01-01 RX ADMIN — LEVALBUTEROL 1.25 MG: 1.25 SOLUTION, CONCENTRATE RESPIRATORY (INHALATION) at 14:40

## 2021-01-01 RX ADMIN — GUAIFENESIN 100 MG: 200 SOLUTION ORAL at 07:05

## 2021-01-01 RX ADMIN — FAMOTIDINE 20 MG: 20 TABLET, FILM COATED ORAL at 09:11

## 2021-01-01 RX ADMIN — LEVALBUTEROL 1.25 MG: 1.25 SOLUTION, CONCENTRATE RESPIRATORY (INHALATION) at 15:37

## 2021-01-01 RX ADMIN — BUSPIRONE HYDROCHLORIDE 5 MG: 5 TABLET ORAL at 17:14

## 2021-01-01 RX ADMIN — ONDANSETRON 4 MG: 2 INJECTION INTRAMUSCULAR; INTRAVENOUS at 08:37

## 2021-01-01 RX ADMIN — Medication 10 MG: at 22:12

## 2021-01-01 RX ADMIN — LEVALBUTEROL 1.25 MG: 1.25 SOLUTION, CONCENTRATE RESPIRATORY (INHALATION) at 00:28

## 2021-01-01 RX ADMIN — LORAZEPAM 0.5 MG: 0.5 TABLET ORAL at 05:02

## 2021-01-01 RX ADMIN — LEVALBUTEROL 1.25 MG: 1.25 SOLUTION, CONCENTRATE RESPIRATORY (INHALATION) at 11:46

## 2021-01-01 RX ADMIN — ETHAMBUTOL HYDROCHLORIDE 1200 MG: 400 TABLET, FILM COATED ORAL at 10:06

## 2021-01-01 RX ADMIN — SODIUM BICARBONATE 650 MG: 650 TABLET ORAL at 10:06

## 2021-01-01 RX ADMIN — CHLORHEXIDINE GLUCONATE 10 ML: 1.2 RINSE ORAL at 10:21

## 2021-01-01 RX ADMIN — ACETAMINOPHEN 650 MG: 325 TABLET ORAL at 11:30

## 2021-01-01 RX ADMIN — SULFAMETHOXAZOLE AND TRIMETHOPRIM 2 TABLET: 800; 160 TABLET ORAL at 01:12

## 2021-01-01 RX ADMIN — SULFAMETHOXAZOLE AND TRIMETHOPRIM 2 TABLET: 800; 160 TABLET ORAL at 17:14

## 2021-01-01 RX ADMIN — MULTIPLE VITAMINS W/ MINERALS TAB 1 TABLET: TAB at 10:06

## 2021-01-01 RX ADMIN — BUSPIRONE HYDROCHLORIDE 5 MG: 5 TABLET ORAL at 23:29

## 2021-01-01 RX ADMIN — MORPHINE SULFATE 2 MG: 2 INJECTION, SOLUTION INTRAMUSCULAR; INTRAVENOUS at 08:40

## 2021-01-01 RX ADMIN — MULTIPLE VITAMINS W/ MINERALS TAB 1 TABLET: TAB at 08:23

## 2021-01-01 RX ADMIN — GUAIFENESIN 100 MG: 200 SOLUTION ORAL at 08:50

## 2021-01-01 RX ADMIN — INSULIN LISPRO 2 UNITS: 100 INJECTION, SOLUTION INTRAVENOUS; SUBCUTANEOUS at 07:08

## 2021-01-01 RX ADMIN — LEVALBUTEROL 1.25 MG: 1.25 SOLUTION, CONCENTRATE RESPIRATORY (INHALATION) at 07:27

## 2021-01-01 RX ADMIN — RIFAMPIN 600 MG: 300 CAPSULE ORAL at 09:47

## 2021-01-01 RX ADMIN — LORAZEPAM 0.5 MG: 0.5 TABLET ORAL at 11:14

## 2021-01-01 RX ADMIN — SULFAMETHOXAZOLE AND TRIMETHOPRIM 438.72 MG: 80; 16 INJECTION, SOLUTION, CONCENTRATE INTRAVENOUS at 04:20

## 2021-01-01 RX ADMIN — LEVALBUTEROL 1.25 MG: 1.25 SOLUTION, CONCENTRATE RESPIRATORY (INHALATION) at 01:18

## 2021-01-01 RX ADMIN — Medication 10 MG: at 21:26

## 2021-01-01 RX ADMIN — MORPHINE SULFATE 2 MG: 2 INJECTION, SOLUTION INTRAMUSCULAR; INTRAVENOUS at 21:27

## 2021-01-01 RX ADMIN — LORAZEPAM 0.5 MG: 0.5 TABLET ORAL at 21:28

## 2021-01-01 RX ADMIN — SERTRALINE HYDROCHLORIDE 50 MG: 50 TABLET ORAL at 10:06

## 2021-01-01 RX ADMIN — INSULIN LISPRO 4 UNITS: 100 INJECTION, SOLUTION INTRAVENOUS; SUBCUTANEOUS at 17:21

## 2021-01-01 RX ADMIN — LEVALBUTEROL 1.25 MG: 1.25 SOLUTION, CONCENTRATE RESPIRATORY (INHALATION) at 00:33

## 2021-01-01 RX ADMIN — SODIUM CHLORIDE 50 ML/HR: 900 INJECTION, SOLUTION INTRAVENOUS at 09:10

## 2021-01-01 RX ADMIN — LEVALBUTEROL 1.25 MG: 1.25 SOLUTION, CONCENTRATE RESPIRATORY (INHALATION) at 00:36

## 2021-01-01 RX ADMIN — GUAIFENESIN 100 MG: 200 SOLUTION ORAL at 14:35

## 2021-01-01 RX ADMIN — SULFAMETHOXAZOLE AND TRIMETHOPRIM 40 ML: 200; 40 SUSPENSION ORAL at 11:35

## 2021-01-01 RX ADMIN — LEVALBUTEROL 1.25 MG: 1.25 SOLUTION, CONCENTRATE RESPIRATORY (INHALATION) at 15:36

## 2021-01-01 RX ADMIN — FAMOTIDINE 20 MG: 20 TABLET, FILM COATED ORAL at 09:46

## 2021-01-01 RX ADMIN — ONDANSETRON 4 MG: 2 INJECTION INTRAMUSCULAR; INTRAVENOUS at 11:15

## 2021-01-01 RX ADMIN — AZITHROMYCIN MONOHYDRATE 500 MG: 250 TABLET ORAL at 08:49

## 2021-01-01 RX ADMIN — IPRATROPIUM BROMIDE 0.5 MG: 0.5 SOLUTION RESPIRATORY (INHALATION) at 00:36

## 2021-01-01 RX ADMIN — FENTANYL CITRATE 100 MCG: 50 INJECTION, SOLUTION INTRAMUSCULAR; INTRAVENOUS at 04:40

## 2021-01-01 RX ADMIN — LORAZEPAM 0.5 MG: 0.5 TABLET ORAL at 19:08

## 2021-01-01 RX ADMIN — IPRATROPIUM BROMIDE 0.5 MG: 0.5 SOLUTION RESPIRATORY (INHALATION) at 15:37

## 2021-01-01 RX ADMIN — LEVALBUTEROL 1.25 MG: 1.25 SOLUTION, CONCENTRATE RESPIRATORY (INHALATION) at 15:41

## 2021-01-01 RX ADMIN — AZITHROMYCIN MONOHYDRATE 500 MG: 250 TABLET ORAL at 08:23

## 2021-01-01 RX ADMIN — MORPHINE SULFATE 2 MG: 2 INJECTION, SOLUTION INTRAMUSCULAR; INTRAVENOUS at 07:15

## 2021-01-01 RX ADMIN — FAMOTIDINE 20 MG: 20 TABLET, FILM COATED ORAL at 21:26

## 2021-01-01 RX ADMIN — BUDESONIDE 500 MCG: 0.5 INHALANT RESPIRATORY (INHALATION) at 07:17

## 2021-01-01 RX ADMIN — IPRATROPIUM BROMIDE 0.5 MG: 0.5 SOLUTION RESPIRATORY (INHALATION) at 23:51

## 2021-01-01 RX ADMIN — IPRATROPIUM BROMIDE 0.5 MG: 0.5 SOLUTION RESPIRATORY (INHALATION) at 11:20

## 2021-01-01 RX ADMIN — BUSPIRONE HYDROCHLORIDE 5 MG: 5 TABLET ORAL at 09:46

## 2021-01-01 RX ADMIN — LORAZEPAM 0.5 MG: 0.5 TABLET ORAL at 21:06

## 2021-01-01 RX ADMIN — SERTRALINE HYDROCHLORIDE 50 MG: 50 TABLET ORAL at 08:50

## 2021-01-01 RX ADMIN — BUDESONIDE 500 MCG: 0.5 INHALANT RESPIRATORY (INHALATION) at 07:10

## 2021-01-01 RX ADMIN — BUDESONIDE 500 MCG: 0.5 INHALANT RESPIRATORY (INHALATION) at 20:32

## 2021-01-01 RX ADMIN — CHLORHEXIDINE GLUCONATE 10 ML: 1.2 RINSE ORAL at 04:41

## 2021-01-01 RX ADMIN — GUAIFENESIN 100 MG: 200 SOLUTION ORAL at 22:56

## 2021-01-01 RX ADMIN — LEVALBUTEROL 1.25 MG: 1.25 SOLUTION, CONCENTRATE RESPIRATORY (INHALATION) at 20:32

## 2021-01-01 RX ADMIN — METHYLPREDNISOLONE SODIUM SUCCINATE 40 MG: 40 INJECTION, POWDER, FOR SOLUTION INTRAMUSCULAR; INTRAVENOUS at 21:06

## 2021-01-01 RX ADMIN — LEVALBUTEROL 1.25 MG: 1.25 SOLUTION, CONCENTRATE RESPIRATORY (INHALATION) at 00:00

## 2021-01-01 RX ADMIN — CISATRACURIUM BESYLATE 10 MG: 2 INJECTION INTRAVENOUS at 04:33

## 2021-01-01 RX ADMIN — SODIUM CHLORIDE 75 ML/HR: 900 INJECTION, SOLUTION INTRAVENOUS at 03:01

## 2021-01-01 RX ADMIN — PROPOFOL 15 MCG/KG/MIN: 10 INJECTION, EMULSION INTRAVENOUS at 04:07

## 2021-01-01 RX ADMIN — GUAIFENESIN 100 MG: 200 SOLUTION ORAL at 03:35

## 2021-01-01 RX ADMIN — MORPHINE SULFATE 2 MG: 2 INJECTION, SOLUTION INTRAMUSCULAR; INTRAVENOUS at 22:34

## 2021-01-01 RX ADMIN — RIFAMPIN 600 MG: 300 CAPSULE ORAL at 06:42

## 2021-01-01 RX ADMIN — IPRATROPIUM BROMIDE 0.5 MG: 0.5 SOLUTION RESPIRATORY (INHALATION) at 20:32

## 2021-01-01 RX ADMIN — FAMOTIDINE 20 MG: 20 TABLET, FILM COATED ORAL at 08:33

## 2021-01-01 RX ADMIN — MORPHINE SULFATE 2 MG: 2 INJECTION, SOLUTION INTRAMUSCULAR; INTRAVENOUS at 02:59

## 2021-01-01 RX ADMIN — ENOXAPARIN SODIUM 40 MG: 40 INJECTION SUBCUTANEOUS at 13:08

## 2021-01-01 RX ADMIN — MORPHINE SULFATE 2 MG: 2 INJECTION, SOLUTION INTRAMUSCULAR; INTRAVENOUS at 16:12

## 2021-01-01 RX ADMIN — LORAZEPAM 1 MG: 2 INJECTION INTRAMUSCULAR; INTRAVENOUS at 00:40

## 2021-01-01 RX ADMIN — GUAIFENESIN 100 MG: 200 SOLUTION ORAL at 05:28

## 2021-01-01 RX ADMIN — IPRATROPIUM BROMIDE 0.5 MG: 0.5 SOLUTION RESPIRATORY (INHALATION) at 05:14

## 2021-01-01 RX ADMIN — MORPHINE SULFATE 2 MG: 2 INJECTION, SOLUTION INTRAMUSCULAR; INTRAVENOUS at 03:18

## 2021-01-01 RX ADMIN — LEVALBUTEROL 1.25 MG: 1.25 SOLUTION, CONCENTRATE RESPIRATORY (INHALATION) at 11:20

## 2021-01-01 RX ADMIN — SODIUM CHLORIDE 75 ML/HR: 900 INJECTION, SOLUTION INTRAVENOUS at 23:10

## 2021-01-01 RX ADMIN — ACETAMINOPHEN 650 MG: 325 TABLET ORAL at 12:29

## 2021-01-01 RX ADMIN — FAMOTIDINE 20 MG: 20 TABLET, FILM COATED ORAL at 10:06

## 2021-01-01 RX ADMIN — GUAIFENESIN 100 MG: 200 SOLUTION ORAL at 10:46

## 2021-01-01 RX ADMIN — GUAIFENESIN 100 MG: 200 SOLUTION ORAL at 16:12

## 2021-01-01 RX ADMIN — MORPHINE SULFATE 2 MG: 2 INJECTION, SOLUTION INTRAMUSCULAR; INTRAVENOUS at 02:17

## 2021-01-01 RX ADMIN — MORPHINE SULFATE 2 MG: 2 INJECTION, SOLUTION INTRAMUSCULAR; INTRAVENOUS at 12:17

## 2021-01-01 RX ADMIN — METOPROLOL TARTRATE 5 MG: 5 INJECTION INTRAVENOUS at 01:27

## 2021-01-01 RX ADMIN — BUDESONIDE 500 MCG: 0.5 INHALANT RESPIRATORY (INHALATION) at 07:29

## 2021-01-01 RX ADMIN — METHYLPREDNISOLONE SODIUM SUCCINATE 40 MG: 40 INJECTION, POWDER, FOR SOLUTION INTRAMUSCULAR; INTRAVENOUS at 08:33

## 2021-01-01 RX ADMIN — MULTIPLE VITAMINS W/ MINERALS TAB 1 TABLET: TAB at 08:49

## 2021-01-01 RX ADMIN — BUDESONIDE 500 MCG: 0.5 INHALANT RESPIRATORY (INHALATION) at 07:31

## 2021-01-01 RX ADMIN — ETHAMBUTOL HYDROCHLORIDE 1200 MG: 400 TABLET, FILM COATED ORAL at 13:41

## 2021-01-01 RX ADMIN — MORPHINE SULFATE 2 MG: 2 INJECTION, SOLUTION INTRAMUSCULAR; INTRAVENOUS at 03:42

## 2021-01-01 RX ADMIN — BUSPIRONE HYDROCHLORIDE 5 MG: 5 TABLET ORAL at 09:11

## 2021-01-01 RX ADMIN — LEVALBUTEROL 1.25 MG: 1.25 SOLUTION, CONCENTRATE RESPIRATORY (INHALATION) at 07:11

## 2021-01-01 RX ADMIN — MULTIPLE VITAMINS W/ MINERALS TAB 1 TABLET: TAB at 08:32

## 2021-01-01 RX ADMIN — ETHAMBUTOL HYDROCHLORIDE 1200 MG: 400 TABLET, FILM COATED ORAL at 08:31

## 2021-01-01 RX ADMIN — CISATRACURIUM BESYLATE 10 MG: 2 INJECTION INTRAVENOUS at 12:04

## 2021-01-01 RX ADMIN — LEVALBUTEROL 1.25 MG: 1.25 SOLUTION, CONCENTRATE RESPIRATORY (INHALATION) at 11:49

## 2021-01-01 RX ADMIN — METHYLPREDNISOLONE SODIUM SUCCINATE 40 MG: 40 INJECTION, POWDER, FOR SOLUTION INTRAMUSCULAR; INTRAVENOUS at 08:50

## 2021-01-01 RX ADMIN — GUAIFENESIN 100 MG: 200 SOLUTION ORAL at 14:48

## 2021-01-01 RX ADMIN — FAMOTIDINE 20 MG: 20 TABLET, FILM COATED ORAL at 22:12

## 2021-01-01 RX ADMIN — ETHAMBUTOL HYDROCHLORIDE 1200 MG: 400 TABLET, FILM COATED ORAL at 08:49

## 2021-01-01 RX ADMIN — MORPHINE SULFATE 2 MG: 2 INJECTION, SOLUTION INTRAMUSCULAR; INTRAVENOUS at 21:28

## 2021-01-01 RX ADMIN — SODIUM CHLORIDE 50 ML/HR: 900 INJECTION, SOLUTION INTRAVENOUS at 02:39

## 2021-01-01 RX ADMIN — SULFAMETHOXAZOLE AND TRIMETHOPRIM 438.72 MG: 80; 16 INJECTION, SOLUTION, CONCENTRATE INTRAVENOUS at 22:13

## 2021-01-01 RX ADMIN — GUAIFENESIN 100 MG: 200 SOLUTION ORAL at 17:17

## 2021-01-01 RX ADMIN — MIDAZOLAM HYDROCHLORIDE 2 MG/HR: 5 INJECTION, SOLUTION INTRAMUSCULAR; INTRAVENOUS at 11:41

## 2021-01-01 RX ADMIN — ONDANSETRON 4 MG: 2 INJECTION INTRAMUSCULAR; INTRAVENOUS at 15:07

## 2021-01-01 RX ADMIN — BUSPIRONE HYDROCHLORIDE 5 MG: 5 TABLET ORAL at 08:23

## 2021-01-01 RX ADMIN — AZITHROMYCIN MONOHYDRATE 500 MG: 250 TABLET ORAL at 10:02

## 2021-01-01 RX ADMIN — SULFAMETHOXAZOLE AND TRIMETHOPRIM 2 TABLET: 800; 160 TABLET ORAL at 17:05

## 2021-01-01 RX ADMIN — AZITHROMYCIN MONOHYDRATE 500 MG: 250 TABLET ORAL at 08:33

## 2021-01-01 RX ADMIN — MORPHINE SULFATE 2 MG: 2 INJECTION, SOLUTION INTRAMUSCULAR; INTRAVENOUS at 03:32

## 2021-01-01 RX ADMIN — SULFAMETHOXAZOLE AND TRIMETHOPRIM 438.72 MG: 80; 16 INJECTION, SOLUTION, CONCENTRATE INTRAVENOUS at 18:54

## 2021-01-01 RX ADMIN — SERTRALINE HYDROCHLORIDE 50 MG: 50 TABLET ORAL at 08:23

## 2021-01-01 RX ADMIN — LEVALBUTEROL 1.25 MG: 1.25 SOLUTION, CONCENTRATE RESPIRATORY (INHALATION) at 07:29

## 2021-01-01 RX ADMIN — MORPHINE SULFATE 2 MG: 2 INJECTION, SOLUTION INTRAMUSCULAR; INTRAVENOUS at 01:27

## 2021-01-01 RX ADMIN — INSULIN LISPRO 4 UNITS: 100 INJECTION, SOLUTION INTRAVENOUS; SUBCUTANEOUS at 21:31

## 2021-01-01 RX ADMIN — PROPOFOL 50 MCG/KG/MIN: 10 INJECTION, EMULSION INTRAVENOUS at 08:26

## 2021-01-01 RX ADMIN — SODIUM CHLORIDE 75 ML/HR: 900 INJECTION, SOLUTION INTRAVENOUS at 17:20

## 2021-01-01 RX ADMIN — SULFAMETHOXAZOLE AND TRIMETHOPRIM 438.72 MG: 80; 16 INJECTION, SOLUTION, CONCENTRATE INTRAVENOUS at 22:57

## 2021-01-01 RX ADMIN — GUAIFENESIN 100 MG: 200 SOLUTION ORAL at 23:29

## 2021-01-01 RX ADMIN — FENTANYL CITRATE 100 MCG: 50 INJECTION, SOLUTION INTRAMUSCULAR; INTRAVENOUS at 05:13

## 2021-01-01 RX ADMIN — SODIUM BICARBONATE 650 MG: 650 TABLET ORAL at 21:06

## 2021-01-01 RX ADMIN — LEVALBUTEROL 1.25 MG: 1.25 SOLUTION, CONCENTRATE RESPIRATORY (INHALATION) at 04:59

## 2021-01-01 RX ADMIN — SERTRALINE HYDROCHLORIDE 50 MG: 50 TABLET ORAL at 08:33

## 2021-01-01 RX ADMIN — METHYLPREDNISOLONE SODIUM SUCCINATE 40 MG: 40 INJECTION, POWDER, FOR SOLUTION INTRAMUSCULAR; INTRAVENOUS at 22:12

## 2021-01-01 RX ADMIN — PROPOFOL 150 MG: 10 INJECTION, EMULSION INTRAVENOUS at 04:00

## 2021-01-01 RX ADMIN — SULFAMETHOXAZOLE AND TRIMETHOPRIM 438.72 MG: 80; 16 INJECTION, SOLUTION, CONCENTRATE INTRAVENOUS at 06:42

## 2021-01-01 RX ADMIN — BUDESONIDE 500 MCG: 0.5 INHALANT RESPIRATORY (INHALATION) at 20:33

## 2021-01-01 RX ADMIN — MORPHINE SULFATE 2 MG: 2 INJECTION, SOLUTION INTRAMUSCULAR; INTRAVENOUS at 13:08

## 2021-01-01 RX ADMIN — BUSPIRONE HYDROCHLORIDE 5 MG: 5 TABLET ORAL at 21:26

## 2021-01-01 RX ADMIN — SODIUM BICARBONATE 650 MG: 650 TABLET ORAL at 17:14

## 2021-01-01 RX ADMIN — SULFAMETHOXAZOLE AND TRIMETHOPRIM 438.72 MG: 80; 16 INJECTION, SOLUTION, CONCENTRATE INTRAVENOUS at 03:53

## 2021-01-01 RX ADMIN — MORPHINE SULFATE 2 MG: 2 INJECTION, SOLUTION INTRAMUSCULAR; INTRAVENOUS at 19:20

## 2021-01-01 RX ADMIN — BUDESONIDE 500 MCG: 0.5 INHALANT RESPIRATORY (INHALATION) at 07:48

## 2021-01-01 RX ADMIN — BUSPIRONE HYDROCHLORIDE 5 MG: 5 TABLET ORAL at 15:07

## 2021-01-01 RX ADMIN — METHYLPREDNISOLONE SODIUM SUCCINATE 40 MG: 40 INJECTION, POWDER, FOR SOLUTION INTRAMUSCULAR; INTRAVENOUS at 21:28

## 2021-01-01 RX ADMIN — SODIUM BICARBONATE 650 MG: 650 TABLET ORAL at 08:23

## 2021-01-01 RX ADMIN — SODIUM CHLORIDE 75 ML/HR: 900 INJECTION, SOLUTION INTRAVENOUS at 12:18

## 2021-01-01 RX ADMIN — MULTIPLE VITAMINS W/ MINERALS TAB 1 TABLET: TAB at 09:46

## 2021-01-01 RX ADMIN — MORPHINE SULFATE 2 MG: 2 INJECTION, SOLUTION INTRAMUSCULAR; INTRAVENOUS at 05:02

## 2021-01-01 RX ADMIN — LORAZEPAM 0.5 MG: 0.5 TABLET ORAL at 09:46

## 2021-01-01 RX ADMIN — LEVALBUTEROL 1.25 MG: 1.25 SOLUTION, CONCENTRATE RESPIRATORY (INHALATION) at 07:48

## 2021-01-01 RX ADMIN — LEVALBUTEROL 1.25 MG: 1.25 SOLUTION, CONCENTRATE RESPIRATORY (INHALATION) at 11:37

## 2021-01-01 RX ADMIN — LEVALBUTEROL 1.25 MG: 1.25 SOLUTION, CONCENTRATE RESPIRATORY (INHALATION) at 20:33

## 2021-01-01 RX ADMIN — SODIUM BICARBONATE 650 MG: 650 TABLET ORAL at 22:12

## 2021-01-01 RX ADMIN — GUAIFENESIN 100 MG: 200 SOLUTION ORAL at 22:12

## 2021-01-01 RX ADMIN — GUAIFENESIN 100 MG: 200 SOLUTION ORAL at 09:06

## 2021-01-01 RX ADMIN — BUSPIRONE HYDROCHLORIDE 5 MG: 5 TABLET ORAL at 17:04

## 2021-01-01 RX ADMIN — RIFAMPIN 600 MG: 300 CAPSULE ORAL at 08:30

## 2021-01-01 RX ADMIN — METHYLPREDNISOLONE SODIUM SUCCINATE 40 MG: 40 INJECTION, POWDER, FOR SOLUTION INTRAMUSCULAR; INTRAVENOUS at 08:22

## 2021-01-01 RX ADMIN — AZITHROMYCIN MONOHYDRATE 500 MG: 250 TABLET ORAL at 10:06

## 2021-01-01 RX ADMIN — SULFAMETHOXAZOLE AND TRIMETHOPRIM 438.72 MG: 80; 16 INJECTION, SOLUTION, CONCENTRATE INTRAVENOUS at 12:17

## 2021-01-01 RX ADMIN — MORPHINE SULFATE 2 MG: 2 INJECTION, SOLUTION INTRAMUSCULAR; INTRAVENOUS at 07:06

## 2021-01-01 RX ADMIN — SULFAMETHOXAZOLE AND TRIMETHOPRIM 438.72 MG: 80; 16 INJECTION, SOLUTION, CONCENTRATE INTRAVENOUS at 13:53

## 2021-01-01 RX ADMIN — SULFAMETHOXAZOLE AND TRIMETHOPRIM 438.72 MG: 80; 16 INJECTION, SOLUTION, CONCENTRATE INTRAVENOUS at 06:01

## 2021-01-01 RX ADMIN — LEVALBUTEROL 1.25 MG: 1.25 SOLUTION, CONCENTRATE RESPIRATORY (INHALATION) at 23:05

## 2021-01-01 RX ADMIN — LORAZEPAM 0.5 MG: 0.5 TABLET ORAL at 14:22

## 2021-01-01 RX ADMIN — LORAZEPAM 0.5 MG: 0.5 TABLET ORAL at 12:10

## 2021-01-01 RX ADMIN — RIFAMPIN 600 MG: 300 CAPSULE ORAL at 15:10

## 2021-01-01 RX ADMIN — FENTANYL CITRATE 100 MCG: 50 INJECTION, SOLUTION INTRAMUSCULAR; INTRAVENOUS at 09:47

## 2021-01-01 RX ADMIN — IPRATROPIUM BROMIDE 0.5 MG: 0.5 SOLUTION RESPIRATORY (INHALATION) at 00:33

## 2021-01-01 RX ADMIN — PENICILLIN G BENZATHINE 2.4 MILLION UNITS: 1200000 INJECTION, SUSPENSION INTRAMUSCULAR at 04:52

## 2021-01-01 RX ADMIN — BUSPIRONE HYDROCHLORIDE 5 MG: 5 TABLET ORAL at 08:32

## 2021-01-01 RX ADMIN — SODIUM BICARBONATE 650 MG: 650 TABLET ORAL at 09:12

## 2021-01-01 RX ADMIN — METHYLPREDNISOLONE SODIUM SUCCINATE 40 MG: 40 INJECTION, POWDER, FOR SOLUTION INTRAMUSCULAR; INTRAVENOUS at 10:21

## 2021-01-01 RX ADMIN — LORAZEPAM 0.5 MG: 0.5 TABLET ORAL at 00:57

## 2021-01-01 RX ADMIN — MORPHINE SULFATE 2 MG: 2 INJECTION, SOLUTION INTRAMUSCULAR; INTRAVENOUS at 11:55

## 2021-01-01 RX ADMIN — IPRATROPIUM BROMIDE 0.5 MG: 0.5 SOLUTION RESPIRATORY (INHALATION) at 14:39

## 2021-01-01 RX ADMIN — LEVALBUTEROL 1.25 MG: 1.25 SOLUTION, CONCENTRATE RESPIRATORY (INHALATION) at 07:31

## 2021-01-01 RX ADMIN — LORAZEPAM 0.5 MG: 0.5 TABLET ORAL at 13:42

## 2021-01-01 RX ADMIN — ENOXAPARIN SODIUM 40 MG: 40 INJECTION SUBCUTANEOUS at 15:25

## 2021-01-01 RX ADMIN — MORPHINE SULFATE 2 MG: 2 INJECTION, SOLUTION INTRAMUSCULAR; INTRAVENOUS at 07:13

## 2021-01-01 RX ADMIN — AZITHROMYCIN MONOHYDRATE 500 MG: 250 TABLET ORAL at 11:55

## 2021-01-01 RX ADMIN — GUAIFENESIN 100 MG: 200 SOLUTION ORAL at 22:53

## 2021-01-01 RX ADMIN — SERTRALINE HYDROCHLORIDE 50 MG: 50 TABLET ORAL at 09:11

## 2021-01-01 RX ADMIN — LORAZEPAM 2 MG: 2 INJECTION INTRAMUSCULAR; INTRAVENOUS at 10:58

## 2021-01-01 RX ADMIN — ENOXAPARIN SODIUM 40 MG: 40 INJECTION SUBCUTANEOUS at 13:42

## 2021-01-01 RX ADMIN — ACETAMINOPHEN 650 MG: 325 TABLET ORAL at 05:00

## 2021-01-01 RX ADMIN — IPRATROPIUM BROMIDE 0.5 MG: 0.5 SOLUTION RESPIRATORY (INHALATION) at 00:28

## 2021-01-01 RX ADMIN — SODIUM BICARBONATE 650 MG: 650 TABLET ORAL at 23:29

## 2021-01-01 RX ADMIN — BUDESONIDE 500 MCG: 0.5 INHALANT RESPIRATORY (INHALATION) at 19:05

## 2021-01-01 RX ADMIN — LORAZEPAM 1 MG: 2 INJECTION INTRAMUSCULAR; INTRAVENOUS at 05:30

## 2021-01-01 RX ADMIN — ENOXAPARIN SODIUM 40 MG: 40 INJECTION SUBCUTANEOUS at 15:07

## 2021-01-01 RX ADMIN — MORPHINE SULFATE 2 MG: 2 INJECTION, SOLUTION INTRAMUSCULAR; INTRAVENOUS at 00:55

## 2021-01-01 RX ADMIN — MORPHINE SULFATE 2 MG: 2 INJECTION, SOLUTION INTRAMUSCULAR; INTRAVENOUS at 15:43

## 2021-01-01 RX ADMIN — MORPHINE SULFATE 2 MG: 2 INJECTION, SOLUTION INTRAMUSCULAR; INTRAVENOUS at 12:06

## 2021-01-01 RX ADMIN — METHYLPREDNISOLONE SODIUM SUCCINATE 40 MG: 40 INJECTION, POWDER, FOR SOLUTION INTRAMUSCULAR; INTRAVENOUS at 09:12

## 2021-01-01 RX ADMIN — LEVALBUTEROL 1.25 MG: 1.25 SOLUTION, CONCENTRATE RESPIRATORY (INHALATION) at 19:12

## 2021-01-01 RX ADMIN — MULTIPLE VITAMINS W/ MINERALS TAB 1 TABLET: TAB at 09:12

## 2021-01-01 RX ADMIN — METHYLPREDNISOLONE SODIUM SUCCINATE 40 MG: 40 INJECTION, POWDER, FOR SOLUTION INTRAMUSCULAR; INTRAVENOUS at 10:06

## 2021-01-01 RX ADMIN — ETHAMBUTOL HYDROCHLORIDE 1200 MG: 400 TABLET, FILM COATED ORAL at 08:22

## 2021-01-01 RX ADMIN — METHYLPREDNISOLONE SODIUM SUCCINATE 40 MG: 40 INJECTION, POWDER, FOR SOLUTION INTRAMUSCULAR; INTRAVENOUS at 21:27

## 2021-01-01 RX ADMIN — LORAZEPAM 0.5 MG: 0.5 TABLET ORAL at 17:14

## 2021-01-01 RX ADMIN — LEVALBUTEROL 1.25 MG: 1.25 SOLUTION, CONCENTRATE RESPIRATORY (INHALATION) at 07:17

## 2021-01-01 RX ADMIN — MORPHINE SULFATE 2 MG: 2 INJECTION, SOLUTION INTRAMUSCULAR; INTRAVENOUS at 23:29

## 2021-01-01 RX ADMIN — Medication 100 MG: at 04:01

## 2021-01-01 RX ADMIN — IPRATROPIUM BROMIDE 0.5 MG: 0.5 SOLUTION RESPIRATORY (INHALATION) at 15:41

## 2021-01-01 RX ADMIN — ACETAMINOPHEN 650 MG: 325 TABLET ORAL at 14:56

## 2021-01-01 RX ADMIN — ENOXAPARIN SODIUM 40 MG: 40 INJECTION SUBCUTANEOUS at 14:22

## 2021-01-01 RX ADMIN — BUSPIRONE HYDROCHLORIDE 5 MG: 5 TABLET ORAL at 16:12

## 2021-01-01 RX ADMIN — MORPHINE SULFATE 2 MG: 2 INJECTION, SOLUTION INTRAMUSCULAR; INTRAVENOUS at 08:22

## 2021-01-01 RX ADMIN — LEVALBUTEROL 1.25 MG: 1.25 SOLUTION, CONCENTRATE RESPIRATORY (INHALATION) at 19:53

## 2021-01-01 RX ADMIN — BUSPIRONE HYDROCHLORIDE 5 MG: 5 TABLET ORAL at 22:50

## 2021-01-01 RX ADMIN — GUAIFENESIN 100 MG: 200 SOLUTION ORAL at 22:47

## 2021-01-01 RX ADMIN — GUAIFENESIN 100 MG: 200 SOLUTION ORAL at 18:45

## 2021-01-01 RX ADMIN — SODIUM BICARBONATE 650 MG: 650 TABLET ORAL at 09:47

## 2021-01-01 RX ADMIN — FAMOTIDINE 20 MG: 20 TABLET, FILM COATED ORAL at 08:50

## 2021-01-01 RX ADMIN — LEVALBUTEROL 1.25 MG: 1.25 SOLUTION, CONCENTRATE RESPIRATORY (INHALATION) at 11:19

## 2021-01-01 NOTE — PROGRESS NOTES
Problem: Pressure Injury - Risk of 
Goal: *Prevention of pressure injury Description: Document Josep Scale and appropriate interventions in the flowsheet. Outcome: Progressing Towards Goal 
Note: Pressure Injury Interventions: 
Sensory Interventions: Assess changes in LOC, Keep linens dry and wrinkle-free, Minimize linen layers, Pressure redistribution bed/mattress (bed type) Moisture Interventions: Absorbent underpads, Minimize layers Activity Interventions: Pressure redistribution bed/mattress(bed type), PT/OT evaluation Mobility Interventions: PT/OT evaluation, Pressure redistribution bed/mattress (bed type) Nutrition Interventions: Document food/fluid/supplement intake Friction and Shear Interventions: Apply protective barrier, creams and emollients, Minimize layers Problem: Patient Education: Go to Patient Education Activity Goal: Patient/Family Education Outcome: Progressing Towards Goal 
  
Problem: Falls - Risk of 
Goal: *Absence of Falls Description: Document Jenn Garrido Fall Risk and appropriate interventions in the flowsheet. Outcome: Progressing Towards Goal 
Note: Fall Risk Interventions: 
Mobility Interventions: Communicate number of staff needed for ambulation/transfer, OT consult for ADLs, PT Consult for mobility concerns, PT Consult for assist device competence, Patient to call before getting OOB Mentation Interventions: Door open when patient unattended, Adequate sleep, hydration, pain control Medication Interventions: Patient to call before getting OOB, Teach patient to arise slowly Elimination Interventions: Call light in reach, Patient to call for help with toileting needs History of Falls Interventions: Door open when patient unattended Problem: Patient Education: Go to Patient Education Activity Goal: Patient/Family Education Outcome: Progressing Towards Goal

## 2021-01-01 NOTE — CONSULTS
[facility] 
(A Division of [address] [place] [address]) Follow-up Note Date of Admission: 12/17/2020       Date of Note:  1/1/2021 Summary:   
Mr Ritchie Douglas is a 33y MSM AAM with underlying/untreated HIV who was in usual state of health until approx 2-3wks ago with onset of gradual dry cough and associated/progressive dyspnea (rest and exertion).  No f/s/c.  Has had 50-60lbs wt loss over the last year. Melody Ceron known about his HIV disease for a couple of years, but has not sought care yet.  Works at [employer]; wears a mask.  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  Feels the same today. 
  
Barista at [employer]:  \"Tired\" Interval History: 
Events this week reviewed/tracked. And he is (tired). Missed a whole day's of tmp-smx (Wednesday) and yesterday morning's dose of steroids with fevers since. Becoming more listless. Current Antimicrobials:    Prior Antimicrobials: 1. Tmp-smx IV/PO (12/18-) #13 (missed 12/30 all day) 1. azithro IV (12/17) 2. CAX IV (12/17) Assessment: Rec / Plan: PJP pneumonia 12/17:  PCT 0.57ng/mL;  Dying. Nearly 2wks into his 3wk regimen, and still huffin/'puffing. Normally, we would start ART at this point, but I don't perceive his body is ready yet. Will get some blood for GT resistance testing on Monday and re-consider next week starting what we have on the shelf in PHARM (for HIV meds) ->Tmp-smx #13/21 (subtract a missing day from Wednesday this week) Put it all back in IV as I am getting ready to increase his daily pill load for [facility] treatment (see below) AIDS With his fevers of late, and doubling of his alkaline phosphatase this past week, I have strong suspicion that he is simultaneously fighting Disseminated Mycobacterium [facility]); it happens. Especially when your CD4+  Is <50 cells (he's 6 cells). Have drawn 2 sets of AFB blood cultures this week and will empirically start 3-drug (azithro/ethambutol/rifampin) today. ->  
 
This is bad. Will require 3 medicines given severity of his illness. Prognosis poor. Syphilis And he still has significant titers/infection requiring re-treatment, but he's refused the shots. Microbiology:                12/31 - AFB BCx sent Resp sent 12/27 - BCx (-) 
    12/18-  Resp nl dequan/yeast 
                                                    Legionella/Spneumo Ag (-)                                        89/56 - Flu (-)                                                     BCx x2 (-)                                                     [id number] (-) 
  Lines / Catheters:         peripheral 
 
 
 
Patient Active Problem List  
Diagnosis Code  AIDS (acquired immune deficiency syndrome) ([facility]) B20  
 Bilateral pneumonia J18.9  Sepsis ([facility] [address] A41.9  Hyponatremia E87.1  Pneumonia of both lungs due to Pneumocystis jirovecii ([facility]) B59  
 Acute respiratory distress R06.03  
 Hypoxia R09.02  
 
 
Current Facility-Administered Medications Medication Dose Route Frequency  azithromycin (ZITHROMAX) tablet 500 mg  500 mg Oral DAILY  ethambutoL (MYAMBUTOL) tablet 1,200 mg  1,200 mg Oral DAILY  [START ON 1/2/2021] rifAMPin (RIFADIN) capsule 600 mg  600 mg Oral ACB  trimethoprim-sulfamethoxazole (BACTRIM) 438.72 mg in dextrose 5% 500 mL  20 mg/kg/day IntraVENous Q8H  phenol throat spray (CHLORASEPTIC) 1 Spray  1 Spray Oral PRN  
 ibuprofen (MOTRIN) tablet 400 mg  400 mg Oral Q4H PRN  
 sodium bicarbonate tablet 650 mg  650 mg Oral BID  
  sertraline (ZOLOFT) tablet 50 mg  50 mg Oral DAILY  busPIRone (BUSPAR) tablet 5 mg  5 mg Oral TID  morphine injection 2 mg  2 mg IntraVENous Q4H PRN  
 LORazepam (ATIVAN) tablet 0.5 mg  0.5 mg Oral Q6H PRN  penicillin g benzathine (BICILLIN LA) 1,200,000 unit/2 mL IM injection 2.4 Million Units  2.4 Million Units IntraMUSCular Q7D  
 methylPREDNISolone (PF) (SOLU-MEDROL) injection 40 mg  40 mg IntraVENous Q12H  
 insulin lispro (HUMALOG) injection   SubCUTAneous AC&HS  acetaminophen (TYLENOL) tablet 650 mg  650 mg Oral Q8H PRN  
 multivitamin, tx-iron-ca-min (THERA-M w/ IRON) tablet 1 Tab  1 Tab Oral DAILY  guaiFENesin (ROBITUSSIN) 100 mg/5 mL oral liquid 100 mg  100 mg Oral Q4H PRN  
 melatonin tablet 10 mg  10 mg Oral QHS  levalbuterol (XOPENEX) nebulizer soln 1.25 mg/0.5 ml  1.25 mg Nebulization Q4H RT  
 ipratropium (ATROVENT) 0.02 % nebulizer solution 0.5 mg  0.5 mg Nebulization Q4H PRN  
 budesonide (PULMICORT) 500 mcg/2 ml nebulizer suspension  500 mcg Nebulization BID RT  
 levalbuterol (XOPENEX) nebulizer soln 1.25 mg/0.5 ml  1.25 mg Nebulization Q4H PRN  
 enoxaparin (LOVENOX) injection 40 mg  40 mg SubCUTAneous Q24H  
 influenza vaccine 2020-21 (6 mos+)(PF) (FLUARIX/FLULAVAL/FLUZONE QUAD) injection 0.5 mL  0.5 mL IntraMUSCular PRIOR TO DISCHARGE  calcium carbonate (TUMS) chewable tablet 200 mg [elemental]  200 mg Oral TID PRN  
 [Held by provider] predniSONE (DELTASONE) tablet 20 mg  20 mg Oral DAILY WITH BREAKFAST  sodium chloride (NS) flush 5-10 mL  5-10 mL IntraVENous PRN  
 sodium chloride (NS) flush 5-40 mL  5-40 mL IntraVENous PRN  promethazine (PHENERGAN) tablet 12.5 mg  12.5 mg Oral Q6H PRN  Or  
 ondansetron (ZOFRAN) injection 4 mg  4 mg IntraVENous Q6H PRN  
 bisacodyL (DULCOLAX) tablet 5 mg  5 mg Oral DAILY PRN  
 famotidine (PEPCID) tablet 20 mg  20 mg Oral BID  
 
 
 
 Review of Systems - General ROS: positive for  - chills, fatigue, fever and malaise Respiratory ROS: positive for - cough, shortness of breath and tachypnea 
negative for - hemoptysis Cardiovascular ROS: positive for - dyspnea on exertion, rapid heart rate and shortness of breath 
negative for - chest pain Gastrointestinal ROS: no abdominal pain, change in bowel habits, or black or bloody stools Objective: 
 
Visit Vitals BP (!) 142/87 (BP 1 Location: Right arm) Pulse (!) 114 Temp 98.6 °F (37 °C) Resp 20 Ht 5' 7\" (1.702 m) Wt 67.6 kg (149 lb) SpO2 95% BMI 23.34 kg/m² Temp (24hrs), Av.2 °F (37.9 °C), Min:98.1 °F (36.7 °C), Max:102.6 °F (39.2 °C) GEN: dying AAM  
HEENT: anicteric CHEST: work of breathing present CVS:Tachy ABD: NT 
EXT: no edema Lab results: 
 
Chemistry Recent Labs 21 
0247 * 319* 214* * 128* 127* K 4.6 4.2 4.7 CL 93* 91* 91* CO2 26 28 30 BUN 16 14 13 CREA 0.81 0.65 0.76 CA 8.1* 8.0* 8.3* AGAP 7 9 6 BUCR 20 22* 17  
* 239* 267* TP 5.7* 5.9* 6.0* ALB 1.9* 2.0* 2.2*  
GLOB 3.8 3.9 3.8 AGRAT 0.5* 0.5* 0.6* CBC w/ Diff Recent Labs 21 
0247 WBC 5.8 4.7 5.6  
RBC 3.09* 3.02* 2.97* HGB 9.2* 9.2* 9.0*  
HCT 28.0* 27.2* 26.9*  
 236 246 GRANS 96* 93* 96* LYMPH 2* 5* 2* EOS 1 0 1 Microbiology All Micro Results Procedure Component Value Units Date/Time CULTURE, RESPIRATORY/SPUTUM/BRONCH Benettmalcom Dineshjose j [[id number]] Collected: 20 1610 Order Status: Completed Specimen: Sputum Updated: 21 1142 Special Requests: NO SPECIAL REQUESTS     
  GRAM STAIN FEW WBCS SEEN     
   NO EPITHELIAL CELLS SEEN     
      
  1+ GRAM POSITIVE COCCI IN PAIRS CHAINS AND CLUSTERS  
     
      
  FEW APPARENT GRAM NEGATIVE RODS  
     
      
  FEW GRAM POSITIVE RODS (CORYNEFORM) Culture result: MODERATE NORMAL RESPIRATORY PRANAY  
     
 AFB CULTURE + SMEAR W/RFLX ID FROM CULTURE [[id number]] Order Status: Sent CULTURE, BLOOD [[id number]] Collected: 12/27/20 1400 Order Status: Completed Specimen: Blood Updated: 01/01/21 5305 Special Requests: NO SPECIAL REQUESTS Culture result: NO GROWTH 5 DAYS     
 CULTURE, BLOOD [[id number]] Collected: 12/27/20 1350 Order Status: Completed Specimen: Blood Updated: 01/01/21 2623 Special Requests: NO SPECIAL REQUESTS Culture result: NO GROWTH 5 DAYS     
 AFB CULTURE + SMEAR W/RFLX ID FROM CULTURE [[id number]] Collected: 12/31/20 1610 Order Status: Completed Updated: 12/31/20 1617 CULTURE, RESPIRATORY/SPUTUM/BRONCH Trinidad Jim [[id number]] Collected: 12/28/20 1845 Order Status: Canceled Specimen: Sputum P.JIROVECI BY PCR [[id number]] Collected: 12/23/20 1845 Order Status: Completed Updated: 12/23/20 1959 CULTURE, BLOOD [[id number]] Collected: 12/17/20 1820 Order Status: Completed Specimen: Blood Updated: 12/23/20 0899 Special Requests: NO SPECIAL REQUESTS Culture result: NO GROWTH 6 DAYS     
 CULTURE, BLOOD [[id number]] Collected: 12/17/20 1810 Order Status: Completed Specimen: Blood Updated: 12/23/20 5876 Special Requests: NO SPECIAL REQUESTS Culture result: NO GROWTH 6 DAYS     
 CULTURE, RESPIRATORY/SPUTUM/BRONCH Ainsley Bharath STAIN [[id number]]  (Abnormal) Collected: 12/18/20 0014 Order Status: Completed Specimen: Sputum Updated: 12/20/20 1030 Special Requests: NO SPECIAL REQUESTS     
  GRAM STAIN MODERATE WBCS SEEN     
      
  RARE EPITHELIAL CELLS SEEN  
     
   FEW GRAM NEGATIVE RODS     
      
  RARE GRAM POSITIVE COCCI IN CHAINS  
     
   RARE GRAM POSITIVE RODS Culture result:    
  LIGHT NORMAL RESPIRATORY PRANAY  
     
      
  FEW YEAST, (APPARENT CANDIDA ALBICANS) LEGIONELLA PNEUMOPHILA AG, URINE [[id number]] Collected: 12/18/20 0014 Order Status: Completed Specimen: Urine, random Updated: 12/18/20 1131 Legionella Ag, urine Negative Sonia Dull, URINE [[id number]] Collected: 12/18/20 0014 Order Status: Completed Specimen: Urine, random Updated: 12/18/20 1131 Strep pneumo Ag, urine Negative INFLUENZA A & B AG (RAPID TEST) [[id number]] Collected: 12/17/20 1900 Order Status: Completed Specimen: Nasopharyngeal from Nasal washing Updated: 12/17/20 1925 Influenza A Antigen Negative Comment: A negative result does not exclude influenza virus infection, seasonal or H1N1 due to suboptimal sensitivity. If influenza is circulating in your community, a diagnosis of influenza should be considered based on a patients clinical presentation and empiric antiviral treatment should be considered, if indicated. Influenza B Antigen Negative Papo Barrera MD 
cell [phone] [facility] 1/1/2021  
12:16 PM

## 2021-01-01 NOTE — PROGRESS NOTES
[facility] Pulmonary, Critical Care, and Sleep Medicine Name: Emerson Goldberg MRN: [id number] : 1990 Hospital: [facility] [place] Date: 2021 PCCM Note Consult requesting physician: Dr. Shaylee Kim Reason for Consult: Likely PJP pneumonia, hypoxia IMPRESSION:  
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  
Diagnosis Code  AIDS (acquired immune deficiency syndrome) ([facility]) B20  
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  
 Acute respiratory distress R06.03  
 Hypoxia R09.02  
  
RECOMMENDATIONS:  
On HFNC at 50 Lpm and 55% FiO2. Titrate flow and fio2 for goal SPO2 > 91% XR chest this AM stable on f/up Per RT and RN no episodes of desaturation today; hopefully tomorrow we could wean HFC flow Improving ST Patient has off and on episodes of anxiety and panic; gets very tachycardiac and tachypnea. Once receives these meds, he calms down and feels better with improvement in HR and RR At this point he has tolerated this medications w/o any respiratory worsening rather they have appeared to have helped control his anxiety, panic and pain related hyperventilation with limited reserves Patient declined for ABG for follow up before EKG 20 showed ST. He remained asymptomatic from cardiac standpoint Low threshold for transfer to ICU for close monitoring if any worsening. Staff aware Bilateral extensive pneumonia with hypoxemia in a patient with HIV; COVID-19 negative; PJP pneumonia. Given inability to wean FiO2 or flow changed Bactrim to 20 mg/kg/d in divided 3 times daily 20 Due to worsening CXR findings and increased O2 requirement, prednisone is already changed to Solu-Medrol on 12/27/2020. Continue IV Solu-Medrol 40 mg IV every 12 hours. Await sputum cx for G/S and AFB Any empirical antibiotics for broadening coverage per clinical course Await LD in AM 
No leukocytosis. Mildly elevated lactic likely related to WOB Bronchodilators: Pulmicort twice daily, Xopenex every 4 hours. Xopenex and Atrovent as needed. G6PD normal. Monitor Hgb - stable Fungitell elevated 455. Expectorated sputum for PJP: Pending. Sputum culture 12/18/20: Light normal dequan. Urine antigen panel: Negative. COVID-19: Negative. Alk P elevated but stable and hyponatremia management defer to hospitalist. 
 
CT chest with bilateral diffuse GGO and underlying emphysema, pulmonary fibrosis in the background but due to motion artifact it is difficult to exclude. HOB >=30 degree, aspiration precautions, aggressive pulmonary toileting, incentive spirometry, Gregorio Pew DVT Prophylaxis: Lovenox GI Prophylaxis: Pepcid Other issues management by primary team and respective consultants. Further recommendations will be based on the patient's response to recommended treatment and results of the investigation ordered. Quality Care: PPI, DVT prophylaxis, HOB elevated, infection control all reviewed and addressed. Discussed with patient, answered all questions to his satisfaction. Care plan discussed with nursing, RT High Complexity decision making performed with > 50% time spent in face to face assessment of this patinet Subjective/History: Mr. Sheryl Nicole is a 31 y.o. male with PMHx significant for HIV diagnosed couple years ago, not on treatment; admitted with weight loss, ESCOTO; COVID-19 negative x2; diagnosed with atypical looking pneumonia/GGO on CT chest which is negative for PE, suspected for PJP pneumonia. Initially treated with Rocephin and Zithromax. Seen by Dr. Kamala Baptiste: Elevated LDH, PCT 0.57. Antibiotics changed to Bactrim and started on steroids. HIV viral load elevated. 1/1/2021 Patient seen at bedside in rm 342 Improved ST. Overnight high fever Tachypnea when converse long On HF at stable 50 Lpm and 55% w/o desaturations The patient denies wheezing, hemoptysis, palpitations, syncope, orthopnea, edema or pnd  
Coughing off and on but reports less with Robitussin Stable chest discomfort from coughing Afebrile. No GI symptoms. Appears chronically ill and easy to get short winded on long conversation Pulmonary was not contacted by staff on anything about patient overnight. Review of Systems:  
Constitutional: No no chills, night sweats HEENT: No epistaxis, no nasal drainage, no difficulty in swallowing, no redness in eyes Respiratory: as above Cardiovascular: no palpitations, no chronic leg edema, no syncope Gastrointestinal: no abd pain, no vomiting, no diarrhea, no bleeding symptoms Genitourinary: No urinary symptoms or hematuria Neurological: No focal weakness, no seizures, no headaches Behvioral/Psych: No anxiety, no depression No Known Allergies Past Medical History:  
Diagnosis Date  Asthma  Chlamydia  Herpes zoster  HIV (human immunodeficiency virus infection) ([facility] [address]  Syphilis History reviewed. No pertinent surgical history. History reviewed. No pertinent family history. Social History Tobacco Use  Smoking status: Former Smoker  Smokeless tobacco: Never Used Substance Use Topics  Alcohol use: Yes Comment: socially Prior to Admission medications Not on File Current Facility-Administered Medications Medication Dose Route Frequency  azithromycin (ZITHROMAX) tablet 500 mg  500 mg Oral DAILY  ethambutoL (MYAMBUTOL) tablet 1,200 mg  1,200 mg Oral DAILY  [START ON 1/2/2021] rifAMPin (RIFADIN) capsule 600 mg  600 mg Oral ACB  rifAMPin (RIFADIN) capsule 600 mg  600 mg Oral NOW  trimethoprim-sulfamethoxazole (BACTRIM) 438.72 mg in dextrose 5% 500 mL  20 mg/kg/day IntraVENous Q8H  phenol throat spray (CHLORASEPTIC) 1 Spray  1 Spray Oral PRN  
 ibuprofen (MOTRIN) tablet 400 mg  400 mg Oral Q4H PRN  
 sodium bicarbonate tablet 650 mg  650 mg Oral BID  sertraline (ZOLOFT) tablet 50 mg  50 mg Oral DAILY  busPIRone (BUSPAR) tablet 5 mg  5 mg Oral TID  morphine injection 2 mg  2 mg IntraVENous Q4H PRN  
 LORazepam (ATIVAN) tablet 0.5 mg  0.5 mg Oral Q6H PRN  penicillin g benzathine (BICILLIN LA) 1,200,000 unit/2 mL IM injection 2.4 Million Units  2.4 Million Units IntraMUSCular Q7D  
 methylPREDNISolone (PF) (SOLU-MEDROL) injection 40 mg  40 mg IntraVENous Q12H  
 insulin lispro (HUMALOG) injection   SubCUTAneous AC&HS  acetaminophen (TYLENOL) tablet 650 mg  650 mg Oral Q8H PRN  
 multivitamin, tx-iron-ca-min (THERA-M w/ IRON) tablet 1 Tab  1 Tab Oral DAILY  guaiFENesin (ROBITUSSIN) 100 mg/5 mL oral liquid 100 mg  100 mg Oral Q4H PRN  
 melatonin tablet 10 mg  10 mg Oral QHS  levalbuterol (XOPENEX) nebulizer soln 1.25 mg/0.5 ml  1.25 mg Nebulization Q4H RT  
 ipratropium (ATROVENT) 0.02 % nebulizer solution 0.5 mg  0.5 mg Nebulization Q4H PRN  
 budesonide (PULMICORT) 500 mcg/2 ml nebulizer suspension  500 mcg Nebulization BID RT  
 levalbuterol (XOPENEX) nebulizer soln 1.25 mg/0.5 ml  1.25 mg Nebulization Q4H PRN  
 enoxaparin (LOVENOX) injection 40 mg  40 mg SubCUTAneous Q24H  
 influenza vaccine 2020-21 (6 mos+)(PF) (FLUARIX/FLULAVAL/FLUZONE QUAD) injection 0.5 mL  0.5 mL IntraMUSCular PRIOR TO DISCHARGE  calcium carbonate (TUMS) chewable tablet 200 mg [elemental]  200 mg Oral TID PRN  
 [Held by provider] predniSONE (DELTASONE) tablet 20 mg  20 mg Oral DAILY WITH BREAKFAST  sodium chloride (NS) flush 5-10 mL  5-10 mL IntraVENous PRN  
  sodium chloride (NS) flush 5-40 mL  5-40 mL IntraVENous PRN  promethazine (PHENERGAN) tablet 12.5 mg  12.5 mg Oral Q6H PRN Or  
 ondansetron (ZOFRAN) injection 4 mg  4 mg IntraVENous Q6H PRN  
 bisacodyL (DULCOLAX) tablet 5 mg  5 mg Oral DAILY PRN  
 famotidine (PEPCID) tablet 20 mg  20 mg Oral BID Objective:  
Vital Signs:   
Blood pressure (!) 142/87, pulse (!) 114, temperature 98.6 °F (37 °C), resp. rate 20, height 5' 7\" (1.702 m), weight 67.6 kg (149 lb), SpO2 95 %. Body mass index is 23.34 kg/m². O2 Device: Heated, Hi flow nasal cannula O2 Flow Rate (L/min): 50 l/min Temp (24hrs), Av.2 °F (37.9 °C), Min:98.1 °F (36.7 °C), Max:102.6 °F (39.2 °C) Intake/Output:  
Last shift:      701 -  190 In: 360 [P.O.:360] Out: 1500 [Urine:1500] Last 3 shifts: 1901 -  0700 In: -  
Out: 4154 [ZMONV:[id number]] Intake/Output Summary (Last 24 hours) at 2021 1446 Last data filed at 2021 1310 Gross per 24 hour Intake 360 ml Output 3600 ml Net -3240 ml Physical Exam:  
General: AAO x 3, comfortable, no respiratory distress, appears older than stated age, on HFNC HEENT: PERRLA, throat normal without erythema or exudate Neck: No abnormally enlarged lymph nodes or thyroid, supple Chest: normal 
Lungs: moderate air entry, few rhonchi scattered bilaterally, normal percussion bilaterally, no tenderness/ rash Heart: Regular rate and rhythm, S1S2 present or without murmur or extra heart sounds Abdomen: non distended, bowel sounds normoactive, tympanic, soft without significant tenderness, masses, organomegaly or guarding Extremity: negative for edema, cyanosis, clubbing Neuro:  aao x 3, no defects noted in limited exam. 
Skin: Skin color, texture, turgor fair Data:  
   
Recent Results (from the past 24 hour(s)) EKG, 12 LEAD, SUBSEQUENT Collection Time: 20  4:07 PM  
Result Value Ref Range  Ventricular Rate 145 BPM  
 Atrial Rate 145 BPM  
 P-R Interval 112 ms QRS Duration 78 ms Q-T Interval 264 ms QTC Calculation (Bezet) 410 ms Calculated P Axis 87 degrees Calculated R Axis 21 degrees Calculated T Axis 58 degrees Diagnosis Sinus tachycardia Otherwise normal ECG When compared with ECG of 17-DEC-2020 18:00, 
premature ventricular complexes are no longer present Left anterior fascicular block is no longer present CULTURE, RESPIRATORY/SPUTUM/BRONCH W GRAM STAIN Collection Time: 12/31/20  4:10 PM  
 Specimen: Sputum Result Value Ref Range Special Requests: NO SPECIAL REQUESTS    
 GRAM STAIN FEW WBCS SEEN    
 GRAM STAIN NO EPITHELIAL CELLS SEEN    
 GRAM STAIN     
  1+ GRAM POSITIVE COCCI IN PAIRS CHAINS AND CLUSTERS  
 GRAM STAIN FEW APPARENT GRAM NEGATIVE RODS    
 GRAM STAIN FEW GRAM POSITIVE RODS (CORYNEFORM) Culture result: MODERATE NORMAL RESPIRATORY PRANAY    
GLUCOSE, POC Collection Time: 12/31/20  4:28 PM  
Result Value Ref Range Glucose (POC) 94 70 - 110 mg/dL LACTIC ACID Collection Time: 12/31/20  7:20 PM  
Result Value Ref Range Lactic acid 2.1 (HH) 0.4 - 2.0 MMOL/L  
GLUCOSE, POC Collection Time: 12/31/20  9:19 PM  
Result Value Ref Range Glucose (POC) 189 (H) 70 - 110 mg/dL METABOLIC PANEL, COMPREHENSIVE Collection Time: 01/01/21  3:00 AM  
Result Value Ref Range Sodium 126 (L) 136 - 145 mmol/L Potassium 4.6 3.5 - 5.5 mmol/L Chloride 93 (L) 100 - 111 mmol/L  
 CO2 26 21 - 32 mmol/L Anion gap 7 3.0 - 18 mmol/L Glucose 358 (H) 74 - 99 mg/dL BUN 16 7.0 - 18 MG/DL Creatinine 0.81 0.6 - 1.3 MG/DL  
 BUN/Creatinine ratio 20 12 - 20 GFR est AA >60 >60 ml/min/1.73m2 GFR est non-AA >60 >60 ml/min/1.73m2 Calcium 8.1 (L) 8.5 - 10.1 MG/DL Bilirubin, total 0.5 0.2 - 1.0 MG/DL  
 ALT (SGPT) 103 (H) 16 - 61 U/L  
 AST (SGOT) 65 (H) 10 - 38 U/L Alk. phosphatase 427 (H) 45 - 117 U/L Protein, total 5.7 (L) 6.4 - 8.2 g/dL Albumin 1.9 (L) 3.4 - 5.0 g/dL Globulin 3.8 2.0 - 4.0 g/dL A-G Ratio 0.5 (L) 0.8 - 1.7    
CBC WITH AUTOMATED DIFF Collection Time: 01/01/21  3:00 AM  
Result Value Ref Range WBC 5.8 4.6 - 13.2 K/uL  
 RBC 3.09 (L) 4.70 - 5.50 M/uL HGB 9.2 (L) 13.0 - 16.0 g/dL HCT 28.0 (L) 36.0 - 48.0 % MCV 90.6 74.0 - 97.0 FL  
 MCH 29.8 24.0 - 34.0 PG  
 MCHC 32.9 31.0 - 37.0 g/dL  
 RDW 13.9 11.6 - 14.5 % PLATELET 674 352 - 737 K/uL MPV 10.6 9.2 - 11.8 FL  
 NEUTROPHILS 96 (H) 40 - 73 % LYMPHOCYTES 2 (L) 21 - 52 % MONOCYTES 1 (L) 3 - 10 % EOSINOPHILS 1 0 - 5 % BASOPHILS 0 0 - 2 %  
 ABS. NEUTROPHILS 5.7 1.8 - 8.0 K/UL  
 ABS. LYMPHOCYTES 0.1 (L) 0.9 - 3.6 K/UL  
 ABS. MONOCYTES 0.0 (L) 0.05 - 1.2 K/UL  
 ABS. EOSINOPHILS 0.0 0.0 - 0.4 K/UL  
 ABS. BASOPHILS 0.0 0.0 - 0.1 K/UL  
 DF AUTOMATED    
GLUCOSE, POC Collection Time: 01/01/21  6:19 AM  
Result Value Ref Range Glucose (POC) 156 (H) 70 - 110 mg/dL GLUCOSE, POC Collection Time: 01/01/21 11:31 AM  
Result Value Ref Range Glucose (POC) 123 (H) 70 - 110 mg/dL Chemistry Recent Labs 01/01/21 
0300 12/31/20 
0315 12/30/20 
0247 * 319* 214* * 128* 127* K 4.6 4.2 4.7 CL 93* 91* 91* CO2 26 28 30 BUN 16 14 13 CREA 0.81 0.65 0.76 CA 8.1* 8.0* 8.3* AGAP 7 9 6 BUCR 20 22* 17  
* 239* 267* TP 5.7* 5.9* 6.0* ALB 1.9* 2.0* 2.2*  
GLOB 3.8 3.9 3.8 AGRAT 0.5* 0.5* 0.6* Lactic Acid Lactic acid Date Value Ref Range Status 12/31/2020 2.1 (HH) 0.4 - 2.0 MMOL/L Final  
  Comment:  
  CALLED TO AND CORRECTLY REPEATED BY: 
Suzanne Jacobs RN, 3N ON 12/31/2020 AT 2000 TO [facility] Recent Labs 12/31/20 
1920 LAC 2.1* Liver Enzymes Protein, total  
Date Value Ref Range Status 01/01/2021 5.7 (L) 6.4 - 8.2 g/dL Final  
 
Albumin Date Value Ref Range Status 01/01/2021 1.9 (L) 3.4 - 5.0 g/dL Final  
 
Globulin Date Value Ref Range Status 01/01/2021 3.8 2.0 - 4.0 g/dL Final  
 
A-G Ratio Date Value Ref Range Status 01/01/2021 0.5 (L) 0.8 - 1.7   Final  
 
Alk. phosphatase Date Value Ref Range Status 01/01/2021 427 (H) 45 - 117 U/L Final  
 
Recent Labs 01/01/21 
0300 12/31/20 
0315 12/30/20 
0247 TP 5.7* 5.9* 6.0* ALB 1.9* 2.0* 2.2*  
GLOB 3.8 3.9 3.8 AGRAT 0.5* 0.5* 0.6* * 239* 267* CBC w/Diff Recent Labs 01/01/21 
0300 12/31/20 
0315 12/30/20 
0247 WBC 5.8 4.7 5.6  
RBC 3.09* 3.02* 2.97* HGB 9.2* 9.2* 9.0*  
HCT 28.0* 27.2* 26.9*  
 236 246 GRANS 96* 93* 96* LYMPH 2* 5* 2* EOS 1 0 1 Cardiac Enzymes No results found for: CPK, CK, CKMMB, CKMB, RCK3, CKMBT, CKNDX, CKND1, ANIKA, TROPT, TROIQ, ZOË, TROPT, TNIPOC, BNP, BNPP  
 
BNP No results found for: BNP, BNPP, XBNPT Coagulation No results for input(s): PTP, INR, APTT, INREXT, INREXT in the last 72 hours. Thyroid  Lab Results Component Value Date/Time TSH 2.10 12/23/2020 10:45 AM  
   
No results found for: T4  
 
Urinalysis Lab Results Component Value Date/Time Color YELLOW 12/18/2020 12:14 AM  
 Appearance CLEAR 12/18/2020 12:14 AM  
 Specific gravity <1.005 (L) 12/18/2020 12:14 AM  
 pH (UA) 7.0 12/18/2020 12:14 AM  
 Protein Negative 12/18/2020 12:14 AM  
 Glucose Negative 12/18/2020 12:14 AM  
 Ketone Negative 12/18/2020 12:14 AM  
 Bilirubin Negative 12/18/2020 12:14 AM  
 Urobilinogen 1.0 12/18/2020 12:14 AM  
 Nitrites Negative 12/18/2020 12:14 AM  
 Leukocyte Esterase Negative 12/18/2020 12:14 AM  
 Epithelial cells 1+ 03/08/2018 09:11 PM  
 Bacteria FEW (A) 03/08/2018 09:11 PM  
 WBC 4 to 6 03/08/2018 09:11 PM  
 RBC NEGATIVE  03/08/2018 09:11 PM  
  
 
Micro  Recent Labs 12/31/20 
1610 CULT MODERATE NORMAL RESPIRATORY PRANAY Recent Labs 12/31/20 
1610 CULT MODERATE NORMAL RESPIRATORY PRANAY ABG No results for input(s): PHI, PHI, POC2, PCO2I, PO2, PO2I, HCO3, HCO3I, FIO2, FIO2I in the last 72 hours. CT (Most Recent) (CT chest reviewed by me) Results from [facility] Encounter encounter on 12/17/20 CTA CHEST W OR W WO CONT Narrative EXAM: CTA Chest 
 
INDICATION: Shortness of breath. Possible PE. COMPARISON: No prior study. TECHNIQUE: Axial CT imaging from the thoracic inlet through the diaphragm with 
intravenous contrast utilizing CTA study for pulmonary artery evaluation. Coronal and sagittal MIP reformations were generated at a separate workstation. One or more dose reduction techniques were used on this CT: automated exposure 
control, adjustment of the mAs and/or kVp according to patient size, and 
iterative reconstruction techniques. The specific techniques used on this CT 
exam have been documented in the patient's electronic medical record. Digital 
Imaging and Communications in Medicine (DICOM) format image data are available 
to nonaffiliated external healthcare facilities or entities on a secure, media 
free, reciprocally searchable basis with patient authorization for at least a 
12-month period after this study. _______________ FINDINGS: 
 
EXAM QUALITY: Overall exam quality is suboptimal. Pulmonary arterial enhancement 
is adequate. Respiratory motion artifact is present, limiting evaluation. PULMONARY ARTERIES: No convincing evidence of pulmonary embolism. MEDIASTINUM: Normal heart size. No evidence of right heart strain. Aorta is 
unremarkable. No pericardial effusion. LYMPH NODES: No pathologically enlarged lymph nodes lymph nodes. AIRWAY: Unremarkable. LUNGS: Centrilobular emphysema. Superimposed scattered bilateral groundglass 
opacities throughout the lungs. PLEURA: No pleural effusion or pneumothorax. UPPER ABDOMEN: Visualized upper abdomen is unremarkable. OTHER: No acute or aggressive osseous abnormalities identified. _______________ Impression IMPRESSION: 
 
Suboptimal study secondary to motion artifact and bolus timing. No evidence of obvious central pulmonary embolism. Diffuse bilateral groundglass opacities, suspicious for atypical infection. Emphysema. XR (Most Recent). CXR  reviewed by me and compared with previous CXR Results from [facility] [place] Encounter encounter on 12/17/20 XR CHEST PORT Narrative EXAM: XR CHEST PORT 
 
CLINICAL INDICATION/HISTORY: PJP pneumonia, shortness of breath 
-Additional: None COMPARISON: Several prior studies, most recently 12/29/2020 TECHNIQUE: Portable frontal view of the chest 
 
_______________ FINDINGS: 
 
SUPPORT DEVICES: None. HEART AND MEDIASTINUM: Stable appearing cardiac size and mediastinal contours. LUNGS AND PLEURAL SPACES: Relatively diffuse interstitial and to lesser extent 
alveolar opacities are present bilaterally. No pneumothorax or pleural effusion. BONY THORAX AND SOFT TISSUES: Unremarkable. 
 
_______________ Impression IMPRESSION: 
 
Diffuse interstitial and airspace disease, overall similar to prior without 
superimposed acute abnormality. Sathish Lubin MD 
1/1/2021

## 2021-01-01 NOTE — PROGRESS NOTES
0725:Received verbal bedside report from off going nurse BUSTER Albarado R.N. Patient care received. Patient alert and oriented x 4. Patient resting in bed. Patient stable. Call light with in reach bed in lowest position. 1531: Informed  that patient's heart rate was sustaining in at 128. She said that she would look at patient's chart. Will continue to monitor patient.

## 2021-01-01 NOTE — PROGRESS NOTES
Hospitalist Progress Note Patient: Hannah Childress MRN: [id number]  CSN: [id number] YOB: 1990  Age: 31 y.o. Sex: male DOA: 12/17/2020 LOS:  LOS: 15 days Chief Complaint: Ac resp failure Assessment/Plan  
 
31 you male with untreated HIV/AIDS over 3 years, came with SOB and fevers 
covid negative 
  
Acute hypoxia resp failure requiring high flow 02-continued SOB and ESCOTO Sepsis-resolved Tachycardia-likely due to advanced lung infection, and severe illness 
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 
  
Hyponatremia-sodium bicarb tabs, fluid restriction 
  
Depression and anxiety-SSRI and anxiolytic medicine- zoloft and buspar, ativan ONLY if needed for severe anxiety 
  
DVT proph-lovenox 
  
Continue supportive care 
  
Tele monitoring 
  
Very poor prognosis-he gave permission for me to update his mother who he states knows of his HIV disease- he is really not making much recovery, his mortality rate and risk is high I updated mother on severity of his condition, she appreciated the call Start low dose IVF 
  
Disposition : 
Patient Active Problem List  
Diagnosis Code  AIDS (acquired immune deficiency syndrome) ([facility]) B20  
 Bilateral pneumonia J18.9  Sepsis ([facility] [address] A41.9  Hyponatremia E87.1  Pneumonia of both lungs due to Pneumocystis jirovecii ([facility]) B59  
 Acute respiratory distress R06.03  
 Hypoxia R09.02 Subjective: 
 
Still winded But can speak HR has come down some 
sats are wnl on high flow No new changes Review of systems: 
 
Constitutional:  fevers, chills, myalgias Respiratory: SOB, cough Cardiovascular: denies chest pain, palpitations Gastrointestinal: denies nausea, vomiting, diarrhea Vital signs/Intake and Output: 
Visit Vitals BP (!) 142/95 Pulse (!) 129 Temp 98.8 °F (37.1 °C) Resp 22 Ht 5' 7\" (1.702 m) Wt 67.6 kg (149 lb) SpO2 97% BMI 23.34 kg/m² Current Shift:  01/01 0701 - 01/01 1900 In: 360 [P.O.:360] Out: 1500 [Urine:1500] Last three shifts:  12/30 1901 - 01/01 0700 In: -  
Out: 6581 [MLGES:7973] Exam: 
 
General:ill appearing cachectic AAM alert, NAD, OX3 Head/Neck: NCAT, supple, No masses, No lymphadenopathy CVS:tachy regular, no M/R/G, S1/S2 heard, no thrill Lungs:Clear to auscultation bilaterally, no wheezes, rhonchi, or rales Abdomen: Soft, Nontender, No distention, Normal Bowel sounds, No hepatomegaly Extremities: No C/C/E, pulses palpable 2+ Neuro:grossly normal , follows commands Psych:appropriate Labs: Results:  
   
Chemistry Recent Labs 01/01/21 0300 12/31/20 0315 12/30/20 
0247 * 319* 214* * 128* 127* K 4.6 4.2 4.7 CL 93* 91* 91* CO2 26 28 30 BUN 16 14 13 CREA 0.81 0.65 0.76 CA 8.1* 8.0* 8.3* AGAP 7 9 6 BUCR 20 22* 17  
* 239* 267* TP 5.7* 5.9* 6.0* ALB 1.9* 2.0* 2.2*  
GLOB 3.8 3.9 3.8 AGRAT 0.5* 0.5* 0.6* CBC w/Diff Recent Labs 01/01/21 0300 12/31/20 0315 12/30/20 
0247 WBC 5.8 4.7 5.6  
RBC 3.09* 3.02* 2.97* HGB 9.2* 9.2* 9.0*  
HCT 28.0* 27.2* 26.9*  
 236 246 GRANS 96* 93* 96* LYMPH 2* 5* 2* EOS 1 0 1 Cardiac Enzymes No results for input(s): CPK, CKND1, ANIKA in the last 72 hours. No lab exists for component: Limmie Knuckles Coagulation No results for input(s): PTP, INR, APTT, INREXT in the last 72 hours. Lipid Panel No results found for: CHOL, CHOLPOCT, CHOLX, CHLST, CHOLV, 501531, HDL, HDLP, LDL, LDLC, DLDLP, 688645, VLDLC, VLDL, TGLX, TRIGL, TRIGP, TGLPOCT, CHHD, CHHDX  
BNP No results for input(s): BNPP in the last 72 hours. Liver Enzymes Recent Labs 01/01/21 
0300 TP 5.7* ALB 1.9* * Thyroid Studies Lab Results Component Value Date/Time TSH 2.10 12/23/2020 10:45 AM  
    
Procedures/imaging: see electronic medical records for all procedures/Xrays and details which were not copied into this note but were reviewed prior to creation of Plan Thang Campbell MD

## 2021-01-01 NOTE — PROGRESS NOTES
2130: Patient has a MEWS of 5 with a temperature of 102. 2. Critical lactic 2.1. Dr Lois Somers made aware. MD will place orders. 0211: Patient resting comfortably in bed. MEWS now a 3 with . 
 
0435: HR 96 on the monitor. Patient resting in bed in no acute distress. 0730: Bedside and Verbal shift change report given to Bren Pritchard RN (oncoming nurse) by Young Mcnamara RN (offgoing nurse). Report included the following information SBAR, Kardex, Intake/Output, MAR and Recent Results.

## 2021-01-01 NOTE — PROGRESS NOTES
01/01/21 1119 PEP Therapy Details PEP Device Oscillating positive expiratory device (Patient report using but dose not wish to use at this time)

## 2021-01-01 NOTE — PROGRESS NOTES
Noted PT order cancelled by Dr. Ericka Andrade. Pt has also not been participating with PT services. Pt declines all attempts to encourage attempting to sit on the EOB due to pt reports breathing difficulty and unable to tolerate this at this time. Please reorder when patient willing and able to tolerate OOB mobility.

## 2021-01-01 NOTE — PROGRESS NOTES
Problem: Risk for Spread of Infection Goal: Prevent transmission of infectious organism to others Description: Prevent the transmission of infectious organisms to other patients, staff members, and visitors. Outcome: Progressing Towards Goal 
  
Problem: Pressure Injury - Risk of 
Goal: *Prevention of pressure injury Description: Document Josep Scale and appropriate interventions in the flowsheet. Outcome: Progressing Towards Goal 
Note: Pressure Injury Interventions: 
Sensory Interventions: Keep linens dry and wrinkle-free, Maintain/enhance activity level, Pad between skin to skin, Turn and reposition approx. every two hours (pillows and wedges if needed) Moisture Interventions: Limit adult briefs, Maintain skin hydration (lotion/cream) Activity Interventions: Pressure redistribution bed/mattress(bed type), Increase time out of bed Mobility Interventions: Pressure redistribution bed/mattress (bed type), HOB 30 degrees or less Nutrition Interventions: Document food/fluid/supplement intake, Offer support with meals,snacks and hydration Friction and Shear Interventions: HOB 30 degrees or less Problem: Nutrition Deficit Goal: *Optimize nutritional status Outcome: Progressing Towards Goal

## 2021-01-02 NOTE — PROGRESS NOTES
[facility] Pul[facility], Critical Care, and Sleep Medicine Name: Scot Martínez MRN: [id number] : 1990 Hospital: [facility] [place] Date: 2021 [facility] Note Consult requesting physician: Dr. oRdrigo Paris Reason for Consult: Likely PJP pneumonia, hypoxia IMPRESSION:  
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  
Diagnosis Code  AIDS (acquired immune deficiency syndrome) ([facility]) B20  
 Bilateral pneumonia J18.9  Sepsis ([facility] [address] A41.9  Hyponatremia E87.1  Pneumonia of both lungs due to Pneumocystis jirovecii ([facility]) B59  
 Acute respiratory distress R06.03  
 Hypoxia R09.02  
  
RECOMMENDATIONS:  
On HFNC at 50 Lpm and 60% FiO2. Titrate flow and fio2 for goal SPO2 > 91%- RT aware XR chest stable on f/up 21 HOB >=30 degree, aspiration precautions, aggressive pulmonary toileting, incentive spirometry, [place] Patient has off and on episodes of anxiety and panic; gets very tachycardiac and tachypnea. Once receives these meds, he calms down and feels better with improvement in HR and RR At this point he has tolerated this medications w/o any respiratory worsening rather they have appeared to have helped control his anxiety, panic and pain related hyperventilation with limited reserves Patient has declined for ABG for follow up EKG 20 showed ST. He remained asymptomatic from cardiac standpoint Low threshold for transfer to ICU for close monitoring if any worsening. Staff aware Bilateral extensive pneumonia with hypoxemia in a patient with HIV; COVID-19 negative; PJP pneumonia. Bactrim to 20 mg/kg/d in divided 3 times daily since 20 given inability to wean FiO2 or flow Due to worsening CXR findings and increased O2 requirement, prednisone is already changed to Solu-Medrol on 12/27/2020. Continue IV Solu-Medrol 40 mg IV every 12 hours. Sputum cx for G/S NGT final and AFB sputum pending ID started pt on: Azithromycin, Ethambutol, Rifampin for MAC infection empirically No leukocytosis. Mildly elevated lactic likely related to WOB Bronchodilators: Pulmicort twice daily, Xopenex every 4 hours. Xopenex and Atrovent as needed. G6PD normal. Monitor Hgb Fungitell elevated 455. Expectorated sputum for PJP: Pending. Sputum culture 12/18/20: Light normal dequan. Urine antigen panel: Negative. COVID-19: Negative. Alk P elevated but stable and hyponatremia management defer to hospitalist. 
 
CT chest with bilateral diffuse GGO and underlying emphysema, pulmonary fibrosis in the background but due to motion artifact it is difficult to exclude. DVT Prophylaxis: Lovenox GI Prophylaxis: Pepcid Other issues management by primary team and respective consultants. Further recommendations will be based on the patient's response to recommended treatment and results of the investigation ordered. Quality Care: PPI, DVT prophylaxis, HOB elevated, infection control all reviewed and addressed. Discussed with patient, answered all questions to his satisfaction. Care plan discussed with nursing, RT High Complexity decision making performed with > 50% time spent in face to face assessment of this patinet Subjective/History: Mr. Roosevelt Grajeda is a 31 y.o. male with PMHx significant for HIV diagnosed couple years ago, not on treatment; admitted with weight loss, ESCOTO; COVID-19 negative x2; diagnosed with atypical looking pneumonia/GGO on CT chest which is negative for PE, suspected for PJP pneumonia. Initially treated with Rocephin and Zithromax. Seen by Dr. Tarah López: Elevated LDH, PCT 0.57. Antibiotics changed to Bactrim and started on steroids. HIV viral load elevated. 1/2/2021 Patient seen in rm 342 at bedside Remained on HFNC and during anxiety episode required FiO2 to increase 75% but tolerating gradual weaning ST stable and in NSR No fever today. Cough and phlegm stable Patient denies wheezing or hemoptysis. The patient denies palpitations, syncope, orthopnea, edema or pnd Stable chest discomfort from coughing Appears chronically ill and easy to get short winded on long conversation Pulmonary was not contacted by staff on anything about patient overnight. Review of Systems:  
Constitutional: No no chills, night sweats HEENT: No epistaxis, no nasal drainage, no difficulty in swallowing, no redness in eyes Respiratory: as above Cardiovascular: no palpitations, no chronic leg edema, no syncope Gastrointestinal: no abd pain, no vomiting, no diarrhea, no bleeding symptoms Genitourinary: No urinary symptoms or hematuria Neurological: No focal weakness, no seizures, no headaches Behvioral/Psych: No anxiety, no depression No Known Allergies Past Medical History:  
Diagnosis Date  Asthma  Chlamydia  Herpes zoster  HIV (human immunodeficiency virus infection) ([facility])  Syphilis History reviewed. No pertinent surgical history. History reviewed. No pertinent family history. Social History Tobacco Use  Smoking status: Former Smoker  Smokeless tobacco: Never Used Substance Use Topics  Alcohol use: Yes Comment: socially Prior to Admission medications Not on File Current Facility-Administered Medications Medication Dose Route Frequency  azithromycin (ZITHROMAX) tablet 500 mg  500 mg Oral DAILY  ethambutoL (MYAMBUTOL) tablet 1,200 mg  1,200 mg Oral DAILY  rifAMPin (RIFADIN) capsule 600 mg  600 mg Oral ACB  0.9% sodium chloride infusion  75 mL/hr IntraVENous CONTINUOUS  
 trimethoprim-sulfamethoxazole (BACTRIM) 438.72 mg in dextrose 5% 500 mL  20 mg/kg/day IntraVENous Q8H  phenol throat spray (CHLORASEPTIC) 1 Spray  1 Spray Oral PRN  
 ibuprofen (MOTRIN) tablet 400 mg  400 mg Oral Q4H PRN  
 sodium bicarbonate tablet 650 mg  650 mg Oral BID  sertraline (ZOLOFT) tablet 50 mg  50 mg Oral DAILY  busPIRone (BUSPAR) tablet 5 mg  5 mg Oral TID  morphine injection 2 mg  2 mg IntraVENous Q4H PRN  
 LORazepam (ATIVAN) tablet 0.5 mg  0.5 mg Oral Q6H PRN  penicillin g benzathine (BICILLIN LA) 1,200,000 unit/2 mL IM injection 2.4 Million Units  2.4 Million Units IntraMUSCular Q7D  
 methylPREDNISolone (PF) (SOLU-MEDROL) injection 40 mg  40 mg IntraVENous Q12H  
 insulin lispro (HUMALOG) injection   SubCUTAneous AC&HS  acetaminophen (TYLENOL) tablet 650 mg  650 mg Oral Q8H PRN  
 multivitamin, tx-iron-ca-min (THERA-M w/ IRON) tablet 1 Tab  1 Tab Oral DAILY  guaiFENesin (ROBITUSSIN) 100 mg/5 mL oral liquid 100 mg  100 mg Oral Q4H PRN  
 melatonin tablet 10 mg  10 mg Oral QHS  levalbuterol (XOPENEX) nebulizer soln 1.25 mg/0.5 ml  1.25 mg Nebulization Q4H RT  
 ipratropium (ATROVENT) 0.02 % nebulizer solution 0.5 mg  0.5 mg Nebulization Q4H PRN  
 budesonide (PULMICORT) 500 mcg/2 ml nebulizer suspension  500 mcg Nebulization BID RT  
 levalbuterol (XOPENEX) nebulizer soln 1.25 mg/0.5 ml  1.25 mg Nebulization Q4H PRN  
 enoxaparin (LOVENOX) injection 40 mg  40 mg SubCUTAneous Q24H  
 influenza vaccine 2020-21 (6 mos+)(PF) (FLUARIX/FLULAVAL/FLUZONE QUAD) injection 0.5 mL  0.5 mL IntraMUSCular PRIOR TO DISCHARGE  calcium carbonate (TUMS) chewable tablet 200 mg [elemental]  200 mg Oral TID PRN  
 [Held by provider] predniSONE (DELTASONE) tablet 20 mg  20 mg Oral DAILY WITH BREAKFAST  sodium chloride (NS) flush 5-10 mL  5-10 mL IntraVENous PRN  
  sodium chloride (NS) flush 5-40 mL  5-40 mL IntraVENous PRN  promethazine (PHENERGAN) tablet 12.5 mg  12.5 mg Oral Q6H PRN Or  
 ondansetron (ZOFRAN) injection 4 mg  4 mg IntraVENous Q6H PRN  
 bisacodyL (DULCOLAX) tablet 5 mg  5 mg Oral DAILY PRN  
 famotidine (PEPCID) tablet 20 mg  20 mg Oral BID Objective:  
Vital Signs:   
Blood pressure (!) 138/93, pulse (!) 134, temperature 99.5 °F (37.5 °C), resp. rate 22, height 5' 7\" (1.702 m), weight 68.8 kg (151 lb 11.2 oz), SpO2 93 %. Body mass index is 23.76 kg/m². O2 Device: Heated, Hi flow nasal cannula O2 Flow Rate (L/min): 50 l/min Temp (24hrs), Av.9 °F (37.2 °C), Min:98 °F (36.7 °C), Max:99.7 °F (37.6 °C) Intake/Output:  
Last shift:      701 -  1900 In: 540 [P.O.:540] Out: 500 [Urine:500] Last 3 shifts: 1901 -  0700 In: 360 [P.O.:360] Out: 6325 [[id number]] Intake/Output Summary (Last 24 hours) at 2021 1634 Last data filed at 2021 1200 Gross per 24 hour Intake 540 ml Output 3225 ml Net -2685 ml Physical Exam:  
General: AAO x 3, no respiratory distress, appears older than stated age, on HFNC HEENT: PERRLA, throat moist without erythema or exudate Neck: No abnormally enlarged lymph nodes or thyroid, supple Chest: normal 
Lungs: moderate air entry, few rhonchi scattered bilaterally, normal percussion bilaterally, no tenderness/ rash Heart: Regular rate and rhythm, S1S2 present or without murmur or extra heart sounds Abdomen: non distended, bowel sounds normoactive, tympanic, soft without significant tenderness, masses, organomegaly or guarding Extremity: negative for edema, cyanosis, clubbing Neuro:  aao x 3, no defects noted in limited exam. 
Skin: Skin color, texture, turgor fair Data:  
   
Recent Results (from the past 24 hour(s)) LD Collection Time: 21  8:00 PM  
Result Value Ref Range   (H) 81 - 234 U/L  
GLUCOSE, POC  
 Collection Time: 01/01/21  9:02 PM  
Result Value Ref Range Glucose (POC) 248 (H) 70 - 110 mg/dL METABOLIC PANEL, COMPREHENSIVE Collection Time: 01/02/21  3:35 AM  
Result Value Ref Range Sodium 132 (L) 136 - 145 mmol/L Potassium 4.0 3.5 - 5.5 mmol/L Chloride 100 100 - 111 mmol/L  
 CO2 25 21 - 32 mmol/L Anion gap 7 3.0 - 18 mmol/L Glucose 133 (H) 74 - 99 mg/dL BUN 9 7.0 - 18 MG/DL Creatinine 0.39 (L) 0.6 - 1.3 MG/DL  
 BUN/Creatinine ratio 23 (H) 12 - 20 GFR est AA >60 >60 ml/min/1.73m2 GFR est non-AA >60 >60 ml/min/1.73m2 Calcium 6.8 (L) 8.5 - 10.1 MG/DL Bilirubin, total 0.2 0.2 - 1.0 MG/DL  
 ALT (SGPT) 55 16 - 61 U/L  
 AST (SGOT) 25 10 - 38 U/L Alk. phosphatase 266 (H) 45 - 117 U/L Protein, total 4.7 (L) 6.4 - 8.2 g/dL Albumin 1.5 (L) 3.4 - 5.0 g/dL Globulin 3.2 2.0 - 4.0 g/dL A-G Ratio 0.5 (L) 0.8 - 1.7    
CBC WITH AUTOMATED DIFF Collection Time: 01/02/21  3:35 AM  
Result Value Ref Range WBC 3.9 (L) 4.6 - 13.2 K/uL  
 RBC 2.66 (L) 4.70 - 5.50 M/uL HGB 8.1 (L) 13.0 - 16.0 g/dL HCT 23.8 (L) 36.0 - 48.0 % MCV 89.5 74.0 - 97.0 FL  
 MCH 30.5 24.0 - 34.0 PG  
 MCHC 34.0 31.0 - 37.0 g/dL  
 RDW 14.3 11.6 - 14.5 % PLATELET 749 360 - 102 K/uL MPV 10.1 9.2 - 11.8 FL  
 NEUTROPHILS 94 (H) 40 - 73 % LYMPHOCYTES 3 (L) 21 - 52 % MONOCYTES 3 3 - 10 % EOSINOPHILS 0 0 - 5 % BASOPHILS 0 0 - 2 %  
 ABS. NEUTROPHILS 3.7 1.8 - 8.0 K/UL  
 ABS. LYMPHOCYTES 0.1 (L) 0.9 - 3.6 K/UL  
 ABS. MONOCYTES 0.1 0.05 - 1.2 K/UL  
 ABS. EOSINOPHILS 0.0 0.0 - 0.4 K/UL  
 ABS. BASOPHILS 0.0 0.0 - 0.1 K/UL  
 DF AUTOMATED    
GLUCOSE, POC Collection Time: 01/02/21  6:25 AM  
Result Value Ref Range Glucose (POC) 122 (H) 70 - 110 mg/dL GLUCOSE, POC Collection Time: 01/02/21 11:55 AM  
Result Value Ref Range Glucose (POC) 194 (H) 70 - 110 mg/dL Chemistry Recent Labs 01/02/21 
0335 01/01/21 
0300 12/31/20 
0315 * 358* 319* * 126* 128* K 4.0 4.6 4.2  93* 91* CO2 25 26 28 BUN 9 16 14 CREA 0.39* 0.81 0.65 CA 6.8* 8.1* 8.0* AGAP 7 7 9 BUCR 23* 20 22* * 427* 239* TP 4.7* 5.7* 5.9* ALB 1.5* 1.9* 2.0*  
GLOB 3.2 3.8 3.9 AGRAT 0.5* 0.5* 0.5* Lactic Acid Lactic acid Date Value Ref Range Status 12/31/2020 2.1 (HH) 0.4 - 2.0 MMOL/L Final  
  Comment:  
  CALLED TO AND CORRECTLY REPEATED BY: 
Inocencia Johnston RN, 3N ON 12/31/2020 AT 2000 TO JDA Recent Labs 12/31/20 1920 LAC 2.1* Liver Enzymes Protein, total  
Date Value Ref Range Status 01/02/2021 4.7 (L) 6.4 - 8.2 g/dL Final  
 
Albumin Date Value Ref Range Status 01/02/2021 1.5 (L) 3.4 - 5.0 g/dL Final  
 
Globulin Date Value Ref Range Status 01/02/2021 3.2 2.0 - 4.0 g/dL Final  
 
A-G Ratio Date Value Ref Range Status 01/02/2021 0.5 (L) 0.8 - 1.7   Final  
 
Alk. phosphatase Date Value Ref Range Status 01/02/2021 266 (H) 45 - 117 U/L Final  
 
Recent Labs 01/02/21 
0335 01/01/21 
0300 12/31/20 
0315 TP 4.7* 5.7* 5.9* ALB 1.5* 1.9* 2.0*  
GLOB 3.2 3.8 3.9 AGRAT 0.5* 0.5* 0.5* * 427* 239* CBC w/Diff Recent Labs 01/02/21 
0335 01/01/21 
0300 12/31/20 
0315 WBC 3.9* 5.8 4.7  
RBC 2.66* 3.09* 3.02* HGB 8.1* 9.2* 9.2* HCT 23.8* 28.0* 27.2*  
 218 236 GRANS 94* 96* 93* LYMPH 3* 2* 5* EOS 0 1 0 Cardiac Enzymes No results found for: CPK, CK, CKMMB, CKMB, RCK3, CKMBT, CKNDX, CKND1, ANIKA, TROPT, TROIQ, ZOË, TROPT, TNIPOC, BNP, BNPP  
 
BNP No results found for: BNP, BNPP, XBNPT Coagulation No results for input(s): PTP, INR, APTT, INREXT, INREXT in the last 72 hours. Thyroid  Lab Results Component Value Date/Time TSH 2.10 12/23/2020 10:45 AM  
   
No results found for: T4  
 
Urinalysis Lab Results Component Value Date/Time  Color YELLOW 12/18/2020 12:14 AM  
 Appearance CLEAR 12/18/2020 12:14 AM  
 Specific gravity <1.005 (L) 12/18/2020 12:14 AM  
 pH (UA) 7.0 12/18/2020 12:14 AM  
 Protein Negative 12/18/2020 12:14 AM  
 Glucose Negative 12/18/2020 12:14 AM  
 Ketone Negative 12/18/2020 12:14 AM  
 Bilirubin Negative 12/18/2020 12:14 AM  
 Urobilinogen 1.0 12/18/2020 12:14 AM  
 Nitrites Negative 12/18/2020 12:14 AM  
 Leukocyte Esterase Negative 12/18/2020 12:14 AM  
 Epithelial cells 1+ 03/08/2018 09:11 PM  
 Bacteria FEW (A) 03/08/2018 09:11 PM  
 WBC 4 to 6 03/08/2018 09:11 PM  
 RBC NEGATIVE  03/08/2018 09:11 PM  
  
 
Micro  Recent Labs 12/31/20 
1610 CULT MODERATE NORMAL RESPIRATORY PRANAY Recent Labs 12/31/20 
1610 CULT MODERATE NORMAL RESPIRATORY PRANAY  
  
 
ABG No results for input(s): PHI, PHI, POC2, PCO2I, PO2, PO2I, HCO3, HCO3I, FIO2, FIO2I in the last 72 hours. CT (Most Recent) (CT chest reviewed by me) Results from [facility] Encounter encounter on 12/17/20 CTA CHEST W OR W WO CONT Narrative EXAM: CTA Chest 
 
INDICATION: Shortness of breath. Possible PE. COMPARISON: No prior study. TECHNIQUE: Axial CT imaging from the thoracic inlet through the diaphragm with 
intravenous contrast utilizing CTA study for pulmonary artery evaluation. Coronal and sagittal MIP reformations were generated at a separate workstation. One or more dose reduction techniques were used on this CT: automated exposure 
control, adjustment of the mAs and/or kVp according to patient size, and 
iterative reconstruction techniques. The specific techniques used on this CT 
exam have been documented in the patient's electronic medical record. Digital 
Imaging and Communications in Medicine (DICOM) format image data are available 
to nonaffiliated external healthcare facilities or entities on a secure, media 
free, reciprocally searchable basis with patient authorization for at least a 
12-month period after this study. _______________ FINDINGS: 
 
 EXAM QUALITY: Overall exam quality is suboptimal. Pulmonary arterial enhancement 
is adequate. Respiratory motion artifact is present, limiting evaluation. PULMONARY ARTERIES: No convincing evidence of pulmonary embolism. MEDIASTINUM: Normal heart size. No evidence of right heart strain. Aorta is 
unremarkable. No pericardial effusion. LYMPH NODES: No pathologically enlarged lymph nodes lymph nodes. AIRWAY: Unremarkable. LUNGS: Centrilobular emphysema. Superimposed scattered bilateral groundglass 
opacities throughout the lungs. PLEURA: No pleural effusion or pneumothorax. UPPER ABDOMEN: Visualized upper abdomen is unremarkable. OTHER: No acute or aggressive osseous abnormalities identified. _______________ Impression IMPRESSION: 
 
Suboptimal study secondary to motion artifact and bolus timing. No evidence of obvious central pulmonary embolism. Diffuse bilateral groundglass opacities, suspicious for atypical infection. Emphysema. XR (Most Recent). CXR  reviewed by me and compared with previous CXR Results from [facility] [place] Encounter encounter on 12/17/20 XR CHEST PORT Narrative EXAM: XR CHEST PORT 
 
CLINICAL INDICATION/HISTORY: PJP pneumonia, shortness of breath 
-Additional: None COMPARISON: Several prior studies, most recently 12/29/2020 TECHNIQUE: Portable frontal view of the chest 
 
_______________ FINDINGS: 
 
SUPPORT DEVICES: None. HEART AND MEDIASTINUM: Stable appearing cardiac size and mediastinal contours. LUNGS AND PLEURAL SPACES: Relatively diffuse interstitial and to lesser extent 
alveolar opacities are present bilaterally. No pneumothorax or pleural effusion. BONY THORAX AND SOFT TISSUES: Unremarkable. 
 
_______________ Impression IMPRESSION: 
 
Diffuse interstitial and airspace disease, overall similar to prior without 
superimposed acute abnormality. Precious Pierce MD 
1/2/2021

## 2021-01-02 NOTE — PROGRESS NOTES
01/02/21 1942 Oxygen Therapy O2 Sat (%) 92 % Pulse via Oximetry 112 beats per minute O2 Device Heated; Hi flow nasal cannula O2 Flow Rate (L/min) 50 l/min O2 Temperature 98.6 °F (37 °C) FIO2 (%) 75 % Patient became Short of breath. SpO2 decreased below 84%, SpO2 increased to 75%.

## 2021-01-02 NOTE — PROGRESS NOTES
Problem: Risk for Spread of Infection Goal: Prevent transmission of infectious organism to others Description: Prevent the transmission of infectious organisms to other patients, staff members, and visitors. Outcome: Progressing Towards Goal 
  
Problem: Pressure Injury - Risk of 
Goal: *Prevention of pressure injury Description: Document Josep Scale and appropriate interventions in the flowsheet. Outcome: Progressing Towards Goal 
Note: Pressure Injury Interventions: 
Sensory Interventions: Assess changes in LOC, Keep linens dry and wrinkle-free, Minimize linen layers, Pressure redistribution bed/mattress (bed type) Moisture Interventions: Absorbent underpads, Minimize layers Activity Interventions: Pressure redistribution bed/mattress(bed type), Increase time out of bed Mobility Interventions: Pressure redistribution bed/mattress (bed type), HOB 30 degrees or less Nutrition Interventions: Document food/fluid/supplement intake, Offer support with meals,snacks and hydration Friction and Shear Interventions: Apply protective barrier, creams and emollients Problem: Patient Education: Go to Patient Education Activity Goal: Patient/Family Education Outcome: Progressing Towards Goal 
  
Problem: Falls - Risk of 
Goal: *Absence of Falls Description: Document Ramya Vigil Fall Risk and appropriate interventions in the flowsheet. Outcome: Progressing Towards Goal 
Note: Fall Risk Interventions: 
Mobility Interventions: Communicate number of staff needed for ambulation/transfer, Patient to call before getting OOB, Utilize walker, cane, or other assistive device Mentation Interventions: Door open when patient unattended, Adequate sleep, hydration, pain control Medication Interventions: Teach patient to arise slowly, Patient to call before getting OOB Elimination Interventions: Call light in reach, Patient to call for help with toileting needs, Toileting schedule/hourly rounds History of Falls Interventions: Door open when patient unattended

## 2021-01-02 NOTE — PROGRESS NOTES
Hospitalist Progress Note Patient: Violette Fried MRN: [id number]  CSN: [id number] YOB: 1990  Age: 31 y.o. Sex: male DOA: 12/17/2020 LOS:  LOS: 16 days Chief Complaint: 
 
resp failure Assessment/Plan  
 
31 you male with untreated HIV/AIDS over 3 years, came with SOB and fevers 
covid negative 
  
Acute hypoxia resp failure requiring high flow 02-continued SOB and ESCOTO, stable on tele, has not decompensated, HR variable with anxiety also Sepsis-resolved Tachycardia-due to resp failure and  severe illness 
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 
HIV/AIDS, untreated, CD4 count 6, was never on HAART Secondary syphyllis, RPR reactive, titer 1:64-started on IM bicillin-has been refusing injection apparently 
  
Hyponatremia-IV NS-na better today 
  
Depression and anxiety-SSRI and anxiolytic medicine- zoloft and buspar, ativan ONLY if needed for severe anxiety 
  
DVT proph-lovenox 
  
Continue supportive care 
  
Tele monitoring 
  
Very poor prognosis-he gave permission for me to update his mother on 1/1/21which I did Disposition : 
Patient Active Problem List  
Diagnosis Code  AIDS (acquired immune deficiency syndrome) ([facility]) B20  
 Bilateral pneumonia J18.9  Sepsis ([facility] [address] A41.9  Hyponatremia E87.1  Pneumonia of both lungs due to Pneumocystis jirovecii ([facility]) B59  
 Acute respiratory distress R06.03  
 Hypoxia R09.02 Subjective: 
 
I am still SOB But did sleep Requests water this am 
 
No new concerns Took all his prescribed meds Review of systems: 
 
Constitutional: denies fevers, chills since yesterday Respiratory: OB, cough Cardiovascular:  chest pain Gastrointestinal: denies nausea, vomiting, diarrhea Vital signs/Intake and Output: 
Visit Vitals BP (!) 135/96 (BP 1 Location: Left arm, BP Patient Position: At rest) Pulse 97 Temp 98 °F (36.7 °C) Resp 20 Ht 5' 7\" (1.702 m) Wt 68.8 kg (151 lb 11.2 oz) SpO2 99% BMI 23.76 kg/m² Current Shift:  No intake/output data recorded. Last three shifts:  12/31 1901 - 01/02 0700 In: 360 [P.O.:360] Out: 6325 [[facility]:7313] Exam: 
 
General: cachectic AAM, alert, NAD, OX3, dyspneic with talking Head/Neck: NCAT, supple, No masses, No lymphadenopathy CVS:Regular rate and rhythm, no M/R/G, S1/S2 heard, no thrill Lungs:Clear to auscultation bilaterally, no wheezes, rhonchi, or rales Abdomen: Soft, Nontender, No distention, Normal Bowel sounds, No hepatomegaly Extremities: No C/C/E, pulses palpable 2+ Neuro:grossly normal , follows commands Psych:appropriate Labs: Results:  
   
Chemistry Recent Labs 01/02/21 0335 01/01/21 0300 12/31/20 0315 * 358* 319* * 126* 128* K 4.0 4.6 4.2  93* 91* CO2 25 26 28 BUN 9 16 14 CREA 0.39* 0.81 0.65 CA 6.8* 8.1* 8.0* AGAP 7 7 9 BUCR 23* 20 22* * 427* 239* TP 4.7* 5.7* 5.9* ALB 1.5* 1.9* 2.0*  
GLOB 3.2 3.8 3.9 AGRAT 0.5* 0.5* 0.5* CBC w/Diff Recent Labs 01/02/21 0335 01/01/21 0300 12/31/20 
0315 WBC 3.9* 5.8 4.7  
RBC 2.66* 3.09* 3.02* HGB 8.1* 9.2* 9.2* HCT 23.8* 28.0* 27.2*  
 218 236 GRANS 94* 96* 93* LYMPH 3* 2* 5* EOS 0 1 0 Cardiac Enzymes No results for input(s): CPK, CKND1, ANIKA in the last 72 hours. No lab exists for component: Cleatis Daniel Coagulation No results for input(s): PTP, INR, APTT, INREXT in the last 72 hours. Lipid Panel No results found for: CHOL, CHOLPOCT, CHOLX, CHLST, CHOLV, 511079, HDL, HDLP, LDL, LDLC, DLDLP, 072015, VLDLC, VLDL, TGLX, TRIGL, TRIGP, TGLPOCT, CHHD, CHHDX  
BNP No results for input(s): BNPP in the last 72 hours. Liver Enzymes Recent Labs 01/02/21 
0335 TP 4.7* ALB 1.5* * Thyroid Studies Lab Results Component Value Date/Time TSH 2.10 12/23/2020 10:45 AM  
    
Procedures/imaging: see electronic medical records for all procedures/Xrays and details which were not copied into this note but were reviewed prior to creation of Plan Garry Soto MD

## 2021-01-02 NOTE — PROGRESS NOTES
No acute change overnight. Patient incessantly requesting Morphine and Ativan. Education provided. Bedside and Verbal shift change report given to Payal Zimmerman RN (oncoming nurse) by Michelle Mayberry RN (offgoing nurse). Report included the following information SBAR, Kardex, Accordion, Recent Results and Med Rec Status.

## 2021-01-02 NOTE — ROUTINE PROCESS
Bedside and Verbal shift change report given to BUSTER Albarado R.N. (oncoming nurse) by BUSTER Arroyo R.N. (offgoing nurse). Report included the following information SBAR, Kardex, Intake/Output, MAR, Accordion, Recent Results and Med Rec Status.

## 2021-01-02 NOTE — PROGRESS NOTES
Problem: Risk for Spread of Infection Goal: Prevent transmission of infectious organism to others Description: Prevent the transmission of infectious organisms to other patients, staff members, and visitors. Outcome: Progressing Towards Goal 
  
Problem: Patient Education:  Go to Education Activity Goal: Patient/Family Education Outcome: Progressing Towards Goal 
  
Problem: Pressure Injury - Risk of 
Goal: *Prevention of pressure injury Description: Document Josep Scale and appropriate interventions in the flowsheet. Outcome: Progressing Towards Goal 
Note: Pressure Injury Interventions: 
Sensory Interventions: Assess changes in LOC, Minimize linen layers, Pressure redistribution bed/mattress (bed type) Moisture Interventions: Absorbent underpads, Minimize layers Activity Interventions: Pressure redistribution bed/mattress(bed type), Increase time out of bed Mobility Interventions: Pressure redistribution bed/mattress (bed type), HOB 30 degrees or less Nutrition Interventions: Offer support with meals,snacks and hydration, Document food/fluid/supplement intake Friction and Shear Interventions: Minimize layers, HOB 30 degrees or less Problem: Patient Education: Go to Patient Education Activity Goal: Patient/Family Education Outcome: Progressing Towards Goal 
  
Problem: Falls - Risk of 
Goal: *Absence of Falls Description: Document Nallely Araiza Fall Risk and appropriate interventions in the flowsheet. Outcome: Progressing Towards Goal 
Note: Fall Risk Interventions: 
Mobility Interventions: Assess mobility with egress test, Bed/chair exit alarm, Communicate number of staff needed for ambulation/transfer, Patient to call before getting OOB, PT Consult for mobility concerns, PT Consult for assist device competence Mentation Interventions: Adequate sleep, hydration, pain control, Toileting rounds, Update white board Medication Interventions: Patient to call before getting OOB, Teach patient to arise slowly Elimination Interventions: Bed/chair exit alarm, Call light in reach, Patient to call for help with toileting needs, Urinal in reach History of Falls Interventions: Door open when patient unattended Problem: Patient Education: Go to Patient Education Activity Goal: Patient/Family Education Outcome: Progressing Towards Goal 
  
Problem: Nutrition Deficit Goal: *Optimize nutritional status Outcome: Progressing Towards Goal 
  
Problem: Patient Education: Go to Patient Education Activity Goal: Patient/Family Education Outcome: Progressing Towards Goal

## 2021-01-03 NOTE — PROGRESS NOTES
Hospitalist Progress Note Patient: Hannah Childress MRN: [id number]  CSN: [id number] YOB: 1990  Age: 31 y.o. Sex: male DOA: 12/17/2020 LOS:  LOS: 17 days Chief Complaint: 
 
resp failure Assessment/Plan  
 
30 yo male with untreated HIV/AIDS over 3 years, came with SOB and fevers 
covid negative 
  
Acute hypoxia resp failure requiring high flow 02-stable on high flow w/o decompensation Sepsis-resolved Tachycardia-due to resp failure and  severe illness Fevers due to PJ pneumonia and possible disseminated MAC 
pneumocystis pneumonia-on high dose bactrim and steroids, now IV Probable disseminated MAC as per ID-ethambutol, zithromax, rifampin started Hyperglycemia with steroids-SSI PRN 
HIV/AIDS, untreated, CD4 count 6, was never on HAART after diagnosis made Secondary syphyllis, RPR reactive, titer 1:64-started on IM bicillin-has been refusing injection  
  
Hyponatremia-gentle IV NS and bicarb tabs 
  
Depression and anxiety-SSRI and anxiolytic medicine- zoloft and buspar, continue symptomatic tx as needed 
  
DVT proph-lovenox 
   
Tele monitoring 
  
Very poor prognosis-I updated his mom this weekend, she is aware of his AIDS diagnosis (with his permission of course) Maximizing medical therapy at this juncture, his chances for recovering at this time are not great Change labs to every other day with his long road ahead 
  
Disposition : 
Patient Active Problem List  
Diagnosis Code  AIDS (acquired immune deficiency syndrome) ([facility]) B20  
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  
 Acute respiratory distress R06.03  
 Hypoxia R09.02 Subjective: 
 
I am ok Still feeling SOB Did not sleep much Is eating HR variable to 120's range Low grade fevers Review of systems: 
 
Constitutional:  fevers Respiratory:  SOB, cough Cardiovascular: denies chest pain, palpitations Gastrointestinal: denies nausea, vomiting, diarrhea Vital signs/Intake and Output: 
Visit Vitals BP (!) 144/92 (BP 1 Location: Left arm, BP Patient Position: At rest) Pulse (!) 129 Temp 100.2 °F (37.9 °C) Resp 24 Ht 5' 7\" (1.702 m) Wt 68.8 kg (151 lb 11.2 oz) SpO2 97% BMI 23.76 kg/m² Current Shift:  No intake/output data recorded. Last three shifts:  01/01 1901 - 01/03 0700 In: 1520 [P.O.:1020; I.V.:500] Out: 3750 [GXNUU:4131] Exam: 
 
General: frail cachectic AAM alert, NAD, OX3 Head/Neck: NCAT, supple, No masses, No lymphadenopathy CVS:Regular rate and rhythm, no M/R/G, S1/S2 heard, no thrill Lungs:Clear to auscultation bilaterally, no wheezes, rhonchi, or rales Abdomen: Soft, Nontender, No distention, Normal Bowel sounds, No hepatomegaly Extremities: No C/C/E, pulses palpable 2+ Neuro:grossly normal , follows commands Psych:appropriate Labs: Results:  
   
Chemistry Recent Labs 01/03/21 0140 01/02/21 0335 01/01/21 
0300 * 133* 358* * 132* 126*  
K 4.4 4.0 4.6 CL 89* 100 93* CO2 29 25 26 BUN 10 9 16 CREA 0.72 0.39* 0.81 CA 8.0* 6.8* 8.1* AGAP 8 7 7 BUCR 14 23* 20  
* 266* 427* TP 5.8* 4.7* 5.7* ALB 1.9* 1.5* 1.9*  
GLOB 3.9 3.2 3.8 AGRAT 0.5* 0.5* 0.5* CBC w/Diff Recent Labs 01/03/21 0140 01/02/21 0335 01/01/21 
0300 WBC 5.1 3.9* 5.8  
RBC 3.17* 2.66* 3.09* HGB 9.4* 8.1* 9.2* HCT 28.6* 23.8* 28.0*  
 192 218 GRANS 94* 94* 96* LYMPH 2* 3* 2* EOS 1 0 1 Cardiac Enzymes No results for input(s): CPK, CKND1, ANIKA in the last 72 hours. No lab exists for component: Elías Bliss Coagulation No results for input(s): PTP, INR, APTT, INREXT in the last 72 hours. Lipid Panel No results found for: CHOL, CHOLPOCT, CHOLX, CHLST, CHOLV, 398918, HDL, HDLP, LDL, LDLC, DLDLP, 851330, VLDLC, VLDL, TGLX, TRIGL, TRIGP, TGLPOCT, CHHD, CHHDX BNP No results for input(s): BNPP in the last 72 hours. Liver Enzymes Recent Labs 01/03/21 
0140 TP 5.8* ALB 1.9* * Thyroid Studies Lab Results Component Value Date/Time TSH 2.10 12/23/2020 10:45 AM  
    
Procedures/imaging: see electronic medical records for all procedures/Xrays and details which were not copied into this note but were reviewed prior to creation of Plan Tatyana Dunne MD

## 2021-01-03 NOTE — PROGRESS NOTES
2000 [facility] 
 
2106 Assessment completed, patient is alert and oriented x's 4, c/o pain. PRN pain medication administered per patient's request without any complications. ST on the monitor with a HR in the 130s. Lung fields are clear throughout on 50L hiflow, 02 sat sustained in the low 90s with a productive cough noted. Patient is voiding orange clear urine in the urinal. Patient is currently sitting up in bed, call light within reach, will continue to monitor. 2253 Scheduled medications administered as ordered without any complications. Patient continue to rest quietly in bed, no s/s of any distress, will continue to monitor. 2345 Scheduled sleeping aide administered as ordered, will continue to monitor. Rodríguez Sewell of patient's temp of [phone] spoke with Dr. Reyna Loya, received order to administer Tylenol and order for blood cultures, orders carried out accordingly. Patient is currently resting quietly in bed, no s/s of any pain or distress, will continue to monitor. 6854 Scheduled medications administered as ordered without any complications. Patient is currently resting quietly, no s/f of any pain or distress, will continue to monitor 
 
0506 PRN pain medications administered per patient's request without any complications. Patient is currently resting quietly in bed, no s/s of any distress, will continue to monitor 
 
0728 Bedside and Verbal shift change report given to Emiliano Raymundo RN (oncoming nurse) by Lauri Lares RN (offgoing nurse). Report included the following information SBAR, Accordion and Cardiac Rhythm ST.

## 2021-01-03 NOTE — PROGRESS NOTES
[facility] Pulmonary, Critical Care, and Sleep Medicine Name: Olga Lidia Posada MRN: [id number] : 1990 Hospital: [facility] [place] Date: 1/3/2021 PCCM Note Consult requesting physician: Dr. Josiah Valentino Reason for Consult: Likely PJP pneumonia, hypoxia IMPRESSION:  
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  
Diagnosis Code  AIDS (acquired immune deficiency syndrome) ([facility]) B20  
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  
 Acute respiratory distress R06.03  
 Hypoxia R09.02  
  
RECOMMENDATIONS:  
On HFNC at 50 Lpm and 70% FiO2. Titrate flow and fio2 for goal SPO2 > 91%- RT aware XR chest for f/up in AM; last CXR stable HOB >=30 degree, aspiration precautions, aggressive pulmonary toileting, incentive spirometry, Wilhemena Kelp Patient has off and on episodes of anxiety and panic; gets very tachycardiac and tachypnea. Once receives these meds, he calms down and feels better with improvement in HR and RR At this point he has tolerated this medications w/o any respiratory worsening rather they have appeared to have helped control his anxiety, panic and pain related hyperventilation with limited reserves Patient has declined for ABG for follow up EKG 20 showed ST. He remained asymptomatic from cardiac standpoint Low threshold for transfer to ICU for close monitoring if any worsening. Staff aware Bilateral extensive pneumonia with hypoxemia in a patient with HIV; COVID-19 negative; PJP pneumonia. Bactrim to 20 mg/kg/d in divided 3 times daily since 20 given inability to wean FiO2 or flow Due to worsening CXR findings and increased O2 requirement, prednisone is already changed to Solu-Medrol on 12/27/2020. Continue IV Solu-Medrol 40 mg IV every 12 hours. Sputum cx for G/S NGT final and AFB sputum pending ID started pt on: Azithromycin, Ethambutol, Rifampin for MAC infection empirically No leukocytosis. Fever likely from any MAC Mildly elevated lactic likely related to WOB Bronchodilators: Pulmicort twice daily, Xopenex every 4 hours. Xopenex and Atrovent as needed. G6PD normal. Monitor Hgb Fungitell elevated 455. Expectorated sputum for PJP: Pending. Sputum culture 12/18/20: Light normal dequan. Urine antigen panel: Negative. COVID-19: Negative. Alk P elevated but stable and hyponatremia management defer to hospitalist. 
 
CT chest with bilateral diffuse GGO and underlying emphysema, pulmonary fibrosis in the background but due to motion artifact it is difficult to exclude. DVT Prophylaxis: Lovenox GI Prophylaxis: Pepcid Other issues management by primary team and respective consultants. Further recommendations will be based on the patient's response to recommended treatment and results of the investigation ordered. Quality Care: PPI, DVT prophylaxis, HOB elevated, infection control all reviewed and addressed. Discussed with patient, answered all questions to his satisfaction. Care plan discussed with nursing, RT High Complexity decision making performed with > 50% time spent in face to face assessment of this patinet Subjective/History: Mr. Antolin Dailey is a 31 y.o. male with PMHx significant for HIV diagnosed couple years ago, not on treatment; admitted with weight loss, ESCOTO; COVID-19 negative x2; diagnosed with atypical looking pneumonia/GGO on CT chest which is negative for PE, suspected for PJP pneumonia. Initially treated with Rocephin and Zithromax. Seen by Dr. Tarah López: Elevated LDH, PCT 0.57. Antibiotics changed to Bactrim and started on steroids. HIV viral load elevated. 1/3/2021 Patient seen in rm 342 at bedside Required to increase FiO2 to 70% from 60% before on HFNC 
ST stable and in NSR; denies any palpitations, syncope, orthopnea, edema or pnd Low grade fever at present. Cough and phlegm stable Patient denies wheezing or hemoptysis. Stable chest discomfort from coughing Appears chronically ill and easy to get short winded on long conversation Pulmonary was not contacted by staff on anything about patient overnight. Review of Systems:  
Constitutional: No no chills, night sweats HEENT: No epistaxis, no nasal drainage, no difficulty in swallowing, no redness in eyes Respiratory: as above Cardiovascular: no palpitations, no chronic leg edema, no syncope Gastrointestinal: no abd pain, no vomiting, no diarrhea, no bleeding symptoms Genitourinary: No urinary symptoms or hematuria Neurological: No focal weakness, no seizures, no headaches Behvioral/Psych: No anxiety, no depression No Known Allergies Past Medical History:  
Diagnosis Date  Asthma  Chlamydia  Herpes zoster  HIV (human immunodeficiency virus infection) ([facility] [address]  Syphilis History reviewed. No pertinent surgical history. History reviewed. No pertinent family history. Social History Tobacco Use  Smoking status: Former Smoker  Smokeless tobacco: Never Used Substance Use Topics  Alcohol use: Yes Comment: socially Prior to Admission medications Not on File Current Facility-Administered Medications Medication Dose Route Frequency  acetaminophen (TYLENOL) tablet 650 mg  650 mg Oral Q4H PRN  
 melatonin (rapid dissolve) tablet 10 mg  10 mg Oral QHS  azithromycin (ZITHROMAX) tablet 500 mg  500 mg Oral DAILY  ethambutoL (MYAMBUTOL) tablet 1,200 mg  1,200 mg Oral DAILY  rifAMPin (RIFADIN) capsule 600 mg  600 mg Oral ACB  
 0.9% sodium chloride infusion  75 mL/hr IntraVENous CONTINUOUS  
 trimethoprim-sulfamethoxazole (BACTRIM) 438.72 mg in dextrose 5% 500 mL  20 mg/kg/day IntraVENous Q8H  phenol throat spray (CHLORASEPTIC) 1 Spray  1 Spray Oral PRN  
 ibuprofen (MOTRIN) tablet 400 mg  400 mg Oral Q4H PRN  
 sodium bicarbonate tablet 650 mg  650 mg Oral BID  sertraline (ZOLOFT) tablet 50 mg  50 mg Oral DAILY  busPIRone (BUSPAR) tablet 5 mg  5 mg Oral TID  morphine injection 2 mg  2 mg IntraVENous Q4H PRN  
 LORazepam (ATIVAN) tablet 0.5 mg  0.5 mg Oral Q6H PRN  penicillin g benzathine (BICILLIN LA) 1,200,000 unit/2 mL IM injection 2.4 Million Units  2.4 Million Units IntraMUSCular Q7D  
 methylPREDNISolone (PF) (SOLU-MEDROL) injection 40 mg  40 mg IntraVENous Q12H  
 insulin lispro (HUMALOG) injection   SubCUTAneous AC&HS  multivitamin, tx-iron-ca-min (THERA-M w/ IRON) tablet 1 Tab  1 Tab Oral DAILY  guaiFENesin (ROBITUSSIN) 100 mg/5 mL oral liquid 100 mg  100 mg Oral Q4H PRN  
 levalbuterol (XOPENEX) nebulizer soln 1.25 mg/0.5 ml  1.25 mg Nebulization Q4H RT  
 ipratropium (ATROVENT) 0.02 % nebulizer solution 0.5 mg  0.5 mg Nebulization Q4H PRN  
 budesonide (PULMICORT) 500 mcg/2 ml nebulizer suspension  500 mcg Nebulization BID RT  
 levalbuterol (XOPENEX) nebulizer soln 1.25 mg/0.5 ml  1.25 mg Nebulization Q4H PRN  
 enoxaparin (LOVENOX) injection 40 mg  40 mg SubCUTAneous Q24H  
 influenza vaccine 2020-21 (6 mos+)(PF) (FLUARIX/FLULAVAL/FLUZONE QUAD) injection 0.5 mL  0.5 mL IntraMUSCular PRIOR TO DISCHARGE  calcium carbonate (TUMS) chewable tablet 200 mg [elemental]  200 mg Oral TID PRN  
 [Held by provider] predniSONE (DELTASONE) tablet 20 mg  20 mg Oral DAILY WITH BREAKFAST  sodium chloride (NS) flush 5-10 mL  5-10 mL IntraVENous PRN  
 sodium chloride (NS) flush 5-40 mL  5-40 mL IntraVENous PRN  
  promethazine (PHENERGAN) tablet 12.5 mg  12.5 mg Oral Q6H PRN Or  
 ondansetron (ZOFRAN) injection 4 mg  4 mg IntraVENous Q6H PRN  
 bisacodyL (DULCOLAX) tablet 5 mg  5 mg Oral DAILY PRN  
 famotidine (PEPCID) tablet 20 mg  20 mg Oral BID Objective:  
Vital Signs:   
Blood pressure (!) 144/92, pulse (!) 129, temperature 100.2 °F (37.9 °C), resp. rate 24, height 5' 7\" (1.702 m), weight 68.8 kg (151 lb 11.2 oz), SpO2 97 %. Body mass index is 23.76 kg/m². O2 Device: Heated, Hi flow nasal cannula O2 Flow Rate (L/min): 55 l/min Temp (24hrs), Av.6 °F (37.6 °C), Min:97.6 °F (36.4 °C), Max:102 °F (38.9 °C) Intake/Output:  
Last shift:      No intake/output data recorded. Last 3 shifts:  1901 -  0700 In: 1520 [P.O.:1020; I.V.:500] Out: 3750 [OPWSA:5714] Intake/Output Summary (Last 24 hours) at 1/3/2021 1359 Last data filed at 1/3/2021 1851 Gross per 24 hour Intake 980 ml Output 1350 ml Net -370 ml Physical Exam:  
General: AAO x 3, chronically ill looking, no respiratory distress, appears older than stated age, on HFNC HEENT: PERRLA, throat moist without erythema or exudate Neck: No abnormally enlarged lymph nodes or thyroid, supple Chest: normal 
Lungs: moderate air entry, few rhonchi scattered bilaterally, normal percussion bilaterally, no tenderness/ rash Heart: Regular rate and rhythm, S1S2 present or without murmur or extra heart sounds Abdomen: non distended, bowel sounds normoactive, tympanic, soft without significant tenderness, masses, organomegaly or guarding Extremity: negative for edema, cyanosis, clubbing Neuro:  aao x 3, no defects noted in limited exam. 
Skin: Skin color, texture, turgor fair Data:  
   
Recent Results (from the past 24 hour(s)) GLUCOSE, POC Collection Time: 21  4:52 PM  
Result Value Ref Range Glucose (POC) 151 (H) 70 - 110 mg/dL GLUCOSE, POC  Collection Time: 21 10:50 PM  
 Result Value Ref Range Glucose (POC) 145 (H) 70 - 110 mg/dL METABOLIC PANEL, COMPREHENSIVE Collection Time: 01/03/21  1:40 AM  
Result Value Ref Range Sodium 126 (L) 136 - 145 mmol/L Potassium 4.4 3.5 - 5.5 mmol/L Chloride 89 (L) 100 - 111 mmol/L  
 CO2 29 21 - 32 mmol/L Anion gap 8 3.0 - 18 mmol/L Glucose 112 (H) 74 - 99 mg/dL BUN 10 7.0 - 18 MG/DL Creatinine 0.72 0.6 - 1.3 MG/DL  
 BUN/Creatinine ratio 14 12 - 20 GFR est AA >60 >60 ml/min/1.73m2 GFR est non-AA >60 >60 ml/min/1.73m2 Calcium 8.0 (L) 8.5 - 10.1 MG/DL Bilirubin, total 0.3 0.2 - 1.0 MG/DL  
 ALT (SGPT) 54 16 - 61 U/L  
 AST (SGOT) 26 10 - 38 U/L Alk. phosphatase 274 (H) 45 - 117 U/L Protein, total 5.8 (L) 6.4 - 8.2 g/dL Albumin 1.9 (L) 3.4 - 5.0 g/dL Globulin 3.9 2.0 - 4.0 g/dL A-G Ratio 0.5 (L) 0.8 - 1.7    
CBC WITH AUTOMATED DIFF Collection Time: 01/03/21  1:40 AM  
Result Value Ref Range WBC 5.1 4.6 - 13.2 K/uL  
 RBC 3.17 (L) 4.70 - 5.50 M/uL HGB 9.4 (L) 13.0 - 16.0 g/dL HCT 28.6 (L) 36.0 - 48.0 % MCV 90.2 74.0 - 97.0 FL  
 MCH 29.7 24.0 - 34.0 PG  
 MCHC 32.9 31.0 - 37.0 g/dL  
 RDW 14.3 11.6 - 14.5 % PLATELET 896 955 - 810 K/uL MPV 10.3 9.2 - 11.8 FL  
 NEUTROPHILS 94 (H) 40 - 73 % LYMPHOCYTES 2 (L) 21 - 52 % MONOCYTES 3 3 - 10 % EOSINOPHILS 1 0 - 5 % BASOPHILS 0 0 - 2 %  
 ABS. NEUTROPHILS 4.8 1.8 - 8.0 K/UL  
 ABS. LYMPHOCYTES 0.1 (L) 0.9 - 3.6 K/UL  
 ABS. MONOCYTES 0.1 0.05 - 1.2 K/UL  
 ABS. EOSINOPHILS 0.0 0.0 - 0.4 K/UL  
 ABS. BASOPHILS 0.0 0.0 - 0.1 K/UL  
 DF AUTOMATED    
CULTURE, BLOOD Collection Time: 01/03/21  4:15 AM  
 Specimen: Blood Result Value Ref Range Special Requests: NO SPECIAL REQUESTS Culture result: NO GROWTH AFTER 1 HOUR    
GLUCOSE, POC Collection Time: 01/03/21  6:19 AM  
Result Value Ref Range Glucose (POC) 114 (H) 70 - 110 mg/dL GLUCOSE, POC  Collection Time: 01/03/21 11:14 AM  
 Result Value Ref Range Glucose (POC) 106 70 - 110 mg/dL Chemistry Recent Labs 01/03/21 
0140 01/02/21 
0335 01/01/21 
0300 * 133* 358* * 132* 126*  
K 4.4 4.0 4.6 CL 89* 100 93* CO2 29 25 26 BUN 10 9 16 CREA 0.72 0.39* 0.81 CA 8.0* 6.8* 8.1* AGAP 8 7 7 BUCR 14 23* 20  
* 266* 427* TP 5.8* 4.7* 5.7* ALB 1.9* 1.5* 1.9*  
GLOB 3.9 3.2 3.8 AGRAT 0.5* 0.5* 0.5* Lactic Acid Lactic acid Date Value Ref Range Status 12/31/2020 2.1 (HH) 0.4 - 2.0 MMOL/L Final  
  Comment:  
  CALLED TO AND CORRECTLY REPEATED BY: 
Stephania Reed RN, 3N ON 12/31/2020 AT 2000 TO [facility] Recent Labs 12/31/20 
1920 LAC 2.1* Liver Enzymes Protein, total  
Date Value Ref Range Status 01/03/2021 5.8 (L) 6.4 - 8.2 g/dL Final  
 
Albumin Date Value Ref Range Status 01/03/2021 1.9 (L) 3.4 - 5.0 g/dL Final  
 
Globulin Date Value Ref Range Status 01/03/2021 3.9 2.0 - 4.0 g/dL Final  
 
A-G Ratio Date Value Ref Range Status 01/03/2021 0.5 (L) 0.8 - 1.7   Final  
 
Alk. phosphatase Date Value Ref Range Status 01/03/2021 274 (H) 45 - 117 U/L Final  
 
Recent Labs 01/03/21 
0140 01/02/21 
0335 01/01/21 
0300 TP 5.8* 4.7* 5.7* ALB 1.9* 1.5* 1.9*  
GLOB 3.9 3.2 3.8 AGRAT 0.5* 0.5* 0.5* * 266* 427* CBC w/Diff Recent Labs 01/03/21 
0140 01/02/21 
0335 01/01/21 
0300 WBC 5.1 3.9* 5.8  
RBC 3.17* 2.66* 3.09* HGB 9.4* 8.1* 9.2* HCT 28.6* 23.8* 28.0*  
 192 218 GRANS 94* 94* 96* LYMPH 2* 3* 2* EOS 1 0 1 Cardiac Enzymes No results found for: CPK, CK, CKMMB, CKMB, RCK3, CKMBT, CKNDX, CKND1, ANIKA, TROPT, TROIQ, ZOË, TROPT, TNIPOC, BNP, BNPP  
 
BNP No results found for: BNP, BNPP, XBNPT Coagulation No results for input(s): PTP, INR, APTT, INREXT, INREXT in the last 72 hours. Thyroid  Lab Results Component Value Date/Time  TSH 2.10 12/23/2020 10:45 AM  
   
No results found for: T4  
 
 Urinalysis Lab Results Component Value Date/Time Color YELLOW 12/18/2020 12:14 AM  
 Appearance CLEAR 12/18/2020 12:14 AM  
 Specific gravity <1.005 (L) 12/18/2020 12:14 AM  
 pH (UA) 7.0 12/18/2020 12:14 AM  
 Protein Negative 12/18/2020 12:14 AM  
 Glucose Negative 12/18/2020 12:14 AM  
 Ketone Negative 12/18/2020 12:14 AM  
 Bilirubin Negative 12/18/2020 12:14 AM  
 Urobilinogen 1.0 12/18/2020 12:14 AM  
 Nitrites Negative 12/18/2020 12:14 AM  
 Leukocyte Esterase Negative 12/18/2020 12:14 AM  
 Epithelial cells 1+ 03/08/2018 09:11 PM  
 Bacteria FEW (A) 03/08/2018 09:11 PM  
 WBC 4 to 6 03/08/2018 09:11 PM  
 RBC NEGATIVE  03/08/2018 09:11 PM  
  
 
Micro  Recent Labs 01/03/21 
0415 12/31/20 
1610 CULT NO GROWTH AFTER 1 HOUR MODERATE NORMAL RESPIRATORY PRANAY Recent Labs 01/03/21 
0415 12/31/20 
1610 CULT NO GROWTH AFTER 1 HOUR MODERATE NORMAL RESPIRATORY PRANAY  
  
 
ABG No results for input(s): PHI, PHI, POC2, PCO2I, PO2, PO2I, HCO3, HCO3I, FIO2, FIO2I in the last 72 hours. CT (Most Recent) (CT chest reviewed by me) Results from [facility] Encounter encounter on 12/17/20 CTA CHEST W OR W WO CONT Narrative EXAM: CTA Chest 
 
INDICATION: Shortness of breath. Possible PE. COMPARISON: No prior study. TECHNIQUE: Axial CT imaging from the thoracic inlet through the diaphragm with 
intravenous contrast utilizing CTA study for pulmonary artery evaluation. Coronal and sagittal MIP reformations were generated at a separate workstation. One or more dose reduction techniques were used on this CT: automated exposure 
control, adjustment of the mAs and/or kVp according to patient size, and 
iterative reconstruction techniques. The specific techniques used on this CT 
exam have been documented in the patient's electronic medical record. Digital 
Imaging and Communications in Medicine (DICOM) format image data are available to nonaffiliated external healthcare facilities or entities on a secure, media 
free, reciprocally searchable basis with patient authorization for at least a 
12-month period after this study. _______________ FINDINGS: 
 
EXAM QUALITY: Overall exam quality is suboptimal. Pulmonary arterial enhancement 
is adequate. Respiratory motion artifact is present, limiting evaluation. PULMONARY ARTERIES: No convincing evidence of pulmonary embolism. MEDIASTINUM: Normal heart size. No evidence of right heart strain. Aorta is 
unremarkable. No pericardial effusion. LYMPH NODES: No pathologically enlarged lymph nodes lymph nodes. AIRWAY: Unremarkable. LUNGS: Centrilobular emphysema. Superimposed scattered bilateral groundglass 
opacities throughout the lungs. PLEURA: No pleural effusion or pneumothorax. UPPER ABDOMEN: Visualized upper abdomen is unremarkable. OTHER: No acute or aggressive osseous abnormalities identified. _______________ Impression IMPRESSION: 
 
Suboptimal study secondary to motion artifact and bolus timing. No evidence of obvious central pulmonary embolism. Diffuse bilateral groundglass opacities, suspicious for atypical infection. Emphysema. XR (Most Recent). CXR  reviewed by me and compared with previous CXR Results from [facility] Encounter encounter on 12/17/20 XR CHEST PORT Narrative EXAM: XR CHEST PORT 
 
CLINICAL INDICATION/HISTORY: PJP pneumonia, shortness of breath 
-Additional: None COMPARISON: Several prior studies, most recently 12/29/2020 TECHNIQUE: Portable frontal view of the chest 
 
_______________ FINDINGS: 
 
SUPPORT DEVICES: None. HEART AND MEDIASTINUM: Stable appearing cardiac size and mediastinal contours. LUNGS AND PLEURAL SPACES: Relatively diffuse interstitial and to lesser extent 
alveolar opacities are present bilaterally. No pneumothorax or pleural effusion. BONY THORAX AND SOFT TISSUES: Unremarkable. _______________ Impression IMPRESSION: 
 
Diffuse interstitial and airspace disease, overall similar to prior without 
superimposed acute abnormality. Lashay Seaman MD 
1/3/2021

## 2021-01-03 NOTE — PROGRESS NOTES
Problem: Risk for Spread of Infection Goal: Prevent transmission of infectious organism to others Description: Prevent the transmission of infectious organisms to other patients, staff members, and visitors. Outcome: Progressing Towards Goal 
  
Problem: Pressure Injury - Risk of 
Goal: *Prevention of pressure injury Description: Document Josep Scale and appropriate interventions in the flowsheet. Outcome: Progressing Towards Goal 
Note: Pressure Injury Interventions: 
Sensory Interventions: Assess changes in LOC, Minimize linen layers, Pressure redistribution bed/mattress (bed type) Moisture Interventions: Absorbent underpads, Minimize layers Activity Interventions: Pressure redistribution bed/mattress(bed type) Mobility Interventions: Pressure redistribution bed/mattress (bed type), HOB 30 degrees or less Nutrition Interventions: Offer support with meals,snacks and hydration, Document food/fluid/supplement intake Friction and Shear Interventions: Minimize layers, HOB 30 degrees or less Problem: Patient Education:  Go to Education Activity Goal: Patient/Family Education Outcome: Progressing Towards Goal 
  
Problem: Falls - Risk of 
Goal: *Absence of Falls Description: Document Steve Tovar Fall Risk and appropriate interventions in the flowsheet. Outcome: Progressing Towards Goal 
Note: Fall Risk Interventions: 
Mobility Interventions: Patient to call before getting OOB, PT Consult for mobility concerns, OT consult for ADLs, Communicate number of staff needed for ambulation/transfer Mentation Interventions: Adequate sleep, hydration, pain control, Update white board, Toileting rounds, Reorient patient Medication Interventions: Patient to call before getting OOB Elimination Interventions: Patient to call for help with toileting needs, Call light in reach History of Falls Interventions: Door open when patient unattended Problem: Patient Education: Go to Patient Education Activity Goal: Patient/Family Education Outcome: Progressing Towards Goal 
  
Problem: Falls - Risk of 
Goal: *Absence of Falls Description: Document Ramya Vigil Fall Risk and appropriate interventions in the flowsheet. Outcome: Progressing Towards Goal 
Note: Fall Risk Interventions: 
Mobility Interventions: Patient to call before getting OOB, PT Consult for mobility concerns, OT consult for ADLs, Communicate number of staff needed for ambulation/transfer Mentation Interventions: Adequate sleep, hydration, pain control, Update white board, Toileting rounds, Reorient patient Medication Interventions: Patient to call before getting OOB Elimination Interventions: Patient to call for help with toileting needs, Call light in reach History of Falls Interventions: Door open when patient unattended Problem: Patient Education: Go to Patient Education Activity Goal: Patient/Family Education Outcome: Progressing Towards Goal 
  
Problem: Patient Education: Go to Patient Education Activity Goal: Patient/Family Education Outcome: Progressing Towards Goal 
  
Problem: Nutrition Deficit Goal: *Optimize nutritional status Outcome: Progressing Towards Goal 
  
Problem: Patient Education: Go to Patient Education Activity Goal: Patient/Family Education Outcome: Progressing Towards Goal

## 2021-01-03 NOTE — PROGRESS NOTES
Bedside and Verbal shift change report given to [address] (oncoming nurse) by Mandi Osullivan RN (offgoing nurse). Report included the following information SBAR, Kardex, Intake/Output, MAR and Recent Results. 0930 Shift assessment complete. 1206 Pt complains of 7/10 aching pain in his rib cage, prn morphine administered. 1230 Reassessment, pt awake and alert, reports pain as a 3/10 which is tolerable for him. VS stable O2 saturation Stable call light within reach 
1422 Pt reports experiencing worsening anxiety, prn ativan administered. 1612 Pt complains of 6/10 rib cage pain, prn morphine administered. 1640 Reassessment pt awake and alert, reports pain as a 3/10. Bedside and Verbal shift change report given to [address] (oncoming nurse) by Tyler Rosas RN (offgoing nurse). Report included the following information SBAR, Kardex, Intake/Output, MAR and Recent Results.

## 2021-01-03 NOTE — PROGRESS NOTES
Bedside and Verbal shift change report given to Emiliano Raymundo RN (oncoming nurse) by Lauri Lares RN (offgoing nurse). Report included the following information SBAR, Kardex, Intake/Output, MAR and Recent Results. Shift assessment complete and documented in flowsheets. Pt stable with no concerns at time of assessment. White board updated and call light within reach. 1217 Pt c/o 10/10 abdominal pain, prn morphine administered pt awake and alert. Call light within reach, will reassess pain and LOC in 30 minutes. 1230 Pt temperature 100.2 and complains of aching 4/10 headache. PRN acetaminophen administered. 1240 Pt states abdominal pain resolved, rates it 0/10 pt awake and alert call light within reach. Shift uneventful, Bedside and Verbal shift change report given to Dillan Coats RN (oncoming nurse) by Emiliano Raymundo RN (offgoing nurse). Report included the following information SBAR, Kardex, Intake/Output, MAR and Recent Results.

## 2021-01-03 NOTE — PROGRESS NOTES
01/03/21 6391 Oxygen Therapy O2 Sat (%) 93 % Pulse via Oximetry 115 beats per minute O2 Device Heated; Hi flow nasal cannula O2 Flow Rate (L/min) 55 l/min O2 Temperature 98.6 °F (37 °C) FIO2 (%) 70 %

## 2021-01-04 PROBLEM — J96.01 ACUTE RESPIRATORY FAILURE WITH HYPOXIA (HCC): Status: ACTIVE | Noted: 2020-01-01

## 2021-01-04 PROBLEM — J96.00 ACUTE RESPIRATORY FAILURE (HCC): Status: ACTIVE | Noted: 2020-01-01

## 2021-01-04 NOTE — ROUTINE PROCESS
2191 
Bedside shift change report given to Jazmyne CARMICHAEL RN (oncoming nurse) by Kenan Chambers (offgoing nurse). Report included the following information SBAR, Kardex, Intake/Output and MAR.

## 2021-01-04 NOTE — CONSULTS
Brief Lorazepam Note TMP-SMX #15/21 
dMAC #3/? Hanging in there. Had a long chat with him/mother/uncle. DONT GIVE UP. I sent off some Genotype resistance testing this morning. His alk phos has been cut in 1/2 over weekend since starting the [facility] drugs (3). I'm going to qid schedule dose his lorazepam 1/2mg to see if that helps. Tremaine Cuevas MD 
Cell [phone] [facility]

## 2021-01-04 NOTE — PROGRESS NOTES
Chart reviewed noted prognosis very poor, pt remains on HFNC, cm will cont to review and remain available for d/c planning.

## 2021-01-04 NOTE — PROGRESS NOTES
[facility] Pulmonary, Critical Care, and Sleep Medicine Name: Amrita Garcia MRN: [id number] : 1990 Hospital: [facility] [place] Date: 2021 PCCM Note Consult requesting physician: Dr. Jose Puri Reason for Consult: Likely PJP pneumonia, hypoxia IMPRESSION:  
· Acute hypoxic respiratory failure J96.01 · Pneumonia of both lungs due to Pneumocystis jirovecii ([facility] B59 · Anxiety d/o F41.9 · Patient Active Problem List  
Diagnosis Code  AIDS (acquired immune deficiency syndrome) (HCC) B20  
 Bilateral pneumonia J18.9  Sepsis ([facility] A41.9  Hyponatremia E87.1  Pneumonia of both lungs due to Pneumocystis jirovecii ([facility] B59  
 Acute respiratory distress R06.03  
 Hypoxia R09.02  
  
RECOMMENDATIONS:  
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 Palliative care has been consulted, and on case. Discussed CODE STATUS in layman terms; discussed about medical futility if patient ends up on ventilator support, or as cardiac arrest; patient next of kin his mother, who is aware of HIV status per patient. Patient currently full code. DVT Prophylaxis: Lovenox GI Prophylaxis: Pepcid Other issues management by primary team and respective consultants. Further recommendations will be based on the patient's response to recommended treatment and results of the investigation ordered. High Complexity decision making performed and chart review in the care of this patient. Subjective/History: Mr. Emerson Goldberg is a 31 y.o. male with PMHx significant for HIV diagnosed couple years ago, not on treatment; admitted with weight loss, ESCOTO; COVID-19 negative x2; diagnosed with atypical looking pneumonia/GGO on CT chest which is negative for PE, suspected for PJP pneumonia. Initially treated with Rocephin and Zithromax. Seen by Dr. Cherylyn Leventhal: Elevated LDH, PCT 0.57. Antibiotics changed to Bactrim and started on steroids. HIV viral load elevated. 1/4/2021 Chart reviewed; discussed during signout. Patient seen in telemetry unit. He remains on high flow nasal cannula oxygen. He is mildly dyspneic during conversations. He complains of cough, productive at times. No chest pain or hemoptysis. Afebrile; sinus tachycardia. Urine output good. Review of Systems: No fever or chills Chronic weight loss No night sweats No chest pain or palpitations No hemoptysis or wheezing No abdominal pain no vomiting No hematuria No Known Allergies Past Medical History:  
Diagnosis Date  Asthma  Chlamydia  Herpes zoster  HIV (human immunodeficiency virus infection) ([facility]  Syphilis History reviewed. No pertinent surgical history. History reviewed. No pertinent family history. Social History Tobacco Use  Smoking status: Former Smoker  Smokeless tobacco: Never Used Substance Use Topics  Alcohol use: Yes Comment: socially Prior to Admission medications Not on File Current Facility-Administered Medications Medication Dose Route Frequency  trimethoprim-sulfamethoxazole (BACTRIM DS, SEPTRA DS) 160-800 mg per tablet 2 Tab  2 Tab Oral Q8H  
 acetaminophen (TYLENOL) tablet 650 mg  650 mg Oral Q4H PRN  
 melatonin (rapid dissolve) tablet 10 mg  10 mg Oral QHS  azithromycin (ZITHROMAX) tablet 500 mg  500 mg Oral DAILY  ethambutoL (MYAMBUTOL) tablet 1,200 mg  1,200 mg Oral DAILY  rifAMPin (RIFADIN) capsule 600 mg  600 mg Oral ACB  
 0.9% sodium chloride infusion  50 mL/hr IntraVENous CONTINUOUS  phenol throat spray (CHLORASEPTIC) 1 Spray  1 Spray Oral PRN  
 ibuprofen (MOTRIN) tablet 400 mg  400 mg Oral Q4H PRN  
 sodium bicarbonate tablet 650 mg  650 mg Oral BID  sertraline (ZOLOFT) tablet 50 mg  50 mg Oral DAILY  busPIRone (BUSPAR) tablet 5 mg  5 mg Oral TID  morphine injection 2 mg  2 mg IntraVENous Q4H PRN  
 LORazepam (ATIVAN) tablet 0.5 mg  0.5 mg Oral Q6H PRN  penicillin g benzathine (BICILLIN LA) 1,200,000 unit/2 mL IM injection 2.4 Million Units  2.4 Million Units IntraMUSCular Q7D  
 methylPREDNISolone (PF) (SOLU-MEDROL) injection 40 mg  40 mg IntraVENous Q12H  
 insulin lispro (HUMALOG) injection   SubCUTAneous AC&HS  multivitamin, tx-iron-ca-min (THERA-M w/ IRON) tablet 1 Tab  1 Tab Oral DAILY  guaiFENesin (ROBITUSSIN) 100 mg/5 mL oral liquid 100 mg  100 mg Oral Q4H PRN  
 levalbuterol (XOPENEX) nebulizer soln 1.25 mg/0.5 ml  1.25 mg Nebulization Q4H RT  
 ipratropium (ATROVENT) 0.02 % nebulizer solution 0.5 mg  0.5 mg Nebulization Q4H PRN  
 budesonide (PULMICORT) 500 mcg/2 ml nebulizer suspension  500 mcg Nebulization BID RT  
 levalbuterol (XOPENEX) nebulizer soln 1.25 mg/0.5 ml  1.25 mg Nebulization Q4H PRN  
 enoxaparin (LOVENOX) injection 40 mg  40 mg SubCUTAneous Q24H  
 influenza vaccine 2020-21 (6 mos+)(PF) (FLUARIX/FLULAVAL/FLUZONE QUAD) injection 0.5 mL  0.5 mL IntraMUSCular PRIOR TO DISCHARGE  
  calcium carbonate (TUMS) chewable tablet 200 mg [elemental]  200 mg Oral TID PRN  
 [Held by provider] predniSONE (DELTASONE) tablet 20 mg  20 mg Oral DAILY WITH BREAKFAST  sodium chloride (NS) flush 5-10 mL  5-10 mL IntraVENous PRN  
 sodium chloride (NS) flush 5-40 mL  5-40 mL IntraVENous PRN  promethazine (PHENERGAN) tablet 12.5 mg  12.5 mg Oral Q6H PRN Or  
 ondansetron (ZOFRAN) injection 4 mg  4 mg IntraVENous Q6H PRN  
 bisacodyL (DULCOLAX) tablet 5 mg  5 mg Oral DAILY PRN  
 famotidine (PEPCID) tablet 20 mg  20 mg Oral BID Objective:  
Vital Signs:   
Blood pressure 131/88, pulse (!) 131, temperature 98.8 °F (37.1 °C), resp. rate 24, height 5' 7\" (1.702 m), weight 64.9 kg (143 lb), SpO2 96 %. Body mass index is 22.4 kg/m². O2 Device: Hi flow nasal cannula O2 Flow Rate (L/min): 60 l/min Temp (24hrs), Av.8 °F (37.1 °C), Min:98.2 °F (36.8 °C), Max:99.4 °F (37.4 °C) Intake/Output:  
Last shift:      701 - 1900 In: -  
Out: 407 [MGNFE:066] Last 3 shifts: 1901 -  0700 In: 1200 [P.O.:700; I.V.:500] Out: 2450 [Urine:2450] Intake/Output Summary (Last 24 hours) at 2021 1435 Last data filed at 2021 2350 Gross per 24 hour Intake 400 ml Output 2400 ml Net -2000 ml Physical Exam:  
Patient appears cachectic; on high flow nasal cannula oxygen; mildly dyspneic; acyanotic HEENT: pupils not dilated, reactive, no scleral jaundice, moist oral mucosa, no nasal drainage; neck supple Neck: No adenopathy or thyroid swelling CVS: S1S2 no murmurs; JVD not elevated; sinus tachycardia RS: Mod air entry bilaterally, decreased BS at bases, no wheezes, diffuse bilateral crackles, mildly tachypneic, not in distress Abd: soft, non tender, no hepatosplenomegaly, no abd distension, no guarding or rigidity, bowel sounds heard Neuro: non focal, awake, alert, moving all extremities Extrm: no leg edema or swelling or clubbing Skin: no rash Lymphatic: no cervical or supraclavicular adenopathy Vascular: DPs palpable; good capillary refill in toes Data:  
   
Recent Results (from the past 24 hour(s)) GLUCOSE, POC Collection Time: 01/03/21  4:09 PM  
Result Value Ref Range Glucose (POC) 131 (H) 70 - 110 mg/dL GLUCOSE, POC Collection Time: 01/03/21  9:17 PM  
Result Value Ref Range Glucose (POC) 109 70 - 110 mg/dL GLUCOSE, POC Collection Time: 01/04/21  6:11 AM  
Result Value Ref Range Glucose (POC) 101 70 - 110 mg/dL GLUCOSE, POC Collection Time: 01/04/21 12:48 PM  
Result Value Ref Range Glucose (POC) 99 70 - 110 mg/dL Chemistry Recent Labs 01/03/21 0140 01/02/21 
0335 * 133* * 132* K 4.4 4.0  
CL 89* 100 CO2 29 25 BUN 10 9 CREA 0.72 0.39* CA 8.0* 6.8* AGAP 8 7 BUCR 14 23* * 266* TP 5.8* 4.7* ALB 1.9* 1.5*  
GLOB 3.9 3.2 AGRAT 0.5* 0.5* Lactic Acid Lactic acid Date Value Ref Range Status 12/31/2020 2.1 (HH) 0.4 - 2.0 MMOL/L Final  
  Comment:  
  CALLED TO AND CORRECTLY REPEATED BY: 
Bea Giordano RN, 3N ON 12/31/2020 AT 2000 TO JDA No results for input(s): LAC in the last 72 hours. Liver Enzymes Protein, total  
Date Value Ref Range Status 01/03/2021 5.8 (L) 6.4 - 8.2 g/dL Final  
 
Albumin Date Value Ref Range Status 01/03/2021 1.9 (L) 3.4 - 5.0 g/dL Final  
 
Globulin Date Value Ref Range Status 01/03/2021 3.9 2.0 - 4.0 g/dL Final  
 
A-G Ratio Date Value Ref Range Status 01/03/2021 0.5 (L) 0.8 - 1.7   Final  
 
Alk. phosphatase Date Value Ref Range Status 01/03/2021 274 (H) 45 - 117 U/L Final  
 
Recent Labs 01/03/21 0140 01/02/21 
0335 TP 5.8* 4.7* ALB 1.9* 1.5*  
GLOB 3.9 3.2 AGRAT 0.5* 0.5* * 266* CBC w/Diff Recent Labs 01/03/21 0140 01/02/21 
0335 WBC 5.1 3.9*  
RBC 3.17* 2.66* HGB 9.4* 8.1* HCT 28.6* 23.8*  
 192 GRANS 94* 94* LYMPH 2* 3* EOS 1 0  
  
 
 
 Thyroid  Lab Results Component Value Date/Time TSH 2.10 12/23/2020 10:45 AM  
   
No results found for: T4  
 
Results Procedure Component Value Units Date/Time CULTURE, BLOOD [[id number]] Collected: 01/03/21 0745 Order Status: Completed Specimen: Blood Updated: 01/04/21 1209 Special Requests: NO SPECIAL REQUESTS Culture result: NO GROWTH 1 DAY     
 CULTURE, BLOOD [[id number]] Collected: 01/03/21 0415 Order Status: Completed Specimen: Blood Updated: 01/04/21 0507 Special Requests: NO SPECIAL REQUESTS Culture result: NO GROWTH 1 DAY     
 CULTURE, BLOOD [[id number]] Collected: 01/03/21 0215 Order Status: Canceled Specimen: Blood AFB CULTURE + SMEAR W/RFLX ID FROM CULTURE [[id number]] Collected: 01/01/21 1030 Order Status: Canceled AFB CULTURE + SMEAR W/RFLX ID FROM CULTURE [[id number]] Collected: 12/31/20 1610 Order Status: Completed Updated: 12/31/20 1617 CULTURE, RESPIRATORY/SPUTUM/BRONCH Blondell Leghorn [[id number]] Collected: 12/31/20 1610 Order Status: Completed Specimen: Sputum Updated: 01/02/21 0901 Special Requests: NO SPECIAL REQUESTS     
  GRAM STAIN FEW WBCS SEEN     
   NO EPITHELIAL CELLS SEEN     
      
  1+ GRAM POSITIVE COCCI IN PAIRS CHAINS AND CLUSTERS  
     
      
  FEW APPARENT GRAM NEGATIVE RODS  
     
      
  FEW GRAM POSITIVE RODS (CORYNEFORM) Culture result: MODERATE NORMAL RESPIRATORY PRANAY  
     
 CULTURE, RESPIRATORY/SPUTUM/BRONCH Blondell Leghorn [[id number]] Collected: 12/28/20 1845 Order Status: Canceled Specimen: Sputum CULTURE, BLOOD [[id number]] Collected: 12/27/20 1400 Order Status: Completed Specimen: Blood Updated: 01/02/21 0719 Special Requests: NO SPECIAL REQUESTS Culture result: NO GROWTH 6 DAYS     
 CULTURE, BLOOD [[id number]] Collected: 12/27/20 1350 Order Status: Completed Specimen: Blood Updated: 01/02/21 0719 Special Requests: NO SPECIAL REQUESTS Culture result: NO GROWTH 6 DAYS     
 P.JIROVECI BY PCR [[id number]] Collected: 12/23/20 1845 Order Status: Completed Updated: 12/23/20 1959 CT 12/18/2020 (Most Recent) (CT chest reviewed by me) Results from [facility] Encounter encounter on 12/17/20 CTA CHEST W OR W WO CONT Narrative EXAM: CTA Chest 
 
INDICATION: Shortness of breath. Possible PE. COMPARISON: No prior study. TECHNIQUE: Axial CT imaging from the thoracic inlet through the diaphragm with 
intravenous contrast utilizing CTA study for pulmonary artery evaluation. Coronal and sagittal MIP reformations were generated at a separate workstation. One or more dose reduction techniques were used on this CT: automated exposure 
control, adjustment of the mAs and/or kVp according to patient size, and 
iterative reconstruction techniques. The specific techniques used on this CT 
exam have been documented in the patient's electronic medical record. Digital 
Imaging and Communications in Medicine (DICOM) format image data are available 
to nonaffiliated external healthcare facilities or entities on a secure, media 
free, reciprocally searchable basis with patient authorization for at least a 
12-month period after this study. _______________ FINDINGS: 
 
EXAM QUALITY: Overall exam quality is suboptimal. Pulmonary arterial enhancement 
is adequate. Respiratory motion artifact is present, limiting evaluation. PULMONARY ARTERIES: No convincing evidence of pulmonary embolism. MEDIASTINUM: Normal heart size. No evidence of right heart strain. Aorta is 
unremarkable. No pericardial effusion. LYMPH NODES: No pathologically enlarged lymph nodes lymph nodes. AIRWAY: Unremarkable. LUNGS: Centrilobular emphysema. Superimposed scattered bilateral groundglass 
opacities throughout the lungs. PLEURA: No pleural effusion or pneumothorax. UPPER ABDOMEN: Visualized upper abdomen is unremarkable. OTHER: No acute or aggressive osseous abnormalities identified. _______________ Impression IMPRESSION: 
 
Suboptimal study secondary to motion artifact and bolus timing. No evidence of obvious central pulmonary embolism. Diffuse bilateral groundglass opacities, suspicious for atypical infection. Emphysema. XR 1/4 (Most Recent). CXR  reviewed by me and compared with previous CXR Results from [facility] Encounter encounter on 12/17/20 XR CHEST PORT Narrative EXAM: XR CHEST PORT 
 
CLINICAL INDICATION/HISTORY: Pneumonia 
-Additional: None COMPARISON: 1/1/2021 TECHNIQUE: Portable frontal view of the chest 
 
_______________ FINDINGS: 
 
SUPPORT DEVICES: None. HEART AND MEDIASTINUM: Cardiomediastinal silhouette within normal limits. LUNGS AND PLEURAL SPACES: Diffuse interstitial and alveolar opacities 
bilaterally. No pneumothorax or pleural effusion. _______________ Impression IMPRESSION: 
 
Diffuse interstitial and alveolar opacities bilaterally, unchanged. Trae Sanches MD 
1/4/2021

## 2021-01-04 NOTE — PROGRESS NOTES
0830 
Assumed care at this time. Patient alert and oriented x4. Denies SOB, chest pain. Shows no signs of distress. Patient lungs clear bilaterally but patient respirations fast paced and patient c/o SOB. Patient on highflow 60 L and O2 is 96. Encouraged calming, deep breathing techniques. Cap refill < 3 sec to all extremities. Patient has 20 G IV to L forearm CDI. Stated pain 10/10--nurse to admin morphine. Bowel sounds present. Skin flaky but intact with no pressure wounds. Patient call light and possessions within reach. Bed locked and in lowest position. Nurse will continue to monitor. Shift Summary [address] Patient alert and oriented x4. Patient had uneventful shift. Voiding sufficient amounts. Pain controlled by prn morphine and anxiety by ativan. Continue to monitor oxygen and tachycardia.

## 2021-01-04 NOTE — PROGRESS NOTES
Hospitalist Progress Note-critical care note Patient: Mason Donaldson MRN: [id number]  CSN: [id number] YOB: 1990  Age: 31 y.o. Sex: male DOA: 12/17/2020 LOS:  LOS: 18 days Chief complaint: pneumonia , hiv infection , bilateral pna, sepsis , suspect covid 23 , hyponatremia Assessment/Plan Hospital Problems  Never Reviewed Codes Class Noted POA Hypoxia ICD-10-CM: R09.02 
ICD-9-CM: 799.02  12/23/2020 Unknown Acute respiratory failure with hypoxia [facility]) ICD-10-CM: J96.01 
ICD-9-CM: 518.81  12/22/2020 Yes * (Principal) Pneumonia of both lungs due to [facility]ii [facility] ICD-10-CM: B59 ICD-9-CM: 136.3  12/18/2020 Yes AIDS (acquired immune deficiency syndrome) ([facility] ICD-10-CM: B20 
ICD-9-CM: 467  12/17/2020 Yes Bilateral pneumonia ICD-10-CM: J18.9 ICD-9-CM: 919  12/17/2020 Yes Sepsis ([facility] ICD-10-CM: A41.9 ICD-9-CM: 038.9, 995.91  12/17/2020 Yes Hyponatremia ICD-10-CM: E87.1 ICD-9-CM: 276.1  12/17/2020 Unknown 32 yo male dx with HIV 2 [facility] ago [facility] and with no treatent or follow up came with progressive ob X 2 weeks, recent 12/7 covid test negative repeated  
  
 
Acute hypoxia resp failure requiring high flow O2  
stable on high flow w/o decompensation ID and pulmonologist f/u Consult ID for HIV infection, possible pneumocystis pneumonia, Continue vitamins, steroid Diffuse bilateral groundglass opacities, suspicious for atypical infection -no PE ,emphysema  
  
HIV infection and Bilateral pneumonia, pcp , AIDS  
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 
has been refusing injection  
  
Hypoalbuminemia 
  
 
Hyponatremia Ns infusion Continue monitoring na level Depression and anxiety On zoloft and and buspar  
  
Subjective: sob, anxious  
rn : still sob Mom was updated per Dr. Gabe Hanks, prognosis is grim Disposition :tbd, Review of systems: 
 
General: No fevers or chills. Cardiovascular: No chest pain or pressure. No palpitations. Pulmonary: + shortness of breath. Gastrointestinal: No nausea, vomiting. Vital signs/Intake and Output: 
Visit Vitals /88 (BP 1 Location: Left arm, BP Patient Position: At rest) Pulse (!) 131 Temp 98.8 °F (37.1 °C) Resp 24 Ht 5' 7\" (1.702 m) Wt 64.9 kg (143 lb) SpO2 96% BMI 22.40 kg/m² Current Shift:  01/04 0701 - 01/04 1900 In: -  
Out: 049 [ZBGEJ:163] Last three shifts:  01/02 1901 - 01/04 0700 In: 1200 [P.O.:700; I.V.:500] Out: 2450 [Urine:2450] Physical Exam: 
General: WD, WN. Alert, cooperative, no acute distress HEENT: NC, Atraumatic. PERRLA, anicteric sclerae. High flow O2 noted Lungs: Coarse Heart:  Tachy   No murmur, No Rubs, No Gallops Abdomen: Soft, Non distended, Non tender. +Bowel sounds, Extremities: No c/c/e Psych:   + anxious, no agitated. Neurologic:  No acute neurological deficit. Labs: Results:  
   
Chemistry Recent Labs 01/03/21 0140 01/02/21 
0335 * 133* * 132* K 4.4 4.0  
CL 89* 100 CO2 29 25 BUN 10 9 CREA 0.72 0.39* CA 8.0* 6.8* AGAP 8 7 BUCR 14 23* * 266* TP 5.8* 4.7* ALB 1.9* 1.5*  
GLOB 3.9 3.2 AGRAT 0.5* 0.5* CBC w/Diff Recent Labs 01/03/21 
0140 01/02/21 
0335 WBC 5.1 3.9*  
RBC 3.17* 2.66* HGB 9.4* 8.1* HCT 28.6* 23.8*  
 192 GRANS 94* 94* LYMPH 2* 3* EOS 1 0 Cardiac Enzymes No results for input(s): CPK, CKND1, ANIKA in the last 72 hours. No lab exists for component: Arelis Castro Coagulation No results for input(s): PTP, INR, APTT, INREXT, INREXT in the last 72 hours. Lipid Panel No results found for: CHOL, CHOLPOCT, CHOLX, CHLST, CHOLV, 749947, HDL, HDLP, LDL, LDLC, DLDLP, 184309, VLDLC, VLDL, TGLX, TRIGL, TRIGP, TGLPOCT, CHHD, CHHDX  
BNP No results for input(s): BNPP in the last 72 hours. Liver Enzymes Recent Labs 01/03/21 
0140 TP 5.8* ALB 1.9* * Thyroid Studies Lab Results Component Value Date/Time TSH 2.10 12/23/2020 10:45 AM  
    
Procedures/imaging: see electronic medical records for all procedures/Xrays and details which were not copied into this note but were reviewed prior to creation of Plan Cta Chest W Or W Wo Cont Result Date: 12/18/2020 EXAM: CTA Chest INDICATION: Shortness of breath. Possible PE. COMPARISON: No prior study. TECHNIQUE: Axial CT imaging from the thoracic inlet through the diaphragm with intravenous contrast utilizing CTA study for pulmonary artery evaluation. Coronal and sagittal MIP reformations were generated at a separate workstation. One or more dose reduction techniques were used on this CT: automated exposure control, adjustment of the mAs and/or kVp according to patient size, and iterative reconstruction techniques. The specific techniques used on this CT exam have been documented in the patient's electronic medical record. Digital Imaging and Communications in Medicine (DICOM) format image data are available to nonaffiliated external healthcare facilities or entities on a secure, media free, reciprocally searchable basis with patient authorization for at least a 12-month period after this study. _______________ FINDINGS: EXAM QUALITY: Overall exam quality is suboptimal. Pulmonary arterial enhancement is adequate. Respiratory motion artifact is present, limiting evaluation. PULMONARY ARTERIES: No convincing evidence of pulmonary embolism. MEDIASTINUM: Normal heart size. No evidence of right heart strain. Aorta is unremarkable. No pericardial effusion. LYMPH NODES: No pathologically enlarged lymph nodes lymph nodes. AIRWAY: Unremarkable. LUNGS: Centrilobular emphysema. Superimposed scattered bilateral groundglass opacities throughout the lungs. PLEURA: No pleural effusion or pneumothorax. UPPER ABDOMEN: Visualized upper abdomen is unremarkable. OTHER: No acute or aggressive osseous abnormalities identified. _______________ IMPRESSION: Suboptimal study secondary to motion artifact and bolus timing. No evidence of obvious central pulmonary embolism. Diffuse bilateral groundglass opacities, suspicious for atypical infection. Emphysema. Xr Chest [facility] Result Date: 12/18/2020 EXAM: XR CHEST PORT CLINICAL INDICATION/HISTORY: SOB -Additional: None COMPARISON: One day prior TECHNIQUE: Portable frontal view of the chest _______________ FINDINGS: SUPPORT DEVICES: None. HEART AND MEDIASTINUM: Cardiomediastinal silhouette within normal limits. LUNGS AND PLEURAL SPACES: Perihilar/central bilateral interstitial/parenchymal opacities, slightly worsened from prior study. No large effusion or pneumothorax. _______________ IMPRESSION: Perihilar/central bilateral interstitial/parenchymal opacities, slightly worsened from prior study. Xr Chest [facility] Result Date: 12/17/2020 EXAM: Portable frontal view of the chest. CLINICAL INDICATION/HISTORY: Shortness of breath COMPARISON: None. _______________ FINDINGS: There is diffuse hazy and groundglass appearing opacity throughout both lungs, with denser areas of opacity in the medial right upper and lower lung and within the region of the central left upper lobe. No pleural effusion. Normal heart size. No acute osseous findings. _______________ IMPRESSION: 1. Diffuse bilateral lung opacity most concerning for bilateral pneumonia. Echo Adult Complete Result Date: 12/18/2020 · Left Ventricle: Normal cavity size, wall thickness and systolic function (ejection fraction normal). The estimated EF is 55 - 60%. There is inconclusive left ventricular diastolic function E'E= 0.83. Wall Scoring: The left ventricular wall motion is normal. · Tricuspid Valve: Tricuspid valve not well visualized. No stenosis. Tricuspid regurgitation is inadequate for estimation of right ventricular systolic pressure. · Pericardium: Trivial pericardial effusion noted. No indications of tamponade present.    
 
 
Liliam Cordon MD

## 2021-01-04 NOTE — PROGRESS NOTES
Comprehensive Nutrition Assessment Type and Reason for Visit: [place] Door Nutrition Recommendations/Plan: Other: continue w/POC Nutrition Assessment:  pt admitted w/ bilateral PNA, sepsis, hyponatremia, acute resp distress, hypoxia, AIDS Estimated Daily Nutrient Needs: 
Energy (kcal): 2416; Weight Used for Energy Requirements: Current Protein (g): 121; Weight Used for Protein Requirements: Admission Fluid (ml/day): 2416; Method Used for Fluid Requirements: 1 ml/kcal 
 
 
Nutrition Related Findings:  Lab: Na-127, Ca-8.0, glucose-112. MedL ducolax, TUMS, pepcid, humalog, melatonin, sodium bicarbonate, MVI. +BM 1/3/21. Reported 50-75% intake per flowsheet. Wounds:   
None Current Nutrition Therapies: DIET NUTRITIONAL SUPPLEMENTS All Meals; Ensure [employer] DIET REGULAR FR 1800ML Anthropometric Measures: 
· Height:  5' 7\" (170.2 cm) · Current Body Wt:  64.9 kg (143 lb) · Admission Body Wt:  151 lb · Usual Body Wt:  (unable to assess at this time) · Ideal Body Wt:  148 lbs:  96.6 % · Adjusted Body Weight:   ; Weight Adjustment for: No adjustment · Adjusted BMI:      
· BMI Category:  Normal weight (BMI 18.5-24. 9) Nutrition Diagnosis:  
· (P) Inadequate oral intake related to (P) catabolic illness as evidenced by (P) weight loss Nutrition Interventions:  
Food and/or Nutrient Delivery: (P) Continue current diet, Continue oral nutrition supplement Nutrition Education and Counseling: (P) No recommendations at this time Coordination of Nutrition Care: (P) Continue to monitor while inpatient, Interdisciplinary rounds Goals: (P) PO intake of meals and supplements >50% throughout the next 3-5 days Nutrition Monitoring and Evaluation:  
Behavioral-Environmental Outcomes: (P) None identified Food/Nutrient Intake Outcomes: (P) Food and nutrient intake, Supplement intake Physical Signs/Symptoms Outcomes: (P) Biochemical data, Nutrition focused physical findings, Skin, Weight, GI status, Nausea/vomiting Discharge Planning: (P) Continue current diet, Continue oral nutrition supplement Electronically signed by Rhonda Kessler on 1/4/2021 at 10:51 AM

## 2021-01-05 NOTE — PROGRESS NOTES
0116-Resting in bed, stable. Call light within reach. Assessment complete. Informed of timing of next pain medication. White board updated. Assessment complete. 0345-Spoke to Dr. Jaxon Gregg information reported earlier via zone phone was reported on patient incorrectly. 0552-Patient resting in bed. Stable. No change from initial assessment. Call light within reach. 0630-Paged Respiratory, spoke with Anuj. Anuj to come to see patient. Some respiratory distress noted. Stable at this time. Shift Summary:  Stable at shift change report.

## 2021-01-05 NOTE — DIABETES MGMT
GLYCEMIC CONTROL PROGRESS NOTE: 
 
- no known h/o T2DM 
- Solumedrol 40 mg Q 12 hours - BG trending down, no corrective coverage required Noted: 
- pt with hypoglycemic episode on evening POCT 66 mg/dL 
- encourage PO intake Recent Glucose Results:  
Lab Results Component Value Date/Time  (H) 01/05/2021 01:50 AM  
 GLUCPOC 87 01/05/2021 06:58 AM  
 GLUCPOC 72 01/05/2021 06:45 AM  
 GLUCPOC 113 (H) 01/04/2021 11:56 PM  
 
 
Kaleb Schreiber MS, RN, CDE Glycemic Control Team 
[phone] Pager [phone] (M-TH 8:00-4:30P) *After Hours pager [phone]

## 2021-01-05 NOTE — PROGRESS NOTES
Pulmonary I went to see patient this afternoon; patient's mother was at bedside; they were both upset with me that I discussed yesterday that he has end-stage disease with poor prognosis; I clarified, that I had seen and examined patient first; then I had discussed with infectious disease consultant, and came back to discuss CODE STATUS since I am an intensivist as well; I clarified that the CODE STATUS was discussed with respect, layman terms, and advised patient to review his decisions with familyall this is standard discussion in taking care of a patient like him, who has been in the hospital for about 2 weeks with his illness and refractory hypoxemia on high flow oxygen; in this case, the patient had wished to proceed with full code based on his wishes. The mom said that she had spoken to infectious disease consultant subsequently yesterday, and got the report that patient is doing well, and his blood counts are improving. They would like to hold off from me seeing him; I discussed that in the unfortunate event that patient may need to come to the ICU, I would need to get involved again, and they are okay with that. I clarified that I was under the impression, that based on my discussion with infectious disease consultant that patient has end-stage illness with a high risk of worsening/mortality, considering his respiratory illness and refractory hypoxemia, and poor CD4 count. I would be on standby for now per patient and family wishes.  
 
 
Harvey Altman MD

## 2021-01-05 NOTE — ROUTINE PROCESS
Bedside and Verbal shift change report given to Lizeth Gaviria RN (oncoming nurse) by Damion Ordaz RN (offgoing nurse). Report included the following information SBAR and Kardex.

## 2021-01-05 NOTE — PROGRESS NOTES
Hospitalist Progress Note Patient: Bj Carter MRN: [id number]  CSN: [id number] YOB: 1990  Age: 31 y.o. Sex: male DOA: 12/17/2020 LOS:  LOS: 19 days Chief Complaint: Ac resp failure Assessment/Plan  
 
30 yo male with untreated HIV/AIDS over 3 years, came with SOB and fevers 
covid negative 
  
Acute hypoxia resp failure requiring high flow 02-continued requirement, is not weaning, continue supportive nebs Tachycardia-due to resp failure and  severe illness, as well as compounded by anxiety Fevers due to PJ pneumonia and possible disseminated MAC-improved 
pneumocystis pneumonia-on high dose bactrim and steroids, now IV, Q12H, continue Probable disseminated MAC as per ID-ethambutol, zithromax, rifampin -tolerating, Alk phos is better Hyperglycemia with steroids-SSI PRN 
HIV/AIDS, untreated, CD4 count 6, was never on HAART after diagnosis made Secondary syphyllis, RPR reactive, titer 1:64-started on IM bicillin Severe prot imelda malnutrition 
  
Hyponatremia-gentle IV NS and bicarb tabs-increase dose, judicious fluids, repeat BMP in am 
  
Depression and anxiety-SSRI and anxiolytic medicine- zoloft and ativan, continue symptomatic tx as needed 
  
DVT proph-lovenox Remains very ill, continue current course, he is motivated to get better but he no where out of the woods yet Reasonable discussion about code status had yesterday but he is not ready to make a decision Disposition : 
Patient Active Problem List  
Diagnosis Code  AIDS (acquired immune deficiency syndrome) ([facility]) B20  
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  
 Acute respiratory failure with hypoxia ([facility]) J96.01  
 Hypoxia R09.02 Subjective: 
Denies new issue \"im scared about what Im hearing\" States he is maybe a \"little\" better today Just fearful about his health and severity of illness in general 
 
Denies new pain or issues Review of systems: 
 
Constitutional: denies fevers, chills Respiratory: SOB, cough Cardiovascular: denies chest pain, palpitations Gastrointestinal: denies nausea, vomiting, diarrhea Vital signs/Intake and Output: 
Visit Vitals /76 (BP 1 Location: Left arm, BP Patient Position: At rest;Supine) Pulse (!) 134 Temp 98.8 °F (37.1 °C) Resp 24 Ht 5' 7\" (1.702 m) Wt 64.9 kg (143 lb) SpO2 96% BMI 22.40 kg/m² Current Shift:  No intake/output data recorded. Last three shifts:  01/03 1901 - 01/05 0700 In: 8152 [P.O.:420; I.V.:750] Out: 0555 [facility] Exam: 
 
General: frail thin ill appearing AAM, alert, NAD, OX3 Head/Neck: NCAT, supple, No masses, No lymphadenopathy CVS:Regular rate and rhythm, no M/R/G, S1/S2 heard, no thrill Lungs:Clear to auscultation bilaterally, no wheezes, rhonchi, or rales Abdomen: Soft, Nontender, No distention, Normal Bowel sounds, No hepatomegaly Extremities: No C/C/E, pulses palpable 2+ Neuro:grossly normal , follows commands Psych:appropriate Labs: Results:  
   
Chemistry Recent Labs 01/05/21 
0150 01/03/21 
0140 * 112* * 126*  
K 4.0 4.4 CL 89* 89* CO2 28 29 BUN 11 10 CREA 0.63 0.72  
CA 7.5* 8.0* AGAP 6 8 BUCR 17 14 * 274* TP 5.7* 5.8* ALB 1.8* 1.9*  
GLOB 3.9 3.9 AGRAT 0.5* 0.5* CBC w/Diff Recent Labs 01/05/21 
0150 01/03/21 
0140 WBC 6.7 5.1  
RBC 3.26* 3.17* HGB 9.7* 9.4* HCT 29.1* 28.6*  
 228 GRANS 95* 94* LYMPH 2* 2*  
EOS 0 1 Cardiac Enzymes No results for input(s): CPK, CKND1, ANIKA in the last 72 hours. No lab exists for component: Malva Jeannine Coagulation No results for input(s): PTP, INR, APTT, INREXT in the last 72 hours. Lipid Panel No results found for: CHOL, CHOLPOCT, CHOLX, CHLST, CHOLV, 582213, HDL, HDLP, LDL, LDLC, DLDLP, 548432, VLDLC, VLDL, TGLX, TRIGL, TRIGP, TGLPOCT, CHHD, CHHDX  
BNP No results for input(s): BNPP in the last 72 hours. Liver Enzymes Recent Labs 01/05/21 
0150 TP 5.7* ALB 1.8* * Thyroid Studies Lab Results Component Value Date/Time TSH 2.10 12/23/2020 10:45 AM  
    
Procedures/imaging: see electronic medical records for all procedures/Xrays and details which were not copied into this note but were reviewed prior to creation of Plan Rowan Deleon MD

## 2021-01-05 NOTE — ROUTINE PROCESS
1923 
Bedside and verbal shift change report given to [address] by Jazmyn Roberts RN. Report included SBAR, kardex, MAR, and recent results.

## 2021-01-05 NOTE — ROUTINE PROCESS
Bedside and Verbal shift change report given to Beti Garcia RN (oncoming nurse) by Melissa Jean RN (offgoing nurse). Report included the following information SBAR and Kardex.

## 2021-01-05 NOTE — PROGRESS NOTES
0825 
Assumed care of pt at this time. Assessment complete. Pt alert and oriented x 4. Shows no sign of distress. Fall risk arm band in place. Denies SOB and chest pain. Pt lungs clear/diminished bilaterally on high flow with periods of SOB. Cap refill  less than 3 seconds. Pt denies numbness and tingling to all extremities. Stated pain 9/10 to \"chest my lungs and back\" . Pt has 20 G IV to L forearm. Pt has no dressing skin flaky and dry to BLE . On lovonox for VTE Call light and possessions within reach. Bed locked and in low position. Will continue to monitor. On box 14 reading sinus tach 
 
1829 Paged resp for prn inhaler as pt requests states he feels SOB 
 
1830 Resp aware and have him on list to see after their shift change. [address] Shift summary Pt is alert and oriented x 4. Pt had uneventful shift. Still SOB on high flow. Pt  voiding sufficient amounts via urinal and had BM today. Pain controlled by PRN medication. Also ativan for anxiety and robitussin for cough [address] Pt desat between 76-82% on highflow. off and ongoing Nurse went to assess pt at bedside. Pt having SOB. RT called to bedside nonrebreathing mask placed on pt at 10L Sat began to increase reading 92%

## 2021-01-06 NOTE — ANESTHESIA POSTPROCEDURE EVALUATION
* No procedures listed *. No value filed. <BSHSIANPOST> INITIAL Post-op Vital signs:  
Vitals Value Taken Time /81 01/06/21 0425 Temp Pulse 154 01/06/21 0425 Resp 36 01/06/21 0425 SpO2 88 % 01/06/21 0425 Vitals shown include unvalidated device data. Pt left in care of ICU staff

## 2021-01-06 NOTE — PROGRESS NOTES
2122-Received call from primary RN, Edwige Wagner that would like for additional help with patient that may be potentially coming to ICU.  
 
2125-As RRT RN, went to assess patient and assist primary RN. Dr. Raymond Rodríguez at bedside. Pt tachyphenic and taking very shallow breaths, -160 at this time. Pt appears very anxious. Encouraged slow deep breaths and breathing exercises, sats increased to 94-95% and maintained at this time. Pt able to take PO Ativan and melatonin but was unable to take other PO pills at this time. Gave PRN 2mg Morphine for sob (Has orders for pain, Dr. Parris Stubbs gave VO for SOB at this time). 2145-Pt continues to be anxious. Orders received for 1Mg IV ativan at this time. 2150-Pt appears more comfortable and sats maintaining 94% on HFNC and non rebreather mask. Per Dr. Baljeet Padilla conversation with patient, he is most anxious about having to come to ICU. Dr. Raymond Rodríguez states she will stay at bedside and monitor patient for now and would like to attempt to keep him on tele if he is able to maintain Sats and anxiety level decreased. Left pt in care of Primary RN and Juliocesar at this time.

## 2021-01-06 NOTE — PROGRESS NOTES
Hospitalist Progress Note-critical care note Patient: Scot Martínez MRN: [id number]  [facility]: [id number] YOB: 1990  Age: 31 y.o. Sex: male DOA: 12/17/2020 LOS:  LOS: 20 days Chief complaint: pneumonia , hiv infection , bilateral pna, sepsis , suspect covid 23 , hyponatremia Assessment/Plan Hospital Problems  Never Reviewed Codes Class Noted POA Hypoxia ICD-10-CM: R09.02 
ICD-9-CM: 799.02  12/23/2020 Unknown Acute respiratory failure with hypoxia [facility]) ICD-10-CM: J96.01 
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 
ICD-9-CM: 523  12/17/2020 Yes Bilateral pneumonia ICD-10-CM: J18.9 ICD-9-CM: 701  12/17/2020 Yes Sepsis ([facility] [address] ICD-10-CM: A41.9 ICD-9-CM: 038.9, 995.91  12/17/2020 Yes Hyponatremia ICD-10-CM: E87.1 ICD-9-CM: 276.1  12/17/2020 Unknown 30 yo male dx with HIV 2 years ago at [facility] and with no treatent or follow up came with progressive ob X 2 weeks, recent 12/7 covid test negative repeated  
  
 
Acute hypoxia resp failure  
Transferred to icu and intubated AM  
Continue vent support Will resend covid 19  
 
pneumocystis pneumonia. possible disseminated MAC-  
on  ethambutol, zithromax, rifampin 
on steroid and bactrim [facility] 
Continue cardiac monitoring, keep map >65. Vasopressor as needed ID: Aids :  
cd4 6, Never on HAART Repeated bcx sent today Secondary syphyllis, RPR reactive, titer 1:64-started on penicillin  
  
Renal   
Hyponatremia Ns infusion Continue monitoring na level Continue monitoring other electrolytes, icu electrolytes replacement protocol Heme: anemia Continue h/h monitoring Transfuse if hb<7 Psych Depression and anxiety On zoloft and and buspar GI  Npo now ,ppi Neuro on sedation Rn: stable on vent Will reconsult palliative care Prognosis is grim Discussed with Dr. Lang Godwin Disposition :tbd,  
30 minutes of critical care time spent in the direct evaluation and treatment of this high risk patient. The reason for providing this level of medical care for this critically ill patient was due a critical illness that impaired one or more vital organ systems such that there was a high probability of imminent or life threatening deterioration in the patients condition. This care involved high complexity decision making to assess, manipulate, and support vital system functions, to treat this degreee vital organ system failure and to prevent further life threatening deterioration of the patients condition. Review of systems: 
Unable to obtain due to intubation Vital signs/Intake and Output: 
Visit Vitals BP 91/63 Pulse (!) 145 Temp (!) 102.3 °F (39.1 °C) Resp 30 Ht 5' 7\" (1.702 m) Wt 64.9 kg (143 lb) SpO2 (!) 83% BMI 22.40 kg/m² Current Shift:  No intake/output data recorded. Last three shifts:  01/04 1901 - 01/06 0700 In: 565.8 [P.O.:240; I.V.:325.8] Out: 1250 [[id number]] Physical Exam: 
General: On vent HEENT: NC, Atraumatic. PERRLA, anicteric sclerae. ET tube noted Lungs: Coarse Heart:  rrr  No murmur, No Rubs, No Gallops Abdomen: Soft, Non distended, Non tender. +Bowel sounds, Extremities: No c/c/e Psych:   Calm Neurologic:  On sedation Labs: Results:  
   
Chemistry Recent Labs 01/06/21 0425 01/05/21 
0150 GLU 82 102* * 123* K 4.9 4.0  
CL 90* 89* CO2 28 28 BUN 15 11 CREA 0.79 0.63  
CA 7.8* 7.5* AGAP 8 6 BUCR 19 17 * 269* TP 6.0* 5.7* ALB 1.7* 1.8*  
GLOB 4.3* 3.9 AGRAT 0.4* 0.5* CBC w/Diff Recent Labs 01/06/21 0425 01/05/21 
0150 WBC 9.8 6.7  
RBC 3.53* 3.26* HGB 10.6* 9.7* HCT 32.5* 29.1*  
 192 GRANS 88* 95*  
 LYMPH 6* 2* EOS 0 0 Cardiac Enzymes Recent Labs 01/06/21 
0425 CPK 72 Coagulation No results for input(s): PTP, INR, APTT, INREXT, INREXT in the last 72 hours. Lipid Panel No results found for: CHOL, CHOLPOCT, CHOLX, CHLST, CHOLV, 488085, HDL, HDLP, LDL, LDLC, DLDLP, 925690, VLDLC, VLDL, TGLX, TRIGL, TRIGP, TGLPOCT, CHHD, CHHDX  
BNP No results for input(s): BNPP in the last 72 hours. Liver Enzymes Recent Labs 01/06/21 
0425 TP 6.0* ALB 1.7* * Thyroid Studies Lab Results Component Value Date/Time TSH 2.10 12/23/2020 10:45 AM  
    
Procedures/imaging: see electronic medical records for all procedures/Xrays and details which were not copied into this note but were reviewed prior to creation of Plan Cta Chest W Or W Wo Cont Result Date: 12/18/2020 EXAM: CTA Chest INDICATION: Shortness of breath. Possible PE. COMPARISON: No prior study. TECHNIQUE: Axial CT imaging from the thoracic inlet through the diaphragm with intravenous contrast utilizing CTA study for pulmonary artery evaluation. Coronal and sagittal MIP reformations were generated at a separate workstation. One or more dose reduction techniques were used on this CT: automated exposure control, adjustment of the mAs and/or kVp according to patient size, and iterative reconstruction techniques. The specific techniques used on this CT exam have been documented in the patient's electronic medical record. Digital Imaging and Communications in Medicine (DICOM) format image data are available to nonaffiliated external healthcare facilities or entities on a secure, media free, reciprocally searchable basis with patient authorization for at least a 12-month period after this study. _______________ FINDINGS: EXAM QUALITY: Overall exam quality is suboptimal. Pulmonary arterial enhancement is adequate. Respiratory motion artifact is present, limiting evaluation. PULMONARY ARTERIES: No convincing evidence of pulmonary embolism. MEDIASTINUM: Normal heart size. No evidence of right heart strain. Aorta is unremarkable. No pericardial effusion. LYMPH NODES: No pathologically enlarged lymph nodes lymph nodes. AIRWAY: Unremarkable. LUNGS: Centrilobular emphysema. Superimposed scattered bilateral groundglass opacities throughout the lungs. PLEURA: No pleural effusion or pneumothorax. UPPER ABDOMEN: Visualized upper abdomen is unremarkable. OTHER: No acute or aggressive osseous abnormalities identified. _______________ IMPRESSION: Suboptimal study secondary to motion artifact and bolus timing. No evidence of obvious central pulmonary embolism. Diffuse bilateral groundglass opacities, suspicious for atypical infection. Emphysema. Xr Chest [facility] Result Date: 12/18/2020 EXAM: XR CHEST PORT CLINICAL INDICATION/HISTORY: SOB -Additional: None COMPARISON: One day prior TECHNIQUE: Portable frontal view of the chest _______________ FINDINGS: SUPPORT DEVICES: None. HEART AND MEDIASTINUM: Cardiomediastinal silhouette within normal limits. LUNGS AND PLEURAL SPACES: Perihilar/central bilateral interstitial/parenchymal opacities, slightly worsened from prior study. No large effusion or pneumothorax. _______________ IMPRESSION: Perihilar/central bilateral interstitial/parenchymal opacities, slightly worsened from prior study. Xr Chest [facility] Result Date: 12/17/2020 EXAM: Portable frontal view of the chest. CLINICAL INDICATION/HISTORY: Shortness of breath COMPARISON: None. _______________ FINDINGS: There is diffuse hazy and groundglass appearing opacity throughout both lungs, with denser areas of opacity in the medial right upper and lower lung and within the region of the central left upper lobe. No pleural effusion. Normal heart size. No acute osseous findings. _______________ IMPRESSION: 1. Diffuse bilateral lung opacity most concerning for bilateral pneumonia. Echo Adult Complete Result Date: 12/18/2020 · Left Ventricle: Normal cavity size, wall thickness and systolic function (ejection fraction normal). The estimated EF is 55 - 60%. There is inconclusive left ventricular diastolic function E'E= 6.44. Wall Scoring: The left ventricular wall motion is normal. · Tricuspid Valve: Tricuspid valve not well visualized. No stenosis. Tricuspid regurgitation is inadequate for estimation of right ventricular systolic pressure. · Pericardium: Trivial pericardial effusion noted. No indications of tamponade present.    
 
 
Dietrich Rinne, MD

## 2021-01-06 NOTE — PROGRESS NOTES
[address] [facility], [place] extensively discussed comfort measures with palliative care and stated that she want her son to \"stop suffering. \" She advised this nurse that she would like to remove the vent and be present at the time of extubation. Kassy Duong and updated [facility] Melania Aquino discussed comfort measures with mom [place] and prospective plan of care 1925 Patient extubated compassionately. .. mom and aunt at bedside. ... 1930 Time of death called. .. asystole on monitor. ..

## 2021-01-06 NOTE — PROGRESS NOTES
2300- RN called hospitalist  to inform that patient is not maintaining adequate or sustainable oxygen saturation levels. Patient in bed and alert, but having difficulty speaking because he is so SOB, tachypneic, and tachycardic. VS recorded. Patient has weakness and is diaphoretic, using accessory muscles to breath. Patient still on high flow NC and non-rebreather. RN attempted methodical breathing exercises with patient with no success. Patient's oxygen saturation fluctuating between 70's and low 90's. SBAR provided. RN recommended a higher level of care for patient. Physician informed RN that she will come to speak with patient and place order for patient to be transfered to ICU.

## 2021-01-06 NOTE — PROGRESS NOTES
BIPAP placed on patient for a short time but not tolerated. Placed back on the HFNC at 60LPM / 95%, along with a 100% NRM. Tolerating fair with O2 Sats 88 to 90%.

## 2021-01-06 NOTE — CONSULTS
Brief ICU Note Events last few days reviewed/tracked. Intubated this morning with abrupt worsening resp/mentation. I agree with compassionate extubation and allowing natural death at this point. He's dying. Anjum Rowan MD 
Cell [phone] [address] Nu[address] [facility]

## 2021-01-06 NOTE — PROGRESS NOTES
31 yo male with untreated HIV/AIDS over 3 years, admitted with SOB and fever, found to be  
covid negative Treatment plan includes Dx of:  
Acute hypoxia resp failure requiring high flow 02 Tachycardia due to respiratory failure Fevers due to PJ pneumonia and possible disseminated MAC 
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  
Intubated, on ventilator CRITICAL CARE PLAN Resp - S/p intubation Still only maintaining 02 sats high 80s Sedated on propofol and fentanyl Had to use one dose of nimbex along with propofol and fentanyl due to high degree of agitation See vent orders, VAP bundle. HOB>30 degrees. Bronchodilators. Follow sputum cx. Daily CXR Concern that resp status is not improving even though vented ID - Febrile with increasing temps >103, cooling measures in place Obtain new blood cultures and lactic acid PJ pneumonia and possible disseminated MAC Follow up blood and urine cx. ANTIBIOTICS . Trend temps, wbc, LA improving. CVS - Maintaining appropriate BP Monitor HD Heme/onc - Follow H&H, plts. INR. Renal - Trend BUN, Cr, follow I/O, penn in place. Check and replace Mg, K, phos. Endocrine - Follow FSG Neuro/ Pain/ Sedation - Fentanyl, ativan/versed prn. Sedation bundle, Nimbex added x1 dose GI - NPO for now. NG tube, tube feeding. Prophylaxis - DVT: heparin, GI: protonix D/w intensivist, Dr. Areli Mccracken, and ordered cytology for pneumocystis jiroveci, blood cultures x2, lactic acid, UA with micro and urine culture

## 2021-01-06 NOTE — CONSULTS
Palliative Medicine Consult DR. GARCIA'[facility]: [phone] ([phone]) [facility]: [phone] [facility]: [phone] Patient Name: Maine Conner YOB: 1990 Date of Initial Consult: 12/28/2020, follow up 1/6/2021 Reason for Consult: overwhelming symptoms Requesting Provider:  Jose L Dominguez MD 
Primary Care Physician: None SUMMARY:  
Maine Conner is a 31 y.o. male with a past history of HIV, syphilis and asthma who was admitted on 12/17/2020 from home with a diagnosis of pneumocystis pneumonia. Current medical issues leading to Palliative Medicine involvement include: HIV and overwhelming symptoms. 1/6/2021 events over past few days noted. Aruna Cheek is now intubated on FIO2 of 100% and unfortunately doing very poorly. Appreciate conversations with mother from [facility] now partial code no Chest compressions or shock at cardiac arrest. Mother and family here to see Xiomara. Again discussed his very poor prognosis. She wishes to wait for her other son to arrive before making further decisions. PALLIATIVE DIAGNOSES:  
1. Advanced care decisions 2. Acute respiratory failure 3. Pneumonia 4. HIV 5. Syphilis  PLAN:  
 1. 1/6/2020 Sebastián Tesfaye is intubated on high ventilator settings, sats in 60's and 70's tachycardiac. Doing poorly despite very aggressive treatment[facility] overall poor prognosis. She decided on no chest compressions/ shock at cardiac arrest. She affirmed this wish with us. Mother and several other family members allowed to come to bedside and see Sebastián Tesfaye. Offered support, explained in layman's terms the information she was seeing on the monitor, vs normal values. We again shared that despite all treatment and \" machines\" Sebastián Tesfaye is doing poorly and GOD is trying to take him. She is tearful. Family is awaiting arrival of [address] brother. Goals of care Partial code no chest compressions, no shock at cardiac arrest. He remains intubated with continuation of full treatment. Unfortunately Sebastián Tesfaye is not likely to survive this event. ( please see below for previous note) 2. Advanced care decisions: This NP met with patient at bedside. He is awake, alert, oriented and able to make own healthcare decisions. Patient is single and has no children. Patient lives with his mother who although uses walker and wheelchair is independent in all ADL's. His uncle also lives in the home. Discussed importance of AMD document and patient agreed to complete today. Patient delegated MPOA to his mother, Shawn Sumner (telephone number [phone]) and no backup surrogate decision maker. Discussed risks and benefits of CPR in the event of cardiopulmonary arrest and patient wishes to remain full code. GOALS OF CARE: FULL CODE with FULL INTERVENTIONS. 3. Acute respiratory failure with hypoxia: secondary to #3 below. Currently on HFNC at 35LPM with 45% FiO2. Pulmonary consulted. On steroids, bronchodilators. Primary team managing. 4. Pneumonia: secondary to #4. CT chest with bilateral diffuse ground glass opacities and underlying emphysema. IV antibiotics. 5. HIV: Initially diagnosed ~ 2 years ago after donating plasma. Patient has had no treatment secondary to denial \"if I didn't have blood drawn to know numbers, I wasn't bad\". Family was informed of diagnosis the day prior to admission to the ED. CD 4 count is 6 and HIV RNA viral load is 1,420,000. Seems to be willing to participate in treatment and accept referrals. ID consulted. 6. Syphillis: Diagnosed and treated two years ago. ID consulted. 7. Initial consult note routed to primary continuity provider 8. Communicated plan of care with: Palliative IDT 
 
GOALS OF CARE: 
Patient/Health Care Proxy Stated Goals: Prolong life TREATMENT PREFERENCES:  
Code Status: Partial Code Advance Care Planning: No flowsheet data found. Medical Interventions: Full interventions Other: As far as possible, the palliative care team has discussed with patient/health care proxy about goals of care/treatment preferences for patient. HISTORY:  
 
History obtained from: chart CHIEF COMPLAINT: shortness of breath HPI/SUBJECTIVE: The patient is:  
[x] Verbal and participatory [] Non-participatory due to:  
Patient admits to shortness of breath. Denies pain and nausea. Is eating well although prior to hospitalization noted weight loss. States he finally told his family about HIV when he \"felt something shift in my body and I could no longer ignore it\". Clinical Pain Assessment (nonverbal scale for nonverbal patients): Clinical Pain Assessment Severity: 0 Activity (Movement): Lying quietly, normal position Duration: for how long has pt been experiencing pain (e.g., 2 days, 1 month, years) Frequency: how often pain is an issue (e.g., several times per day, once every few days, constant) FUNCTIONAL ASSESSMENT:  
 
Palliative Performance Scale (PPS): PPS: 20 
 
ECOG 
ECOG Status : Completely disabled PSYCHOSOCIAL/SPIRITUAL SCREENING:  
  
Any spiritual / [employer] concerns: [] Yes /  [x] No 
 
Caregiver Burnout: 
[] Yes /  [] No /  [x] No Caregiver Present Anticipatory grief assessment:  
[x] Normal  / [] Maladaptive REVIEW OF SYSTEMS:  
 
Positive and pertinent negative findings in ROS are noted above in HPI. The following systems were [x] reviewed / [] unable to be reviewed as noted in HPI Other findings are noted below. Systems: constitutional, ears/nose/mouth/throat, respiratory, gastrointestinal, genitourinary, musculoskeletal, integumentary, neurologic, psychiatric, endocrine. Positive findings noted below. Modified ESAS Completed by: provider Pain: 0 Nausea: 0 Dyspnea: 3 Stool Occurrence(s): 1 PHYSICAL EXAM:  
 
Wt Readings from Last 3 Encounters:  
01/04/21 64.9 kg (143 lb) 12/07/20 86.2 kg (190 lb)  
03/16/18 88.5 kg (195 lb) Blood pressure 108/68, pulse (!) 151, temperature (!) 102.3 °F (39.1 °C), resp. rate 29, height 5' 7\" (1.702 m), weight 64.9 kg (143 lb), SpO2 (!) 83 %. Pain: 
Pain Scale 1: Numeric (0 - 10) Pain Intensity 1: 0 Pain Onset 1: acute Pain Location 1: Back Pain Orientation 1: Posterior Pain Description 1: Aching Pain Intervention(s) 1: Medication (see MAR), MD notified (comment), Guided imagery, Emotional support Constitutional: ill appearing male in bed, orally intubated in mild distress due to overwhelming illness ENMT: orally intubated Respiratory: ventilator supported on 100% Fi02 Skin: warm to touch, sweating Neurologic: sedated HISTORY:  
 
Principal Problem: 
  Pneumonia of both lungs due to Pneumocystis jirovecii ([address] (12/18/2020) Active Problems: AIDS (acquired immune deficiency syndrome) ([address] (12/17/2020) Bilateral pneumonia (12/17/2020) Sepsis ([address] (12/17/2020) Hyponatremia (12/17/2020) Acute respiratory failure with hypoxia ([address] (12/22/2020) Hypoxia (12/23/2020) Past Medical History:  
Diagnosis Date  Asthma  Chlamydia  Herpes zoster  HIV (human immunodeficiency virus infection) [facility]  Syphilis History reviewed. No pertinent surgical history. History reviewed. No pertinent family history. History reviewed, no pertinent family history. Social History Tobacco Use  Smoking status: Former Smoker  Smokeless tobacco: Never Used Substance Use Topics  Alcohol use: Yes Comment: socially No Known Allergies Current Facility-Administered Medications Medication Dose Route Frequency  penicillin g benzathine (BICILLIN LA) 1,200,000 unit/2 mL IM injection 2.4 Million Units  2.4 Million Units IntraMUSCular Q7D  
 metoprolol (LOPRESSOR) injection 5 mg  5 mg IntraVENous Q6H PRN  propofol (DIPRIVAN) 10 mg/mL infusion  0-50 mcg/kg/min IntraVENous TITRATE  chlorhexidine (PERIDEX) 0.12 % mouthwash 10 mL  10 mL Oral Q12H  levalbuterol (XOPENEX) nebulizer soln 1.25 mg/0.5 ml  1.25 mg Nebulization Q4H PRN  
 insulin lispro (HUMALOG) injection   SubCUTAneous Q6H  
 sulfamethoxazole-trimethoprim (BACTRIM;SEPTRA) 200-40 mg/5 mL oral suspension 40 mL  40 mL Oral Q8H  
 LORazepam (ATIVAN) injection 2 mg  2 mg IntraVENous Q2H PRN  
 midazolam (VERSED) 100 mg in 0.9% sodium chloride 100 mL infusion  1-10 mg/hr IntraVENous TITRATE  fentaNYL citrate (PF) injection 50 mcg  50 mcg IntraVENous Q2H PRN  
 sodium bicarbonate tablet 650 mg  650 mg Oral TID  morphine injection 2 mg  2 mg IntraVENous Q4H PRN  
 LORazepam (ATIVAN) tablet 0.5 mg  0.5 mg Oral QID  acetaminophen (TYLENOL) tablet 650 mg  650 mg Oral Q4H PRN  
 melatonin (rapid dissolve) tablet 10 mg  10 mg Oral QHS  azithromycin (ZITHROMAX) tablet 500 mg  500 mg Oral DAILY  ethambutoL (MYAMBUTOL) tablet 1,200 mg  1,200 mg Oral DAILY  rifAMPin (RIFADIN) capsule 600 mg  600 mg Oral ACB  
 0.9% sodium chloride infusion  50 mL/hr IntraVENous CONTINUOUS  phenol throat spray (CHLORASEPTIC) 1 Spray  1 Spray Oral PRN  
  ibuprofen (MOTRIN) tablet 400 mg  400 mg Oral Q4H PRN  
 sertraline (ZOLOFT) tablet 50 mg  50 mg Oral DAILY  busPIRone (BUSPAR) tablet 5 mg  5 mg Oral TID  methylPREDNISolone (PF) (SOLU-MEDROL) injection 40 mg  40 mg IntraVENous Q12H  
 multivitamin, tx-iron-ca-min (THERA-M w/ IRON) tablet 1 Tab  1 Tab Oral DAILY  guaiFENesin (ROBITUSSIN) 100 mg/5 mL oral liquid 100 mg  100 mg Oral Q4H PRN  
 ipratropium (ATROVENT) 0.02 % nebulizer solution 0.5 mg  0.5 mg Nebulization Q4H PRN  
 levalbuterol (XOPENEX) nebulizer soln 1.25 mg/0.5 ml  1.25 mg Nebulization Q4H PRN  
 enoxaparin (LOVENOX) injection 40 mg  40 mg SubCUTAneous Q24H  
 influenza vaccine 2020-21 (6 mos+)(PF) (FLUARIX/FLULAVAL/FLUZONE QUAD) injection 0.5 mL  0.5 mL IntraMUSCular PRIOR TO DISCHARGE  calcium carbonate (TUMS) chewable tablet 200 mg [elemental]  200 mg Oral TID PRN  
 sodium chloride (NS) flush 5-10 mL  5-10 mL IntraVENous PRN  
 sodium chloride (NS) flush 5-40 mL  5-40 mL IntraVENous PRN  promethazine (PHENERGAN) tablet 12.5 mg  12.5 mg Oral Q6H PRN Or  
 ondansetron (ZOFRAN) injection 4 mg  4 mg IntraVENous Q6H PRN  
 bisacodyL (DULCOLAX) tablet 5 mg  5 mg Oral DAILY PRN  
 famotidine (PEPCID) tablet 20 mg  20 mg Oral BID  
 
 
 LAB AND IMAGING FINDINGS:  
 
Lab Results Component Value Date/Time WBC 9.8 01/06/2021 04:25 AM  
 HGB 10.6 (L) 01/06/2021 04:25 AM  
 PLATELET 085 88/91/4153 04:25 AM  
 
Lab Results Component Value Date/Time Sodium 126 (L) 01/06/2021 04:25 AM  
 Potassium 4.9 01/06/2021 04:25 AM  
 Chloride 90 (L) 01/06/2021 04:25 AM  
 CO2 28 01/06/2021 04:25 AM  
 BUN 15 01/06/2021 04:25 AM  
 Creatinine 0.79 01/06/2021 04:25 AM  
 Calcium 7.8 (L) 01/06/2021 04:25 AM  
 Magnesium 2.0 12/28/2020 01:00 AM  
 Phosphorus 4.0 12/28/2020 01:00 AM  
  
Lab Results Component Value Date/Time Alk.  phosphatase 276 (H) 01/06/2021 04:25 AM  
 Protein, total 6.0 (L) 01/06/2021 04:25 AM  
 Albumin 1.7 (L) 01/06/2021 04:25 AM  
 Globulin 4.3 (H) 01/06/2021 04:25 AM  
 
Lab Results Component Value Date/Time INR 1.0 12/20/2020 04:34 AM  
 Prothrombin time 12.8 12/20/2020 04:34 AM  
  
Lab Results Component Value Date/Time Iron 29 (L) 12/23/2020 12:45 AM  
 TIBC 241 (L) 12/23/2020 12:45 AM  
 Iron % saturation 12 (L) 12/23/2020 12:45 AM  
 Ferritin 1,525 (H) 12/17/2020 11:00 PM  
  
No results found for: PH, PCO2, PO2 No components found for: [place] Lab Results Component Value Date/Time CK 72 01/06/2021 04:25 AM  
 CK - MB 1.0 12/17/2020 06:10 PM  
  
 
   
 
Total time: 35 minutes Counseling / coordination time, spent as noted above 
> 50% counseling / coordination: 30 minutes with RN, PCCM, mother and family Prolonged service was provided for  []30 min   []75 min in face to face time in the presence of the patient, spent as noted above. Time Start:  
Time End:  
Note: this can only be billed with 83695 (initial) or 55902 (follow up). If multiple start / stop times, list each separately.

## 2021-01-06 NOTE — PROGRESS NOTES
Called by RN regarding desaturation  to 70's and shortness of breath, readjusted HFNC patient on 60l and 95%advised RN to [id number]% Nonrebreather, patient's SPO2 improved to 84-88% with non rebreather. Patient still short of breath with RR 40-44, 's. RN in room.

## 2021-01-06 NOTE — ANESTHESIA PROCEDURE NOTES
Emergent Intubation Performed by: Aman Man CRNA Authorized by: Aman Man CRNA Emergent Intubation: Location:  ICU Date/Time:  1/6/2021 4:00 AM 
Indications:  Respiratory failure Spontaneous Ventilation: present Level of Consciousness: awake Preoxygenated: Yes Airway Documentation: Airway:  ETT - Cuffed Technique:  Intubating stylet Advanced Technique:  David Blade Type:  Jeannie Blade Size:  4 ETT size (mm):  7.5 ETT Line Anthony:  Lips ETT Insertion depth (cm):  23 Placement verified by: auscultation, EtCO2 and BBS Attempts:  2 Difficult airway: No   
Pt given propofol ant intubation attempted but pt started coughing, Succinylcholine given and reattempted with success. Vitals 117/94  SaO2 94% post intubation, pt had been tachycardic.

## 2021-01-06 NOTE — PROGRESS NOTES
[facility] Pulmonary, Critical Care, and Sleep Medicine Name: Emerson Goldberg MRN: [id number] : 1990 Hospital: [place] [address] Date: 2021 [facility] Note Consult requesting physician: Dr. Shaylee Kim Reason for Consult: Likely PJP pneumonia, hypoxia IMPRESSION:  
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  
 F41.9 · Patient Active Problem List  
Diagnosis Code  AIDS (acquired immune deficiency syndrome) ([facility]) B20  
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  
 Acute respiratory failure with hypoxia ([facility]) J96.01  
 Hypoxia R09.02  
  
RECOMMENDATIONS:  
Respiratory: Patient on ventilator support; pressure control mode of ventilation; Pi12, peep 12; tidal volume 500s; FiO2 100%. Sedationpropofol infusion currently; watch blood pressure. Ventilator and sedation bundles. Keep SPO2 >=92%. HOB 30 degree elevation all the time. Aspiration precautions. Prn levalbuterol nebulizer. Chest x-ray revieweddiffuse bilateral infiltrates; ET tube and OG tube in good position; no pneumothorax noted. CVS: Monitor blood pressure; telemetrysinus tachycardia; gentle hydration with IV fluids. 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 Now that patient is intubatedsend tracheal aspirates for pneumocystis, AFB, fungus, CMV, viral panel. ID Dr Cherylyn Leventhal on case. Covid test was negative early this admission. IV steroidsSolu-Medrolfor suspected pneumocystis pneumonia. Hematology/Oncology: Monitor hemoglobin and platelets; transfuse to keep hemoglobin greater than 7 g/dL. Renal: Monitor renal function and urine output; patient has Salazar catheter; sodium low but stable. GI: Monitor LFTs while on antimycobacterial therapy. Endocrine: Monitor BS. SSI while on steroids. Neurology: Patient currently sedated and intubated; normal mentation prior to intubation. Electrolytes: Replace electrolytes per ICU electrolyte replacement protocol. IVF: Normal saline 50 mL/h Nutrition: Currently n.p.o.; if remains stable, can consider trophic tube feeding from tomorrow. Prophylaxis: DVT Prophylaxis: Enoxaparin. GI Prophylaxis: Famotidine. Restraints: Wrist soft restraints for patient interfering with medical therapy/management and patient safety. Lines/Tubes: PIV 
ETT: 1/6medically necessary; ventilator bundle; daily weaning protocol if patient meets criteria. OGT: 1/6medically necessary while on ventilator support. Salazar: 1/6 (Medically necessary for strict input/output monitoring in critically ill patient, will remove it when not needed. Salazar bundle followed). 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 Palliative care has been consulted, and on case. Will defer respective systems problem management to primary and other respective consultant and follow patient in ICU with primary and other medical team. 
Further recommendations will be based on the patient's response to recommended treatment and results of the investigation ordered. Quality Care: PPI, DVT prophylaxis, HOB elevated, Infection control all reviewed and addressed. Care of plan d/w hospitalist team, RN, RT. High complexity decision making was performed during the evaluation of this patient at high risk for decompensation with multiple organ involvement. Total critical care time spent rendering care exclusive of procedures/family discussion/coordination of care: 45 minutes. Wu Conn (telephone number [phone] and updated patient's mom; discussed patient's critical condition on the ventilator with high oxygen requirements; prognosis guarded; continue ventilator support; discussed with mom that I am involved in care in the ICU for ventilator management and critical ICU issues; mom is okay with this. Subjective/History: Mr. Maxwell Pereira is a 31 y.o. male with PMHx significant for HIV diagnosed couple years ago, not on treatment; admitted with weight loss, ESCOTO; COVID-19 negative x2; diagnosed with atypical looking pneumonia/GGO on CT chest which is negative for PE, suspected for PJP pneumonia. 1/6/2021 Chart reviewed; overnight events noted. Patient intubated last night with worsening respiratory failure. Patient currently on ventilator supportpressure control mode. Patient currently sedated. Sinus tachycardia; blood pressure stable. Review of Systems:  
Limited due to patient condition No Known Allergies Past Medical History:  
Diagnosis Date  Asthma  Chlamydia  Herpes zoster  HIV (human immunodeficiency virus infection) ([facility] [address]  Syphilis History reviewed. No pertinent surgical history. History reviewed. No pertinent family history. Social History Tobacco Use  Smoking status: Former Smoker  Smokeless tobacco: Never Used Substance Use Topics  Alcohol use: Yes Comment: socially Prior to Admission medications Not on File Current Facility-Administered Medications Medication Dose Route Frequency  penicillin g benzathine (BICILLIN LA) 1,200,000 unit/2 mL IM injection 2.4 Million Units  2.4 Million Units IntraMUSCular Q7D  
 metoprolol (LOPRESSOR) injection 5 mg  5 mg IntraVENous Q6H PRN  chlorhexidine (PERIDEX) 0.12 % mouthwash 10 mL  10 mL Oral Q12H  propofol (DIPRIVAN) 10 mg/mL infusion  0-50 mcg/kg/min IntraVENous TITRATE  fentaNYL citrate (PF) injection 100 mcg  100 mcg IntraVENous Q4H PRN  
 sodium bicarbonate tablet 650 mg  650 mg Oral TID  LORazepam (ATIVAN) injection 1 mg  1 mg IntraVENous Q2H PRN  
 LORazepam (ATIVAN) 2 mg/mL injection  morphine injection 2 mg  2 mg IntraVENous Q4H PRN  
 trimethoprim-sulfamethoxazole (BACTRIM DS, SEPTRA DS) 160-800 mg per tablet 2 Tab  2 Tab Oral Q8H  
 LORazepam (ATIVAN) tablet 0.5 mg  0.5 mg Oral QID  acetaminophen (TYLENOL) tablet 650 mg  650 mg Oral Q4H PRN  
 melatonin (rapid dissolve) tablet 10 mg  10 mg Oral QHS  azithromycin (ZITHROMAX) tablet 500 mg  500 mg Oral DAILY  ethambutoL (MYAMBUTOL) tablet 1,200 mg  1,200 mg Oral DAILY  rifAMPin (RIFADIN) capsule 600 mg  600 mg Oral ACB  
 0.9% sodium chloride infusion  50 mL/hr IntraVENous CONTINUOUS  phenol throat spray (CHLORASEPTIC) 1 Spray  1 Spray Oral PRN  
 ibuprofen (MOTRIN) tablet 400 mg  400 mg Oral Q4H PRN  
 sertraline (ZOLOFT) tablet 50 mg  50 mg Oral DAILY  busPIRone (BUSPAR) tablet 5 mg  5 mg Oral TID  methylPREDNISolone (PF) (SOLU-MEDROL) injection 40 mg  40 mg IntraVENous Q12H  
 insulin lispro (HUMALOG) injection   SubCUTAneous AC&HS  multivitamin, tx-iron-ca-min (THERA-M w/ IRON) tablet 1 Tab  1 Tab Oral DAILY  guaiFENesin (ROBITUSSIN) 100 mg/5 mL oral liquid 100 mg  100 mg Oral Q4H PRN  
 levalbuterol (XOPENEX) nebulizer soln 1.25 mg/0.5 ml  1.25 mg Nebulization Q4H RT  
 ipratropium (ATROVENT) 0.02 % nebulizer solution 0.5 mg  0.5 mg Nebulization Q4H PRN  
 budesonide (PULMICORT) 500 mcg/2 ml nebulizer suspension  500 mcg Nebulization BID RT  
 levalbuterol (XOPENEX) nebulizer soln 1.25 mg/0.5 ml  1.25 mg Nebulization Q4H PRN  
 enoxaparin (LOVENOX) injection 40 mg  40 mg SubCUTAneous Q24H  
 influenza vaccine 2020-21 (6 mos+)(PF) (FLUARIX/FLULAVAL/FLUZONE QUAD) injection 0.5 mL  0.5 mL IntraMUSCular PRIOR TO DISCHARGE  
  calcium carbonate (TUMS) chewable tablet 200 mg [elemental]  200 mg Oral TID PRN  
 sodium chloride (NS) flush 5-10 mL  5-10 mL IntraVENous PRN  
 sodium chloride (NS) flush 5-40 mL  5-40 mL IntraVENous PRN  promethazine (PHENERGAN) tablet 12.5 mg  12.5 mg Oral Q6H PRN Or  
 ondansetron (ZOFRAN) injection 4 mg  4 mg IntraVENous Q6H PRN  
 bisacodyL (DULCOLAX) tablet 5 mg  5 mg Oral DAILY PRN  
 famotidine (PEPCID) tablet 20 mg  20 mg Oral BID Objective:  
Vital Signs:   
Blood pressure 91/63, pulse (!) 137, temperature 99.9 °F (37.7 °C), resp. rate 30, height 5' 7\" (1.702 m), weight 64.9 kg (143 lb), SpO2 90 %. Body mass index is 22.4 kg/m². O2 Device: Hi flow nasal cannula, Non-rebreather mask O2 Flow Rate (L/min): 60 l/min Temp (24hrs), Av.6 °F (38.1 °C), Min:97.9 °F (36.6 °C), Max:103.2 °F (39.6 °C) Intake/Output:  
Last shift:      No intake/output data recorded. Last 3 shifts:  1901 -  0700 In: 565.8 [P.O.:240; I.V.:325.8] Out: 1250 [QQQOS:3638] Intake/Output Summary (Last 24 hours) at 2021 1061 Last data filed at 2021 6287 Gross per 24 hour Intake 565.83 ml Output 750 ml Net -184.17 ml ABG:   
Lab Results Component Value Date/Time PHI 7.34 (L) 2021 06:00 AM  
 PCO2I 42.8 2021 06:00 AM  
 PO2I 62 (L) 2021 06:00 AM  
 HCO3I 22.8 2021 06:00 AM  
 FIO2I 100 2021 06:00 AM  
 
Ventilator Mode: Pressure control Respiratory Rate Back-Up Rate: 16 Insp Time (sec): 0.8 sec Ventilator Volumes Vt Exhaled (Machine Breath) (ml): 620 ml Vt Spont (ml): 950 ml Ve Observed (l/min): 19.9 l/min Ventilator Pressures PC Set: 12(lowered to bring peak pressure down) PIP Observed (cm H2O): 31 cm H2O 
MAP (cm H2O): 22 PEEP/VENT (cm H2O): 15 cm H20 Physical Exam:  
Patient intubated; sedated; appears chronically ill; acyanotic. Orotracheal and orogastric tubeno bleeding issues. HEENT: pupils not dilated, reactive, no scleral jaundice, moist oral mucosa, no nasal drainage; neck supple Neck: No adenopathy or thyroid swelling CVS: Heart sounds S1 and S2 with no murmurs; telemetrysinus tachycardia; JVD does not appear to be elevated; no chest heaving or rubs RS: Moderate air entry bilateral; diffuse bilateral crackles; no wheezing; symmetrical breath sounds Abd: soft, no tenderness, no distention, no guarding or rigidity; no hepatosplenomegaly; bowel sounds present Neuro: Intubated and sedated; limited exam  
Extrm: no leg edema or swelling or clubbing Skin: no rash Lymphatic: no cervical or supraclavicular adenopathy Vascular: DPs palpable; good capillary refill in toes Data:  
   
Recent Results (from the past 24 hour(s)) GLUCOSE, POC Collection Time: 01/05/21 12:10 PM  
Result Value Ref Range Glucose (POC) 83 70 - 110 mg/dL SODIUM, UR, RANDOM Collection Time: 01/05/21  1:11 PM  
Result Value Ref Range Sodium,urine random 88 20 - 110 MMOL/L  
GLUCOSE, POC Collection Time: 01/05/21  4:46 PM  
Result Value Ref Range Glucose (POC) 82 70 - 110 mg/dL POC G3 Collection Time: 01/05/21  8:45 PM  
Result Value Ref Range Device: High Flow Nasal Cannula Flow rate (POC) 60 L/M  
 FIO2 (POC) 95 % pH (POC) 7.46 (H) 7.35 - 7.45    
 pCO2 (POC) 33.9 (L) 35.0 - 45.0 MMHG  
 pO2 (POC) 51 (L) 80 - 100 MMHG  
 HCO3 (POC) 24.0 22 - 26 MMOL/L  
 sO2 (POC) 88 (L) 92 - 97 % Base excess (POC) 0 mmol/L Allens test (POC) YES Total resp. rate 44 Site LEFT RADIAL Specimen type (POC) ARTERIAL Performed by Apryl Vázquez GLUCOSE, POC Collection Time: 01/06/21 12:26 AM  
Result Value Ref Range Glucose (POC) 89 70 - 110 mg/dL METABOLIC PANEL, COMPREHENSIVE Collection Time: 01/06/21  4:25 AM  
Result Value Ref Range Sodium 126 (L) 136 - 145 mmol/L Potassium 4.9 3.5 - 5.5 mmol/L  Chloride 90 (L) 100 - 111 mmol/L  
 CO2 28 21 - 32 mmol/L Anion gap 8 3.0 - 18 mmol/L Glucose 82 74 - 99 mg/dL BUN 15 7.0 - 18 MG/DL Creatinine 0.79 0.6 - 1.3 MG/DL  
 BUN/Creatinine ratio 19 12 - 20 GFR est AA >60 >60 ml/min/1.73m2 GFR est non-AA >60 >60 ml/min/1.73m2 Calcium 7.8 (L) 8.5 - 10.1 MG/DL Bilirubin, total 0.7 0.2 - 1.0 MG/DL  
 ALT (SGPT) 30 16 - 61 U/L  
 AST (SGOT) 49 (H) 10 - 38 U/L Alk. phosphatase 276 (H) 45 - 117 U/L Protein, total 6.0 (L) 6.4 - 8.2 g/dL Albumin 1.7 (L) 3.4 - 5.0 g/dL Globulin 4.3 (H) 2.0 - 4.0 g/dL A-G Ratio 0.4 (L) 0.8 - 1.7 CK Collection Time: 01/06/21  4:25 AM  
Result Value Ref Range CK 72 39 - 308 U/L  
POC G3 Collection Time: 01/06/21  6:00 AM  
Result Value Ref Range Device: VENT    
 FIO2 (POC) 100 % pH (POC) 7.34 (L) 7.35 - 7.45    
 pCO2 (POC) 42.8 35.0 - 45.0 MMHG  
 pO2 (POC) 62 (L) 80 - 100 MMHG  
 HCO3 (POC) 22.8 22 - 26 MMOL/L  
 sO2 (POC) 87 (L) 92 - 97 % Base deficit (POC) 3 mmol/L Mode ASSIST CONTROL Tidal volume 420 ml Set Rate 16 bpm  
 PEEP/CPAP (POC) 10 cmH2O Allens test (POC) YES Inspiratory Time 0.85 sec Total resp. rate 34 Site RIGHT RADIAL Patient temp. 102.0 Specimen type (POC) ARTERIAL Performed by [place] Bolus Volume control plus YES    
URINALYSIS W/MICROSCOPIC Collection Time: 01/06/21  6:21 AM  
Result Value Ref Range Color DARK YELLOW Appearance CLOUDY Specific gravity 1.024 1.005 - 1.030    
 pH (UA) 6.0 5.0 - 8.0 Protein 30 (A) NEG mg/dL Glucose Negative NEG mg/dL Ketone 15 (A) NEG mg/dL Bilirubin SMALL (A) NEG Blood Negative NEG Urobilinogen 4.0 (H) 0.2 - 1.0 EU/dL Nitrites Positive (A) NEG Leukocyte Esterase TRACE (A) NEG    
 WBC 0 to 1 0 - 5 /hpf  
 RBC 0 to 1 0 - 5 /hpf Bacteria FEW (A) NEG /hpf  
 Granular cast 4 to 10 NEG /lpf LACTIC ACID Collection Time: 01/06/21  7:10 AM  
Result Value Ref Range Lactic acid 1.5 0.4 - 2.0 MMOL/L Chemistry Recent Labs 01/06/21 
0425 01/05/21 
0150 GLU 82 102* * 123* K 4.9 4.0  
CL 90* 89* CO2 28 28 BUN 15 11 CREA 0.79 0.63  
CA 7.8* 7.5* AGAP 8 6 BUCR 19 17 * 269* TP 6.0* 5.7* ALB 1.7* 1.8*  
GLOB 4.3* 3.9 AGRAT 0.4* 0.5* Lactic Acid Lactic acid Date Value Ref Range Status 01/06/2021 1.5 0.4 - 2.0 MMOL/L Final  
 
Recent Labs 01/06/21 
0710 LAC 1.5 Liver Enzymes Protein, total  
Date Value Ref Range Status 01/06/2021 6.0 (L) 6.4 - 8.2 g/dL Final  
 
Albumin Date Value Ref Range Status 01/06/2021 1.7 (L) 3.4 - 5.0 g/dL Final  
 
Globulin Date Value Ref Range Status 01/06/2021 4.3 (H) 2.0 - 4.0 g/dL Final  
 
A-G Ratio Date Value Ref Range Status 01/06/2021 0.4 (L) 0.8 - 1.7   Final  
 
Alk. phosphatase Date Value Ref Range Status 01/06/2021 276 (H) 45 - 117 U/L Final  
 
Recent Labs 01/06/21 
0425 01/05/21 
0150 TP 6.0* 5.7* ALB 1.7* 1.8*  
GLOB 4.3* 3.9 AGRAT 0.4* 0.5* * 269* CBC w/Diff Recent Labs 01/05/21 
0150 WBC 6.7  
RBC 3.26* HGB 9.7* HCT 29.1*  
 GRANS 95* LYMPH 2*  
EOS 0 Thyroid  Lab Results Component Value Date/Time TSH 2.10 12/23/2020 10:45 AM  
   
No results found for: T4  
 
Results Procedure Component Value Units Date/Time P.JIROVECI BY PCR [[id number]] Order Status: Sent CMV BY PCR, QT, BAL [[id number]] Order Status: Sent Sukumar Harrison [[id number]] Order Status: Sent Specimen: Tracheal Aspirate CULTURE, VIRAL [[id number]] Order Status: Sent Specimen: Other AFB CULTURE + SMEAR W/RFLX ID FROM CULTURE [[id number]] Order Status: Sent CULTURE, BLOOD [[id number]] Collected: 01/06/21 0710 Order Status: Completed Specimen: Blood Updated: 01/06/21 0754 CULTURE, RESPIRATORY/SPUTUM/BRONCH Niecy Mulch STAIN [[id number]] Order Status: Sent Specimen: Sputum CULTURE, BLOOD [[id number]] Order Status: Sent Specimen: Blood CULTURE, URINE [[id number]] Collected: 01/06/21 4334 Order Status: Completed Specimen: Cath Urine Updated: 01/06/21 1485 CULTURE, BLOOD [[id number]] Collected: 01/03/21 0745 Order Status: Completed Specimen: Blood Updated: 01/06/21 9892 Special Requests: NO SPECIAL REQUESTS Culture result: NO GROWTH 3 DAYS     
 CULTURE, BLOOD [[id number]] Collected: 01/03/21 0415 Order Status: Completed Specimen: Blood Updated: 01/06/21 6843 Special Requests: NO SPECIAL REQUESTS Culture result: NO GROWTH 3 DAYS     
 CULTURE, BLOOD [[id number]] Collected: 01/03/21 0215 Order Status: Canceled Specimen: Blood AFB CULTURE + SMEAR W/RFLX ID FROM CULTURE [[id number]] Collected: 01/01/21 1030 Order Status: Canceled AFB CULTURE + SMEAR W/RFLX ID FROM CULTURE [[id number]] Collected: 12/31/20 1610 Order Status: Completed Updated: 12/31/20 1617 CULTURE, RESPIRATORY/SPUTUM/BRONCH Trinidad Fernandez [[id number]] Collected: 12/31/20 1610 Order Status: Completed Specimen: Sputum Updated: 01/02/21 0901 Special Requests: NO SPECIAL REQUESTS     
  GRAM STAIN FEW WBCS SEEN     
   NO EPITHELIAL CELLS SEEN     
      
  1+ GRAM POSITIVE COCCI IN PAIRS CHAINS AND CLUSTERS  
     
      
  FEW APPARENT GRAM NEGATIVE RODS  
     
      
  FEW GRAM POSITIVE RODS (CORYNEFORM) Culture result: MODERATE NORMAL RESPIRATORY PRANAY  
     
 CULTURE, RESPIRATORY/SPUTUM/BRONCH Trinidad Fernandez [[id number]] Collected: 12/28/20 1845 Order Status: Canceled Specimen: Sputum CULTURE, BLOOD [[id number]] Collected: 12/27/20 1400 Order Status: Completed Specimen: Blood Updated: 01/02/21 0719 Special Requests: NO SPECIAL REQUESTS Culture result: NO GROWTH 6 DAYS     
 CULTURE, BLOOD [[id number]] Collected: 12/27/20 1350 Order Status: Completed Specimen: Blood Updated: 01/02/21 0719 Special Requests: NO SPECIAL REQUESTS Culture result: NO GROWTH 6 DAYS     
 P.JIROVECI BY PCR [[id number]] Collected: 12/23/20 1845 Order Status: Completed Specimen: Miscellaneous sample Updated: 01/04/21 1735 Source SPUTUM     
  P. jiroveci - PCR TEST NOT PERFORMED copies/mL Comment: (NOTE) Test not performed Source TEST NOT PERFORMED Comment: (NOTE) Test not performed. Deterioration occurred during specimen handling. Notified Marisol S 01/04/2021-Taz CT 12/18/2020 (Most Recent) (CT chest reviewed by me) Results from [facility] Encounter encounter on 12/17/20 CTA CHEST W OR W WO CONT Narrative EXAM: CTA Chest 
 
INDICATION: Shortness of breath. Possible PE. COMPARISON: No prior study. TECHNIQUE: Axial CT imaging from the thoracic inlet through the diaphragm with 
intravenous contrast utilizing CTA study for pulmonary artery evaluation. Coronal and sagittal MIP reformations were generated at a separate workstation. One or more dose reduction techniques were used on this CT: automated exposure 
control, adjustment of the mAs and/or kVp according to patient size, and 
iterative reconstruction techniques. The specific techniques used on this CT 
exam have been documented in the patient's electronic medical record. Digital 
Imaging and Communications in Medicine (DICOM) format image data are available 
to nonaffiliated external healthcare facilities or entities on a secure, media 
free, reciprocally searchable basis with patient authorization for at least a 
12-month period after this study. _______________ FINDINGS: 
 
EXAM QUALITY: Overall exam quality is suboptimal. Pulmonary arterial enhancement 
is adequate. Respiratory motion artifact is present, limiting evaluation. PULMONARY ARTERIES: No convincing evidence of pulmonary embolism. MEDIASTINUM: Normal heart size. No evidence of right heart strain. Aorta is unremarkable. No pericardial effusion. LYMPH NODES: No pathologically enlarged lymph nodes lymph nodes. AIRWAY: Unremarkable. LUNGS: Centrilobular emphysema. Superimposed scattered bilateral groundglass 
opacities throughout the lungs. PLEURA: No pleural effusion or pneumothorax. UPPER ABDOMEN: Visualized upper abdomen is unremarkable. OTHER: No acute or aggressive osseous abnormalities identified. _______________ Impression IMPRESSION: 
 
Suboptimal study secondary to motion artifact and bolus timing. No evidence of obvious central pulmonary embolism. Diffuse bilateral groundglass opacities, suspicious for atypical infection. Emphysema. XR 1/6 (Most Recent). CXR  reviewed by me and compared with previous CXR Results from [facility] Encounter encounter on 12/17/20 XR CHEST PORT Narrative --------------------------------------------------------------------------- 
<<<<<<<<<           [facility]           >>>>>>>>>  
--------------------------------------------------------------------------- CLINICAL HISTORY:  Respiratory failure. Endotracheal tube placement. COMPARISON EXAMINATIONS:  January 4, 2021. 
 
 
---  SINGLE FRONTAL VIEW OF THE CHEST  --- There is an endotracheal tube in good position below the clavicles and above the 
luca. A gastric tube extends below the diaphragm into the upper abdomen. The 
tip of the gastric tube is not seen. The cardiomediastinal silhouette is stable. There are diffuse bilateral infiltrates. No significant osseous abnormalities 
are identified.   
 
 
-------------- Impression Impression: 
-------------- Endotracheal and gastric tubes in place. Diffuse bilateral infiltrates similar to prior. Sergio Lam MD 
1/6/2021

## 2021-01-06 NOTE — PROGRESS NOTES
2010-Entered patient's chart per charge nurse duties when notified by supervisor patient may need to be transferred to ICU for higher level of care.

## 2021-01-06 NOTE — PROGRESS NOTES
56- Received from [facility], assessment completed per flow sheet. Tachypneic in the 40's, hypoxic, see flow sheet. Anxious, verbal reassurance provided. RT in to see. Reports he feels \"A little better. \". 
 
0040-RT attempting BiPAP, ativan administered per orders. 65- Did not tolerate BiPAP, became extremely anxious and tried to take off, back on high flow nasal cannula and non re breather.  updated, MD to place orders. States to monitor for now. SPO2 up to 89-92%. 0200-Resting, currently calm, HR down in the 150's, RR 30-40's, SPO2 90-95%. Continue to monitor, RT updated regarding status. 0300- Reassessment without change. 0- MD hymandJudi on unit and in to see. Updated regarding very minor improvement, however, respirations remain labored and work of breathing remains significantly elevated. Orders received to intubate, RT and anesthesia notified. 2282- Anesthesia in to see. 0406- Intubated #7.5, 23 cm. 
 
0407-Propofol administered per orders, soft bilateral wrist restraints applied to BUE to protect integrity of lines/tubes, flow sheet in progress. 0414-OGT and penn inserted without difficulty. 1593- Agitated, coughing, thrashing in bed despite high dose of propofol and PRN versed, orders received for a one time dose of nimbex. 0435- Resting quietly at this time. 0500-CXR completed, sweating profusely (was sweating upon arrival to ICU). Temperature noted to be 103.2 orally. Tylenol administered, ice packs applied. Respirations labored again (appears to be \"guppy\" breathing), SPO2 85%. 36-  updated regarding fever s/p intubation. No muscle rigidity present, MD notified. RT in and updated. MD to place lab orders, states to administer fentanyl and monitor temperature closely. 8233- Fentanyl administered per orders. 4899- Respirations slightly less labored. 0530- Coughing, agitated, ativan administered per MAR. 1- The intensivist paged. 56-  updated, to come to ICU and then call intensivist. Patient finally resting quietly, sedated.

## 2021-01-06 NOTE — PROGRESS NOTES
Pulm/CC Patient severely hypoxemic on ventilator; he is on pressure control ventilation with PEEP of 12; tidal volumes are low in the 200s; oxygen saturations high 60s, low 70s; chest x-ray stat donesevere bilateral diffuse infiltrates; no pneumothorax. I called and discussed with patient's mom; reviewed that patient severely hypoxemic; severely inflamed lungs; discussed about high risk of cardiac arrest; medical futility with chest compressions discussed; severe hypoxemia is already likely to cause anoxic brain injury; after discussion, mom wishes for no chest compressions, but continue current management; she plans to come soon to visit her son in the ICU. CODE STATUSpartial code; no chest compressions or CPR. I also discussed with palliative care nurse LIN Kulkarni at bedside in the ICU. I also discussed with ICU RN.  
 
Pearl Alva MD

## 2021-01-06 NOTE — PROGRESS NOTES
1- RN and respiratory therapist at bedside. Patient alert, resting in bed, C/O SOB. Oxygen saturation fluctuating between 70-80% and tachypneic. No C/O pain. Patient currently on high azeb oxygen 60L 95%. VS recorded. RN placed additional non-rebreather oxygen mask on patient. Oxygen saturation increased to 88% and reached 90%. Patient continues to fluctuate, oxygen saturation will dip down to low 80's.  
 
1940- RN paged hospitalist Dr. Kelly Figueroa. Called nursing supervisor to inform of patients current status and the possible need for patient to be transferred to a higher level of care. 2010- Hospitalist Dr. Kelly Figueroa returned call. Physician informed of patients current status. SBAR provided. RN recommended higher level of care. Physician ordered a ABG and stated that she will come to see patient. RN monitoring patient. 2040- Dr. Kelly Figueroa and respiratory therapist at patient bedside.

## 2021-01-06 NOTE — PROGRESS NOTES
Chart reviewed and noted that Pt was transferred from tele unit during the night due to worsening resp failure. Pt was intubated during the evening. CM following for needs and goals of care. Care Management Interventions PCP Verified by CM: Yes(none) Palliative Care Criteria Met (RRAT>21 & CHF Dx)?: No 
Mode of Transport at Discharge: Other (see comment)(family) Current Support Network: Other, Relative's Home(mother) Confirm Follow Up Transport: Family Discharge Location Discharge Placement: Home

## 2021-01-06 NOTE — PROGRESS NOTES
OVERNIGHT COVERAGE NOTE:  
 
RN asked me to come and evaluate pt for possible transfer to ICU because of low 02 levels. Apparently his 02 saturations are in the 90s and drop to 70s/80s repeatedly. Came to evaluate pt, very tachpneic, very anxious, on NRB mask as well. Ordered AB.457/33.9/51/24/88% D/w patient need for ICU transfer if current state is maintained. Also verified CODE STATUS. He wants to be FULL CODE. Asked that I speak with his mother. He does not want to be transferred to the ICU because very afraid. D/w Bharat Files (pt's mother) and updated her. Phone number in chart not in service. Used number that pt had: [phone]. ICU RN came down to access pt. Ordered Ativan for anxiety. Morphine for air hunger and melatonin. Educated pt on proper breathing versus panting and/or hypoventilating. Pt currently mantaining 02 sats >91% Will monitor closely. 0100: UPDATE: FAILED CLOSE MONITORING. 02 SATS IN UPPER 80S AND RR INCREASES. TRANSFERRED TO ICU FOR HIGHER LEVEL OF CARE 
D/W PT AND HIS MOTHER.  
 
0330: D/w Bharat Files (pt's mother) need to intubate pt. Pt unable to tolerate bipap and has failed HFNC and NRB mask. RR 50. HR 150s-170s. Agitated.

## 2021-01-06 NOTE — ROUTINE PROCESS
Bedside and Verbal shift change report given to [address] Kenia (oncoming nurse) by Pradip Leal RN (offgoing nurse). Report included the following information SBAR, Kardex, MAR and Recent Results.

## 2021-01-06 NOTE — DIABETES MGMT
GLYCEMIC CONTROL PROGRESS NOTE: 
 
- no known h/o T2DM 
- Solumedrol 40 mg Q 12 hours - BG trending down, no corrective coverage required 
- NPO, intubated Noted: 
- pt with hypoglycemic episode yesterday, may benefit from more frequent BG checks Recent Glucose Results:  
Lab Results Component Value Date/Time GLU 82 01/06/2021 04:25 AM  
 GLUCPOC 89 01/06/2021 12:26 AM  
 GLUCPOC 82 01/05/2021 04:46 PM  
 GLUCPOC 83 01/05/2021 12:10 PM  
 
 
Bruno Celaya MS, RN, CDE Glycemic Control Team 
[phone] Pager [phone] (M-TH 8:00-4:30P) *After Hours pager [phone]

## 2021-01-07 PROBLEM — A51.49 SECONDARY SYPHILIS IN MALE: Status: ACTIVE | Noted: 2021-01-07

## 2021-01-07 LAB
BACTERIA SPEC CULT: NORMAL
SERVICE CMNT-IMP: NORMAL

## 2021-01-07 NOTE — PROGRESS NOTES
Compassionate extubation, placed patient on 2 liters nasal cannula, RN present. Patient's mother and aunt at bedside.

## 2021-01-07 NOTE — PROGRESS NOTES
Freddie Mohamud from [facility] called patient not candidate for any donation. Body may be released to  home.

## 2021-01-07 NOTE — PROGRESS NOTES
Called to pronounce patient. No response to noxious stimuli. No spontaneous breath sounds nor cardiac sounds. Pupils fixed and dilated. TOD: 1930. Family at bedside.   
D/C Summary to be dictated by Attending MD.

## 2021-01-07 NOTE — PROGRESS NOTES
1949- Care assumed,  called for death pronouncement. Time of death 1930, asystole, not breathing. See Death form for disposition. Pronounced by .

## 2021-01-07 NOTE — DISCHARGE SUMMARY
Discharge Summary Patient: Helga Lincoln MRN: [id number]  CSN: [id number] YOB: 1990  Age: 31 y.o. Sex: male DOA: 12/17/2020 LOS:  LOS: 20 days   Discharge Date: 1/6/2021 Primary Care Provider:  None Admission Diagnoses: HIV infection ([facility] Raenette Fink Bilateral pneumonia [J18.9] Discharge Diagnoses:   
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 
ICD-9-CM: 091.9  1/7/2021 Unknown Hypoxia ICD-10-CM: R09.02 
ICD-9-CM: 799.02  12/23/2020 Unknown Acute respiratory failure with hypoxia [facility]) ICD-10-CM: J96.01 
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 
ICD-9-CM: 178  12/17/2020 Yes Bilateral pneumonia ICD-10-CM: J18.9 ICD-9-CM: 287  12/17/2020 Yes Sepsis ([facility] ICD-10-CM: A41.9 ICD-9-CM: 038.9, 995.91  12/17/2020 Yes Hyponatremia ICD-10-CM: E87.1 ICD-9-CM: 276.1  12/17/2020 Unknown Discharge Condition: stable Discharge Medications: There are no discharge medications for this patient. Procedures : intubation Consults: Infectious Disease and Pulmonary/Critical Care Admission HPI : 
 Maxwell Pereira is a 31 y.o. male with h/o HIV not on antivirals, not currently being followed up with infectious disease, no recent viral load or CD4 count presents to the emergency department via ambulance complaining of cough, fever, shortness of breath.  Patient reports that the symptoms have been ongoing for 2 weeks but worsening today.  Patient tested 10 days ago for Covid.  Was Covid negative.  However states that the day after being tested he developed symptoms of fever, cough and shortness of breath.  Patient states that the fever resolved but then woke up again this morning with a fever of 101.  EMS reports that patient was hypoxic on room air in the mid 80s.  Was placed on 2 L and with better oxygenation.  Pt denies nausea, vomiting, abdominal pain, chest pain, and any other sxs or complaints Hospital Course :  
Maxwell Pereira is a 31 y.o. male with h/o HIV not on antivirals, not currently being followed up with infectious disease, no recent viral load or CD4 was admitted for pan-PCP. ID was consulted, AIDS was diagnosed . pneumocystis pneumonia. possible disseminated MAC had been treated with   ethambutol, zithromax, rifampin and on steroid and bactrim. Penicillin was administrated for  Secondary syphyllis. covid 19 was ruled out, he was transferred to icu due to worsening hypoxia. He desat after maximized vent setting. Partial code was granted. \"Called to pronounce patient. No response to noxious stimuli. No spontaneous breath sounds nor cardiac sounds. Pupils fixed and dilated. TOD: 1930. Family at bedside\" Minutes spent on discharge: 45 min Labs: Results:  
   
Chemistry Recent Labs 01/06/21 
0425 01/05/21 
0150 GLU 82 102* * 123* K 4.9 4.0  
CL 90* 89* CO2 28 28 BUN 15 11 CREA 0.79 0.63  
CA 7.8* 7.5* AGAP 8 6 BUCR 19 17 * 269* TP 6.0* 5.7* ALB 1.7* 1.8*  
GLOB 4.3* 3.9 AGRAT 0.4* 0.5* CBC w/Diff Recent Labs 01/06/21 
0425 01/05/21 0150  
WBC 9.8 6.7  
RBC 3.53* 3.26* HGB 10.6* 9.7* HCT 32.5* 29.1*  
 192 GRANS 88* 95* LYMPH 6* 2* EOS 0 0 Cardiac Enzymes Recent Labs 01/06/21 
0425 CPK 72 Coagulation No results for input(s): PTP, INR, APTT, INREXT, INREXT in the last 72 hours. Lipid Panel No results found for: CHOL, CHOLPOCT, CHOLX, CHLST, CHOLV, 492680, HDL, HDLP, LDL, LDLC, DLDLP, 591211, VLDLC, VLDL, TGLX, TRIGL, TRIGP, TGLPOCT, CHHD, CHHDX  
BNP No results for input(s): BNPP in the last 72 hours. Liver Enzymes Recent Labs 01/06/21 0425 TP 6.0* ALB 1.7* * Thyroid Studies Lab Results Component Value Date/Time TSH 2.10 12/23/2020 10:45 AM  
    
 
@micro Significant Diagnostic Studies: Xr Chest Pa Lat Result Date: 12/25/2020 EXAM: CHEST X-RAY  Technique:  Frontal and Lateral views of the chest. History: Cough Comparison: 12/22/2020, 12/20/2020 _______________ FINDINGS: HEART AND MEDIASTINUM: Midline cardiac silhouette, normal in size. Unremarkable hilar vascular structures. LUNGS AND PLEURAL SPACES: Continued progressive diffuse bilateral upper and lower lung, hazy confluent interstitial and alveolar opacities. The lateral projection demonstrates the majority airspace disease is in the posterior thorax. No pneumothorax or effusion. BONY THORAX AND SOFT TISSUES: No acute or destructive osseous abnormality. _______________ IMPRESSION: 1. Interval progressive worsening bilateral airspace disease/pneumonia. Cta Chest W Or W Wo Cont Result Date: 12/18/2020 EXAM: CTA Chest INDICATION: Shortness of breath. Possible PE. COMPARISON: No prior study. TECHNIQUE: Axial CT imaging from the thoracic inlet through the diaphragm with intravenous contrast utilizing CTA study for pulmonary artery evaluation. Coronal and sagittal MIP reformations were generated at a separate workstation. One or more dose reduction techniques were used on this CT: automated exposure control, adjustment of the mAs and/or kVp according to patient size, and iterative reconstruction techniques. The specific techniques used on this CT exam have been documented in the patient's electronic medical record. Digital Imaging and Communications in Medicine (DICOM) format image data are available to nonaffiliated external healthcare facilities or entities on a secure, media free, reciprocally searchable basis with patient authorization for at least a 12-month period after this study. _______________ FINDINGS: EXAM QUALITY: Overall exam quality is suboptimal. Pulmonary arterial enhancement is adequate. Respiratory motion artifact is present, limiting evaluation. PULMONARY ARTERIES: No convincing evidence of pulmonary embolism. MEDIASTINUM: Normal heart size. No evidence of right heart strain. Aorta is unremarkable. No pericardial effusion. LYMPH NODES: No pathologically enlarged lymph nodes lymph nodes. AIRWAY: Unremarkable. LUNGS: Centrilobular emphysema. Superimposed scattered bilateral groundglass opacities throughout the lungs. PLEURA: No pleural effusion or pneumothorax. UPPER ABDOMEN: Visualized upper abdomen is unremarkable. OTHER: No acute or aggressive osseous abnormalities identified. _______________ IMPRESSION: Suboptimal study secondary to motion artifact and bolus timing. No evidence of obvious central pulmonary embolism. Diffuse bilateral groundglass opacities, suspicious for atypical infection. Emphysema. Us Abd Comp Result Date: 12/28/2020 EXAM: COMPLETE ABDOMINAL ULTRASOUND CLINICAL INDICATION/HISTORY: Elevated liver function tests -Additional:  Shortness of breath, pneumonia COMPARISON: CT angiogram of the chest 12/18/2020 TECHNIQUE: Real-time sonography in multiple planes of the abdomen was performed with image documentation. Grayscale, color flow Doppler imaging, and velocity spectral waveform analysis of the portal vein was performed (duplex imaging). _______________ FINDINGS: LIVER:  Normal echogenicity. Solid appearing mass. Somewhat ill-defined areas of hyperechogenicity noted subjacent to the liver capsular surface within the left hepatic lobe very likely in keeping with regional changes of focal fat deposition. Color flow Doppler and velocity spectral waveform analysis of the portal vein shows normal (hepatopetal) direction of flow. BILIARY SYSTEM:  No biliary dilation. Common bile duct measures 6 mm GALLBLADDER:  No gallstones or gallbladder wall thickening. Sonographic Doe's sign is negative per sonographer. RIGHT KIDNEY: 10.7 cm. No hydronephrosis. Lower pole cyst measuring approximately 2.4 cm in maximal dimension with adjacent punctate calcification. LEFT KIDNEY:  11.1 cm. No hydronephrosis or solid renal mass. No visible calculi. PANCREAS:   Visualized portions unremarkable. SPLEEN: Unremarkable. AORTA: Visualized portions unremarkable. IVC: Visualized portions unremarkable. OTHER: Trace amount of perihepatic ascites _______________ IMPRESSION: 1. No evidence of biliary ductal dilatation. 2. Scattered areas of subcapsular focal fat deposition within the liver. 3. Cholelithiasis without evidence of cholecystitis. 4. Trace perihepatic ascites Xr Chest [facility] Result Date: 1/6/2021 EXAM: XR CHEST PORT CLINICAL INDICATION/HISTORY: Respiratory failure and hypoxemia -Additional: None COMPARISON: 1/6/2021 TECHNIQUE: Portable frontal view of the chest _______________ FINDINGS: SUPPORT DEVICES: Endotracheal tube and visualized nasogastric tube both project in stable position. Tip of the nasogastric tube is below the diaphragm, collimated from view. HEART AND MEDIASTINUM: Stable appearing cardiac size and mediastinal contours. LUNGS AND PLEURAL SPACES: Multifocal alveolar and interstitial opacities redemonstrated, appearing similar to prior study. No pneumothorax or pleural effusion. BONY THORAX AND SOFT TISSUES: Unremarkable. _______________ IMPRESSION: 1. Support devices in stable position as visualized. 2. Extensive bilateral interstitial and airspace disease overall similar Xr Chest [facility] Result Date: 1/6/2021 
--------------------------------------------------------------------------- <<<<<<<<<           [facility]           >>>>>>>>> --------------------------------------------------------------------------- CLINICAL HISTORY:  Respiratory failure. Endotracheal tube placement. COMPARISON EXAMINATIONS:  January 4, 2021. ---  SINGLE FRONTAL VIEW OF THE CHEST  --- There is an endotracheal tube in good position below the clavicles and above the luca. A gastric tube extends below the diaphragm into the upper abdomen. The tip of the gastric tube is not seen. The cardiomediastinal silhouette is stable. There are diffuse bilateral infiltrates. No significant osseous abnormalities are identified.  -------------- Impression: -------------- Endotracheal and gastric tubes in place. Diffuse bilateral infiltrates similar to prior. Xr Chest [facility] Result Date: 1/4/2021 EXAM: XR CHEST PORT CLINICAL INDICATION/HISTORY: Pneumonia -Additional: None COMPARISON: 1/1/2021 TECHNIQUE: Portable frontal view of the chest _______________ FINDINGS: SUPPORT DEVICES: None. HEART AND MEDIASTINUM: Cardiomediastinal silhouette within normal limits. LUNGS AND PLEURAL SPACES: Diffuse interstitial and alveolar opacities bilaterally. No pneumothorax or pleural effusion. _______________ IMPRESSION: Diffuse interstitial and alveolar opacities bilaterally, unchanged. Xr Chest [facility] Result Date: 1/1/2021 EXAM: XR CHEST PORT CLINICAL INDICATION/HISTORY: PJP pneumonia, shortness of breath -Additional: None COMPARISON: Several prior studies, most recently 12/29/2020 TECHNIQUE: Portable frontal view of the chest _______________ FINDINGS: SUPPORT DEVICES: None. HEART AND MEDIASTINUM: Stable appearing cardiac size and mediastinal contours. LUNGS AND PLEURAL SPACES: Relatively diffuse interstitial and to lesser extent alveolar opacities are present bilaterally. No pneumothorax or pleural effusion. BONY THORAX AND SOFT TISSUES: Unremarkable. _______________ IMPRESSION: Diffuse interstitial and airspace disease, overall similar to prior without superimposed acute abnormality. Xr Chest [facility] Result Date: 12/29/2020 EXAM: CHEST RADIOGRAPH, SINGLE VIEW CLINICAL INDICATION/HISTORY: Pneumonia COMPARISON: Single view chest 12/27/2020 TECHNIQUE: Portable frontal view of the chest was obtained. _______________ FINDINGS: SUPPORT DEVICES: Cardiac monitor leads overlie the chest. HEART AND MEDIASTINUM: Cardiomediastinal silhouette appears within normal limits. Normal caliber thoracic aorta. No central vascular congestion. LUNGS AND PLEURAL SPACES: Diffuse bilateral interstitial infiltrates remain without focal alveolar consolidation. No pleural effusion or pneumothorax. BONY THORAX AND SOFT TISSUES: No acute osseous abnormality. _______________ IMPRESSION: Stable chest with diffuse bilateral interstitial infiltrates. Xr Chest [facility] Result Date: 12/27/2020 EXAM: XR CHEST PORT CLINICAL INDICATION/HISTORY: pneumonia -Additional: None COMPARISON: 12/25/2020, 12/22/2020, 12/20/2020 TECHNIQUE: Frontal view of the chest _______________ FINDINGS: HEART AND MEDIASTINUM: Midline cardiac silhouette, normal in size. Unremarkable hilar vascular structures. LUNGS AND PLEURAL SPACES: Persistent mild hypoinflation with no interval change in appearance of the diffuse bilateral alveolar and interstitial opacities. No pneumothorax or effusion. BONY THORAX AND SOFT TISSUES: No acute or destructive osseous abnormality. _______________ IMPRESSION: 1. No interval change in appearance of the diffuse bilateral alveolar and interstitial opacity/pneumonia. No new finding since preceding day. Xr Chest [facility] Result Date: 12/22/2020 EXAM: XR CHEST PORT CLINICAL INDICATION/HISTORY: SOB, PCJ pneumonia -Additional: None COMPARISON: 12/20/2020, 12/18/2020 TECHNIQUE: Frontal view of the chest _______________ FINDINGS: HEART AND MEDIASTINUM: Midline cardiac silhouette, normal in size. Stable mediastinal and hilar structures LUNGS AND PLEURAL SPACES: Multifocal bilateral confluent perihilar, mid and lower lung opacities with mild increased right diaphragmatic confluence may be secondary to slight the lung volumes. Unchanged bilateral interstitial prominence. No pneumothorax or significant pleural effusion. BONY THORAX AND SOFT TISSUES: No acute or destructive osseous abnormality. _______________ IMPRESSION: 1. Interval developing right diaphragmatic patchy confluent/consolidation, superimposed upon extensive bilateral alveolar pneumonia. Diagnostic considerations include atelectasis in the setting of slight decreased lung volumes versus progressive right basilar disease. Xr Chest [facility] Result Date: 12/20/2020 EXAM: XR CHEST PORT CLINICAL INDICATION/HISTORY: interval change   > Additional: None. COMPARISON: Chest x-ray dated December 18, 2020 TECHNIQUE: Portable chest _______________ FINDINGS: SUPPORT DEVICES: None. HEART AND MEDIASTINUM: Normal size and contour. Normal pulmonary vasculature. LUNGS AND PLEURAL SPACES: Ongoing bilateral perihilar and central pulmonary interstitial opacities demonstrate little change compared to the prior study, perhaps slightly improved in the right perihilar region. No pneumothorax or effusion. BONY THORAX AND SOFT TISSUES: No acute osseous abnormality. _______________ IMPRESSION: Persistent bilateral pulmonary infiltrates suggesting combination of edema and pneumonia with little interval change, perhaps slightly improved in the right perihilar region. *   * Xr Chest [place] Result Date: 12/18/2020 EXAM: XR CHEST PORT CLINICAL INDICATION/HISTORY: SOB -Additional: None COMPARISON: One day prior TECHNIQUE: Portable frontal view of the chest _______________ FINDINGS: SUPPORT DEVICES: None. HEART AND MEDIASTINUM: Cardiomediastinal silhouette within normal limits. LUNGS AND PLEURAL SPACES: Perihilar/central bilateral interstitial/parenchymal opacities, slightly worsened from prior study. No large effusion or pneumothorax. _______________ IMPRESSION: Perihilar/central bilateral interstitial/parenchymal opacities, slightly worsened from prior study. Xr Chest [place] Result Date: 12/17/2020 EXAM: Portable frontal view of the chest. CLINICAL INDICATION/HISTORY: Shortness of breath COMPARISON: None. _______________ FINDINGS: There is diffuse hazy and groundglass appearing opacity throughout both lungs, with denser areas of opacity in the medial right upper and lower lung and within the region of the central left upper lobe. No pleural effusion. Normal heart size. No acute osseous findings. _______________ IMPRESSION: 1. Diffuse bilateral lung opacity most concerning for bilateral pneumonia. Echo Adult Complete Result Date: 12/18/2020 · Left Ventricle: Normal cavity size, wall thickness and systolic function (ejection fraction normal). The estimated EF is 55 - 60%. There is inconclusive left ventricular diastolic function E'E= 9.59. Wall Scoring: The left ventricular wall motion is normal. · Tricuspid Valve: Tricuspid valve not well visualized. No stenosis. Tricuspid regurgitation is inadequate for estimation of right ventricular systolic pressure. · Pericardium: Trivial pericardial effusion noted. No indications of tamponade present. [facility] CC: None

## 2021-01-08 LAB
BACTERIA SPEC CULT: ABNORMAL
BACTERIA SPEC CULT: ABNORMAL
GRAM STN SPEC: ABNORMAL
SERVICE CMNT-IMP: ABNORMAL

## 2021-01-08 NOTE — PROGRESS NOTES
EMR reviewed. Patient noted to be in restraints within 24 hours of the time of death. Information entered into internal log on: 1/8/2021.

## 2021-01-09 LAB
BACTERIA SPEC CULT: NORMAL
BACTERIA SPEC CULT: NORMAL
SERVICE CMNT-IMP: NORMAL
SERVICE CMNT-IMP: NORMAL

## 2021-01-10 LAB
HEALTH STATUS, XMCV2T: NORMAL
SARS-COV-2, COV2NT: NOT DETECTED
SOURCE, COVRS: NORMAL
SPECIMEN TYPE, XMCV1T: NORMAL

## 2021-01-11 LAB
BACTERIA SPEC CULT: ABNORMAL
HIV GENOSURE PRIME(R) COMMENT: NORMAL
HIV SUSC PNL ISLT GENOTYP: NORMAL
HLA-B*57:01 SPEC QL: NEGATIVE
P JIROVECII DNA # BAL NAA+PROBE: ABNORMAL COPIES/ML
SERVICE CMNT-IMP: ABNORMAL
SOURCE, PJPB1: ABNORMAL
SPECIMEN SOURCE: ABNORMAL
TEST PERFORMANCE INFO SPEC: NORMAL

## 2021-01-12 LAB
BACTERIA SPEC CULT: NORMAL
SERVICE CMNT-IMP: NORMAL

## 2021-01-14 LAB
SPECIMEN SOURCE: ABNORMAL
VIRUS SPEC CULT: ABNORMAL

## 2021-03-29 LAB
ACID FAST STN SPEC: NEGATIVE
MYCOBACTERIUM SPEC QL CULT: NEGATIVE
SPECIMEN SOURCE: NORMAL
SPECIMEN SOURCE: NORMAL

## 2021-05-08 NOTE — PROGRESS NOTES
BRONCHOSPASM/BRONCHOCONSTRICTION     [x]         IMPROVE AERATION/BREATH SOUNDS  [x]   ADMINISTER BRONCHODILATOR THERAPY AS APPROPRIATE  [x]   ASSESS BREATH SOUNDS  []   IMPLEMENT AEROSOL/MDI PROTOCOL  [x]   PATIENT EDUCATION AS NEEDED    PROVIDE ADEQUATE OXYGENATION WITH ACCEPTABLE SP02/ABG'S    [x]  IDENTIFY APPROPRIATE OXYGEN THERAPY  [x]   MONITOR SP02/ABG'S AS NEEDED   [x]   PATIENT EDUCATION AS NEEDED Bedside and Verbal shift change report given to [facility], RN (oncoming nurse) by Jessica Ortiz RN 
 (offgoing nurse). Report included the following information SBAR, Kardex, ED Summary, Procedure Summary, Intake/Output, MAR, Recent Results and Med Rec Status.